# Patient Record
Sex: MALE | Race: BLACK OR AFRICAN AMERICAN | Employment: OTHER | URBAN - METROPOLITAN AREA
[De-identification: names, ages, dates, MRNs, and addresses within clinical notes are randomized per-mention and may not be internally consistent; named-entity substitution may affect disease eponyms.]

---

## 2017-03-01 ENCOUNTER — HOSPITAL ENCOUNTER (OUTPATIENT)
Dept: PAIN MANAGEMENT | Age: 62
Discharge: HOME | End: 2017-03-01
Payer: MEDICARE

## 2017-03-01 VITALS
TEMPERATURE: 98.4 F | HEART RATE: 91 BPM | DIASTOLIC BLOOD PRESSURE: 105 MMHG | SYSTOLIC BLOOD PRESSURE: 190 MMHG | RESPIRATION RATE: 16 BRPM

## 2017-03-01 DIAGNOSIS — M43.16 SPONDYLOLISTHESIS, LUMBAR REGION: Primary | ICD-10-CM

## 2017-03-01 DIAGNOSIS — M54.50 CHRONIC MIDLINE LOW BACK PAIN WITHOUT SCIATICA: ICD-10-CM

## 2017-03-01 DIAGNOSIS — G89.29 CHRONIC MIDLINE LOW BACK PAIN WITHOUT SCIATICA: ICD-10-CM

## 2017-03-01 PROCEDURE — 99214 OFFICE O/P EST MOD 30 MIN: CPT

## 2017-03-01 RX ORDER — MELOXICAM 15 MG/1
15 TABLET ORAL DAILY
Qty: 30 TABLET | Refills: 0 | Status: ON HOLD | OUTPATIENT
Start: 2017-03-06 | End: 2017-04-23 | Stop reason: HOSPADM

## 2017-03-01 RX ORDER — OXYCODONE HYDROCHLORIDE 5 MG/1
5 TABLET ORAL EVERY 6 HOURS PRN
Qty: 56 TABLET | Refills: 0 | Status: SHIPPED | OUTPATIENT
Start: 2017-03-06 | End: 2017-03-20 | Stop reason: SDUPTHER

## 2017-03-01 ASSESSMENT — PAIN DESCRIPTION - PROGRESSION: CLINICAL_PROGRESSION: NOT CHANGED

## 2017-03-01 ASSESSMENT — PAIN SCALES - GENERAL: PAINLEVEL_OUTOF10: 10

## 2017-03-01 ASSESSMENT — PAIN DESCRIPTION - FREQUENCY: FREQUENCY: CONTINUOUS

## 2017-03-01 ASSESSMENT — PAIN DESCRIPTION - PAIN TYPE: TYPE: CHRONIC PAIN

## 2017-03-01 ASSESSMENT — PAIN DESCRIPTION - LOCATION: LOCATION: BACK

## 2017-03-01 ASSESSMENT — PAIN DESCRIPTION - ONSET: ONSET: ON-GOING

## 2017-03-01 ASSESSMENT — PAIN DESCRIPTION - ORIENTATION: ORIENTATION: LOWER

## 2017-03-02 ENCOUNTER — OFFICE VISIT (OUTPATIENT)
Dept: INTERNAL MEDICINE | Facility: CLINIC | Age: 62
End: 2017-03-02

## 2017-03-02 VITALS
SYSTOLIC BLOOD PRESSURE: 134 MMHG | HEIGHT: 70 IN | HEART RATE: 104 BPM | BODY MASS INDEX: 29.92 KG/M2 | DIASTOLIC BLOOD PRESSURE: 82 MMHG | WEIGHT: 209 LBS | OXYGEN SATURATION: 100 %

## 2017-03-02 DIAGNOSIS — Z00.00 HEALTHCARE MAINTENANCE: ICD-10-CM

## 2017-03-02 DIAGNOSIS — G47.33 OSA (OBSTRUCTIVE SLEEP APNEA): Primary | ICD-10-CM

## 2017-03-02 DIAGNOSIS — J44.9 CHRONIC OBSTRUCTIVE PULMONARY DISEASE, UNSPECIFIED COPD TYPE (HCC): ICD-10-CM

## 2017-03-02 LAB — HBA1C MFR BLD: 5.6 %

## 2017-03-02 PROCEDURE — 3017F COLORECTAL CA SCREEN DOC REV: CPT | Performed by: INTERNAL MEDICINE

## 2017-03-02 PROCEDURE — G8484 FLU IMMUNIZE NO ADMIN: HCPCS | Performed by: INTERNAL MEDICINE

## 2017-03-02 PROCEDURE — 3023F SPIROM DOC REV: CPT | Performed by: INTERNAL MEDICINE

## 2017-03-02 PROCEDURE — G8419 CALC BMI OUT NRM PARAM NOF/U: HCPCS | Performed by: INTERNAL MEDICINE

## 2017-03-02 PROCEDURE — 99214 OFFICE O/P EST MOD 30 MIN: CPT | Performed by: INTERNAL MEDICINE

## 2017-03-02 PROCEDURE — 83036 HEMOGLOBIN GLYCOSYLATED A1C: CPT | Performed by: INTERNAL MEDICINE

## 2017-03-02 PROCEDURE — G8926 SPIRO NO PERF OR DOC: HCPCS | Performed by: INTERNAL MEDICINE

## 2017-03-02 PROCEDURE — 4004F PT TOBACCO SCREEN RCVD TLK: CPT | Performed by: INTERNAL MEDICINE

## 2017-03-02 PROCEDURE — G8427 DOCREV CUR MEDS BY ELIG CLIN: HCPCS | Performed by: INTERNAL MEDICINE

## 2017-03-02 RX ORDER — AMLODIPINE BESYLATE 5 MG/1
5 TABLET ORAL DAILY
Qty: 90 TABLET | Refills: 1 | Status: SHIPPED | OUTPATIENT
Start: 2017-03-02 | End: 2017-06-13 | Stop reason: SDUPTHER

## 2017-03-02 ASSESSMENT — COPD QUESTIONNAIRES: COPD: 1

## 2017-03-02 ASSESSMENT — PATIENT HEALTH QUESTIONNAIRE - PHQ9
SUM OF ALL RESPONSES TO PHQ9 QUESTIONS 1 & 2: 0
2. FEELING DOWN, DEPRESSED OR HOPELESS: 0
1. LITTLE INTEREST OR PLEASURE IN DOING THINGS: 0
SUM OF ALL RESPONSES TO PHQ9 QUESTIONS 1 & 2: 2
1. LITTLE INTEREST OR PLEASURE IN DOING THINGS: 1
SUM OF ALL RESPONSES TO PHQ QUESTIONS 1-9: 2
SUM OF ALL RESPONSES TO PHQ QUESTIONS 1-9: 0
2. FEELING DOWN, DEPRESSED OR HOPELESS: 1

## 2017-03-02 ASSESSMENT — ENCOUNTER SYMPTOMS: SHORTNESS OF BREATH: 1

## 2017-03-20 RX ORDER — OXYCODONE HYDROCHLORIDE 5 MG/1
5 TABLET ORAL EVERY 6 HOURS PRN
Qty: 56 TABLET | Refills: 0 | Status: SHIPPED | OUTPATIENT
Start: 2017-03-20 | End: 2017-03-30 | Stop reason: SDUPTHER

## 2017-03-30 ENCOUNTER — HOSPITAL ENCOUNTER (OUTPATIENT)
Dept: PAIN MANAGEMENT | Age: 62
Discharge: HOME | End: 2017-03-30
Payer: MEDICARE

## 2017-03-30 VITALS
TEMPERATURE: 98.2 F | DIASTOLIC BLOOD PRESSURE: 91 MMHG | SYSTOLIC BLOOD PRESSURE: 159 MMHG | WEIGHT: 214 LBS | RESPIRATION RATE: 18 BRPM | HEART RATE: 80 BPM | HEIGHT: 70 IN | BODY MASS INDEX: 30.64 KG/M2

## 2017-03-30 DIAGNOSIS — M25.552 HIP PAIN, LEFT: ICD-10-CM

## 2017-03-30 PROCEDURE — 99213 OFFICE O/P EST LOW 20 MIN: CPT

## 2017-03-30 PROCEDURE — 77002 NEEDLE LOCALIZATION BY XRAY: CPT

## 2017-03-30 PROCEDURE — 2500000003 HC RX 250 WO HCPCS

## 2017-03-30 PROCEDURE — 6360000002 HC RX W HCPCS

## 2017-03-30 PROCEDURE — 20610 DRAIN/INJ JOINT/BURSA W/O US: CPT

## 2017-03-30 RX ORDER — OXYCODONE HYDROCHLORIDE 5 MG/1
5 TABLET ORAL EVERY 6 HOURS PRN
Qty: 56 TABLET | Refills: 0 | Status: SHIPPED | OUTPATIENT
Start: 2017-04-03 | End: 2017-04-18 | Stop reason: SDUPTHER

## 2017-03-30 ASSESSMENT — ENCOUNTER SYMPTOMS
UNUSUAL HAIR DISTRIBUTION: 0
EYE DISCHARGE: 0
SUSPICIOUS LESIONS: 0
BLURRED VISION: 0

## 2017-03-30 ASSESSMENT — PAIN DESCRIPTION - ORIENTATION: ORIENTATION: RIGHT;LEFT

## 2017-03-30 ASSESSMENT — PAIN DESCRIPTION - PROGRESSION: CLINICAL_PROGRESSION: GRADUALLY WORSENING

## 2017-03-30 ASSESSMENT — PAIN DESCRIPTION - PAIN TYPE: TYPE: CHRONIC PAIN

## 2017-03-30 ASSESSMENT — PAIN DESCRIPTION - LOCATION: LOCATION: BACK;HIP;BUTTOCKS;LEG

## 2017-03-30 ASSESSMENT — PAIN SCALES - GENERAL: PAINLEVEL_OUTOF10: 10

## 2017-04-07 ENCOUNTER — HOSPITAL ENCOUNTER (OUTPATIENT)
Dept: PAIN MANAGEMENT | Age: 62
Discharge: HOME | End: 2017-04-07
Payer: MEDICARE

## 2017-04-07 VITALS
OXYGEN SATURATION: 95 % | HEIGHT: 70 IN | BODY MASS INDEX: 30.64 KG/M2 | RESPIRATION RATE: 16 BRPM | DIASTOLIC BLOOD PRESSURE: 90 MMHG | TEMPERATURE: 98 F | WEIGHT: 214 LBS | HEART RATE: 81 BPM | SYSTOLIC BLOOD PRESSURE: 141 MMHG

## 2017-04-07 DIAGNOSIS — M54.9 BACKACHE, UNSPECIFIED: ICD-10-CM

## 2017-04-07 PROCEDURE — G0260 INJ FOR SACROILIAC JT ANESTH: HCPCS

## 2017-04-07 PROCEDURE — 6360000002 HC RX W HCPCS: Performed by: PAIN MEDICINE

## 2017-04-07 PROCEDURE — 2580000003 HC RX 258: Performed by: PAIN MEDICINE

## 2017-04-07 RX ORDER — MIDAZOLAM HYDROCHLORIDE 1 MG/ML
2 INJECTION INTRAMUSCULAR; INTRAVENOUS ONCE
Status: COMPLETED | OUTPATIENT
Start: 2017-04-07 | End: 2017-04-07

## 2017-04-07 RX ORDER — SODIUM CHLORIDE, SODIUM LACTATE, POTASSIUM CHLORIDE, CALCIUM CHLORIDE 600; 310; 30; 20 MG/100ML; MG/100ML; MG/100ML; MG/100ML
INJECTION, SOLUTION INTRAVENOUS CONTINUOUS
Status: DISCONTINUED | OUTPATIENT
Start: 2017-04-07 | End: 2017-04-08 | Stop reason: HOSPADM

## 2017-04-07 RX ORDER — FENTANYL CITRATE 50 UG/ML
100 INJECTION, SOLUTION INTRAMUSCULAR; INTRAVENOUS ONCE
Status: DISCONTINUED | OUTPATIENT
Start: 2017-04-07 | End: 2017-04-08 | Stop reason: HOSPADM

## 2017-04-07 RX ADMIN — MIDAZOLAM HYDROCHLORIDE 2 MG: 1 INJECTION, SOLUTION INTRAMUSCULAR; INTRAVENOUS at 10:11

## 2017-04-07 RX ADMIN — SODIUM CHLORIDE, POTASSIUM CHLORIDE, SODIUM LACTATE AND CALCIUM CHLORIDE: 600; 310; 30; 20 INJECTION, SOLUTION INTRAVENOUS at 09:58

## 2017-04-07 ASSESSMENT — PAIN - FUNCTIONAL ASSESSMENT
PAIN_FUNCTIONAL_ASSESSMENT: 0-10

## 2017-04-07 ASSESSMENT — PAIN DESCRIPTION - DESCRIPTORS: DESCRIPTORS: CONSTANT;NUMBNESS;STABBING

## 2017-04-13 ENCOUNTER — HOSPITAL ENCOUNTER (OUTPATIENT)
Dept: MRI IMAGING | Age: 62
Discharge: HOME | End: 2017-04-13
Payer: MEDICARE

## 2017-04-13 DIAGNOSIS — M51.36 LUMBAR DEGENERATIVE DISC DISEASE: ICD-10-CM

## 2017-04-13 DIAGNOSIS — M54.16 LUMBAR RADICULOPATHY: ICD-10-CM

## 2017-04-13 PROCEDURE — A9579 GAD-BASE MR CONTRAST NOS,1ML: HCPCS | Performed by: PHYSICIAN ASSISTANT

## 2017-04-13 PROCEDURE — 72158 MRI LUMBAR SPINE W/O & W/DYE: CPT

## 2017-04-13 PROCEDURE — 6360000004 HC RX CONTRAST MEDICATION: Performed by: PHYSICIAN ASSISTANT

## 2017-04-13 RX ADMIN — GADOPENTETATE DIMEGLUMINE 20 ML: 469.01 INJECTION INTRAVENOUS at 09:34

## 2017-04-21 ENCOUNTER — APPOINTMENT (OUTPATIENT)
Dept: CT IMAGING | Age: 62
DRG: 066 | End: 2017-04-21
Payer: MEDICARE

## 2017-04-21 ENCOUNTER — TELEPHONE (OUTPATIENT)
Dept: PULMONOLOGY | Age: 62
End: 2017-04-21

## 2017-04-21 ENCOUNTER — APPOINTMENT (OUTPATIENT)
Dept: MRI IMAGING | Age: 62
DRG: 066 | End: 2017-04-21
Payer: MEDICARE

## 2017-04-21 ENCOUNTER — HOSPITAL ENCOUNTER (INPATIENT)
Age: 62
LOS: 2 days | Discharge: HOME | DRG: 066 | End: 2017-04-23
Attending: EMERGENCY MEDICINE | Admitting: INTERNAL MEDICINE
Payer: MEDICARE

## 2017-04-21 DIAGNOSIS — I63.9 CEREBROVASCULAR ACCIDENT (CVA), UNSPECIFIED MECHANISM (HCC): ICD-10-CM

## 2017-04-21 DIAGNOSIS — H53.9 VISION DISTURBANCE: ICD-10-CM

## 2017-04-21 DIAGNOSIS — R29.90 STROKE-LIKE SYMPTOMS: Primary | ICD-10-CM

## 2017-04-21 LAB
% CKMB: 1.6 % (ref 0–3.5)
ABSOLUTE EOS #: 0.2 K/UL (ref 0–0.4)
ABSOLUTE LYMPH #: 1.7 K/UL (ref 1–4.8)
ABSOLUTE MONO #: 0.4 K/UL (ref 0.1–1.2)
ANION GAP SERPL CALCULATED.3IONS-SCNC: 12 MMOL/L (ref 9–17)
BASOPHILS # BLD: 2 % (ref 0–2)
BASOPHILS ABSOLUTE: 0.1 K/UL (ref 0–0.2)
BUN BLDV-MCNC: 13 MG/DL (ref 6–20)
BUN/CREAT BLD: ABNORMAL (ref 9–20)
CALCIUM SERPL-MCNC: 8.4 MG/DL (ref 8.6–10.4)
CHLORIDE BLD-SCNC: 102 MMOL/L (ref 98–107)
CK MB: 2.3 NG/ML
CKMB INTERPRETATION: ABNORMAL
CO2: 24 MMOL/L (ref 20–31)
CREAT SERPL-MCNC: 0.86 MG/DL (ref 0.7–1.2)
DIFFERENTIAL TYPE: ABNORMAL
EOSINOPHILS RELATIVE PERCENT: 3 % (ref 1–4)
GFR AFRICAN AMERICAN: >60 ML/MIN
GFR NON-AFRICAN AMERICAN: >60 ML/MIN
GFR SERPL CREATININE-BSD FRML MDRD: ABNORMAL ML/MIN/{1.73_M2}
GFR SERPL CREATININE-BSD FRML MDRD: ABNORMAL ML/MIN/{1.73_M2}
GLUCOSE BLD-MCNC: 109 MG/DL (ref 70–99)
GLUCOSE BLD-MCNC: 98 MG/DL (ref 75–110)
HCT VFR BLD CALC: 41.7 % (ref 41–53)
HEMOGLOBIN: 14.5 G/DL (ref 13.5–17.5)
INR BLD: 1
LYMPHOCYTES # BLD: 28 % (ref 24–44)
MCH RBC QN AUTO: 28.5 PG (ref 26–34)
MCHC RBC AUTO-ENTMCNC: 34.7 G/DL (ref 31–37)
MCV RBC AUTO: 82.1 FL (ref 80–100)
MONOCYTES # BLD: 6 % (ref 2–11)
MYOGLOBIN: 32 NG/ML (ref 28–72)
PARTIAL THROMBOPLASTIN TIME: 26.8 SEC (ref 21.3–31.3)
PDW BLD-RTO: 14.8 % (ref 12.5–15.4)
PLATELET # BLD: 178 K/UL (ref 140–450)
PLATELET ESTIMATE: ABNORMAL
PMV BLD AUTO: 8.4 FL (ref 6–12)
POC TROPONIN I: 0 NG/ML (ref 0–0.1)
POC TROPONIN INTERP: NORMAL
POTASSIUM SERPL-SCNC: 3.8 MMOL/L (ref 3.7–5.3)
PROTHROMBIN TIME: 10.6 SEC (ref 9.4–12.6)
RBC # BLD: 5.08 M/UL (ref 4.5–5.9)
RBC # BLD: ABNORMAL 10*6/UL
SEG NEUTROPHILS: 61 % (ref 36–66)
SEGMENTED NEUTROPHILS ABSOLUTE COUNT: 3.7 K/UL (ref 1.8–7.7)
SODIUM BLD-SCNC: 138 MMOL/L (ref 135–144)
TOTAL CK: 141 U/L (ref 39–308)
TROPONIN INTERP: ABNORMAL
TROPONIN T: <0.03 NG/ML
WBC # BLD: 6.2 K/UL (ref 3.5–11)
WBC # BLD: ABNORMAL 10*3/UL

## 2017-04-21 PROCEDURE — 2060000000 HC ICU INTERMEDIATE R&B

## 2017-04-21 PROCEDURE — 93005 ELECTROCARDIOGRAM TRACING: CPT

## 2017-04-21 PROCEDURE — 82947 ASSAY GLUCOSE BLOOD QUANT: CPT

## 2017-04-21 PROCEDURE — 85025 COMPLETE CBC W/AUTO DIFF WBC: CPT

## 2017-04-21 PROCEDURE — 84484 ASSAY OF TROPONIN QUANT: CPT

## 2017-04-21 PROCEDURE — 6370000000 HC RX 637 (ALT 250 FOR IP): Performed by: INTERNAL MEDICINE

## 2017-04-21 PROCEDURE — 99285 EMERGENCY DEPT VISIT HI MDM: CPT

## 2017-04-21 PROCEDURE — 70498 CT ANGIOGRAPHY NECK: CPT

## 2017-04-21 PROCEDURE — 83874 ASSAY OF MYOGLOBIN: CPT

## 2017-04-21 PROCEDURE — 82550 ASSAY OF CK (CPK): CPT

## 2017-04-21 PROCEDURE — 70496 CT ANGIOGRAPHY HEAD: CPT

## 2017-04-21 PROCEDURE — 70551 MRI BRAIN STEM W/O DYE: CPT

## 2017-04-21 PROCEDURE — 82553 CREATINE MB FRACTION: CPT

## 2017-04-21 PROCEDURE — 6360000002 HC RX W HCPCS: Performed by: INTERNAL MEDICINE

## 2017-04-21 PROCEDURE — 70450 CT HEAD/BRAIN W/O DYE: CPT

## 2017-04-21 PROCEDURE — 80048 BASIC METABOLIC PNL TOTAL CA: CPT

## 2017-04-21 PROCEDURE — 6370000000 HC RX 637 (ALT 250 FOR IP): Performed by: PREVENTIVE MEDICINE

## 2017-04-21 PROCEDURE — 99222 1ST HOSP IP/OBS MODERATE 55: CPT | Performed by: PSYCHIATRY & NEUROLOGY

## 2017-04-21 PROCEDURE — 2580000003 HC RX 258: Performed by: INTERNAL MEDICINE

## 2017-04-21 PROCEDURE — 85610 PROTHROMBIN TIME: CPT

## 2017-04-21 PROCEDURE — 85730 THROMBOPLASTIN TIME PARTIAL: CPT

## 2017-04-21 PROCEDURE — 6360000004 HC RX CONTRAST MEDICATION: Performed by: EMERGENCY MEDICINE

## 2017-04-21 RX ORDER — SODIUM CHLORIDE 0.9 % (FLUSH) 0.9 %
10 SYRINGE (ML) INJECTION EVERY 12 HOURS SCHEDULED
Status: DISCONTINUED | OUTPATIENT
Start: 2017-04-21 | End: 2017-04-23 | Stop reason: HOSPADM

## 2017-04-21 RX ORDER — CLOPIDOGREL BISULFATE 75 MG/1
75 TABLET ORAL ONCE
Status: COMPLETED | OUTPATIENT
Start: 2017-04-21 | End: 2017-04-21

## 2017-04-21 RX ORDER — ACETAMINOPHEN 325 MG/1
650 TABLET ORAL EVERY 4 HOURS PRN
Status: DISCONTINUED | OUTPATIENT
Start: 2017-04-21 | End: 2017-04-23 | Stop reason: HOSPADM

## 2017-04-21 RX ORDER — QUETIAPINE FUMARATE 50 MG/1
50 TABLET, EXTENDED RELEASE ORAL NIGHTLY
Status: DISCONTINUED | OUTPATIENT
Start: 2017-04-21 | End: 2017-04-23 | Stop reason: HOSPADM

## 2017-04-21 RX ORDER — ASPIRIN 325 MG
325 TABLET ORAL DAILY
Status: DISCONTINUED | OUTPATIENT
Start: 2017-04-21 | End: 2017-04-22

## 2017-04-21 RX ORDER — ONDANSETRON 2 MG/ML
4 INJECTION INTRAMUSCULAR; INTRAVENOUS EVERY 6 HOURS PRN
Status: DISCONTINUED | OUTPATIENT
Start: 2017-04-21 | End: 2017-04-23 | Stop reason: HOSPADM

## 2017-04-21 RX ORDER — SODIUM CHLORIDE 0.9 % (FLUSH) 0.9 %
10 SYRINGE (ML) INJECTION PRN
Status: DISCONTINUED | OUTPATIENT
Start: 2017-04-21 | End: 2017-04-23 | Stop reason: HOSPADM

## 2017-04-21 RX ORDER — AMLODIPINE BESYLATE 5 MG/1
5 TABLET ORAL DAILY
Status: DISCONTINUED | OUTPATIENT
Start: 2017-04-21 | End: 2017-04-23 | Stop reason: HOSPADM

## 2017-04-21 RX ADMIN — QUETIAPINE FUMARATE 50 MG: 50 TABLET, EXTENDED RELEASE ORAL at 20:05

## 2017-04-21 RX ADMIN — IOVERSOL 90 ML: 741 INJECTION INTRA-ARTERIAL; INTRAVENOUS at 11:03

## 2017-04-21 RX ADMIN — Medication 10 ML: at 20:07

## 2017-04-21 RX ADMIN — ENOXAPARIN SODIUM 40 MG: 40 INJECTION SUBCUTANEOUS at 18:58

## 2017-04-21 RX ADMIN — ASPIRIN 325 MG: 325 TABLET ORAL at 12:20

## 2017-04-21 RX ADMIN — CLOPIDOGREL 75 MG: 75 TABLET, FILM COATED ORAL at 12:20

## 2017-04-21 RX ADMIN — AMLODIPINE BESYLATE 5 MG: 5 TABLET ORAL at 18:58

## 2017-04-21 ASSESSMENT — PAIN SCALES - GENERAL
PAINLEVEL_OUTOF10: 0

## 2017-04-21 ASSESSMENT — ENCOUNTER SYMPTOMS
BLOOD IN STOOL: 0
EYE REDNESS: 0
WHEEZING: 0
COUGH: 0
VOMITING: 0
EYE DISCHARGE: 0
CHEST TIGHTNESS: 0
TROUBLE SWALLOWING: 0
NAUSEA: 0
DIARRHEA: 0
SHORTNESS OF BREATH: 0
VOICE CHANGE: 0
CONSTIPATION: 0
ABDOMINAL PAIN: 0

## 2017-04-22 PROBLEM — H53.9 VISION CHANGES: Status: ACTIVE | Noted: 2017-04-22

## 2017-04-22 PROBLEM — I63.9 CVA (CEREBRAL VASCULAR ACCIDENT) (HCC): Status: ACTIVE | Noted: 2017-04-22

## 2017-04-22 LAB
ANION GAP SERPL CALCULATED.3IONS-SCNC: 12 MMOL/L (ref 9–17)
BUN BLDV-MCNC: 13 MG/DL (ref 6–20)
BUN/CREAT BLD: ABNORMAL (ref 9–20)
CALCIUM SERPL-MCNC: 8.2 MG/DL (ref 8.6–10.4)
CHLORIDE BLD-SCNC: 103 MMOL/L (ref 98–107)
CO2: 27 MMOL/L (ref 20–31)
CREAT SERPL-MCNC: 1.01 MG/DL (ref 0.7–1.2)
EKG ATRIAL RATE: 76 BPM
EKG P AXIS: 55 DEGREES
EKG P-R INTERVAL: 194 MS
EKG Q-T INTERVAL: 390 MS
EKG QRS DURATION: 78 MS
EKG QTC CALCULATION (BAZETT): 438 MS
EKG R AXIS: 43 DEGREES
EKG T AXIS: 11 DEGREES
EKG VENTRICULAR RATE: 76 BPM
GFR AFRICAN AMERICAN: >60 ML/MIN
GFR NON-AFRICAN AMERICAN: >60 ML/MIN
GFR SERPL CREATININE-BSD FRML MDRD: ABNORMAL ML/MIN/{1.73_M2}
GFR SERPL CREATININE-BSD FRML MDRD: ABNORMAL ML/MIN/{1.73_M2}
GLUCOSE BLD-MCNC: 101 MG/DL (ref 70–99)
HCT VFR BLD CALC: 39.8 % (ref 41–53)
HEMOGLOBIN: 13.6 G/DL (ref 13.5–17.5)
MCH RBC QN AUTO: 28.4 PG (ref 26–34)
MCHC RBC AUTO-ENTMCNC: 34.3 G/DL (ref 31–37)
MCV RBC AUTO: 82.8 FL (ref 80–100)
PDW BLD-RTO: 14.5 % (ref 12.5–15.4)
PLATELET # BLD: 179 K/UL (ref 140–450)
PMV BLD AUTO: 8.3 FL (ref 6–12)
POTASSIUM SERPL-SCNC: 3.8 MMOL/L (ref 3.7–5.3)
RBC # BLD: 4.8 M/UL (ref 4.5–5.9)
SODIUM BLD-SCNC: 142 MMOL/L (ref 135–144)
WBC # BLD: 6.1 K/UL (ref 3.5–11)

## 2017-04-22 PROCEDURE — G8978 MOBILITY CURRENT STATUS: HCPCS

## 2017-04-22 PROCEDURE — 99222 1ST HOSP IP/OBS MODERATE 55: CPT | Performed by: INTERNAL MEDICINE

## 2017-04-22 PROCEDURE — 97535 SELF CARE MNGMENT TRAINING: CPT

## 2017-04-22 PROCEDURE — 85027 COMPLETE CBC AUTOMATED: CPT

## 2017-04-22 PROCEDURE — 94640 AIRWAY INHALATION TREATMENT: CPT

## 2017-04-22 PROCEDURE — 97162 PT EVAL MOD COMPLEX 30 MIN: CPT

## 2017-04-22 PROCEDURE — 6370000000 HC RX 637 (ALT 250 FOR IP): Performed by: PSYCHIATRY & NEUROLOGY

## 2017-04-22 PROCEDURE — 36415 COLL VENOUS BLD VENIPUNCTURE: CPT

## 2017-04-22 PROCEDURE — 80048 BASIC METABOLIC PNL TOTAL CA: CPT

## 2017-04-22 PROCEDURE — 97165 OT EVAL LOW COMPLEX 30 MIN: CPT

## 2017-04-22 PROCEDURE — 6370000000 HC RX 637 (ALT 250 FOR IP): Performed by: INTERNAL MEDICINE

## 2017-04-22 PROCEDURE — 6370000000 HC RX 637 (ALT 250 FOR IP): Performed by: PREVENTIVE MEDICINE

## 2017-04-22 PROCEDURE — 97530 THERAPEUTIC ACTIVITIES: CPT

## 2017-04-22 PROCEDURE — G8980 MOBILITY D/C STATUS: HCPCS

## 2017-04-22 PROCEDURE — 6360000002 HC RX W HCPCS: Performed by: INTERNAL MEDICINE

## 2017-04-22 PROCEDURE — 2580000003 HC RX 258: Performed by: INTERNAL MEDICINE

## 2017-04-22 PROCEDURE — 83036 HEMOGLOBIN GLYCOSYLATED A1C: CPT

## 2017-04-22 PROCEDURE — G8979 MOBILITY GOAL STATUS: HCPCS

## 2017-04-22 PROCEDURE — 2060000000 HC ICU INTERMEDIATE R&B

## 2017-04-22 PROCEDURE — 99222 1ST HOSP IP/OBS MODERATE 55: CPT | Performed by: PSYCHIATRY & NEUROLOGY

## 2017-04-22 PROCEDURE — 97116 GAIT TRAINING THERAPY: CPT

## 2017-04-22 RX ORDER — ASPIRIN 81 MG/1
81 TABLET, CHEWABLE ORAL DAILY
Status: DISCONTINUED | OUTPATIENT
Start: 2017-04-23 | End: 2017-04-23 | Stop reason: HOSPADM

## 2017-04-22 RX ORDER — SIMVASTATIN 40 MG
80 TABLET ORAL NIGHTLY
Status: DISCONTINUED | OUTPATIENT
Start: 2017-04-22 | End: 2017-04-23 | Stop reason: HOSPADM

## 2017-04-22 RX ORDER — ALBUTEROL SULFATE 2.5 MG/3ML
2.5 SOLUTION RESPIRATORY (INHALATION) 4 TIMES DAILY
Status: DISCONTINUED | OUTPATIENT
Start: 2017-04-22 | End: 2017-04-23 | Stop reason: HOSPADM

## 2017-04-22 RX ORDER — CLOPIDOGREL BISULFATE 75 MG/1
75 TABLET ORAL DAILY
Status: DISCONTINUED | OUTPATIENT
Start: 2017-04-22 | End: 2017-04-23 | Stop reason: HOSPADM

## 2017-04-22 RX ORDER — NICOTINE 21 MG/24HR
1 PATCH, TRANSDERMAL 24 HOURS TRANSDERMAL DAILY
Status: DISCONTINUED | OUTPATIENT
Start: 2017-04-22 | End: 2017-04-23 | Stop reason: HOSPADM

## 2017-04-22 RX ADMIN — SIMVASTATIN 80 MG: 40 TABLET, FILM COATED ORAL at 20:18

## 2017-04-22 RX ADMIN — ALBUTEROL SULFATE 2.5 MG: 2.5 SOLUTION RESPIRATORY (INHALATION) at 16:46

## 2017-04-22 RX ADMIN — ASPIRIN 325 MG: 325 TABLET ORAL at 08:41

## 2017-04-22 RX ADMIN — AMLODIPINE BESYLATE 5 MG: 5 TABLET ORAL at 08:41

## 2017-04-22 RX ADMIN — Medication 10 ML: at 20:15

## 2017-04-22 RX ADMIN — ENOXAPARIN SODIUM 40 MG: 40 INJECTION SUBCUTANEOUS at 08:41

## 2017-04-22 RX ADMIN — QUETIAPINE FUMARATE 50 MG: 50 TABLET, EXTENDED RELEASE ORAL at 20:13

## 2017-04-22 RX ADMIN — ALBUTEROL SULFATE 2.5 MG: 2.5 SOLUTION RESPIRATORY (INHALATION) at 20:59

## 2017-04-22 RX ADMIN — Medication 10 ML: at 08:42

## 2017-04-22 RX ADMIN — CLOPIDOGREL 75 MG: 75 TABLET, FILM COATED ORAL at 18:52

## 2017-04-22 ASSESSMENT — PAIN SCALES - GENERAL
PAINLEVEL_OUTOF10: 0
PAINLEVEL_OUTOF10: 0

## 2017-04-23 VITALS
WEIGHT: 207 LBS | DIASTOLIC BLOOD PRESSURE: 58 MMHG | TEMPERATURE: 98 F | HEIGHT: 72 IN | RESPIRATION RATE: 16 BRPM | BODY MASS INDEX: 28.04 KG/M2 | SYSTOLIC BLOOD PRESSURE: 116 MMHG | OXYGEN SATURATION: 98 % | HEART RATE: 97 BPM

## 2017-04-23 PROBLEM — E55.9 VITAMIN D DEFICIENCY: Status: ACTIVE | Noted: 2017-04-23

## 2017-04-23 PROBLEM — E78.00 ELEVATED LDL CHOLESTEROL LEVEL: Status: ACTIVE | Noted: 2017-04-23

## 2017-04-23 LAB
CHOLESTEROL/HDL RATIO: 4.3
CHOLESTEROL: 194 MG/DL
HDLC SERPL-MCNC: 45 MG/DL
LDL CHOLESTEROL: 123 MG/DL (ref 0–130)
TRIGL SERPL-MCNC: 131 MG/DL
VITAMIN D 25-HYDROXY: 10.8 NG/ML (ref 30–100)
VLDLC SERPL CALC-MCNC: NORMAL MG/DL (ref 1–30)

## 2017-04-23 PROCEDURE — 6370000000 HC RX 637 (ALT 250 FOR IP): Performed by: PSYCHIATRY & NEUROLOGY

## 2017-04-23 PROCEDURE — 6360000002 HC RX W HCPCS: Performed by: INTERNAL MEDICINE

## 2017-04-23 PROCEDURE — 6370000000 HC RX 637 (ALT 250 FOR IP): Performed by: INTERNAL MEDICINE

## 2017-04-23 PROCEDURE — 82306 VITAMIN D 25 HYDROXY: CPT

## 2017-04-23 PROCEDURE — 94640 AIRWAY INHALATION TREATMENT: CPT

## 2017-04-23 PROCEDURE — 99232 SBSQ HOSP IP/OBS MODERATE 35: CPT | Performed by: INTERNAL MEDICINE

## 2017-04-23 PROCEDURE — 2580000003 HC RX 258: Performed by: INTERNAL MEDICINE

## 2017-04-23 PROCEDURE — 80061 LIPID PANEL: CPT

## 2017-04-23 PROCEDURE — 36415 COLL VENOUS BLD VENIPUNCTURE: CPT

## 2017-04-23 RX ORDER — SIMVASTATIN 80 MG
80 TABLET ORAL NIGHTLY
Qty: 30 TABLET | Refills: 3 | Status: SHIPPED | OUTPATIENT
Start: 2017-04-23 | End: 2017-06-13 | Stop reason: SDUPTHER

## 2017-04-23 RX ORDER — ALBUTEROL SULFATE 2.5 MG/3ML
2.5 SOLUTION RESPIRATORY (INHALATION) 4 TIMES DAILY
Qty: 120 EACH | Refills: 3 | Status: SHIPPED | OUTPATIENT
Start: 2017-04-23 | End: 2018-03-16

## 2017-04-23 RX ORDER — ERGOCALCIFEROL 1.25 MG/1
50000 CAPSULE ORAL WEEKLY
Status: DISCONTINUED | OUTPATIENT
Start: 2017-04-23 | End: 2017-04-23 | Stop reason: HOSPADM

## 2017-04-23 RX ORDER — ASPIRIN 81 MG/1
81 TABLET, CHEWABLE ORAL DAILY
Qty: 30 TABLET | Refills: 3 | Status: ON HOLD | OUTPATIENT
Start: 2017-04-23 | End: 2017-05-03 | Stop reason: HOSPADM

## 2017-04-23 RX ORDER — NICOTINE 21 MG/24HR
1 PATCH, TRANSDERMAL 24 HOURS TRANSDERMAL DAILY
Qty: 14 PATCH | Refills: 1 | Status: SHIPPED | OUTPATIENT
Start: 2017-04-23 | End: 2017-05-31 | Stop reason: ALTCHOICE

## 2017-04-23 RX ORDER — ERGOCALCIFEROL (VITAMIN D2) 1250 MCG
50000 CAPSULE ORAL WEEKLY
Qty: 12 CAPSULE | Refills: 0 | Status: SHIPPED | OUTPATIENT
Start: 2017-04-23 | End: 2017-05-03

## 2017-04-23 RX ORDER — ALBUTEROL SULFATE 90 UG/1
2 AEROSOL, METERED RESPIRATORY (INHALATION) EVERY 6 HOURS PRN
Qty: 1 INHALER | Refills: 3 | Status: SHIPPED | OUTPATIENT
Start: 2017-04-23 | End: 2018-03-16

## 2017-04-23 RX ORDER — CLOPIDOGREL BISULFATE 75 MG/1
75 TABLET ORAL DAILY
Qty: 21 TABLET | Refills: 0 | Status: SHIPPED | OUTPATIENT
Start: 2017-04-23 | End: 2017-06-13 | Stop reason: SDUPTHER

## 2017-04-23 RX ADMIN — ALBUTEROL SULFATE 2.5 MG: 2.5 SOLUTION RESPIRATORY (INHALATION) at 11:41

## 2017-04-23 RX ADMIN — AMLODIPINE BESYLATE 5 MG: 5 TABLET ORAL at 08:42

## 2017-04-23 RX ADMIN — ALBUTEROL SULFATE 2.5 MG: 2.5 SOLUTION RESPIRATORY (INHALATION) at 09:11

## 2017-04-23 RX ADMIN — ERGOCALCIFEROL 50000 UNITS: 1.25 CAPSULE ORAL at 11:58

## 2017-04-23 RX ADMIN — ASPIRIN 81 MG: 81 TABLET, CHEWABLE ORAL at 08:42

## 2017-04-23 RX ADMIN — CLOPIDOGREL 75 MG: 75 TABLET, FILM COATED ORAL at 08:42

## 2017-04-23 RX ADMIN — ENOXAPARIN SODIUM 40 MG: 40 INJECTION SUBCUTANEOUS at 08:42

## 2017-04-23 RX ADMIN — Medication 10 ML: at 08:43

## 2017-04-23 ASSESSMENT — PAIN SCALES - GENERAL
PAINLEVEL_OUTOF10: 0
PAINLEVEL_OUTOF10: 0

## 2017-04-24 ENCOUNTER — TELEPHONE (OUTPATIENT)
Dept: INTERNAL MEDICINE | Age: 62
End: 2017-04-24

## 2017-04-24 LAB
ESTIMATED AVERAGE GLUCOSE: 120 MG/DL
HBA1C MFR BLD: 5.8 % (ref 4–6)

## 2017-05-02 ENCOUNTER — APPOINTMENT (OUTPATIENT)
Dept: CT IMAGING | Age: 62
DRG: 066 | End: 2017-05-02
Payer: MEDICARE

## 2017-05-02 ENCOUNTER — HOSPITAL ENCOUNTER (INPATIENT)
Age: 62
LOS: 1 days | Discharge: HOME | DRG: 066 | End: 2017-05-03
Attending: EMERGENCY MEDICINE | Admitting: INTERNAL MEDICINE
Payer: MEDICARE

## 2017-05-02 ENCOUNTER — APPOINTMENT (OUTPATIENT)
Dept: GENERAL RADIOLOGY | Age: 62
DRG: 066 | End: 2017-05-02
Payer: MEDICARE

## 2017-05-02 ENCOUNTER — APPOINTMENT (OUTPATIENT)
Dept: MRI IMAGING | Age: 62
DRG: 066 | End: 2017-05-02
Payer: MEDICARE

## 2017-05-02 DIAGNOSIS — R29.90 STROKE-LIKE SYMPTOMS: Primary | ICD-10-CM

## 2017-05-02 LAB
% CKMB: 1.3 % (ref 0–3.5)
ABSOLUTE EOS #: 0.3 K/UL (ref 0–0.4)
ABSOLUTE LYMPH #: 2.3 K/UL (ref 1–4.8)
ABSOLUTE MONO #: 0.5 K/UL (ref 0.1–1.2)
ANION GAP SERPL CALCULATED.3IONS-SCNC: 14 MMOL/L (ref 9–17)
ANION GAP: 14 MMOL/L (ref 8–16)
BASOPHILS # BLD: 2 %
BASOPHILS ABSOLUTE: 0.1 K/UL (ref 0–0.2)
BUN BLDV-MCNC: 18 MG/DL (ref 6–20)
BUN/CREAT BLD: ABNORMAL (ref 9–20)
CALCIUM SERPL-MCNC: 8.5 MG/DL (ref 8.6–10.4)
CHLORIDE BLD-SCNC: 108 MMOL/L (ref 98–107)
CK MB: 2.2 NG/ML
CKMB INTERPRETATION: ABNORMAL
CO2: 22 MMOL/L (ref 20–31)
CREAT SERPL-MCNC: 1.09 MG/DL (ref 0.7–1.2)
DIFFERENTIAL TYPE: ABNORMAL
EOSINOPHILS RELATIVE PERCENT: 5 %
GFR AFRICAN AMERICAN: >60 ML/MIN
GFR NON-AFRICAN AMERICAN: >60 ML/MIN
GFR SERPL CREATININE-BSD FRML MDRD: ABNORMAL ML/MIN/{1.73_M2}
GFR SERPL CREATININE-BSD FRML MDRD: ABNORMAL ML/MIN/{1.73_M2}
GLUCOSE BLD-MCNC: 103 MG/DL (ref 74–106)
GLUCOSE BLD-MCNC: 111 MG/DL (ref 70–99)
HCO3 VENOUS: 25.7 MMOL/L (ref 24–30)
HCT VFR BLD CALC: 40 % (ref 41–53)
HEMOGLOBIN: 13.5 G/DL (ref 13.5–17.5)
INR BLD: 0.9
LYMPHOCYTES # BLD: 34 %
MCH RBC QN AUTO: 28 PG (ref 26–34)
MCHC RBC AUTO-ENTMCNC: 33.8 G/DL (ref 31–37)
MCV RBC AUTO: 82.9 FL (ref 80–100)
MONOCYTES # BLD: 8 %
MYOGLOBIN: 32 NG/ML (ref 28–72)
NEGATIVE BASE EXCESS, VEN: ABNORMAL (ref 0–2)
PARTIAL THROMBOPLASTIN TIME: 26.3 SEC (ref 21.3–31.3)
PCO2, VEN: 45 MM HG (ref 39–55)
PDW BLD-RTO: 14.8 % (ref 12.5–15.4)
PH VENOUS: 7.37 (ref 7.32–7.42)
PLATELET # BLD: 203 K/UL (ref 140–450)
PLATELET ESTIMATE: ABNORMAL
PMV BLD AUTO: 8.5 FL (ref 6–12)
POC BUN: 19 MG/DL (ref 6–20)
POC CHLORIDE: 110 MMOL/L (ref 98–110)
POC CREATININE WHOLE BLOOD: 1.2
POC CREATININE: 1.2 MG/DL (ref 0.6–1.4)
POC HEMATOCRIT: 39 % (ref 41–53)
POC HEMOGLOBIN: 13.3 GM/DL (ref 13.5–17.5)
POC POTASSIUM: 3.9 MMOL/L (ref 3.5–5.1)
POC SODIUM: 146 MMOL/L (ref 136–145)
POC TROPONIN I: 0 NG/ML (ref 0–0.1)
POC TROPONIN INTERP: NORMAL
POSITIVE BASE EXCESS, VEN: 0 (ref 0–2)
POTASSIUM SERPL-SCNC: 4 MMOL/L (ref 3.7–5.3)
PROTHROMBIN TIME: 10 SEC (ref 9.4–12.6)
RBC # BLD: 4.83 M/UL (ref 4.5–5.9)
RBC # BLD: ABNORMAL 10*6/UL
SEG NEUTROPHILS: 51 %
SEGMENTED NEUTROPHILS ABSOLUTE COUNT: 3.5 K/UL (ref 1.8–7.7)
SODIUM BLD-SCNC: 144 MMOL/L (ref 135–144)
TOTAL CK: 176 U/L (ref 39–308)
TOTAL CO2, VENOUS: 27 MM HG (ref 25–31)
TROPONIN INTERP: ABNORMAL
TROPONIN T: <0.03 NG/ML
WBC # BLD: 6.7 K/UL (ref 3.5–11)
WBC # BLD: ABNORMAL 10*3/UL

## 2017-05-02 PROCEDURE — 84295 ASSAY OF SERUM SODIUM: CPT

## 2017-05-02 PROCEDURE — 80048 BASIC METABOLIC PNL TOTAL CA: CPT

## 2017-05-02 PROCEDURE — 82553 CREATINE MB FRACTION: CPT

## 2017-05-02 PROCEDURE — 84484 ASSAY OF TROPONIN QUANT: CPT

## 2017-05-02 PROCEDURE — 84132 ASSAY OF SERUM POTASSIUM: CPT

## 2017-05-02 PROCEDURE — 2580000003 HC RX 258: Performed by: EMERGENCY MEDICINE

## 2017-05-02 PROCEDURE — 6360000004 HC RX CONTRAST MEDICATION: Performed by: EMERGENCY MEDICINE

## 2017-05-02 PROCEDURE — 93005 ELECTROCARDIOGRAM TRACING: CPT

## 2017-05-02 PROCEDURE — 82803 BLOOD GASES ANY COMBINATION: CPT

## 2017-05-02 PROCEDURE — 82565 ASSAY OF CREATININE: CPT

## 2017-05-02 PROCEDURE — 82947 ASSAY GLUCOSE BLOOD QUANT: CPT

## 2017-05-02 PROCEDURE — 85025 COMPLETE CBC W/AUTO DIFF WBC: CPT

## 2017-05-02 PROCEDURE — 70498 CT ANGIOGRAPHY NECK: CPT

## 2017-05-02 PROCEDURE — 85730 THROMBOPLASTIN TIME PARTIAL: CPT

## 2017-05-02 PROCEDURE — 83874 ASSAY OF MYOGLOBIN: CPT

## 2017-05-02 PROCEDURE — 70551 MRI BRAIN STEM W/O DYE: CPT

## 2017-05-02 PROCEDURE — 84520 ASSAY OF UREA NITROGEN: CPT

## 2017-05-02 PROCEDURE — 70496 CT ANGIOGRAPHY HEAD: CPT

## 2017-05-02 PROCEDURE — 2060000000 HC ICU INTERMEDIATE R&B

## 2017-05-02 PROCEDURE — 85014 HEMATOCRIT: CPT

## 2017-05-02 PROCEDURE — 99223 1ST HOSP IP/OBS HIGH 75: CPT | Performed by: PSYCHIATRY & NEUROLOGY

## 2017-05-02 PROCEDURE — 82550 ASSAY OF CK (CPK): CPT

## 2017-05-02 PROCEDURE — 99285 EMERGENCY DEPT VISIT HI MDM: CPT

## 2017-05-02 PROCEDURE — 71020 XR CHEST STANDARD TWO VW: CPT

## 2017-05-02 PROCEDURE — 82435 ASSAY OF BLOOD CHLORIDE: CPT

## 2017-05-02 PROCEDURE — 70450 CT HEAD/BRAIN W/O DYE: CPT

## 2017-05-02 PROCEDURE — 85610 PROTHROMBIN TIME: CPT

## 2017-05-02 RX ORDER — SODIUM CHLORIDE 9 MG/ML
INJECTION, SOLUTION INTRAVENOUS CONTINUOUS
Status: CANCELLED | OUTPATIENT
Start: 2017-05-02

## 2017-05-02 RX ORDER — 0.9 % SODIUM CHLORIDE 0.9 %
1000 INTRAVENOUS SOLUTION INTRAVENOUS ONCE
Status: COMPLETED | OUTPATIENT
Start: 2017-05-02 | End: 2017-05-02

## 2017-05-02 RX ORDER — CLOPIDOGREL BISULFATE 75 MG/1
75 TABLET ORAL DAILY
Status: DISCONTINUED | OUTPATIENT
Start: 2017-05-03 | End: 2017-05-03 | Stop reason: SDUPTHER

## 2017-05-02 RX ADMIN — SODIUM CHLORIDE 1000 ML: 9 INJECTION, SOLUTION INTRAVENOUS at 21:15

## 2017-05-02 RX ADMIN — IOVERSOL 90 ML: 741 INJECTION INTRA-ARTERIAL; INTRAVENOUS at 21:10

## 2017-05-02 ASSESSMENT — ENCOUNTER SYMPTOMS
VOMITING: 0
ABDOMINAL PAIN: 0
EYE DISCHARGE: 0
RHINORRHEA: 0
SORE THROAT: 0
NAUSEA: 0
SHORTNESS OF BREATH: 0

## 2017-05-03 ENCOUNTER — HOSPITAL ENCOUNTER (OUTPATIENT)
Dept: PAIN MANAGEMENT | Age: 62
Discharge: HOME | End: 2017-05-03
Payer: MEDICARE

## 2017-05-03 ENCOUNTER — HOSPITAL ENCOUNTER (INPATIENT)
Dept: PAIN MANAGEMENT | Age: 62
Discharge: HOME | DRG: 066 | End: 2017-05-03
Payer: MEDICARE

## 2017-05-03 ENCOUNTER — HOSPITAL ENCOUNTER (INPATIENT)
Dept: PAIN MANAGEMENT | Age: 62
DRG: 066 | End: 2017-05-03
Payer: MEDICARE

## 2017-05-03 VITALS
HEIGHT: 70 IN | BODY MASS INDEX: 30.84 KG/M2 | SYSTOLIC BLOOD PRESSURE: 136 MMHG | OXYGEN SATURATION: 98 % | WEIGHT: 215.39 LBS | RESPIRATION RATE: 18 BRPM | DIASTOLIC BLOOD PRESSURE: 82 MMHG | HEART RATE: 74 BPM | TEMPERATURE: 98.1 F

## 2017-05-03 VITALS
HEART RATE: 80 BPM | SYSTOLIC BLOOD PRESSURE: 138 MMHG | TEMPERATURE: 98.2 F | RESPIRATION RATE: 16 BRPM | DIASTOLIC BLOOD PRESSURE: 83 MMHG

## 2017-05-03 DIAGNOSIS — M43.16 SPONDYLOLISTHESIS, LUMBAR REGION: Primary | ICD-10-CM

## 2017-05-03 PROBLEM — I63.81 THALAMIC INFARCT, ACUTE (HCC): Status: ACTIVE | Noted: 2017-05-03

## 2017-05-03 LAB
ALBUMIN SERPL-MCNC: 3.2 G/DL (ref 3.5–5.2)
ALBUMIN/GLOBULIN RATIO: 1.1 (ref 1–2.5)
ALP BLD-CCNC: 78 U/L (ref 40–129)
ALT SERPL-CCNC: 11 U/L (ref 5–41)
ANION GAP SERPL CALCULATED.3IONS-SCNC: 13 MMOL/L (ref 9–17)
AST SERPL-CCNC: 11 U/L
BILIRUB SERPL-MCNC: 0.27 MG/DL (ref 0.3–1.2)
BUN BLDV-MCNC: 13 MG/DL (ref 6–20)
BUN/CREAT BLD: ABNORMAL (ref 9–20)
CALCIUM SERPL-MCNC: 8 MG/DL (ref 8.6–10.4)
CHLORIDE BLD-SCNC: 107 MMOL/L (ref 98–107)
CHOLESTEROL/HDL RATIO: 3.1
CHOLESTEROL: 138 MG/DL
CO2: 23 MMOL/L (ref 20–31)
CREAT SERPL-MCNC: 0.99 MG/DL (ref 0.7–1.2)
EKG ATRIAL RATE: 85 BPM
EKG P AXIS: 48 DEGREES
EKG P-R INTERVAL: 208 MS
EKG Q-T INTERVAL: 376 MS
EKG QRS DURATION: 78 MS
EKG QTC CALCULATION (BAZETT): 447 MS
EKG R AXIS: 32 DEGREES
EKG T AXIS: -9 DEGREES
EKG VENTRICULAR RATE: 85 BPM
ESTIMATED AVERAGE GLUCOSE: 117 MG/DL
GFR AFRICAN AMERICAN: >60 ML/MIN
GFR NON-AFRICAN AMERICAN: >60 ML/MIN
GFR SERPL CREATININE-BSD FRML MDRD: ABNORMAL ML/MIN/{1.73_M2}
GFR SERPL CREATININE-BSD FRML MDRD: ABNORMAL ML/MIN/{1.73_M2}
GLUCOSE BLD-MCNC: 112 MG/DL (ref 70–99)
HBA1C MFR BLD: 5.7 % (ref 4–6)
HCT VFR BLD CALC: 36 % (ref 41–53)
HDLC SERPL-MCNC: 45 MG/DL
HEMOGLOBIN: 12.3 G/DL (ref 13.5–17.5)
LDL CHOLESTEROL: 79 MG/DL (ref 0–130)
LV EF: 58 %
LVEF MODALITY: NORMAL
MCH RBC QN AUTO: 28.2 PG (ref 26–34)
MCHC RBC AUTO-ENTMCNC: 34.2 G/DL (ref 31–37)
MCV RBC AUTO: 82.4 FL (ref 80–100)
PDW BLD-RTO: 13.7 % (ref 12.5–15.4)
PLATELET # BLD: 170 K/UL (ref 140–450)
PMV BLD AUTO: 8.7 FL (ref 6–12)
POTASSIUM SERPL-SCNC: 3.8 MMOL/L (ref 3.7–5.3)
RBC # BLD: 4.37 M/UL (ref 4.5–5.9)
SODIUM BLD-SCNC: 143 MMOL/L (ref 135–144)
TOTAL PROTEIN: 6.2 G/DL (ref 6.4–8.3)
TRIGL SERPL-MCNC: 72 MG/DL
VLDLC SERPL CALC-MCNC: NORMAL MG/DL (ref 1–30)
WBC # BLD: 6.2 K/UL (ref 3.5–11)

## 2017-05-03 PROCEDURE — 80053 COMPREHEN METABOLIC PANEL: CPT

## 2017-05-03 PROCEDURE — G8980 MOBILITY D/C STATUS: HCPCS

## 2017-05-03 PROCEDURE — 85027 COMPLETE CBC AUTOMATED: CPT

## 2017-05-03 PROCEDURE — 99213 OFFICE O/P EST LOW 20 MIN: CPT

## 2017-05-03 PROCEDURE — 99214 OFFICE O/P EST MOD 30 MIN: CPT

## 2017-05-03 PROCEDURE — 97535 SELF CARE MNGMENT TRAINING: CPT

## 2017-05-03 PROCEDURE — 93306 TTE W/DOPPLER COMPLETE: CPT

## 2017-05-03 PROCEDURE — 36415 COLL VENOUS BLD VENIPUNCTURE: CPT

## 2017-05-03 PROCEDURE — 97161 PT EVAL LOW COMPLEX 20 MIN: CPT

## 2017-05-03 PROCEDURE — 6370000000 HC RX 637 (ALT 250 FOR IP): Performed by: NURSE PRACTITIONER

## 2017-05-03 PROCEDURE — G8987 SELF CARE CURRENT STATUS: HCPCS

## 2017-05-03 PROCEDURE — 97530 THERAPEUTIC ACTIVITIES: CPT

## 2017-05-03 PROCEDURE — 6370000000 HC RX 637 (ALT 250 FOR IP): Performed by: EMERGENCY MEDICINE

## 2017-05-03 PROCEDURE — 2580000003 HC RX 258: Performed by: EMERGENCY MEDICINE

## 2017-05-03 PROCEDURE — 6360000002 HC RX W HCPCS: Performed by: NURSE PRACTITIONER

## 2017-05-03 PROCEDURE — 80061 LIPID PANEL: CPT

## 2017-05-03 PROCEDURE — 99222 1ST HOSP IP/OBS MODERATE 55: CPT | Performed by: FAMILY MEDICINE

## 2017-05-03 PROCEDURE — 97165 OT EVAL LOW COMPLEX 30 MIN: CPT

## 2017-05-03 PROCEDURE — 83036 HEMOGLOBIN GLYCOSYLATED A1C: CPT

## 2017-05-03 PROCEDURE — G8988 SELF CARE GOAL STATUS: HCPCS

## 2017-05-03 PROCEDURE — G8978 MOBILITY CURRENT STATUS: HCPCS

## 2017-05-03 PROCEDURE — 99222 1ST HOSP IP/OBS MODERATE 55: CPT | Performed by: PSYCHIATRY & NEUROLOGY

## 2017-05-03 PROCEDURE — G8979 MOBILITY GOAL STATUS: HCPCS

## 2017-05-03 RX ORDER — CLOPIDOGREL BISULFATE 75 MG/1
75 TABLET ORAL DAILY
Status: DISCONTINUED | OUTPATIENT
Start: 2017-05-03 | End: 2017-05-03 | Stop reason: SDUPTHER

## 2017-05-03 RX ORDER — AMLODIPINE BESYLATE 5 MG/1
5 TABLET ORAL DAILY
Status: DISCONTINUED | OUTPATIENT
Start: 2017-05-03 | End: 2017-05-03 | Stop reason: HOSPADM

## 2017-05-03 RX ORDER — ASPIRIN 81 MG/1
81 TABLET, CHEWABLE ORAL DAILY
COMMUNITY
End: 2018-03-16

## 2017-05-03 RX ORDER — MORPHINE SULFATE 4 MG/ML
4 INJECTION, SOLUTION INTRAMUSCULAR; INTRAVENOUS
Status: DISCONTINUED | OUTPATIENT
Start: 2017-05-03 | End: 2017-05-03 | Stop reason: HOSPADM

## 2017-05-03 RX ORDER — OXYCODONE HYDROCHLORIDE 5 MG/1
5 TABLET ORAL EVERY 6 HOURS PRN
Qty: 56 TABLET | Refills: 0 | Status: SHIPPED | OUTPATIENT
Start: 2017-05-03 | End: 2017-05-17 | Stop reason: SDUPTHER

## 2017-05-03 RX ORDER — CLOPIDOGREL BISULFATE 75 MG/1
75 TABLET ORAL DAILY
Status: DISCONTINUED | OUTPATIENT
Start: 2017-05-03 | End: 2017-05-03 | Stop reason: HOSPADM

## 2017-05-03 RX ORDER — ATORVASTATIN CALCIUM 40 MG/1
40 TABLET, FILM COATED ORAL NIGHTLY
Status: DISCONTINUED | OUTPATIENT
Start: 2017-05-03 | End: 2017-05-03 | Stop reason: HOSPADM

## 2017-05-03 RX ORDER — ACETAMINOPHEN 325 MG/1
650 TABLET ORAL EVERY 4 HOURS PRN
Status: DISCONTINUED | OUTPATIENT
Start: 2017-05-03 | End: 2017-05-03 | Stop reason: HOSPADM

## 2017-05-03 RX ORDER — SODIUM CHLORIDE 0.9 % (FLUSH) 0.9 %
10 SYRINGE (ML) INJECTION EVERY 12 HOURS SCHEDULED
Status: DISCONTINUED | OUTPATIENT
Start: 2017-05-03 | End: 2017-05-03 | Stop reason: HOSPADM

## 2017-05-03 RX ORDER — ONDANSETRON 2 MG/ML
4 INJECTION INTRAMUSCULAR; INTRAVENOUS EVERY 6 HOURS PRN
Status: DISCONTINUED | OUTPATIENT
Start: 2017-05-03 | End: 2017-05-03 | Stop reason: HOSPADM

## 2017-05-03 RX ORDER — ASPIRIN 300 MG/1
300 SUPPOSITORY RECTAL DAILY
Status: DISCONTINUED | OUTPATIENT
Start: 2017-05-03 | End: 2017-05-03 | Stop reason: HOSPADM

## 2017-05-03 RX ORDER — QUETIAPINE FUMARATE 50 MG/1
50 TABLET, EXTENDED RELEASE ORAL NIGHTLY
Status: DISCONTINUED | OUTPATIENT
Start: 2017-05-03 | End: 2017-05-03 | Stop reason: HOSPADM

## 2017-05-03 RX ORDER — SODIUM CHLORIDE 0.9 % (FLUSH) 0.9 %
10 SYRINGE (ML) INJECTION PRN
Status: DISCONTINUED | OUTPATIENT
Start: 2017-05-03 | End: 2017-05-03 | Stop reason: HOSPADM

## 2017-05-03 RX ORDER — MORPHINE SULFATE 2 MG/ML
2 INJECTION, SOLUTION INTRAMUSCULAR; INTRAVENOUS
Status: DISCONTINUED | OUTPATIENT
Start: 2017-05-03 | End: 2017-05-03 | Stop reason: HOSPADM

## 2017-05-03 RX ORDER — ERGOCALCIFEROL 1.25 MG/1
50000 CAPSULE ORAL WEEKLY
Status: DISCONTINUED | OUTPATIENT
Start: 2017-05-03 | End: 2017-05-03 | Stop reason: HOSPADM

## 2017-05-03 RX ORDER — ASPIRIN 81 MG/1
81 TABLET ORAL DAILY
Status: DISCONTINUED | OUTPATIENT
Start: 2017-05-03 | End: 2017-05-03

## 2017-05-03 RX ORDER — HYDROCODONE BITARTRATE AND ACETAMINOPHEN 5; 325 MG/1; MG/1
1 TABLET ORAL EVERY 4 HOURS PRN
Status: DISCONTINUED | OUTPATIENT
Start: 2017-05-03 | End: 2017-05-03 | Stop reason: HOSPADM

## 2017-05-03 RX ORDER — NICOTINE 21 MG/24HR
1 PATCH, TRANSDERMAL 24 HOURS TRANSDERMAL DAILY
Status: DISCONTINUED | OUTPATIENT
Start: 2017-05-03 | End: 2017-05-03 | Stop reason: HOSPADM

## 2017-05-03 RX ORDER — SIMVASTATIN 40 MG
80 TABLET ORAL NIGHTLY
Status: DISCONTINUED | OUTPATIENT
Start: 2017-05-03 | End: 2017-05-03

## 2017-05-03 RX ORDER — BISACODYL 10 MG
10 SUPPOSITORY, RECTAL RECTAL DAILY PRN
Status: DISCONTINUED | OUTPATIENT
Start: 2017-05-03 | End: 2017-05-03 | Stop reason: HOSPADM

## 2017-05-03 RX ORDER — SODIUM CHLORIDE 9 MG/ML
INJECTION, SOLUTION INTRAVENOUS CONTINUOUS
Status: DISCONTINUED | OUTPATIENT
Start: 2017-05-03 | End: 2017-05-03 | Stop reason: HOSPADM

## 2017-05-03 RX ADMIN — AMLODIPINE BESYLATE 5 MG: 5 TABLET ORAL at 11:46

## 2017-05-03 RX ADMIN — CLOPIDOGREL 75 MG: 75 TABLET, FILM COATED ORAL at 08:40

## 2017-05-03 RX ADMIN — ERGOCALCIFEROL 50000 UNITS: 1.25 CAPSULE ORAL at 08:40

## 2017-05-03 RX ADMIN — ASPIRIN 325 MG: 325 TABLET, COATED ORAL at 08:41

## 2017-05-03 RX ADMIN — SODIUM CHLORIDE: 9 INJECTION, SOLUTION INTRAVENOUS at 03:11

## 2017-05-03 RX ADMIN — ENOXAPARIN SODIUM 40 MG: 40 INJECTION SUBCUTANEOUS at 08:41

## 2017-05-03 ASSESSMENT — ENCOUNTER SYMPTOMS
SINUS PRESSURE: 0
SHORTNESS OF BREATH: 0
VOMITING: 0
BACK PAIN: 1
WHEEZING: 0
CONSTIPATION: 0
SHORTNESS OF BREATH: 0
COUGH: 0
ABDOMINAL PAIN: 0
NAUSEA: 0
VOICE CHANGE: 0
BLOOD IN STOOL: 0
COUGH: 0
CONSTIPATION: 0
SORE THROAT: 0
DIARRHEA: 0

## 2017-05-03 ASSESSMENT — PAIN DESCRIPTION - LOCATION
LOCATION: BACK

## 2017-05-03 ASSESSMENT — PAIN SCALES - GENERAL
PAINLEVEL_OUTOF10: 10
PAINLEVEL_OUTOF10: 0
PAINLEVEL_OUTOF10: 0
PAINLEVEL_OUTOF10: 9

## 2017-05-03 ASSESSMENT — PAIN DESCRIPTION - DESCRIPTORS
DESCRIPTORS: CONSTANT;STABBING;ACHING
DESCRIPTORS: ACHING
DESCRIPTORS: ACHING

## 2017-05-03 ASSESSMENT — PAIN DESCRIPTION - ONSET
ONSET: ON-GOING
ONSET: ON-GOING

## 2017-05-03 ASSESSMENT — PAIN DESCRIPTION - PAIN TYPE
TYPE: CHRONIC PAIN

## 2017-05-03 ASSESSMENT — PAIN DESCRIPTION - PROGRESSION
CLINICAL_PROGRESSION: GRADUALLY WORSENING
CLINICAL_PROGRESSION: NOT CHANGED

## 2017-05-03 ASSESSMENT — PAIN DESCRIPTION - FREQUENCY
FREQUENCY: CONTINUOUS
FREQUENCY: CONTINUOUS

## 2017-05-03 ASSESSMENT — PAIN DESCRIPTION - ORIENTATION: ORIENTATION: LOWER;RIGHT;LEFT

## 2017-05-04 ENCOUNTER — TELEPHONE (OUTPATIENT)
Dept: INTERNAL MEDICINE | Age: 62
End: 2017-05-04

## 2017-05-04 DIAGNOSIS — I63.9 CEREBROVASCULAR ACCIDENT (CVA), UNSPECIFIED MECHANISM (HCC): Primary | ICD-10-CM

## 2017-05-04 DIAGNOSIS — I63.89 OTHER CEREBRAL INFARCTION (HCC): ICD-10-CM

## 2017-05-04 DIAGNOSIS — I10 ESSENTIAL HYPERTENSION: ICD-10-CM

## 2017-05-05 ENCOUNTER — TELEPHONE (OUTPATIENT)
Dept: NEUROLOGY | Age: 62
End: 2017-05-05

## 2017-05-25 ENCOUNTER — HOSPITAL ENCOUNTER (OUTPATIENT)
Dept: CARDIAC CATH/INVASIVE PROCEDURES | Age: 62
Discharge: HOME | End: 2017-05-25
Payer: MEDICARE

## 2017-05-31 ENCOUNTER — HOSPITAL ENCOUNTER (OUTPATIENT)
Dept: CARDIAC CATH/INVASIVE PROCEDURES | Age: 62
Discharge: HOME | End: 2017-05-31
Payer: MEDICARE

## 2017-05-31 VITALS
HEIGHT: 70 IN | TEMPERATURE: 98.2 F | WEIGHT: 217 LBS | HEART RATE: 74 BPM | DIASTOLIC BLOOD PRESSURE: 87 MMHG | OXYGEN SATURATION: 98 % | SYSTOLIC BLOOD PRESSURE: 138 MMHG | RESPIRATION RATE: 18 BRPM | BODY MASS INDEX: 31.07 KG/M2

## 2017-05-31 PROCEDURE — 7100000011 HC PHASE II RECOVERY - ADDTL 15 MIN

## 2017-05-31 PROCEDURE — 93325 DOPPLER ECHO COLOR FLOW MAPG: CPT

## 2017-05-31 PROCEDURE — 93312 ECHO TRANSESOPHAGEAL: CPT

## 2017-05-31 PROCEDURE — 6360000002 HC RX W HCPCS

## 2017-05-31 PROCEDURE — 7100000010 HC PHASE II RECOVERY - FIRST 15 MIN

## 2017-05-31 RX ORDER — SODIUM CHLORIDE 9 MG/ML
INJECTION, SOLUTION INTRAVENOUS CONTINUOUS
Status: DISCONTINUED | OUTPATIENT
Start: 2017-05-31 | End: 2017-06-01 | Stop reason: HOSPADM

## 2017-05-31 RX ADMIN — SODIUM CHLORIDE: 9 INJECTION, SOLUTION INTRAVENOUS at 13:19

## 2017-05-31 ASSESSMENT — PAIN SCALES - GENERAL: PAINLEVEL_OUTOF10: 0

## 2017-06-02 ENCOUNTER — HOSPITAL ENCOUNTER (OUTPATIENT)
Dept: PAIN MANAGEMENT | Age: 62
Discharge: HOME | End: 2017-06-02
Payer: MEDICARE

## 2017-06-02 VITALS
SYSTOLIC BLOOD PRESSURE: 161 MMHG | RESPIRATION RATE: 18 BRPM | HEART RATE: 74 BPM | DIASTOLIC BLOOD PRESSURE: 86 MMHG | TEMPERATURE: 98 F

## 2017-06-02 DIAGNOSIS — M43.16 SPONDYLOLISTHESIS, LUMBAR REGION: Primary | ICD-10-CM

## 2017-06-02 PROCEDURE — 99214 OFFICE O/P EST MOD 30 MIN: CPT

## 2017-06-02 RX ORDER — OXYCODONE HYDROCHLORIDE 5 MG/1
5 TABLET ORAL EVERY 6 HOURS PRN
Qty: 56 TABLET | Refills: 0 | Status: SHIPPED | OUTPATIENT
Start: 2017-06-14 | End: 2017-06-27 | Stop reason: SDUPTHER

## 2017-06-02 ASSESSMENT — ENCOUNTER SYMPTOMS
CONSTIPATION: 0
BACK PAIN: 1
COUGH: 0
SHORTNESS OF BREATH: 0

## 2017-06-02 ASSESSMENT — PAIN DESCRIPTION - FREQUENCY: FREQUENCY: CONTINUOUS

## 2017-06-02 ASSESSMENT — PAIN DESCRIPTION - ORIENTATION: ORIENTATION: LOWER;LEFT;RIGHT

## 2017-06-02 ASSESSMENT — PAIN DESCRIPTION - ONSET: ONSET: ON-GOING

## 2017-06-02 ASSESSMENT — PAIN DESCRIPTION - LOCATION: LOCATION: BACK;BUTTOCKS;HIP

## 2017-06-02 ASSESSMENT — PAIN SCALES - GENERAL: PAINLEVEL_OUTOF10: 10

## 2017-06-02 ASSESSMENT — PAIN DESCRIPTION - PROGRESSION: CLINICAL_PROGRESSION: GRADUALLY WORSENING

## 2017-06-02 ASSESSMENT — PAIN DESCRIPTION - PAIN TYPE: TYPE: CHRONIC PAIN

## 2017-06-13 ENCOUNTER — OFFICE VISIT (OUTPATIENT)
Dept: INTERNAL MEDICINE | Age: 62
End: 2017-06-13
Payer: MEDICARE

## 2017-06-13 VITALS
SYSTOLIC BLOOD PRESSURE: 141 MMHG | HEART RATE: 92 BPM | WEIGHT: 210 LBS | OXYGEN SATURATION: 98 % | BODY MASS INDEX: 30.06 KG/M2 | DIASTOLIC BLOOD PRESSURE: 93 MMHG | HEIGHT: 70 IN

## 2017-06-13 DIAGNOSIS — I10 ESSENTIAL HYPERTENSION: Primary | Chronic | ICD-10-CM

## 2017-06-13 DIAGNOSIS — M54.41 CHRONIC BILATERAL LOW BACK PAIN WITH RIGHT-SIDED SCIATICA: ICD-10-CM

## 2017-06-13 DIAGNOSIS — G89.29 CHRONIC BILATERAL LOW BACK PAIN WITH RIGHT-SIDED SCIATICA: ICD-10-CM

## 2017-06-13 DIAGNOSIS — E78.00 HYPERCHOLESTEREMIA: Chronic | ICD-10-CM

## 2017-06-13 PROCEDURE — G8598 ASA/ANTIPLAT THER USED: HCPCS | Performed by: INTERNAL MEDICINE

## 2017-06-13 PROCEDURE — 3017F COLORECTAL CA SCREEN DOC REV: CPT | Performed by: INTERNAL MEDICINE

## 2017-06-13 PROCEDURE — G8417 CALC BMI ABV UP PARAM F/U: HCPCS | Performed by: INTERNAL MEDICINE

## 2017-06-13 PROCEDURE — 99214 OFFICE O/P EST MOD 30 MIN: CPT | Performed by: INTERNAL MEDICINE

## 2017-06-13 PROCEDURE — 4004F PT TOBACCO SCREEN RCVD TLK: CPT | Performed by: INTERNAL MEDICINE

## 2017-06-13 PROCEDURE — G8427 DOCREV CUR MEDS BY ELIG CLIN: HCPCS | Performed by: INTERNAL MEDICINE

## 2017-06-13 RX ORDER — SIMVASTATIN 80 MG
80 TABLET ORAL NIGHTLY
Qty: 30 TABLET | Refills: 5 | Status: SHIPPED | OUTPATIENT
Start: 2017-06-13 | End: 2018-01-05 | Stop reason: SDUPTHER

## 2017-06-13 RX ORDER — AMLODIPINE BESYLATE 5 MG/1
5 TABLET ORAL DAILY
Qty: 30 TABLET | Refills: 5 | Status: SHIPPED | OUTPATIENT
Start: 2017-06-13 | End: 2018-01-05 | Stop reason: SDUPTHER

## 2017-06-13 RX ORDER — CLOPIDOGREL BISULFATE 75 MG/1
75 TABLET ORAL DAILY
Qty: 30 TABLET | Refills: 5 | Status: SHIPPED | OUTPATIENT
Start: 2017-06-13 | End: 2018-01-05 | Stop reason: SDUPTHER

## 2017-06-13 ASSESSMENT — ENCOUNTER SYMPTOMS: BACK PAIN: 1

## 2017-06-20 DIAGNOSIS — M25.511 RIGHT SHOULDER PAIN, UNSPECIFIED CHRONICITY: Primary | ICD-10-CM

## 2017-06-21 ENCOUNTER — OFFICE VISIT (OUTPATIENT)
Dept: ORTHOPEDIC SURGERY | Age: 62
End: 2017-06-21
Payer: MEDICARE

## 2017-06-21 VITALS
DIASTOLIC BLOOD PRESSURE: 88 MMHG | HEART RATE: 93 BPM | WEIGHT: 212 LBS | HEIGHT: 70 IN | SYSTOLIC BLOOD PRESSURE: 130 MMHG | BODY MASS INDEX: 30.35 KG/M2

## 2017-06-21 DIAGNOSIS — M19.019 SHOULDER ARTHRITIS: Primary | ICD-10-CM

## 2017-06-21 PROCEDURE — G8427 DOCREV CUR MEDS BY ELIG CLIN: HCPCS | Performed by: ORTHOPAEDIC SURGERY

## 2017-06-21 PROCEDURE — 99213 OFFICE O/P EST LOW 20 MIN: CPT | Performed by: ORTHOPAEDIC SURGERY

## 2017-06-21 PROCEDURE — G8598 ASA/ANTIPLAT THER USED: HCPCS | Performed by: ORTHOPAEDIC SURGERY

## 2017-06-21 PROCEDURE — 4004F PT TOBACCO SCREEN RCVD TLK: CPT | Performed by: ORTHOPAEDIC SURGERY

## 2017-06-21 PROCEDURE — 3017F COLORECTAL CA SCREEN DOC REV: CPT | Performed by: ORTHOPAEDIC SURGERY

## 2017-06-21 PROCEDURE — G8417 CALC BMI ABV UP PARAM F/U: HCPCS | Performed by: ORTHOPAEDIC SURGERY

## 2017-06-21 ASSESSMENT — ENCOUNTER SYMPTOMS
SINUS PRESSURE: 0
SHORTNESS OF BREATH: 0
VOMITING: 0
CONSTIPATION: 0
APNEA: 0
DIARRHEA: 0
NAUSEA: 0
BACK PAIN: 0

## 2017-06-29 ENCOUNTER — HOSPITAL ENCOUNTER (OUTPATIENT)
Dept: PHYSICAL THERAPY | Age: 62
Setting detail: THERAPIES SERIES
Discharge: HOME | End: 2017-06-29
Payer: MEDICARE

## 2017-07-06 ENCOUNTER — HOSPITAL ENCOUNTER (OUTPATIENT)
Dept: PHYSICAL THERAPY | Age: 62
Setting detail: THERAPIES SERIES
Discharge: HOME | End: 2017-07-06
Payer: MEDICARE

## 2017-07-06 PROCEDURE — G8984 CARRY CURRENT STATUS: HCPCS

## 2017-07-06 PROCEDURE — G0283 ELEC STIM OTHER THAN WOUND: HCPCS

## 2017-07-06 PROCEDURE — G8985 CARRY GOAL STATUS: HCPCS

## 2017-07-06 PROCEDURE — 97162 PT EVAL MOD COMPLEX 30 MIN: CPT

## 2017-07-06 PROCEDURE — 97110 THERAPEUTIC EXERCISES: CPT

## 2017-07-10 ENCOUNTER — HOSPITAL ENCOUNTER (OUTPATIENT)
Dept: PAIN MANAGEMENT | Age: 62
Discharge: HOME | End: 2017-07-10
Payer: MEDICARE

## 2017-07-10 VITALS
SYSTOLIC BLOOD PRESSURE: 151 MMHG | TEMPERATURE: 98.2 F | RESPIRATION RATE: 20 BRPM | DIASTOLIC BLOOD PRESSURE: 98 MMHG | HEART RATE: 81 BPM

## 2017-07-10 DIAGNOSIS — M54.50 CHRONIC MIDLINE LOW BACK PAIN WITHOUT SCIATICA: Primary | ICD-10-CM

## 2017-07-10 DIAGNOSIS — G89.29 CHRONIC MIDLINE LOW BACK PAIN WITHOUT SCIATICA: Primary | ICD-10-CM

## 2017-07-10 PROCEDURE — 80307 DRUG TEST PRSMV CHEM ANLYZR: CPT

## 2017-07-10 PROCEDURE — 99214 OFFICE O/P EST MOD 30 MIN: CPT

## 2017-07-10 RX ORDER — OXYCODONE HYDROCHLORIDE 5 MG/1
5 TABLET ORAL EVERY 6 HOURS PRN
Qty: 56 TABLET | Refills: 0 | Status: SHIPPED | OUTPATIENT
Start: 2017-07-12 | End: 2017-07-26 | Stop reason: SDUPTHER

## 2017-07-10 ASSESSMENT — PAIN DESCRIPTION - ORIENTATION: ORIENTATION: LOWER

## 2017-07-10 ASSESSMENT — ENCOUNTER SYMPTOMS
BACK PAIN: 1
CONSTIPATION: 0
SHORTNESS OF BREATH: 0
COUGH: 0

## 2017-07-10 ASSESSMENT — PAIN DESCRIPTION - PROGRESSION: CLINICAL_PROGRESSION: GRADUALLY WORSENING

## 2017-07-10 ASSESSMENT — PAIN SCALES - GENERAL: PAINLEVEL_OUTOF10: 9

## 2017-07-10 ASSESSMENT — PAIN DESCRIPTION - FREQUENCY: FREQUENCY: CONTINUOUS

## 2017-07-10 ASSESSMENT — PAIN DESCRIPTION - ONSET: ONSET: ON-GOING

## 2017-07-10 ASSESSMENT — PAIN DESCRIPTION - LOCATION: LOCATION: BACK

## 2017-07-10 ASSESSMENT — PAIN DESCRIPTION - PAIN TYPE: TYPE: CHRONIC PAIN

## 2017-07-10 ASSESSMENT — PAIN DESCRIPTION - DESCRIPTORS: DESCRIPTORS: ACHING;CONSTANT;NUMBNESS

## 2017-07-11 ENCOUNTER — HOSPITAL ENCOUNTER (OUTPATIENT)
Dept: PHYSICAL THERAPY | Age: 62
Setting detail: THERAPIES SERIES
Discharge: HOME | End: 2017-07-11
Payer: MEDICARE

## 2017-07-11 PROCEDURE — 97110 THERAPEUTIC EXERCISES: CPT

## 2017-07-11 PROCEDURE — G0283 ELEC STIM OTHER THAN WOUND: HCPCS

## 2017-07-13 ENCOUNTER — HOSPITAL ENCOUNTER (OUTPATIENT)
Dept: PHYSICAL THERAPY | Age: 62
Setting detail: THERAPIES SERIES
Discharge: HOME | End: 2017-07-13
Payer: MEDICARE

## 2017-07-13 LAB
6-ACETYLMORPHINE, UR: NOT DETECTED
7-AMINOCLONAZEPAM, URINE: NOT DETECTED
ALPHA-OH-ALPRAZ, URINE: NOT DETECTED
ALPRAZOLAM, URINE: NOT DETECTED
AMPHETAMINES, URINE: NOT DETECTED
BARBITURATES, URINE: NOT DETECTED
BENZOYLECGONINE, UR: NOT DETECTED
BUPRENORPHINE URINE: NOT DETECTED
CARISOPRODOL, UR: NOT DETECTED
CLONAZEPAM, URINE: NOT DETECTED
CODEINE, URINE: NOT DETECTED
CREATININE URINE: 83.5 MG/DL (ref 20–400)
DIAZEPAM, URINE: NOT DETECTED
EER PAIN MGT DRUG PANEL, HIGH RES/EMIT U: NORMAL
ETHYL GLUCURONIDE UR: NOT DETECTED
FENTANYL URINE: NOT DETECTED
HYDROCODONE, URINE: NOT DETECTED
HYDROMORPHONE, URINE: NOT DETECTED
LORAZEPAM, URINE: NOT DETECTED
MARIJUANA METAB, UR: NOT DETECTED
MDA, UR: NOT DETECTED
MDEA, EVE, UR: NOT DETECTED
MDMA URINE: NOT DETECTED
MEPERIDINE METAB, UR: NOT DETECTED
METHADONE, URINE: NOT DETECTED
METHAMPHETAMINE, URINE: NOT DETECTED
METHYLPHENIDATE: NOT DETECTED
MIDAZOLAM, URINE: NOT DETECTED
MORPHINE URINE: NOT DETECTED
NORBUPRENORPHINE, URINE: NOT DETECTED
NORDIAZEPAM, URINE: NOT DETECTED
NORFENTANYL, URINE: NOT DETECTED
NORHYDROCODONE, URINE: NOT DETECTED
NOROXYCODONE, URINE: PRESENT
NOROXYMORPHONE, URINE: NOT DETECTED
OXAZEPAM, URINE: NOT DETECTED
OXYCODONE URINE: PRESENT
OXYMORPHONE, URINE: NOT DETECTED
PAIN MGT DRUG PANEL, HI RES, UR: NORMAL
PCP,URINE: NOT DETECTED
PHENTERMINE, UR: NOT DETECTED
PROPOXYPHENE, URINE: NOT DETECTED
TAPENTADOL, URINE: NOT DETECTED
TAPENTADOL-O-SULFATE, URINE: NOT DETECTED
TEMAZEPAM, URINE: NOT DETECTED
TRAMADOL, URINE: NOT DETECTED
ZOLPIDEM, URINE: NOT DETECTED

## 2017-07-18 ENCOUNTER — HOSPITAL ENCOUNTER (OUTPATIENT)
Dept: PHYSICAL THERAPY | Age: 62
Setting detail: THERAPIES SERIES
Discharge: HOME | End: 2017-07-18
Payer: MEDICARE

## 2017-07-18 PROCEDURE — 97110 THERAPEUTIC EXERCISES: CPT

## 2017-07-18 PROCEDURE — G0283 ELEC STIM OTHER THAN WOUND: HCPCS

## 2017-07-20 ENCOUNTER — HOSPITAL ENCOUNTER (OUTPATIENT)
Dept: PHYSICAL THERAPY | Age: 62
Setting detail: THERAPIES SERIES
Discharge: HOME | End: 2017-07-20
Payer: MEDICARE

## 2017-07-20 PROCEDURE — 97110 THERAPEUTIC EXERCISES: CPT

## 2017-07-20 PROCEDURE — G0283 ELEC STIM OTHER THAN WOUND: HCPCS

## 2017-07-25 ENCOUNTER — HOSPITAL ENCOUNTER (OUTPATIENT)
Dept: PHYSICAL THERAPY | Age: 62
Setting detail: THERAPIES SERIES
Discharge: HOME | End: 2017-07-25
Payer: MEDICARE

## 2017-07-25 PROCEDURE — 97110 THERAPEUTIC EXERCISES: CPT

## 2017-07-25 PROCEDURE — G0283 ELEC STIM OTHER THAN WOUND: HCPCS

## 2017-07-27 ENCOUNTER — HOSPITAL ENCOUNTER (OUTPATIENT)
Dept: PHYSICAL THERAPY | Age: 62
Setting detail: THERAPIES SERIES
Discharge: HOME | End: 2017-07-27
Payer: MEDICARE

## 2017-07-27 ENCOUNTER — TELEPHONE (OUTPATIENT)
Dept: NEUROLOGY | Age: 62
End: 2017-07-27

## 2017-07-28 ENCOUNTER — TELEPHONE (OUTPATIENT)
Dept: INTERNAL MEDICINE | Age: 62
End: 2017-07-28

## 2017-08-01 ENCOUNTER — HOSPITAL ENCOUNTER (OUTPATIENT)
Dept: PHYSICAL THERAPY | Age: 62
Setting detail: THERAPIES SERIES
Discharge: HOME | End: 2017-08-01
Payer: MEDICARE

## 2017-08-03 ENCOUNTER — HOSPITAL ENCOUNTER (OUTPATIENT)
Dept: PHYSICAL THERAPY | Age: 62
Setting detail: THERAPIES SERIES
Discharge: HOME | End: 2017-08-03
Payer: MEDICARE

## 2017-08-08 ENCOUNTER — HOSPITAL ENCOUNTER (OUTPATIENT)
Dept: PHYSICAL THERAPY | Age: 62
Setting detail: THERAPIES SERIES
Discharge: HOME | End: 2017-08-08
Payer: MEDICARE

## 2017-08-09 ENCOUNTER — HOSPITAL ENCOUNTER (OUTPATIENT)
Dept: PAIN MANAGEMENT | Age: 62
Discharge: HOME | End: 2017-08-09
Payer: MEDICARE

## 2017-08-09 VITALS
RESPIRATION RATE: 12 BRPM | DIASTOLIC BLOOD PRESSURE: 83 MMHG | SYSTOLIC BLOOD PRESSURE: 125 MMHG | TEMPERATURE: 98.3 F | HEART RATE: 80 BPM

## 2017-08-09 DIAGNOSIS — M96.1 POSTLAMINECTOMY SYNDROME: ICD-10-CM

## 2017-08-09 DIAGNOSIS — M48.061 NEURAL FORAMINAL STENOSIS OF LUMBAR SPINE: Primary | ICD-10-CM

## 2017-08-09 PROCEDURE — 99213 OFFICE O/P EST LOW 20 MIN: CPT

## 2017-08-09 PROCEDURE — 99214 OFFICE O/P EST MOD 30 MIN: CPT

## 2017-08-09 RX ORDER — OXYCODONE HYDROCHLORIDE 5 MG/1
5 TABLET ORAL EVERY 6 HOURS PRN
Qty: 120 TABLET | Refills: 0 | Status: SHIPPED | OUTPATIENT
Start: 2017-08-09 | End: 2017-09-06 | Stop reason: SDUPTHER

## 2017-08-09 ASSESSMENT — ENCOUNTER SYMPTOMS
BACK PAIN: 1
SHORTNESS OF BREATH: 0
CONSTIPATION: 0
COUGH: 0
BOWEL INCONTINENCE: 0

## 2017-08-09 ASSESSMENT — PAIN DESCRIPTION - LOCATION: LOCATION: BACK;SHOULDER

## 2017-08-09 ASSESSMENT — PAIN DESCRIPTION - ORIENTATION: ORIENTATION: LOWER;RIGHT

## 2017-08-09 ASSESSMENT — PAIN SCALES - GENERAL: PAINLEVEL_OUTOF10: 10

## 2017-08-09 ASSESSMENT — PAIN DESCRIPTION - PAIN TYPE: TYPE: CHRONIC PAIN

## 2017-08-09 ASSESSMENT — PAIN DESCRIPTION - DESCRIPTORS: DESCRIPTORS: CONSTANT;ACHING;STABBING

## 2017-09-06 ENCOUNTER — HOSPITAL ENCOUNTER (OUTPATIENT)
Dept: PAIN MANAGEMENT | Age: 62
Discharge: HOME | End: 2017-09-06
Payer: MEDICARE

## 2017-09-06 VITALS
HEART RATE: 85 BPM | TEMPERATURE: 98 F | RESPIRATION RATE: 14 BRPM | SYSTOLIC BLOOD PRESSURE: 150 MMHG | DIASTOLIC BLOOD PRESSURE: 88 MMHG

## 2017-09-06 PROCEDURE — 99214 OFFICE O/P EST MOD 30 MIN: CPT

## 2017-09-06 RX ORDER — OXYCODONE HYDROCHLORIDE 5 MG/1
5 TABLET ORAL EVERY 6 HOURS PRN
Qty: 120 TABLET | Refills: 0 | Status: SHIPPED | OUTPATIENT
Start: 2017-09-08 | End: 2017-10-05 | Stop reason: SDUPTHER

## 2017-09-06 ASSESSMENT — PAIN DESCRIPTION - DESCRIPTORS: DESCRIPTORS: CONSTANT;STABBING;ACHING

## 2017-09-06 ASSESSMENT — PAIN DESCRIPTION - PROGRESSION: CLINICAL_PROGRESSION: GRADUALLY WORSENING

## 2017-09-06 ASSESSMENT — PAIN DESCRIPTION - PAIN TYPE: TYPE: CHRONIC PAIN

## 2017-09-06 ASSESSMENT — PAIN DESCRIPTION - LOCATION: LOCATION: BACK;LEG;ANKLE

## 2017-09-06 ASSESSMENT — PAIN SCALES - GENERAL: PAINLEVEL_OUTOF10: 8

## 2017-09-06 ASSESSMENT — PAIN DESCRIPTION - ORIENTATION: ORIENTATION: LEFT

## 2017-10-05 ENCOUNTER — HOSPITAL ENCOUNTER (OUTPATIENT)
Dept: PAIN MANAGEMENT | Age: 62
Discharge: HOME | End: 2017-10-05
Payer: MEDICARE

## 2017-10-05 VITALS
BODY MASS INDEX: 30.64 KG/M2 | HEIGHT: 70 IN | DIASTOLIC BLOOD PRESSURE: 91 MMHG | RESPIRATION RATE: 18 BRPM | TEMPERATURE: 98.1 F | HEART RATE: 81 BPM | SYSTOLIC BLOOD PRESSURE: 147 MMHG | WEIGHT: 214 LBS

## 2017-10-05 DIAGNOSIS — M96.1 POSTLAMINECTOMY SYNDROME: ICD-10-CM

## 2017-10-05 DIAGNOSIS — M43.16 SPONDYLOLISTHESIS, LUMBAR REGION: ICD-10-CM

## 2017-10-05 DIAGNOSIS — Z51.81 MEDICATION MONITORING ENCOUNTER: ICD-10-CM

## 2017-10-05 DIAGNOSIS — M48.061 NEURAL FORAMINAL STENOSIS OF LUMBAR SPINE: Primary | ICD-10-CM

## 2017-10-05 PROCEDURE — 99214 OFFICE O/P EST MOD 30 MIN: CPT

## 2017-10-05 PROCEDURE — 99213 OFFICE O/P EST LOW 20 MIN: CPT | Performed by: NURSE PRACTITIONER

## 2017-10-05 RX ORDER — OXYCODONE HYDROCHLORIDE 5 MG/1
5 TABLET ORAL EVERY 6 HOURS PRN
Qty: 120 TABLET | Refills: 0 | Status: SHIPPED | OUTPATIENT
Start: 2017-10-08 | End: 2017-11-02 | Stop reason: SDUPTHER

## 2017-10-05 ASSESSMENT — ENCOUNTER SYMPTOMS
CONSTIPATION: 0
SHORTNESS OF BREATH: 0
COUGH: 0
BACK PAIN: 1

## 2017-10-05 ASSESSMENT — PAIN SCALES - GENERAL: PAINLEVEL_OUTOF10: 8

## 2017-10-05 ASSESSMENT — PAIN DESCRIPTION - PROGRESSION: CLINICAL_PROGRESSION: GRADUALLY WORSENING

## 2017-10-05 ASSESSMENT — PAIN DESCRIPTION - DESCRIPTORS: DESCRIPTORS: CONSTANT;ACHING;STABBING

## 2017-10-05 ASSESSMENT — PAIN DESCRIPTION - ORIENTATION: ORIENTATION: LEFT;RIGHT

## 2017-10-05 ASSESSMENT — PAIN DESCRIPTION - ONSET: ONSET: ON-GOING

## 2017-10-05 ASSESSMENT — PAIN DESCRIPTION - PAIN TYPE: TYPE: CHRONIC PAIN

## 2017-10-05 ASSESSMENT — PAIN DESCRIPTION - FREQUENCY: FREQUENCY: CONTINUOUS

## 2017-10-05 NOTE — PROGRESS NOTES
contract. JUAN R Pires)  Review of OARRS does not show any aberrant prescription behavior. Medication is helping the patient stay active. Patient denies any side effects and reports adequate analgesia. No sign of misuse/abuse. Past Medical History:   Diagnosis Date    Back pain     Cerebral artery occlusion with cerebral infarction ([address]     COPD (chronic obstructive pulmonary disease) (HCC)     NEUBULIZER AS NEEDED    Depression     Depression     DJD (degenerative joint disease)     Hand pain, left     trigger finger    High cholesterol     PT NEVER FILLED SCRIPT    History of shingles     2013    Hypertension     PT NEVER FILLED  SCRIPT    Sleep apnea     does not have machine    Sleep apnea     DX YRS AGO LAST USE OF MACHINE 2008    Wears dentures     1 GOLD TOOTH ON FLIPPER UPPER    Wears glasses        Past Surgical History:   Procedure Laterality Date    BACK SURGERY  2005    LUMBAR    BACK SURGERY  6/15/16    posterior lumbar fusion L4-L5    CYST REMOVAL Bilateral     \"groin\"    FINGER TRIGGER RELEASE Left 03/04/2014    ring finger    FINGER TRIGGER RELEASE Right 1/5/15    rt hand-RING FINGER    KNEE ARTHROSCOPY Bilateral     NERVE BLOCK  01/13/2017    caudal #1    celestone 9mg  25mcq fentanyl    NERVE BLOCK Left 03/30/2017    left hip bursa injection depomedrol 40 mg    NERVE BLOCK Bilateral 04/07/2017    bilateral si joints injections, depo 40    PATELLA SURGERY Right     x2       Allergies   Allergen Reactions    Motrin [Ibuprofen] Nausea And Vomiting    Nsaids Nausea And Vomiting    Other Nausea And Vomiting     Pt states he cannot take any muscle relaxers but does not know what specific muscle relaxer caused the nausea. Current Outpatient Prescriptions:     [START ON 10/8/2017] oxyCODONE (ROXICODONE) 5 MG immediate release tablet, Take 1 tablet by mouth every 6 hours as needed for Pain .  Earliest Fill Date: 10/8/17, Disp: 120 tablet, Rfl: 0    clopidogrel (PLAVIX) 75 MG tablet, Take 1 tablet by mouth daily for 21 days, Disp: 30 tablet, Rfl: 5    simvastatin (ZOCOR) 80 MG tablet, Take 1 tablet by mouth nightly, Disp: 30 tablet, Rfl: 5    amLODIPine (NORVASC) 5 MG tablet, Take 1 tablet by mouth daily, Disp: 30 tablet, Rfl: 5    aspirin 81 MG chewable tablet, Take 81 mg by mouth daily, Disp: , Rfl:     albuterol (PROVENTIL) (2.5 MG/3ML) 0.083% nebulizer solution, Take 3 mLs by nebulization 4 times daily, Disp: 120 each, Rfl: 3    albuterol sulfate HFA (PROVENTIL HFA) 108 (90 BASE) MCG/ACT inhaler, Inhale 2 puffs into the lungs every 6 hours as needed for Wheezing, Disp: 1 Inhaler, Rfl: 3    Family History   Problem Relation Age of Onset    Diabetes Mother     Diabetes Brother        Social History     Social History    Marital status: Single     Spouse name: N/A    Number of children: N/A    Years of education: N/A     Occupational History    Not on file. Social History Main Topics    Smoking status: Current Every Day Smoker     Packs/day: 0.25     Years: 30.00     Types: Cigarettes    Smokeless tobacco: Never Used    Alcohol use No    Drug use: No    Sexual activity: Yes     Partners: Female     Other Topics Concern    Not on file     Social History Narrative       Review of Systems:  Review of Systems   Constitution: Negative for chills and fever. Cardiovascular: Negative for chest pain. Respiratory: Negative for cough and shortness of breath. Musculoskeletal: Positive for arthritis, back pain and joint pain. Gastrointestinal: Negative for constipation. Physical Exam:  BP (!) 147/91  Pulse 81  Temp 98.1 °F (36.7 °C) (Oral)   Resp 18  Ht 5' 10\" (1.778 m)  Wt 214 lb (97.1 kg)  BMI 30.71 kg/m2    Physical Exam   Constitutional: He is oriented to person, place, and time and well-developed, well-nourished, and in no distress. Cardiovascular: Normal rate.     Pulmonary/Chest: Effort normal.   Musculoskeletal: Normal range of

## 2017-11-02 ENCOUNTER — HOSPITAL ENCOUNTER (OUTPATIENT)
Dept: PAIN MANAGEMENT | Age: 62
Discharge: HOME | End: 2017-11-02
Payer: MEDICARE

## 2017-11-02 VITALS
RESPIRATION RATE: 16 BRPM | SYSTOLIC BLOOD PRESSURE: 150 MMHG | TEMPERATURE: 98.3 F | HEIGHT: 70 IN | DIASTOLIC BLOOD PRESSURE: 103 MMHG | HEART RATE: 90 BPM | BODY MASS INDEX: 30.06 KG/M2 | WEIGHT: 210 LBS

## 2017-11-02 DIAGNOSIS — M48.061 NEURAL FORAMINAL STENOSIS OF LUMBAR SPINE: ICD-10-CM

## 2017-11-02 DIAGNOSIS — M96.1 POSTLAMINECTOMY SYNDROME: ICD-10-CM

## 2017-11-02 DIAGNOSIS — M43.16 SPONDYLOLISTHESIS, LUMBAR REGION: Primary | ICD-10-CM

## 2017-11-02 DIAGNOSIS — Z51.81 MEDICATION MONITORING ENCOUNTER: ICD-10-CM

## 2017-11-02 PROCEDURE — 99213 OFFICE O/P EST LOW 20 MIN: CPT | Performed by: NURSE PRACTITIONER

## 2017-11-02 PROCEDURE — 99213 OFFICE O/P EST LOW 20 MIN: CPT

## 2017-11-02 PROCEDURE — 99214 OFFICE O/P EST MOD 30 MIN: CPT

## 2017-11-02 RX ORDER — OXYCODONE HYDROCHLORIDE 5 MG/1
5 TABLET ORAL EVERY 6 HOURS PRN
Qty: 120 TABLET | Refills: 0 | Status: SHIPPED | OUTPATIENT
Start: 2017-11-07 | End: 2017-12-05 | Stop reason: SDUPTHER

## 2017-11-02 ASSESSMENT — ENCOUNTER SYMPTOMS
BACK PAIN: 1
SHORTNESS OF BREATH: 0
CONSTIPATION: 0
COUGH: 0

## 2017-11-02 NOTE — PROGRESS NOTES
Patient is here today to review medication contract. Chief Complaint:  Low back pain    [facility]    Patient complains of constant aching low back pain and bilateral hip pain that  has been ongoing for some time with no known injury. It has been getting worse on the left hip and radiating down the left thigh. He is s/p L4 5 fusion. Had epidural steroids recently with no benefit. He had bilateral SI injections 4/7/17 with no relief. He is opioid dependent for pain control takes oxycodone four times a day as needed. He recently saw Dr Deanna Schmid for the worsening pain and surgery was suggested but pt is hesitant. He was told there is no guarantee surgery would improve pain and he is able to tolerate pain now and still complete ADLs. Did discuss red flags with pt such as loss of bowel and bladder or sensation in legs that would necessitate a reeval from NS. Pt verbalized understanding. HPI:   Back Pain   This is a chronic problem. The current episode started more than 1 year ago. The problem occurs constantly. The problem has been gradually worsening since onset. The pain is present in the lumbar spine and gluteal (across low back varies from side to side). The quality of the pain is described as stabbing and aching (numbness in thigh). The pain radiates to the left thigh (radiates to bilateral hips varies from side to side). The pain is at a severity of 7/10. The pain is moderate. The pain is the same all the time. The symptoms are aggravated by position, sitting and standing. Stiffness is present: when sitting for long periods  Associated symptoms include numbness. Pertinent negatives include no chest pain or fever. Risk factors include lack of exercise. He has tried analgesics and bed rest for the symptoms. The treatment provided mild relief. Patient denies any new neurological symptoms. No bowel or bladder incontinence, no weakness, and no falling.     Pill count: appropriate    Morphine equivalent dose as reported on OARRS:30    Controlled Substances Monitoring:     Attestation: The Prescription Monitoring Report for this patient was reviewed today. JUAN R Darling)  Documentation: Obtaining appropriate analgesic effect of treatment., No signs of potential drug abuse or diversion identified., Existing medication contract. JUAN R Darling)  Review of OARRS does not show any aberrant prescription behavior. Medication is helping the patient stay active. Patient denies any side effects and reports adequate analgesia. No sign of misuse/abuse.     Past Medical History:   Diagnosis Date    Back pain     [address]erebral artery occlusion with cerebral infarction ([facility] [address]     COPD [facility]ch[facility] obstructive pulmonary disease) ([facility])     NEUBULIZER AS NEEDED    Depression     Depression     DJD (degenerative joint disease)     Hand pain, left     trigger finger    High cholesterol     PT NEVER FILLED SCRIPT    History of shingles     2013    Hypertension     PT NEVER FILLED  SCRIPT    Sleep apnea     does not have machine    Sleep apnea     DX YRS AGO LAST USE OF MACHINE 2008    Wears dentures     1 GOLD TOOTH ON FLIPPER UPPER    Wears glasses        Past Surgical History:   Procedure Laterality Date    BACK SURGERY  2005    LUMBAR    BACK SURGERY  6/15/16    posterior lumbar fusion L4-L5    CYST REMOVAL Bilateral     \"groin\"    FINGER TRIGGER RELEASE Left 03/04/2014    ring finger    FINGER TRIGGER RELEASE Right 1/5/15    rt hand-RING FINGER    KNEE ARTHROSCOPY Bilateral     NERVE BLOCK  01/13/2017    caudal #1    celestone 9mg  25mcq fentanyl    NERVE BLOCK Left 03/30/2017    left hip bursa injection depomedrol 40 mg    NERVE BLOCK Bilateral 04/07/2017    bilateral si joints injections, depo 40    PATELLA SURGERY Right     x2       Allergies   Allergen Reactions    Motrin [Ibuprofen] Nausea And Vomiting    Nsaids Nausea And Vomiting    Other Nausea And Vomiting     Pt states he cannot take any muscle relaxers but does not know what specific muscle relaxer caused the nausea. Current Outpatient Prescriptions:     oxyCODONE (ROXICODONE) 5 MG immediate release tablet, Take 1 tablet by mouth every 6 hours as needed for Pain . Earliest Fill Date: 10/8/17, Disp: 120 tablet, Rfl: 0    clopidogrel (PLAVIX) 75 MG tablet, Take 1 tablet by mouth daily for 21 days, Disp: 30 tablet, Rfl: 5    simvastatin (ZOCOR) 80 MG tablet, Take 1 tablet by mouth nightly, Disp: 30 tablet, Rfl: 5    amLODIPine (NORVASC) 5 MG tablet, Take 1 tablet by mouth daily, Disp: 30 tablet, Rfl: 5    aspirin 81 MG chewable tablet, Take 81 mg by mouth daily, Disp: , Rfl:     albuterol (PROVENTIL) (2.5 MG/3ML) 0.083% nebulizer solution, Take 3 mLs by nebulization 4 times daily, Disp: 120 each, Rfl: 3    albuterol sulfate HFA (PROVENTIL HFA) 108 (90 BASE) MCG/ACT inhaler, Inhale 2 puffs into the lungs every 6 hours as needed for Wheezing, Disp: 1 Inhaler, Rfl: 3    Family History   Problem Relation Age of Onset    Diabetes Mother     Diabetes Brother        Social History     Social History    Marital status: Single     Spouse name: N/A    Number of children: N/A    Years of education: N/A     Occupational History    Not on file. Social History Main Topics    Smoking status: Current Every Day Smoker     Packs/day: 0.25     Years: 30.00     Types: Cigarettes    Smokeless tobacco: Never Used    Alcohol use No    Drug use: No    Sexual activity: Yes     Partners: Female     Other Topics Concern    Not on file     Social History Narrative    No narrative on file       Review of Systems:  Review of Systems   Constitution: Negative for chills and fever. Cardiovascular: Negative for chest pain. Respiratory: Negative for cough and shortness of breath. Musculoskeletal: Positive for back pain. Gastrointestinal: Negative for constipation. Neurological: Positive for numbness.         Left thigh         Physical Exam:  BP (!) 150/103 strengthening exercises, and recommend minimizing use of pain medications unless patient cannot get through daily activities due to pain.   Pt was seen by stress care today and remains medium risk   Follow up appointment made for 4 weeks

## 2017-11-02 NOTE — BH NOTE
Carolyne Cruz was seen today for a follow-up appointment as part of the protocol [facility] at [facility] for medication contract patients. The focus of today's session was an evaluation of mood disorder symptoms, potential for suicide and potential for chemical dependency or abuse. Diagnosis Medical: M99.83  Diagnosis code: Psych code: F32.9    FINDINGS:     1) The patient arrived on time for appointment and was cooperative, attentive and had variable eye contact. At times, when it appeared he did not really want to answer a question, for example about his substance use history. Overall affect was euthymic. The patient did not demonstrate pain behaviors during the session. He was A & O x3.      2) Patient reports current pain level to be 6/10 pain . Patient reports usual pain level is 8/10. Pain is exacerbated on an ascending scale of 0-10, by sitting, as well as getting out of bed. He states pain is relieved by : laying down. 3) Patient states current mood is:  \"just alright. At times I get real anxious. \" These symptoms occur \"when I get into an argument with my girl, sometimes the way people drive. \" He used to be depressed, but not now. Patient denied feelings of helplessness, hopelessness, and worthlessness. Patient denied current suicidal thoughts and does not suicidal plan or intent. He does report issues related to sleep, including sleep onset. The patient does not report issues related to appetite. Further stated that they receive social support from his mother, his brothers, girlfriend and friends. He finds enjoyment in talking to people. He enjoys shooting pool, but has trouble with this activity because his back. Pain medication is  helpful. Patient reports functioning is improved by medication. He had difficulty answering a question about activities the medication helps him do.  He went on to talk about daily activities such as walking, getting out of bed, etc.     4) Current Medications:   Prior to Admission medications    Medication Sig Start Date End Date Taking? Authorizing Provider   oxyCODONE (ROXICODONE) 5 MG immediate release tablet Take 1 tablet by mouth every 6 hours as needed for Pain . Earliest Fill Date: 10/8/17 10/8/17 11/7/17  Kyleigh Martino APRMIRNA   clopidogrel (PLAVIX) 75 MG tablet Take 1 tablet by mouth daily for 21 days 6/13/17 10/5/17  Tootie Weinberg MD   simvastatin (ZOCOR) 80 MG tablet Take 1 tablet by mouth nightly 6/13/17   Tootie Weinberg MD   amLODIPine [facility]) 5 MG tablet Take 1 tablet by mouth daily 6/13/17   Tootei Weinberg MD   aspirin 81 MG chewable tablet Take 81 mg by mouth daily    Historical Provider, MD   albuterol (PROVENTIL) (2.5 MG/3ML) 0.083% nebulizer solution Take 3 mLs by nebulization 4 times daily 4/23/17   Keyona Devine, DO   albuterol sulfate HFA (PROVENTIL HFA) 108 (90 BASE) MCG/ACT inhaler Inhale 2 puffs into the lungs every 6 hours as needed for Wheezing 4/23/17   Keyona Devine, DO       5) Other substance Use:  Use in file:  reports that he has been smoking Cigarettes. He has a 7.50 pack-year smoking history. He has never used smokeless tobacco. He reports that he does not drink alcohol or use drugs. History   Alcohol Use No     History   Smoking Status    Current Every Day Smoker    Packs/day: 0.25    Years: 30.00    Types: Cigarettes   Smokeless Tobacco    Never Used     History   Drug Use No     Reported today: Described alcohol consumption as occurring \"a little bit. When I shoot pool I might have a beer. He shoots pool once a week. Reported that last alcohol consumption was last night while playing [employer]. Patient reported use of nicotine products, current use is:5 cigarettes. Caffeine use is a few cups a week. Patient denied current illicit drug use. Patient reported history of alcohol or drug-related issues. He used to drink a lot and smoke \"weed back in my 20's. \" Patient reported history of alcohol or drug-related treatment at [facility] in [address] [place] [address]. The patient states that they do not run out of pain medication early at the end of the month, but with further questioning he stated that he did run out early in the past, but he is back to monthly prescriptions. Patient does report a desire to take medication in higher doses or more frequently than prescribed, compulsive use, loss of control, and continued use despite harm. 6) He reports that they do currently engage in counseling services. Further they reported a history of mental health treatment and reported a history of psychiatric hospitalization 4, 5, 6 years ago. \"He has been in the \"psych [facility]spina\" 5 or 6 times. He was \"stressed out. \"     7) Given the information at hand and it should be noted that this evaluation is based only on the patient's self-report. He will be assigned at a care level of medium (yellow) due to recent history of in patient hospitalization as well issues with his pill count. It should be noted that the above is provided by Pt. Self report. RECOMMENDATIONS:      1)  He should be scheduled for their next followup appointment in 3 months.                    Electronically signed by Saravanan Meier, PhD on 11/2/2017 at 8:07 AM

## 2017-12-05 ENCOUNTER — HOSPITAL ENCOUNTER (OUTPATIENT)
Dept: PAIN MANAGEMENT | Age: 62
Discharge: HOME | End: 2017-12-05
Payer: MEDICARE

## 2017-12-05 VITALS
TEMPERATURE: 98.4 F | HEART RATE: 85 BPM | DIASTOLIC BLOOD PRESSURE: 78 MMHG | RESPIRATION RATE: 16 BRPM | SYSTOLIC BLOOD PRESSURE: 132 MMHG

## 2017-12-05 DIAGNOSIS — Z51.81 MEDICATION MONITORING ENCOUNTER: Primary | ICD-10-CM

## 2017-12-05 DIAGNOSIS — M96.1 POSTLAMINECTOMY SYNDROME: ICD-10-CM

## 2017-12-05 DIAGNOSIS — M48.061 NEURAL FORAMINAL STENOSIS OF LUMBAR SPINE: ICD-10-CM

## 2017-12-05 PROCEDURE — 99214 OFFICE O/P EST MOD 30 MIN: CPT | Performed by: NURSE PRACTITIONER

## 2017-12-05 PROCEDURE — 80307 DRUG TEST PRSMV CHEM ANLYZR: CPT

## 2017-12-05 PROCEDURE — 99214 OFFICE O/P EST MOD 30 MIN: CPT

## 2017-12-05 RX ORDER — OXYCODONE HYDROCHLORIDE 5 MG/1
5 TABLET ORAL EVERY 6 HOURS PRN
Qty: 120 TABLET | Refills: 0 | Status: SHIPPED | OUTPATIENT
Start: 2017-12-07 | End: 2018-01-02 | Stop reason: SDUPTHER

## 2017-12-05 ASSESSMENT — ENCOUNTER SYMPTOMS
BACK PAIN: 1
SHORTNESS OF BREATH: 0
COUGH: 0
CONSTIPATION: 0

## 2017-12-05 NOTE — PROGRESS NOTES
medication side effects, risk of tolerance and/or dependence, and alternative treatments discussed. , Random urine drug screen sent today., No signs of potential drug abuse or diversion identified., Existing medication contract. (Joann Regan, HIGINIO)    Past Medical History:   Diagnosis Date    Back pain     [address]bral [facility] occlusion with [facility] infarction ([facility] [address]     COPD (chronic obstructive pulmonary disease) (HCC)     NEUBULIZER AS NEEDED    Depression     Depression     DJD (degenerative joint disease)     Hand pain, left     trigger finger    High cholesterol     PT NEVER FILLED SCRIPT    History of shingles     2013    Hypertension     PT NEVER FILLED  SCRIPT    Sleep apnea     does not have machine    Sleep apnea     DX YRS AGO LAST USE OF MACHINE 2008    Wears dentures     1 GOLD TOOTH ON FLIPPER UPPER    Wears glasses        Past Surgical History:   Procedure Laterality Date    BACK SURGERY  2005    LUMBAR    BACK SURGERY  6/15/16    posterior lumbar fusion L4-L5    CYST REMOVAL Bilateral     \"groin\"    FINGER TRIGGER RELEASE Left 03/04/2014    ring finger    FINGER TRIGGER RELEASE Right 1/5/15    rt hand-RING FINGER    KNEE ARTHROSCOPY Bilateral     NERVE BLOCK  01/13/2017    caudal #1    celestone 9mg  25mcq fentanyl    NERVE BLOCK Left 03/30/2017    left hip bursa injection depomedrol 40 mg    NERVE BLOCK Bilateral 04/07/2017    bilateral si joints injections, depo 40    PATELLA SURGERY Right     x2       Allergies   Allergen Reactions    Motrin [Ibuprofen] Nausea And Vomiting    Nsaids Nausea And Vomiting    Other Nausea And Vomiting     Pt states he cannot take any muscle relaxers but does not know what specific muscle relaxer caused the nausea. Current Outpatient Prescriptions:     oxyCODONE (ROXICODONE) 5 MG immediate release tablet, Take 1 tablet by mouth every 6 hours as needed for Pain .  Earliest Fill Date: 11/7/17, Disp: 120 tablet, Rfl: 0   clopidogrel (PLAVIX) 75 MG tablet, Take 1 tablet by mouth daily for 21 days, Disp: 30 tablet, Rfl: 5    simvastatin (ZOCOR) 80 MG tablet, Take 1 tablet by mouth nightly, Disp: 30 tablet, Rfl: 5    amLODIPine (NORVASC) 5 MG tablet, Take 1 tablet by mouth daily, Disp: 30 tablet, Rfl: 5    aspirin 81 MG chewable tablet, Take 81 mg by mouth daily, Disp: , Rfl:     albuterol (PROVENTIL) (2.5 MG/3ML) 0.083% nebulizer solution, Take 3 mLs by nebulization 4 times daily, Disp: 120 each, Rfl: 3    albuterol sulfate HFA (PROVENTIL HFA) 108 (90 BASE) MCG/ACT inhaler, Inhale 2 puffs into the lungs every 6 hours as needed for Wheezing, Disp: 1 Inhaler, Rfl: 3    Family History   Problem Relation Age of Onset    Diabetes Mother     Diabetes Brother        Social History     Social History    Marital status: Single     Spouse name: N/A    Number of children: N/A    Years of education: N/A     Occupational History    Not on file. Social History Main Topics    Smoking status: Current Every Day Smoker     Packs/day: 0.25     Years: 30.00     Types: Cigarettes    Smokeless tobacco: Never Used    Alcohol use No    Drug use: No    Sexual activity: Yes     Partners: Female     Other Topics Concern    Not on file     Social History Narrative    No narrative on file       Review of Systems:  Review of Systems   Constitution: Negative for chills and fever. Cardiovascular: Negative for chest pain and irregular heartbeat. Respiratory: Negative for cough and shortness of breath. Musculoskeletal: Positive for back pain. Gastrointestinal: Negative for constipation. Neurological: Positive for numbness and tingling. Negative for disturbances in coordination and loss of balance. Physical Exam:  /78   Pulse 85   Temp 98.4 °F (36.9 °C) (Oral)   Resp 16     Physical Exam   Constitutional: He is oriented to person, place, and time and well-developed, well-nourished, and in no distress.    HENT:   Head: Normocephalic. Eyes: EOM are normal.   Neck: Normal range of motion. Pulmonary/Chest: Effort normal.   Musculoskeletal:        Lumbar back: He exhibits decreased range of motion, tenderness and pain. Neurological: He is alert and oriented to person, place, and time. Skin: Skin is warm and dry. Psychiatric: Affect normal.       Record/Diagnostics Review:    MRI Lumbar 2017 -    1. Sequelae of bilateral laminectomies and posterior lumbar fusion at L4-5  with resolved spinal canal stenosis and bilateral neural foraminal narrowing  at L4-5.  2. Moderate spinal canal stenosis and moderate bilateral neural foraminal  narrowing at L3-4 secondary to a disc bulge, facet arthropathy and thickening  of the ligamentum flavum similar compared with the previous exam.  3. Moderate bilateral neural foraminal narrowing at L5-S1 secondary to a disc  bulge and facet arthropathy, similar to the previous exam.  Disc desiccation  and disc space narrowing at L5-S1 has worsened since the previous exam    Assessment:  Problem List Items Addressed This Visit     Neural foraminal stenosis of lumbar spine    Postlaminectomy syndrome    Medication monitoring encounter - Primary      Other Visit Diagnoses    None. Treatment Plan:  DISCUSSION: Treatment options discussed with patient and all questions answered to patient's satisfaction. TREATMENT OPTIONS:   ELLYN  Continue current medication  Contract compliance requirements are met. Satisfactory pain management plan. Medications help with personal goals and self-care needs. Follow up appointment scheduled.

## 2017-12-09 LAB
6-ACETYLMORPHINE, UR: NOT DETECTED
7-AMINOCLONAZEPAM, URINE: NOT DETECTED
ALPHA-OH-ALPRAZ, URINE: NOT DETECTED
ALPRAZOLAM, URINE: NOT DETECTED
AMPHETAMINES, URINE: NOT DETECTED
BARBITURATES, URINE: NOT DETECTED
BENZOYLECGONINE, UR: NOT DETECTED
BUPRENORPHINE URINE: NOT DETECTED
CARISOPRODOL, UR: NOT DETECTED
CLONAZEPAM, URINE: NOT DETECTED
CODEINE, URINE: NOT DETECTED
CREATININE URINE: 33.1 MG/DL (ref 20–400)
DIAZEPAM, URINE: NOT DETECTED
EER PAIN MGT DRUG PANEL, HIGH RES/EMIT U: NORMAL
ETHYL GLUCURONIDE UR: NOT DETECTED
FENTANYL URINE: NOT DETECTED
HYDROCODONE, URINE: NOT DETECTED
HYDROMORPHONE, URINE: NOT DETECTED
LORAZEPAM, URINE: NOT DETECTED
MARIJUANA METAB, UR: NOT DETECTED
MDA, UR: NOT DETECTED
MDEA, EVE, UR: NOT DETECTED
MDMA URINE: NOT DETECTED
MEPERIDINE METAB, UR: NOT DETECTED
METHADONE, URINE: NOT DETECTED
METHAMPHETAMINE, URINE: NOT DETECTED
METHYLPHENIDATE: NOT DETECTED
MIDAZOLAM, URINE: NOT DETECTED
MORPHINE URINE: NOT DETECTED
NORBUPRENORPHINE, URINE: NOT DETECTED
NORDIAZEPAM, URINE: NOT DETECTED
NORFENTANYL, URINE: NOT DETECTED
NORHYDROCODONE, URINE: NOT DETECTED
NOROXYCODONE, URINE: PRESENT
NOROXYMORPHONE, URINE: NOT DETECTED
OXAZEPAM, URINE: NOT DETECTED
OXYCODONE URINE: PRESENT
OXYMORPHONE, URINE: NOT DETECTED
PAIN MGT DRUG PANEL, HI RES, UR: NORMAL
PCP,URINE: NOT DETECTED
PHENTERMINE, UR: NOT DETECTED
PROPOXYPHENE, URINE: NOT DETECTED
TAPENTADOL, URINE: NOT DETECTED
TAPENTADOL-O-SULFATE, URINE: NOT DETECTED
TEMAZEPAM, URINE: NOT DETECTED
TRAMADOL, URINE: NOT DETECTED
ZOLPIDEM, URINE: NOT DETECTED

## 2018-01-02 ENCOUNTER — HOSPITAL ENCOUNTER (OUTPATIENT)
Dept: PAIN MANAGEMENT | Age: 63
Discharge: HOME | End: 2018-01-02
Payer: MEDICARE

## 2018-01-02 VITALS
HEART RATE: 85 BPM | DIASTOLIC BLOOD PRESSURE: 82 MMHG | WEIGHT: 218 LBS | BODY MASS INDEX: 31.21 KG/M2 | OXYGEN SATURATION: 98 % | SYSTOLIC BLOOD PRESSURE: 147 MMHG | HEIGHT: 70 IN | TEMPERATURE: 98 F | RESPIRATION RATE: 18 BRPM

## 2018-01-02 DIAGNOSIS — M48.061 NEURAL FORAMINAL STENOSIS OF LUMBAR SPINE: ICD-10-CM

## 2018-01-02 DIAGNOSIS — M96.1 POSTLAMINECTOMY SYNDROME: ICD-10-CM

## 2018-01-02 PROCEDURE — 99214 OFFICE O/P EST MOD 30 MIN: CPT

## 2018-01-02 PROCEDURE — 99213 OFFICE O/P EST LOW 20 MIN: CPT | Performed by: ANESTHESIOLOGY

## 2018-01-02 RX ORDER — OXYCODONE HYDROCHLORIDE 5 MG/1
5 TABLET ORAL EVERY 6 HOURS PRN
Qty: 120 TABLET | Refills: 0 | Status: SHIPPED | OUTPATIENT
Start: 2018-01-06 | End: 2018-02-01 | Stop reason: SDUPTHER

## 2018-01-02 ASSESSMENT — PAIN DESCRIPTION - PAIN TYPE: TYPE: CHRONIC PAIN

## 2018-01-02 ASSESSMENT — PAIN DESCRIPTION - PROGRESSION: CLINICAL_PROGRESSION: NOT CHANGED

## 2018-01-02 ASSESSMENT — PAIN DESCRIPTION - LOCATION: LOCATION: BACK

## 2018-01-02 ASSESSMENT — PAIN DESCRIPTION - DESCRIPTORS: DESCRIPTORS: CONSTANT;ACHING;STABBING

## 2018-01-02 ASSESSMENT — PAIN DESCRIPTION - ORIENTATION: ORIENTATION: LOWER

## 2018-01-02 ASSESSMENT — PAIN DESCRIPTION - DIRECTION: RADIATING_TOWARDS: ACROSS LOW BACK

## 2018-01-02 ASSESSMENT — PAIN DESCRIPTION - FREQUENCY: FREQUENCY: CONTINUOUS

## 2018-01-02 ASSESSMENT — PAIN SCALES - GENERAL: PAINLEVEL_OUTOF10: 8

## 2018-01-02 NOTE — PROGRESS NOTES
BP (!) 147/82   Pulse 85   Temp 98 °F (36.7 °C) (Oral)   Resp 18   Ht 5' 10\" (1.778 m)   Wt 218 lb (98.9 kg)   SpO2 98%   BMI 31.28 kg/m²      Controlled Substances Monitoring:          General Appearance: alert and oriented to person, place and time, well-developed and well-nourished, in no acute distress and     Musculoskeletal: LBP: across the back: and distal to both legs: worse on the right: prior Laminectomy L4/5; with residual central and  foraminal stenosis: similar changes at L3/4 and L5/S1 ; Awaiting further NS evaluation: ? Repeat MRI. Remains tender in the L spine central and worse on the right PV / PSIS area. Suspect facet pain as well. Caudals : 2 2017 did not help: recent SI bilateral : did not help. Neurologic: no new findings  Mood and affect: alert and oriented to person, place, time and situation    Impression  Patient Active Problem List   Diagnosis    Tobacco abuse    Back pain    Hypercholesteremia    Trigger finger of left hand    Trigger finger, right    Degenerative arthritis of knee, bilateral    Right shoulder pain    Essential hypertension    Chronic obstructive pulmonary disease ([address]    Foot pain, right    Midline low back pain with right-sided sciatica    Spondylolisthesis, lumbar region    Chronic low back pain without sciatica    Right hip pain    CVA (cerebral vascular accident) ([address]    Vision changes    Stroke-like symptoms    Vitamin D deficiency    Elevated LDL cholesterol level    Thalamic infarct, acute (HCC)    Simple chronic bronchitis (HCC)    Neural foraminal stenosis of lumbar spine    Postlaminectomy syndrome    Medication monitoring encounter       Plan of Care    Consider epidural steroids/MS   Awaiting further eval possibly myelogram or repeat  MRI. Re ealuate the right sided facet pain      I have made any additions and/or corrections, if necessary, to the above scribed information.   I have reviewed and agree with the above information. Patient is here today to review medication contract. The patient is a [facility] patient : No     Pain Assessment  Pain Assessment: 0-10  Pain Level: 8  Pain Type: Chronic pain  Pain Location: Back  Pain Orientation: Lower  Pain Radiating Towards: across low back   Pain Descriptors: Constant, Aching, Stabbing  Pain Frequency: Continuous  Clinical Progression: Not changed  Effect of Pain on Daily Activities: ADL 4/5 doing basic daily care but has to stop and rest frequently. Limited in bending and lifting. Has to reposition frequently. Patient's Stated Pain Goal: 2  Pain Intervention(s): Medication (see eMar), Repositioned, Rest, Tub bath  POSS Score (Patient Ctrl Analgesia): 1    ELLYN  Today  No    OARRS today  Yes     Result of OARRS - appropriate for medication agreement    Morphine equivalent dose as reported on OARRS: 30    Pill count today   Yes  Comments     Last dose taken : this morning       ER visits related to Pain? No  Date of ER visit   N/A    Other Hospitalization  No     Medication Side Effects:  Constipation  No      Sedation  No    Urinary Retention  No  Other Medication Side Effect     Are you on a Bowel Regime  :  Diet  Yes   Medication No    Are you having any of these Emotional Issues? anxiety/ nervousness and moodiness    Are you seeing a Psychiatrist or Psychologist  Yes stress care level yellow last visit November 2017    Have you ever had thoughts of hurting yourself  No    No outpatient prescriptions have been marked as taking for the 1/2/18 encounter [facility] Encounter) with STV PAIN FLUORO RM 1.        Are your Current Pain medication (s) managing the pain  Yes    Other Issues     I have written this patient information acting as a scribe for Dr. Carey Wallis     Electronically signed by Leonard Sepulveda RN on 1/2/2018 at 9:44 AM

## 2018-01-05 ENCOUNTER — OFFICE VISIT (OUTPATIENT)
Dept: INTERNAL MEDICINE | Age: 63
End: 2018-01-05
Payer: MEDICARE

## 2018-01-05 VITALS
HEIGHT: 70 IN | HEART RATE: 89 BPM | WEIGHT: 219.6 LBS | BODY MASS INDEX: 31.44 KG/M2 | OXYGEN SATURATION: 98 % | DIASTOLIC BLOOD PRESSURE: 88 MMHG | RESPIRATION RATE: 12 BRPM | SYSTOLIC BLOOD PRESSURE: 139 MMHG

## 2018-01-05 DIAGNOSIS — J43.1 PANLOBULAR EMPHYSEMA (HCC): ICD-10-CM

## 2018-01-05 DIAGNOSIS — B07.0 PLANTAR WART: ICD-10-CM

## 2018-01-05 DIAGNOSIS — H53.8 BLURRED VISION: Primary | ICD-10-CM

## 2018-01-05 PROCEDURE — 4004F PT TOBACCO SCREEN RCVD TLK: CPT | Performed by: INTERNAL MEDICINE

## 2018-01-05 PROCEDURE — G8417 CALC BMI ABV UP PARAM F/U: HCPCS | Performed by: INTERNAL MEDICINE

## 2018-01-05 PROCEDURE — G8598 ASA/ANTIPLAT THER USED: HCPCS | Performed by: INTERNAL MEDICINE

## 2018-01-05 PROCEDURE — G8427 DOCREV CUR MEDS BY ELIG CLIN: HCPCS | Performed by: INTERNAL MEDICINE

## 2018-01-05 PROCEDURE — G8926 SPIRO NO PERF OR DOC: HCPCS | Performed by: INTERNAL MEDICINE

## 2018-01-05 PROCEDURE — G8484 FLU IMMUNIZE NO ADMIN: HCPCS | Performed by: INTERNAL MEDICINE

## 2018-01-05 PROCEDURE — 99214 OFFICE O/P EST MOD 30 MIN: CPT | Performed by: INTERNAL MEDICINE

## 2018-01-05 PROCEDURE — 3023F SPIROM DOC REV: CPT | Performed by: INTERNAL MEDICINE

## 2018-01-05 PROCEDURE — 3017F COLORECTAL CA SCREEN DOC REV: CPT | Performed by: INTERNAL MEDICINE

## 2018-01-05 RX ORDER — AMLODIPINE BESYLATE 5 MG/1
5 TABLET ORAL DAILY
Qty: 30 TABLET | Refills: 5 | Status: SHIPPED | OUTPATIENT
Start: 2018-01-05 | End: 2018-06-27 | Stop reason: SDUPTHER

## 2018-01-05 RX ORDER — CLOPIDOGREL BISULFATE 75 MG/1
75 TABLET ORAL DAILY
Qty: 30 TABLET | Refills: 5 | Status: SHIPPED | OUTPATIENT
Start: 2018-01-05 | End: 2018-03-16

## 2018-01-05 RX ORDER — SIMVASTATIN 80 MG
80 TABLET ORAL NIGHTLY
Qty: 30 TABLET | Refills: 5 | Status: SHIPPED | OUTPATIENT
Start: 2018-01-05 | End: 2018-03-16

## 2018-01-05 ASSESSMENT — ENCOUNTER SYMPTOMS
ALLERGIC/IMMUNOLOGIC NEGATIVE: 1
GASTROINTESTINAL NEGATIVE: 1
BLURRED VISION: 1
RESPIRATORY NEGATIVE: 1
DIFFICULTY BREATHING: 1

## 2018-01-05 ASSESSMENT — COPD QUESTIONNAIRES: COPD: 1

## 2018-01-05 NOTE — PROGRESS NOTES
03/04/2014    ring finger    FINGER TRIGGER RELEASE Right 1/5/15    rt hand-RING FINGER    KNEE ARTHROSCOPY Bilateral     NERVE BLOCK  01/13/2017    caudal #1    celestone 9mg  25mcq fentanyl    NERVE BLOCK Left 03/30/2017    left hip bursa injection depomedrol 40 mg    NERVE BLOCK Bilateral 04/07/2017    bilateral si joints injections, depo 40    PATELLA SURGERY Right     x2       Family History   Problem Relation Age of Onset    Diabetes Mother     Diabetes Brother        Social History   Substance Use Topics    Smoking status: Current Every Day Smoker     Packs/day: 0.25     Years: 30.00     Types: Cigarettes    Smokeless tobacco: Never Used    Alcohol use No      Current Outpatient Prescriptions   Medication Sig Dispense Refill    [START ON 1/6/2018] oxyCODONE (ROXICODONE) 5 MG immediate release tablet Take 1 tablet by mouth every 6 hours as needed for Pain for up to 30 days. Earliest Fill Date: 1/6/18 120 tablet 0    clopidogrel (PLAVIX) 75 MG tablet Take 1 tablet by mouth daily for 21 days 30 tablet 5    simvastatin (ZOCOR) 80 MG tablet Take 1 tablet by mouth nightly 30 tablet 5    amLODIPine (NORVASC) 5 MG tablet Take 1 tablet by mouth daily 30 tablet 5    aspirin 81 MG chewable tablet Take 81 mg by mouth daily      albuterol (PROVENTIL) (2.5 MG/3ML) 0.083% nebulizer solution Take 3 mLs by nebulization 4 times daily 120 each 3    albuterol sulfate HFA (PROVENTIL HFA) 108 (90 BASE) MCG/ACT inhaler Inhale 2 puffs into the lungs every 6 hours as needed for Wheezing 1 Inhaler 3     No current facility-administered medications for this visit. Allergies   Allergen Reactions    Motrin [Ibuprofen] Nausea And Vomiting    Nsaids Nausea And Vomiting    Other Nausea And Vomiting     Pt states he cannot take any muscle relaxers but does not know what specific muscle relaxer caused the nausea.        Health Maintenance   Topic Date Due    Zostavax vaccine  08/30/2017    Flu vaccine (1) 09/01/2017

## 2018-01-16 ENCOUNTER — OFFICE VISIT (OUTPATIENT)
Dept: PODIATRY | Age: 63
End: 2018-01-16
Payer: MEDICARE

## 2018-01-16 VITALS
HEIGHT: 69 IN | WEIGHT: 221 LBS | RESPIRATION RATE: 16 BRPM | DIASTOLIC BLOOD PRESSURE: 78 MMHG | HEART RATE: 68 BPM | TEMPERATURE: 98.2 F | SYSTOLIC BLOOD PRESSURE: 126 MMHG | BODY MASS INDEX: 32.73 KG/M2

## 2018-01-16 DIAGNOSIS — R26.2 DIFFICULTY WALKING: ICD-10-CM

## 2018-01-16 DIAGNOSIS — D23.72 BENIGN NEOPLASM OF SKIN OF LEFT FOOT: ICD-10-CM

## 2018-01-16 DIAGNOSIS — D23.71 BENIGN NEOPLASM OF SKIN OF RIGHT FOOT: ICD-10-CM

## 2018-01-16 DIAGNOSIS — M79.604 PAIN IN BOTH LOWER EXTREMITIES: ICD-10-CM

## 2018-01-16 DIAGNOSIS — M79.605 PAIN IN BOTH LOWER EXTREMITIES: ICD-10-CM

## 2018-01-16 DIAGNOSIS — B35.1 DERMATOPHYTOSIS OF NAIL: Primary | ICD-10-CM

## 2018-01-16 PROCEDURE — G8484 FLU IMMUNIZE NO ADMIN: HCPCS | Performed by: PODIATRIST

## 2018-01-16 PROCEDURE — 4004F PT TOBACCO SCREEN RCVD TLK: CPT | Performed by: PODIATRIST

## 2018-01-16 PROCEDURE — G8427 DOCREV CUR MEDS BY ELIG CLIN: HCPCS | Performed by: PODIATRIST

## 2018-01-16 PROCEDURE — G8598 ASA/ANTIPLAT THER USED: HCPCS | Performed by: PODIATRIST

## 2018-01-16 PROCEDURE — 99203 OFFICE O/P NEW LOW 30 MIN: CPT | Performed by: PODIATRIST

## 2018-01-16 PROCEDURE — 17110 DESTRUCTION B9 LES UP TO 14: CPT | Performed by: PODIATRIST

## 2018-01-16 PROCEDURE — 3017F COLORECTAL CA SCREEN DOC REV: CPT | Performed by: PODIATRIST

## 2018-01-16 PROCEDURE — 11721 DEBRIDE NAIL 6 OR MORE: CPT | Performed by: PODIATRIST

## 2018-01-16 PROCEDURE — G8417 CALC BMI ABV UP PARAM F/U: HCPCS | Performed by: PODIATRIST

## 2018-01-22 NOTE — PROGRESS NOTES
[facility] Podiatry  New Patient History and Physical    Chief Complaint   Patient presents with    Foot Pain     bilateral         HPI: Nola Nice is a 63 y.o. male who presents to the office today complaining of painful lesions to moi feet. Symptoms began 2 month(s) ago. Patient has noticed bump getting bigger and having trouble finding shoes that fit Patient relates pain is Present. Pain is rated 8 out of 10 and is described as constant. Treatments prior to today's visit include: trimming by himself. Denies any trauma to the feet. Currently denies F/C/N/V. Allergies   Allergen Reactions    Motrin [Ibuprofen] Nausea And Vomiting    Nsaids Nausea And Vomiting    Other Nausea And Vomiting     Pt states he cannot take any muscle relaxers but does not know what specific muscle relaxer caused the nausea. Past Medical History:   Diagnosis Date    Back pain     Cerebral artery occlusion with cerebral infarction ([address]     COPD (chronic obstructive pulmonary disease) ([facility]     NEUBULIZER AS NEEDED    Depression     Depression     DJD (degenerative joint disease)     Hand pain, left     trigger finger    High cholesterol     PT NEVER FILLED SCRIPT    History of shingles     2013    Hypertension     PT NEVER FILLED  SCRIPT    Postlaminectomy syndrome 8/9/2017    Sleep apnea     does not have machine    Sleep apnea     DX YRS AGO LAST USE OF MACHINE 2008    Wears dentures     1 GOLD TOOTH ON FLIPPER UPPER    Wears glasses        Prior to Admission medications    Medication Sig Start Date End Date Taking?  Authorizing Provider   simvastatin (ZOCOR) 80 MG tablet Take 1 tablet by mouth nightly 1/5/18   Mercedes Brittle, MD   clopidogrel (PLAVIX) 75 MG tablet Take 1 tablet by mouth daily 1/5/18   Mercedes Brittle, MD   amLODIPine [facility]) 5 MG tablet Take 1 tablet by mouth daily 1/5/18   Mercedes Brittle, MD   oxyCODONE (ROXICODONE) 5 MG immediate release tablet Take 1 tablet by mouth

## 2018-01-25 ENCOUNTER — OFFICE VISIT (OUTPATIENT)
Dept: PODIATRY | Age: 63
End: 2018-01-25
Payer: MEDICARE

## 2018-01-25 VITALS — HEIGHT: 69 IN | WEIGHT: 221 LBS | BODY MASS INDEX: 32.73 KG/M2

## 2018-01-25 DIAGNOSIS — D23.72 BENIGN NEOPLASM OF SKIN OF LEFT LOWER EXTREMITY, INCLUDING HIP: ICD-10-CM

## 2018-01-25 DIAGNOSIS — L85.3 XEROSIS OF SKIN: ICD-10-CM

## 2018-01-25 DIAGNOSIS — M79.605 BILATERAL LOWER EXTREMITY PAIN: ICD-10-CM

## 2018-01-25 DIAGNOSIS — D23.71 BENIGN NEOPLASM OF SKIN OF RIGHT LOWER EXTREMITY, INCLUDING HIP: Primary | ICD-10-CM

## 2018-01-25 DIAGNOSIS — R60.0 EDEMA OF LOWER EXTREMITY: ICD-10-CM

## 2018-01-25 DIAGNOSIS — M79.604 BILATERAL LOWER EXTREMITY PAIN: ICD-10-CM

## 2018-01-25 PROCEDURE — 17110 DESTRUCTION B9 LES UP TO 14: CPT | Performed by: PODIATRIST

## 2018-01-25 PROCEDURE — G8427 DOCREV CUR MEDS BY ELIG CLIN: HCPCS | Performed by: PODIATRIST

## 2018-01-25 PROCEDURE — G8484 FLU IMMUNIZE NO ADMIN: HCPCS | Performed by: PODIATRIST

## 2018-01-25 PROCEDURE — G8598 ASA/ANTIPLAT THER USED: HCPCS | Performed by: PODIATRIST

## 2018-01-25 PROCEDURE — 3017F COLORECTAL CA SCREEN DOC REV: CPT | Performed by: PODIATRIST

## 2018-01-25 PROCEDURE — G8417 CALC BMI ABV UP PARAM F/U: HCPCS | Performed by: PODIATRIST

## 2018-01-25 PROCEDURE — 4004F PT TOBACCO SCREEN RCVD TLK: CPT | Performed by: PODIATRIST

## 2018-01-25 RX ORDER — AMMONIUM LACTATE 12 G/100G
CREAM TOPICAL
Qty: 1 BOTTLE | Refills: 4 | Status: SHIPPED | OUTPATIENT
Start: 2018-01-25 | End: 2018-03-16

## 2018-01-25 RX ORDER — QUETIAPINE FUMARATE 50 MG/1
50 TABLET, EXTENDED RELEASE ORAL DAILY
Refills: 0 | COMMUNITY
Start: 2018-01-08 | End: 2018-03-16

## 2018-02-01 ENCOUNTER — HOSPITAL ENCOUNTER (OUTPATIENT)
Dept: PAIN MANAGEMENT | Age: 63
Discharge: HOME | End: 2018-02-01
Payer: MEDICARE

## 2018-02-01 VITALS
RESPIRATION RATE: 16 BRPM | DIASTOLIC BLOOD PRESSURE: 71 MMHG | SYSTOLIC BLOOD PRESSURE: 139 MMHG | HEIGHT: 70 IN | BODY MASS INDEX: 31.07 KG/M2 | WEIGHT: 217 LBS | TEMPERATURE: 98.4 F | HEART RATE: 89 BPM

## 2018-02-01 DIAGNOSIS — Z51.81 MEDICATION MONITORING ENCOUNTER: Primary | ICD-10-CM

## 2018-02-01 DIAGNOSIS — M48.061 NEURAL FORAMINAL STENOSIS OF LUMBAR SPINE: ICD-10-CM

## 2018-02-01 DIAGNOSIS — M96.1 POSTLAMINECTOMY SYNDROME: ICD-10-CM

## 2018-02-01 PROCEDURE — 99214 OFFICE O/P EST MOD 30 MIN: CPT

## 2018-02-01 PROCEDURE — 99213 OFFICE O/P EST LOW 20 MIN: CPT | Performed by: NURSE PRACTITIONER

## 2018-02-01 RX ORDER — OXYCODONE HYDROCHLORIDE 5 MG/1
5 TABLET ORAL EVERY 6 HOURS PRN
Qty: 120 TABLET | Refills: 0 | Status: SHIPPED | OUTPATIENT
Start: 2018-02-05 | End: 2018-03-07

## 2018-02-01 ASSESSMENT — ENCOUNTER SYMPTOMS
COUGH: 0
CONSTIPATION: 0
SHORTNESS OF BREATH: 0
BACK PAIN: 1

## 2018-02-01 NOTE — PROGRESS NOTES
identified. , Potential drug abuse or diversion identified, see note documentation. , Possible medication side effects, risk of tolerance and/or dependence, and alternative treatments discussed. JUAN R Meza)  Medication Contracts: Existing medication contract. JUAN R Meza)  Review of OARRS does not show any aberrant prescription behavior. Medication is helping the patient stay active. Patient denies any side effects and reports adequate analgesia. No sign of misuse/abuse.     Past Medical History:   Diagnosis Date    Back pain     [facility] occlusion with cerebral infarction [facility]     COPD (chronic obstructive pulmonary disease) (HCC)     NEUBULIZER AS NEEDED    Depression     Depression     DJD (degenerative joint disease)     Hand pain, left     trigger finger    High cholesterol     PT NEVER FILLED SCRIPT    History of shingles     2013    Hypertension     PT NEVER FILLED  SCRIPT    Postlaminectomy syndrome 8/9/2017    Sleep apnea     does not have machine    Sleep apnea     DX YRS AGO LAST USE OF MACHINE 2008    Wears dentures     1 GOLD TOOTH ON FLIPPER UPPER    Wears glasses        Past Surgical History:   Procedure Laterality Date    BACK SURGERY  2005    LUMBAR    BACK SURGERY  6/15/16    posterior lumbar fusion L4-L5    CYST REMOVAL Bilateral     \"groin\"    FINGER TRIGGER RELEASE Left 03/04/2014    ring finger    FINGER TRIGGER RELEASE Right 1/5/15    rt hand-RING FINGER    KNEE ARTHROSCOPY Bilateral     NERVE BLOCK  01/13/2017    caudal #1    celestone 9mg  25mcq fentanyl    NERVE BLOCK Left 03/30/2017    left hip bursa injection depomedrol 40 mg    NERVE BLOCK Bilateral 04/07/2017    bilateral si joints injections, depo 40    PATELLA SURGERY Right     x2       Allergies   Allergen Reactions    Motrin [Ibuprofen] Nausea And Vomiting    Nsaids Nausea And Vomiting    Other Nausea And Vomiting     Pt states he cannot take any muscle relaxers but does not know what satisfaction. TREATMENT OPTIONS:     Continue opioid therapy. Script written for oxycodone   Will send pt for random pill count this month due to not having pills at today's visit with history of pill shortages. If short will need to terminate medicaiton aggreement  Pt has appt today with NS to discuss surgical options  Contract requirements met. Satisfactory pain management plan. Medication helps with personal goals and self care needs. Patient is stable on current regimen of meds and medication is effectively managing pain, we will continue current medications without changes. As always, we encourage daily stretching and strengthening exercises, and recommend minimizing use of pain medications unless patient cannot get through daily activities due to pain.   Follow up appointment made for 4 weeks

## 2018-02-08 ENCOUNTER — OFFICE VISIT (OUTPATIENT)
Dept: PODIATRY | Age: 63
End: 2018-02-08
Payer: MEDICARE

## 2018-02-08 VITALS
DIASTOLIC BLOOD PRESSURE: 68 MMHG | SYSTOLIC BLOOD PRESSURE: 132 MMHG | BODY MASS INDEX: 32.73 KG/M2 | HEART RATE: 68 BPM | WEIGHT: 221 LBS | RESPIRATION RATE: 16 BRPM | HEIGHT: 69 IN | TEMPERATURE: 98.2 F

## 2018-02-08 DIAGNOSIS — I73.9 PVD (PERIPHERAL VASCULAR DISEASE) (HCC): ICD-10-CM

## 2018-02-08 DIAGNOSIS — D23.72 BENIGN NEOPLASM OF SKIN OF LEFT FOOT: ICD-10-CM

## 2018-02-08 DIAGNOSIS — M79.672 PAIN IN BOTH FEET: ICD-10-CM

## 2018-02-08 DIAGNOSIS — R26.2 DIFFICULTY WALKING: ICD-10-CM

## 2018-02-08 DIAGNOSIS — M79.671 PAIN IN BOTH FEET: ICD-10-CM

## 2018-02-08 DIAGNOSIS — D23.71 BENIGN NEOPLASM OF SKIN OF RIGHT FOOT: Primary | ICD-10-CM

## 2018-02-08 PROCEDURE — G8484 FLU IMMUNIZE NO ADMIN: HCPCS | Performed by: PODIATRIST

## 2018-02-08 PROCEDURE — 17110 DESTRUCTION B9 LES UP TO 14: CPT | Performed by: PODIATRIST

## 2018-02-08 PROCEDURE — 3017F COLORECTAL CA SCREEN DOC REV: CPT | Performed by: PODIATRIST

## 2018-02-08 PROCEDURE — G8417 CALC BMI ABV UP PARAM F/U: HCPCS | Performed by: PODIATRIST

## 2018-02-08 PROCEDURE — G8598 ASA/ANTIPLAT THER USED: HCPCS | Performed by: PODIATRIST

## 2018-02-08 PROCEDURE — G8427 DOCREV CUR MEDS BY ELIG CLIN: HCPCS | Performed by: PODIATRIST

## 2018-02-08 PROCEDURE — 4004F PT TOBACCO SCREEN RCVD TLK: CPT | Performed by: PODIATRIST

## 2018-02-23 ENCOUNTER — HOSPITAL ENCOUNTER (OUTPATIENT)
Dept: PREADMISSION TESTING | Age: 63
Discharge: HOME | End: 2018-02-27
Payer: MEDICARE

## 2018-02-23 ENCOUNTER — HOSPITAL ENCOUNTER (OUTPATIENT)
Dept: GENERAL RADIOLOGY | Age: 63
Discharge: HOME | End: 2018-02-25
Payer: MEDICARE

## 2018-02-23 VITALS
TEMPERATURE: 98.2 F | DIASTOLIC BLOOD PRESSURE: 76 MMHG | HEIGHT: 70 IN | RESPIRATION RATE: 17 BRPM | OXYGEN SATURATION: 98 % | WEIGHT: 221.78 LBS | HEART RATE: 81 BPM | SYSTOLIC BLOOD PRESSURE: 145 MMHG | BODY MASS INDEX: 31.75 KG/M2

## 2018-02-23 LAB
ABSOLUTE EOS #: 0.3 K/UL (ref 0–0.4)
ABSOLUTE IMMATURE GRANULOCYTE: ABNORMAL K/UL (ref 0–0.3)
ABSOLUTE LYMPH #: 1.8 K/UL (ref 1–4.8)
ABSOLUTE MONO #: 0.5 K/UL (ref 0.2–0.8)
ANION GAP SERPL CALCULATED.3IONS-SCNC: 13 MMOL/L (ref 9–17)
BASOPHILS # BLD: 1 % (ref 0–2)
BASOPHILS ABSOLUTE: 0 K/UL (ref 0–0.2)
BUN BLDV-MCNC: 23 MG/DL (ref 8–23)
CHLORIDE BLD-SCNC: 102 MMOL/L (ref 98–107)
CO2: 23 MMOL/L (ref 20–31)
CREAT SERPL-MCNC: 1.18 MG/DL (ref 0.7–1.2)
DIFFERENTIAL TYPE: ABNORMAL
EKG ATRIAL RATE: 81 BPM
EKG P AXIS: 48 DEGREES
EKG P-R INTERVAL: 202 MS
EKG Q-T INTERVAL: 392 MS
EKG QRS DURATION: 90 MS
EKG QTC CALCULATION (BAZETT): 455 MS
EKG R AXIS: 60 DEGREES
EKG T AXIS: -21 DEGREES
EKG VENTRICULAR RATE: 81 BPM
EOSINOPHILS RELATIVE PERCENT: 5 % (ref 1–4)
GFR AFRICAN AMERICAN: >60 ML/MIN
GFR NON-AFRICAN AMERICAN: >60 ML/MIN
GFR SERPL CREATININE-BSD FRML MDRD: NORMAL ML/MIN/{1.73_M2}
GFR SERPL CREATININE-BSD FRML MDRD: NORMAL ML/MIN/{1.73_M2}
HCT VFR BLD CALC: 40.1 % (ref 41–53)
HEMOGLOBIN: 13.7 G/DL (ref 13.5–17.5)
IMMATURE GRANULOCYTES: ABNORMAL %
LYMPHOCYTES # BLD: 29 % (ref 24–44)
MCH RBC QN AUTO: 29.4 PG (ref 26–34)
MCHC RBC AUTO-ENTMCNC: 34.2 G/DL (ref 31–37)
MCV RBC AUTO: 86 FL (ref 80–100)
MONOCYTES # BLD: 8 % (ref 1–7)
NRBC AUTOMATED: ABNORMAL PER 100 WBC
PDW BLD-RTO: 13.6 % (ref 11.5–14.5)
PLATELET # BLD: 196 K/UL (ref 130–400)
PLATELET ESTIMATE: ABNORMAL
PMV BLD AUTO: ABNORMAL FL (ref 6–12)
POTASSIUM SERPL-SCNC: 3.8 MMOL/L (ref 3.7–5.3)
RBC # BLD: 4.66 M/UL (ref 4.5–5.9)
RBC # BLD: ABNORMAL 10*6/UL
SEG NEUTROPHILS: 57 % (ref 36–66)
SEGMENTED NEUTROPHILS ABSOLUTE COUNT: 3.5 K/UL (ref 1.8–7.7)
SODIUM BLD-SCNC: 138 MMOL/L (ref 135–144)
WBC # BLD: 6.1 K/UL (ref 3.5–11)
WBC # BLD: ABNORMAL 10*3/UL

## 2018-02-23 PROCEDURE — 71046 X-RAY EXAM CHEST 2 VIEWS: CPT

## 2018-02-23 PROCEDURE — 85025 COMPLETE CBC W/AUTO DIFF WBC: CPT

## 2018-02-23 PROCEDURE — 82565 ASSAY OF CREATININE: CPT

## 2018-02-23 PROCEDURE — 93017 CV STRESS TEST TRACING ONLY: CPT

## 2018-02-23 PROCEDURE — 84520 ASSAY OF UREA NITROGEN: CPT

## 2018-02-23 PROCEDURE — 93005 ELECTROCARDIOGRAM TRACING: CPT

## 2018-02-23 PROCEDURE — 80051 ELECTROLYTE PANEL: CPT

## 2018-02-23 PROCEDURE — 36415 COLL VENOUS BLD VENIPUNCTURE: CPT

## 2018-02-23 NOTE — PRE-PROCEDURE INSTRUCTIONS
[facility] Thursday, 3/8/18 at 6:00 AM    Once you enter the hospital lobby, take the elevators to the second floor. Check-In is at the surgery registration desk. If you have been given a blood band, you must bring it with you the day of surgery. Continue to take your home medications as you normally do up to and including the night before surgery with the exception of any blood thinning medications. Please stop any blood thinning medications as directed by your surgeon or prescribing physician. Failure to stop certain medications may interfere with your scheduled surgery. These may include:  Aspirin, Warfarin (Coumadin), Clopidogrel (Plavix), Ibuprofen (Motrin, Advil), Naproxen (Aleve), Meloxicam (Mobic), Celecoxib (Celebrex), Eliquis, Pradaxa, Xarelto, Effient, Fish Oil, Herbal supplements. If you are diabetic, do not take any of your diabetic medications by mouth the morning of surgery. If you are taking insulin contact the doctor that manages your diabetes for instructions about any changes to your insulin dosages the day before surgery. Do not inject insulin or other injectable diabetic medications the morning of surgery unless otherwise instructed by the doctor who manages your diabetes. Please take the following medication(s) the day of surgery with a small sip of water:  Amlodipine. Tylenol is okay. Please use your inhalers at home the day of surgery. PREPARING FOR YOUR SURGERY:     Before surgery, you can play an important role in your own health. Because skin is not sterile, we need to be sure that your skin is as free of germs as possible before surgery by carefully washing before surgery. Preparing or prepping skin before surgery can reduce the risk of a surgical site infection.   Do not shave the area of your body where your surgery will be performed unless you received specific permission from your physician.     You will need to shower at home the night before surgery and the morning of surgery with a special soap called chlorhexidine gluconate (CHG*). *Not to be used by people allergic to Chlorhexidine Gluconate (CHG). Following these instructions will help you be sure that your skin is clean before surgery. Instructions on cleaning your skin before surgery: The night before your surgery:      You will need to shower with warm water (not hot) and the CHG soap.  Use a clean wash cloth and a clean towel. Have clean clothes available to put on after the shower.    First wash your hair with regular shampoo. Rinse your hair and body thoroughly to remove the shampoo. [facility] Wash your face with your regular soap or water only. Thoroughly rinse your body with warm water from the neck down.  Turn water off to prevent rinsing the soap off too soon.  With a clean wet washcloth and half of the CHG soap in the bottle, lather your entire body from the neck down. Do not use CHG soap near your eyes or ears to avoid injury to those areas.  Wash thoroughly, paying special attention to the area where your surgery will be performed.  Wash your body gently for five (5) minutes. Avoid scrubbing your skin too hard.  Turn the water back on and rinse your body thoroughly.  Pat yourself dry with a clean, soft towel. Do not apply lotion, cream or powder.  Dress with clean freshly washed clothes. The morning of surgery:     Repeat shower following steps above - using remaining half of CHG soap in bottle. Patient Instructions:    [facility] If you are having any type of anesthesia you are to have nothing to eat or drink after midnight the night before your surgery. This includes gum, mints, water or smoking or chewing tobacco.  The only exception to this is a small sip of water to take with any morning dose of heart, blood pressure, or seizure medications. No alcoholic beverages for 24 hours prior to surgery.      Bring a list of

## 2018-02-27 ENCOUNTER — OFFICE VISIT (OUTPATIENT)
Dept: INTERNAL MEDICINE | Age: 63
End: 2018-02-27
Payer: MEDICARE

## 2018-02-27 VITALS
BODY MASS INDEX: 31.47 KG/M2 | HEIGHT: 70 IN | SYSTOLIC BLOOD PRESSURE: 130 MMHG | DIASTOLIC BLOOD PRESSURE: 86 MMHG | WEIGHT: 219.8 LBS | OXYGEN SATURATION: 98 % | HEART RATE: 90 BPM

## 2018-02-27 DIAGNOSIS — Z01.818 PREOPERATIVE CLEARANCE: Primary | ICD-10-CM

## 2018-02-27 PROCEDURE — G8484 FLU IMMUNIZE NO ADMIN: HCPCS | Performed by: FAMILY MEDICINE

## 2018-02-27 PROCEDURE — 3017F COLORECTAL CA SCREEN DOC REV: CPT | Performed by: FAMILY MEDICINE

## 2018-02-27 PROCEDURE — 99213 OFFICE O/P EST LOW 20 MIN: CPT | Performed by: FAMILY MEDICINE

## 2018-02-27 PROCEDURE — G8427 DOCREV CUR MEDS BY ELIG CLIN: HCPCS | Performed by: FAMILY MEDICINE

## 2018-02-27 PROCEDURE — 4004F PT TOBACCO SCREEN RCVD TLK: CPT | Performed by: FAMILY MEDICINE

## 2018-02-27 PROCEDURE — G8417 CALC BMI ABV UP PARAM F/U: HCPCS | Performed by: FAMILY MEDICINE

## 2018-02-27 PROCEDURE — G8598 ASA/ANTIPLAT THER USED: HCPCS | Performed by: FAMILY MEDICINE

## 2018-02-27 ASSESSMENT — ENCOUNTER SYMPTOMS
WHEEZING: 0
SHORTNESS OF BREATH: 0
NAUSEA: 0
COUGH: 0
VOMITING: 0

## 2018-02-27 NOTE — PROGRESS NOTES
Subjective:      Patient ID: Shlomo Hampton is a 63 y.o. male. HPI     Patient is having right foot bunionectomy on march 8 by podiatry. He cannot remembver name of podiatrist but will call to let us know. Denies any other concerns    Review of Systems   Constitutional: Negative for chills and fever. HENT: Negative for congestion. Respiratory: Negative for cough, shortness of breath and wheezing. Cardiovascular: Negative for chest pain, palpitations and leg swelling. Gastrointestinal: Negative for nausea and vomiting. Musculoskeletal: Negative for arthralgias. Neurological: Negative for dizziness and headaches. /86   Pulse 90   Ht 5' 10\" (1.778 m)   Wt 219 lb 12.8 oz (99.7 kg)   SpO2 98%   BMI 31.54 kg/m²       Objective:   Physical Exam   Constitutional: He appears well-developed and well-nourished. No distress. Neck: Neck supple. Cardiovascular: Normal rate, regular rhythm, normal heart sounds and intact distal pulses. No murmur heard. Pulmonary/Chest: Effort normal and breath sounds normal. No respiratory distress. He has no wheezes. Musculoskeletal: He exhibits no edema. Lymphadenopathy:     He has no cervical adenopathy. Neurological: He has normal reflexes. Assessment:       1. Preoperative clearance              Plan:       Labs, EKG abd CXR are reviewed. Patient is cleared fr surgery. Hold blood thinners 5 days prior to surgery. Ryann Gaitan received counseling on the following healthy behaviors: medication adherence  Reviewed prior labs and health maintenance  Continue current medications, diet and exercise. Discussed use, benefit, and side effects of prescribed medications. Barriers to medication compliance addressed. Patient given educational materials - see patient instructions  Was a self-tracking handout given in paper form or via [employer]?  No:     Requested Prescriptions      No prescriptions requested or ordered in this encounter       All

## 2018-03-16 ENCOUNTER — APPOINTMENT (OUTPATIENT)
Dept: CT IMAGING | Age: 63
End: 2018-03-16
Payer: MEDICARE

## 2018-03-16 ENCOUNTER — APPOINTMENT (OUTPATIENT)
Dept: ULTRASOUND IMAGING | Age: 63
End: 2018-03-16
Payer: MEDICARE

## 2018-03-16 ENCOUNTER — HOSPITAL ENCOUNTER (OUTPATIENT)
Age: 63
Setting detail: OBSERVATION
Discharge: HOME | End: 2018-03-17
Attending: EMERGENCY MEDICINE | Admitting: EMERGENCY MEDICINE
Payer: MEDICARE

## 2018-03-16 ENCOUNTER — APPOINTMENT (OUTPATIENT)
Dept: GENERAL RADIOLOGY | Age: 63
End: 2018-03-16
Payer: MEDICARE

## 2018-03-16 DIAGNOSIS — K85.90 ACUTE PANCREATITIS WITHOUT INFECTION OR NECROSIS, UNSPECIFIED PANCREATITIS TYPE: Primary | ICD-10-CM

## 2018-03-16 PROBLEM — K85.00 IDIOPATHIC ACUTE PANCREATITIS: Status: ACTIVE | Noted: 2018-03-16

## 2018-03-16 LAB
ABSOLUTE EOS #: 0.22 K/UL (ref 0–0.44)
ABSOLUTE IMMATURE GRANULOCYTE: <0.03 K/UL (ref 0–0.3)
ABSOLUTE LYMPH #: 2.66 K/UL (ref 1.1–3.7)
ABSOLUTE MONO #: 0.54 K/UL (ref 0.1–1.2)
ALBUMIN SERPL-MCNC: 3.7 G/DL (ref 3.5–5.2)
ALBUMIN/GLOBULIN RATIO: 1.1 (ref 1–2.5)
ALP BLD-CCNC: 81 U/L (ref 40–129)
ALT SERPL-CCNC: 12 U/L (ref 5–41)
ANION GAP SERPL CALCULATED.3IONS-SCNC: 13 MMOL/L (ref 9–17)
AST SERPL-CCNC: 17 U/L
BASOPHILS # BLD: 1 % (ref 0–2)
BASOPHILS ABSOLUTE: 0.05 K/UL (ref 0–0.2)
BILIRUB SERPL-MCNC: 0.3 MG/DL (ref 0.3–1.2)
BILIRUBIN DIRECT: <0.08 MG/DL
BILIRUBIN, INDIRECT: NORMAL MG/DL (ref 0–1)
BUN BLDV-MCNC: 16 MG/DL (ref 8–23)
BUN/CREAT BLD: ABNORMAL (ref 9–20)
CALCIUM SERPL-MCNC: 8.6 MG/DL (ref 8.6–10.4)
CHLORIDE BLD-SCNC: 108 MMOL/L (ref 98–107)
CO2: 22 MMOL/L (ref 20–31)
CREAT SERPL-MCNC: 1.22 MG/DL (ref 0.7–1.2)
DIFFERENTIAL TYPE: NORMAL
EKG ATRIAL RATE: 73 BPM
EKG P AXIS: 58 DEGREES
EKG P-R INTERVAL: 182 MS
EKG Q-T INTERVAL: 406 MS
EKG QRS DURATION: 94 MS
EKG QTC CALCULATION (BAZETT): 447 MS
EKG R AXIS: 57 DEGREES
EKG T AXIS: 18 DEGREES
EKG VENTRICULAR RATE: 73 BPM
EOSINOPHILS RELATIVE PERCENT: 3 % (ref 1–4)
GFR AFRICAN AMERICAN: >60 ML/MIN
GFR NON-AFRICAN AMERICAN: >60 ML/MIN
GFR SERPL CREATININE-BSD FRML MDRD: ABNORMAL ML/MIN/{1.73_M2}
GFR SERPL CREATININE-BSD FRML MDRD: ABNORMAL ML/MIN/{1.73_M2}
GLOBULIN: NORMAL G/DL (ref 1.5–3.8)
GLUCOSE BLD-MCNC: 105 MG/DL (ref 70–99)
HCT VFR BLD CALC: 42.7 % (ref 40.7–50.3)
HEMOGLOBIN: 13.8 G/DL (ref 13–17)
IMMATURE GRANULOCYTES: 0 %
LACTIC ACID, WHOLE BLOOD: 1.3 MMOL/L (ref 0.7–2.1)
LIPASE: 131 U/L (ref 13–60)
LYMPHOCYTES # BLD: 41 % (ref 24–43)
MCH RBC QN AUTO: 28.3 PG (ref 25.2–33.5)
MCHC RBC AUTO-ENTMCNC: 32.3 G/DL (ref 28.4–34.8)
MCV RBC AUTO: 87.5 FL (ref 82.6–102.9)
MONOCYTES # BLD: 8 % (ref 3–12)
NRBC AUTOMATED: 0 PER 100 WBC
PDW BLD-RTO: 14.4 % (ref 11.8–14.4)
PLATELET # BLD: 198 K/UL (ref 138–453)
PLATELET ESTIMATE: NORMAL
PMV BLD AUTO: 10.8 FL (ref 8.1–13.5)
POC TROPONIN I: 0 NG/ML (ref 0–0.1)
POC TROPONIN I: 0 NG/ML (ref 0–0.1)
POC TROPONIN INTERP: NORMAL
POC TROPONIN INTERP: NORMAL
POTASSIUM SERPL-SCNC: 3.9 MMOL/L (ref 3.7–5.3)
RBC # BLD: 4.88 M/UL (ref 4.21–5.77)
RBC # BLD: NORMAL 10*6/UL
SEG NEUTROPHILS: 47 % (ref 36–65)
SEGMENTED NEUTROPHILS ABSOLUTE COUNT: 3.05 K/UL (ref 1.5–8.1)
SODIUM BLD-SCNC: 143 MMOL/L (ref 135–144)
TOTAL PROTEIN: 7.1 G/DL (ref 6.4–8.3)
WBC # BLD: 6.5 K/UL (ref 3.5–11.3)
WBC # BLD: NORMAL 10*3/UL

## 2018-03-16 PROCEDURE — 93005 ELECTROCARDIOGRAM TRACING: CPT

## 2018-03-16 PROCEDURE — 74177 CT ABD & PELVIS W/CONTRAST: CPT

## 2018-03-16 PROCEDURE — 84484 ASSAY OF TROPONIN QUANT: CPT

## 2018-03-16 PROCEDURE — 83605 ASSAY OF LACTIC ACID: CPT

## 2018-03-16 PROCEDURE — G0378 HOSPITAL OBSERVATION PER HR: HCPCS

## 2018-03-16 PROCEDURE — 96374 THER/PROPH/DIAG INJ IV PUSH: CPT

## 2018-03-16 PROCEDURE — 96376 TX/PRO/DX INJ SAME DRUG ADON: CPT

## 2018-03-16 PROCEDURE — 80076 HEPATIC FUNCTION PANEL: CPT

## 2018-03-16 PROCEDURE — 96375 TX/PRO/DX INJ NEW DRUG ADDON: CPT

## 2018-03-16 PROCEDURE — 2580000003 HC RX 258: Performed by: EMERGENCY MEDICINE

## 2018-03-16 PROCEDURE — 6360000002 HC RX W HCPCS: Performed by: EMERGENCY MEDICINE

## 2018-03-16 PROCEDURE — 6360000004 HC RX CONTRAST MEDICATION: Performed by: EMERGENCY MEDICINE

## 2018-03-16 PROCEDURE — 76705 ECHO EXAM OF ABDOMEN: CPT

## 2018-03-16 PROCEDURE — 85025 COMPLETE CBC W/AUTO DIFF WBC: CPT

## 2018-03-16 PROCEDURE — 6370000000 HC RX 637 (ALT 250 FOR IP): Performed by: EMERGENCY MEDICINE

## 2018-03-16 PROCEDURE — 80048 BASIC METABOLIC PNL TOTAL CA: CPT

## 2018-03-16 PROCEDURE — 99285 EMERGENCY DEPT VISIT HI MDM: CPT

## 2018-03-16 PROCEDURE — 83690 ASSAY OF LIPASE: CPT

## 2018-03-16 RX ORDER — SODIUM CHLORIDE 9 MG/ML
INJECTION, SOLUTION INTRAVENOUS CONTINUOUS
Status: DISCONTINUED | OUTPATIENT
Start: 2018-03-16 | End: 2018-03-17 | Stop reason: HOSPADM

## 2018-03-16 RX ORDER — AMLODIPINE BESYLATE 10 MG/1
5 TABLET ORAL DAILY
Status: DISCONTINUED | OUTPATIENT
Start: 2018-03-16 | End: 2018-03-17 | Stop reason: HOSPADM

## 2018-03-16 RX ORDER — SODIUM CHLORIDE 0.9 % (FLUSH) 0.9 %
10 SYRINGE (ML) INJECTION PRN
Status: DISCONTINUED | OUTPATIENT
Start: 2018-03-16 | End: 2018-03-17 | Stop reason: HOSPADM

## 2018-03-16 RX ORDER — AMLODIPINE BESYLATE 10 MG/1
5 TABLET ORAL DAILY
Status: DISCONTINUED | OUTPATIENT
Start: 2018-03-16 | End: 2018-03-16

## 2018-03-16 RX ORDER — OXYCODONE HYDROCHLORIDE AND ACETAMINOPHEN 5; 325 MG/1; MG/1
1 TABLET ORAL EVERY 6 HOURS PRN
Status: DISCONTINUED | OUTPATIENT
Start: 2018-03-16 | End: 2018-03-17 | Stop reason: HOSPADM

## 2018-03-16 RX ORDER — MORPHINE SULFATE 4 MG/ML
4 INJECTION, SOLUTION INTRAMUSCULAR; INTRAVENOUS
Status: DISCONTINUED | OUTPATIENT
Start: 2018-03-16 | End: 2018-03-17 | Stop reason: HOSPADM

## 2018-03-16 RX ORDER — OXYCODONE HYDROCHLORIDE AND ACETAMINOPHEN 5; 325 MG/1; MG/1
2 TABLET ORAL EVERY 6 HOURS PRN
Status: DISCONTINUED | OUTPATIENT
Start: 2018-03-16 | End: 2018-03-17 | Stop reason: HOSPADM

## 2018-03-16 RX ORDER — ACETAMINOPHEN 325 MG/1
650 TABLET ORAL EVERY 4 HOURS PRN
Status: DISCONTINUED | OUTPATIENT
Start: 2018-03-16 | End: 2018-03-17 | Stop reason: HOSPADM

## 2018-03-16 RX ORDER — ONDANSETRON 2 MG/ML
4 INJECTION INTRAMUSCULAR; INTRAVENOUS EVERY 6 HOURS PRN
Status: DISCONTINUED | OUTPATIENT
Start: 2018-03-16 | End: 2018-03-17 | Stop reason: HOSPADM

## 2018-03-16 RX ORDER — ONDANSETRON 2 MG/ML
4 INJECTION INTRAMUSCULAR; INTRAVENOUS ONCE
Status: COMPLETED | OUTPATIENT
Start: 2018-03-16 | End: 2018-03-16

## 2018-03-16 RX ORDER — MORPHINE SULFATE 2 MG/ML
2 INJECTION, SOLUTION INTRAMUSCULAR; INTRAVENOUS
Status: DISCONTINUED | OUTPATIENT
Start: 2018-03-16 | End: 2018-03-17 | Stop reason: HOSPADM

## 2018-03-16 RX ORDER — SODIUM CHLORIDE 0.9 % (FLUSH) 0.9 %
10 SYRINGE (ML) INJECTION EVERY 12 HOURS SCHEDULED
Status: DISCONTINUED | OUTPATIENT
Start: 2018-03-16 | End: 2018-03-17 | Stop reason: HOSPADM

## 2018-03-16 RX ORDER — MORPHINE SULFATE 4 MG/ML
4 INJECTION, SOLUTION INTRAMUSCULAR; INTRAVENOUS ONCE
Status: COMPLETED | OUTPATIENT
Start: 2018-03-16 | End: 2018-03-16

## 2018-03-16 RX ORDER — 0.9 % SODIUM CHLORIDE 0.9 %
1000 INTRAVENOUS SOLUTION INTRAVENOUS ONCE
Status: COMPLETED | OUTPATIENT
Start: 2018-03-16 | End: 2018-03-16

## 2018-03-16 RX ADMIN — MORPHINE SULFATE 4 MG: 4 INJECTION INTRAVENOUS at 10:50

## 2018-03-16 RX ADMIN — OXYCODONE HYDROCHLORIDE AND ACETAMINOPHEN 2 TABLET: 5; 325 TABLET ORAL at 18:50

## 2018-03-16 RX ADMIN — ONDANSETRON 4 MG: 2 INJECTION INTRAMUSCULAR; INTRAVENOUS at 14:35

## 2018-03-16 RX ADMIN — SODIUM CHLORIDE: 9 INJECTION, SOLUTION INTRAVENOUS at 15:13

## 2018-03-16 RX ADMIN — ONDANSETRON 4 MG: 2 INJECTION INTRAMUSCULAR; INTRAVENOUS at 11:27

## 2018-03-16 RX ADMIN — SODIUM CHLORIDE 1000 ML: 9 INJECTION, SOLUTION INTRAVENOUS at 13:39

## 2018-03-16 RX ADMIN — AMLODIPINE BESYLATE 5 MG: 10 TABLET ORAL at 13:39

## 2018-03-16 RX ADMIN — IOPAMIDOL 75 ML: 755 INJECTION, SOLUTION INTRAVENOUS at 12:05

## 2018-03-16 RX ADMIN — HYDROMORPHONE HYDROCHLORIDE 1 MG: 1 INJECTION, SOLUTION INTRAMUSCULAR; INTRAVENOUS; SUBCUTANEOUS at 11:51

## 2018-03-16 ASSESSMENT — ENCOUNTER SYMPTOMS
SORE THROAT: 0
ABDOMINAL DISTENTION: 0
EYE DISCHARGE: 0
DIARRHEA: 0
NAUSEA: 1
CHEST TIGHTNESS: 0
ABDOMINAL PAIN: 1
BACK PAIN: 0
BLOOD IN STOOL: 0
WHEEZING: 0
STRIDOR: 0
SHORTNESS OF BREATH: 0
COLOR CHANGE: 0
EYE PAIN: 0
COUGH: 0

## 2018-03-16 ASSESSMENT — PAIN DESCRIPTION - LOCATION: LOCATION: ABDOMEN

## 2018-03-16 ASSESSMENT — PAIN SCALES - GENERAL
PAINLEVEL_OUTOF10: 9
PAINLEVEL_OUTOF10: 8
PAINLEVEL_OUTOF10: 9
PAINLEVEL_OUTOF10: 9

## 2018-03-16 ASSESSMENT — PAIN DESCRIPTION - FREQUENCY: FREQUENCY: CONTINUOUS

## 2018-03-16 ASSESSMENT — PAIN DESCRIPTION - PAIN TYPE: TYPE: ACUTE PAIN

## 2018-03-16 ASSESSMENT — PAIN DESCRIPTION - PROGRESSION: CLINICAL_PROGRESSION: GRADUALLY WORSENING

## 2018-03-16 ASSESSMENT — PAIN DESCRIPTION - DESCRIPTORS: DESCRIPTORS: CONSTANT

## 2018-03-16 ASSESSMENT — PAIN DESCRIPTION - ORIENTATION: ORIENTATION: LEFT;MID

## 2018-03-16 ASSESSMENT — PAIN DESCRIPTION - ONSET: ONSET: PROGRESSIVE

## 2018-03-16 NOTE — ED TRIAGE NOTES
Patient states that he started having LUQ and Mid upper abdominal pain two days ago. Patient denies and vomiting, urinary complaints or diarrhea. Patient states that he ran out of his BP meds 3 days ago. Patient denies any injury or trauma.

## 2018-03-16 NOTE — ED NOTES
Resident informed of pts request, resident states he is not giving any more pain medication, writer restates pt also with c/o nausea can we treat that?      Braxton Simental RN  03/16/18 [id number]

## 2018-03-16 NOTE — ED NOTES
Pt ambulatory to room 17 from triage with steady gait. Pt holding lower abdomen.       Mariel Weber RN  03/16/18 [id number]

## 2018-03-16 NOTE — ED PROVIDER NOTES
[facility]     Emergency Department     Faculty Attestation    I performed a history and physical examination of the patient and discussed management with the resident. I reviewed the residents note and agree with the documented findings and plan of care. Any areas of disagreement are noted on the chart. I was personally present for the key portions of any procedures. I have documented in the chart those procedures where I was not present during the key portions. I have reviewed the emergency nurses triage note. I agree with the chief complaint, past medical history, past surgical history, allergies, medications, social and family history as documented unless otherwise noted below. For Physician Assistant/ Nurse Practitioner cases/documentation I have personally evaluated this patient and have completed at least one if not all key elements of the E/M (history, physical exam, and MDM). Additional findings are as noted. Primary Care Physician: Eneida Choe MD    CHIEF COMPLAINT       Chief Complaint   Patient presents with    Abdominal Pain       RECENT VITALS:   Temp: 97.9 °F (36.6 °C),  Pulse: 76, Resp: 18, BP: (!) 143/84    LABS:  Labs Reviewed   BASIC METABOLIC PANEL   CBC WITH AUTO DIFFERENTIAL   HEPATIC FUNCTION PANEL   LIPASE   LACTIC ACID, WHOLE BLOOD   POCT TROPONIN     EKG  Normal sinus rhythm at 73 bpm MT intervals 182 ms, QRS Duration is 94 ms, QT corrected 447 ms, axis for the R wave is 57° is no acute ST elevation or depression.   A little flattening of the T waves laterally    PERTINENT ATTENDING PHYSICIAN COMMENTS:    Patient with abdominal pain mostly left upper quadrant and left lower quadrant but very tender on exam a little nausea no vomiting no diarrhea we'll do workup     Critical Care          Bethany Faulkner MD, [facility]  Attending Emergency  Physician              Bethany Faulkner MD  03/16/18 0033

## 2018-03-16 NOTE — ED PROVIDER NOTES
appears well-developed and well-nourished. No distress. HENT:   Head: Normocephalic and atraumatic. Eyes: EOM are normal. Pupils are equal, round, and reactive to light. Right eye exhibits no discharge. Left eye exhibits no discharge. Neck: Neck supple. Cardiovascular: Normal rate, regular rhythm, S1 normal, S2 normal, normal heart sounds and normal pulses. Pulses:       Radial pulses are 2+ on the right side, and 2+ on the left side. Pulmonary/Chest: Breath sounds normal. No accessory muscle usage. No respiratory distress. He has no decreased breath sounds. He has no wheezes. He has no rales. He exhibits no tenderness. Abdominal: Bowel sounds are normal. He exhibits no distension, no abdominal bruit and no mass. There is tenderness in the epigastric area and left upper quadrant. There is no rigidity, no rebound, no guarding, no CVA tenderness, no tenderness at McBurney's point and negative Rondon's sign. Musculoskeletal: Normal range of motion. Lymphadenopathy:     He has no cervical adenopathy. Neurological: He is alert and oriented to person, place, and time. GCS eye subscore is 4. GCS verbal subscore is 5. GCS motor subscore is 6. Skin: Skin is dry. He is not diaphoretic.            DIFFERENTIAL  DIAGNOSIS     PLAN (LABS / IMAGING / EKG):  Orders Placed This Encounter   Procedures    CT ABDOMEN PELVIS W IV CONTRAST    Basic Metabolic Panel    CBC Auto Differential    Hepatic Function Panel    Lipase    Lactic Acid, Whole Blood    POCT troponin    POCT troponin    EKG 12 Lead    PATIENT STATUS (FROM ED OR OR/PROCEDURAL) Observation       MEDICATIONS ORDERED:  Orders Placed This Encounter   Medications    morphine (PF) injection 4 mg    iopamidol (ISOVUE-370) 76 % injection 75 mL    ondansetron (ZOFRAN) injection 4 mg    HYDROmorphone (DILAUDID) injection 1 mg    0.9 % sodium chloride bolus    amLODIPine (NORVASC) tablet 5 mg       DDX: DDX: GERD, PUD, pancreatitis, cholecystitis, GB colic, c ACS/ MI, pneumonia, SBO, DKA, AAA, constipation,    DIAGNOSTIC RESULTS / EMERGENCY DEPARTMENT COURSE / MDM     LABS:  Results for orders placed or performed during the hospital encounter of 47/21/97   Basic Metabolic Panel   Result Value Ref Range    Glucose 105 (H) 70 - 99 mg/dL    BUN 16 8 - 23 mg/dL    CREATININE 1.22 (H) 0.70 - 1.20 mg/dL    Bun/Cre Ratio NOT REPORTED 9 - 20    Calcium 8.6 8.6 - 10.4 mg/dL    Sodium 143 135 - 144 mmol/L    Potassium 3.9 3.7 - 5.3 mmol/L    Chloride 108 (H) 98 - 107 mmol/L    CO2 22 20 - 31 mmol/L    Anion Gap 13 9 - 17 mmol/L    GFR Non-African American >60 >60 mL/min    GFR African American >60 >60 mL/min    GFR Comment          GFR Staging NOT REPORTED    CBC Auto Differential   Result Value Ref Range    WBC 6.5 3.5 - 11.3 k/uL    RBC 4.88 4.21 - 5.77 m/uL    Hemoglobin 13.8 13.0 - 17.0 g/dL    Hematocrit 42.7 40.7 - 50.3 %    MCV 87.5 82.6 - 102.9 fL    MCH 28.3 25.2 - 33.5 pg    MCHC 32.3 28.4 - 34.8 g/dL    RDW 14.4 11.8 - 14.4 %    Platelets 696 641 - 265 k/uL    MPV 10.8 8.1 - 13.5 fL    NRBC Automated 0.0 0.0 per 100 WBC    Differential Type NOT REPORTED     Seg Neutrophils 47 36 - 65 %    Lymphocytes 41 24 - 43 %    Monocytes 8 3 - 12 %    Eosinophils % 3 1 - 4 %    Basophils 1 0 - 2 %    Immature Granulocytes 0 0 %    Segs Absolute 3.05 1.50 - 8.10 k/uL    Absolute Lymph # 2.66 1.10 - 3.70 k/uL    Absolute Mono # 0.54 0.10 - 1.20 k/uL    Absolute Eos # 0.22 0.00 - 0.44 k/uL    Basophils # 0.05 0.00 - 0.20 k/uL    Absolute Immature Granulocyte <0.03 0.00 - 0.30 k/uL    WBC Morphology NOT REPORTED     RBC Morphology NOT REPORTED     Platelet Estimate NOT REPORTED    Hepatic Function Panel   Result Value Ref Range    Alb 3.7 3.5 - 5.2 g/dL    Alkaline Phosphatase 81 40 - 129 U/L    ALT 12 5 - 41 U/L    AST 17 <40 U/L    Total Bilirubin 0.30 0.3 - 1.2 mg/dL    Bilirubin, Direct <0.08 <0.31 mg/dL    Bilirubin, Indirect CANNOT BE CALCULATED 0.00 - 1.00 mg/dL    Total Protein 7.1 6.4 - 8.3 g/dL    Globulin NOT REPORTED 1.5 - 3.8 g/dL    Albumin/Globulin Ratio 1.1 1.0 - 2.5   Lipase   Result Value Ref Range    Lipase 131 (H) 13 - 60 U/L   Lactic Acid, Whole Blood   Result Value Ref Range    Lactic Acid, Whole Blood 1.3 0.7 - 2.1 mmol/L   POCT troponin   Result Value Ref Range    POC Troponin I 0.00 0.00 - 0.10 ng/mL    POC Troponin Interp       The Troponin-I (POC) results cannot be compared to the Troponin-T results. POCT troponin   Result Value Ref Range    POC Troponin I 0.00 0.00 - 0.10 ng/mL    POC Troponin Interp       The Troponin-I (POC) results cannot be compared to the Troponin-T results. IMPRESSION: Patient is a 72-year-old gentleman who presents with a chief complaint of abdominal pain. On my initial encounter is well-appearing and nontoxic looking does not appear to be in any acute distress. Vital signs reviewed unremarkable for hypertension. On physical exam auscultation of the lungs without any adventitious sounds, there is clear equal bilateral breath sounds. Auscultation of the heart without any murmurs rubs or gallops. Abdomen is soft, nondistended with normal bowel sounds, has mild tenderness to palpation in the epigastrium and left upper quadrant without any evidence of rebound guarding or rigidity. There is no midline or palpable abdominal masses, no CVA tenderness. The history of present illness of physical exam is concerning for acute dermatitis, cholecystitis, biliary disease, diverticulitis. The workup would include CBC, BMP, LFT, lipase, lactic acid. Would obtain CT scan of the abdomen and pelvis for further evaluation. We'll give patient pain medication and IV fluids.     RADIOLOGY:  Ct Abdomen Pelvis W Iv Contrast    Result Date: 3/16/2018  EXAMINATION: CT OF THE ABDOMEN AND PELVIS WITH CONTRAST 3/16/2018 12:11 pm TECHNIQUE: CT of the abdomen and pelvis was performed with the administration of intravenous contrast. Multiplanar reformatted images are provided for review. Dose modulation, iterative reconstruction, and/or weight based adjustment of the mA/kV was utilized to reduce the radiation dose to as low as reasonably achievable. COMPARISON: January 16, 2015 HISTORY: ORDERING SYSTEM PROVIDED HISTORY: abdominal pain mainly in the epigastric guarding. TECHNOLOGIST PROVIDED HISTORY: IV Only Contrast FINDINGS: Lower Chest: Findings of subsegmental atelectasis are noted. Organs: The liver, gallbladder, pancreas, spleen, kidneys, and adrenals are within normal limits. GI/Bowel: There is no bowel dilatation or wall thickening identified. A normal appendix is visualized. Pelvis: No acute findings. There is mild fatty infiltration in the anterior bladder wall, a nonspecific finding. The prostate mildly impresses on the bladder base. Peritoneum/Retroperitoneum: No free air or free fluid. The aorta is normal in caliber. The visceral branches are patent. Calcified atheromatous plaque is present. No lymphadenopathy. Bones/Soft Tissues: Degenerative spurring is noted in the lower thoracic spine. The patient is status post discectomy and transpedicular fusion at L4-5. Disc disease at L5-S1 is noted. More prominent sclerosis and endplate changes at the L5-S1 level are noted in the interval.     1.  No acute abnormality identified in the abdomen or pelvis. 2.  Degenerative disc disease and findings of discectomy with transpedicular fusion at L4-5. Disc disease and associated endplate changes have become more prominent at L5-S1 since 2015.        EKG  None  EKG Interpretation    Interpreted by emergency department physician    Rhythm: normal sinus  Rate: 73, normal  Axis: normal  Ectopy: none  Conduction: normal  ST Segments: normal, no acute infarction evident  T Waves: normal  Q Waves: none    EKG Interpretation:  Normal EKG  All EKG's are interpreted by the Emergency Department Physician who either signs or Co-signs this chart in

## 2018-03-17 VITALS
TEMPERATURE: 98.1 F | HEIGHT: 70 IN | WEIGHT: 217 LBS | HEART RATE: 66 BPM | DIASTOLIC BLOOD PRESSURE: 79 MMHG | SYSTOLIC BLOOD PRESSURE: 125 MMHG | BODY MASS INDEX: 31.07 KG/M2 | OXYGEN SATURATION: 97 % | RESPIRATION RATE: 16 BRPM

## 2018-03-17 LAB — LIPASE: 37 U/L (ref 13–60)

## 2018-03-17 PROCEDURE — 6370000000 HC RX 637 (ALT 250 FOR IP): Performed by: EMERGENCY MEDICINE

## 2018-03-17 PROCEDURE — 83690 ASSAY OF LIPASE: CPT

## 2018-03-17 PROCEDURE — 96376 TX/PRO/DX INJ SAME DRUG ADON: CPT

## 2018-03-17 PROCEDURE — 96372 THER/PROPH/DIAG INJ SC/IM: CPT

## 2018-03-17 PROCEDURE — 2580000003 HC RX 258: Performed by: EMERGENCY MEDICINE

## 2018-03-17 PROCEDURE — 36415 COLL VENOUS BLD VENIPUNCTURE: CPT

## 2018-03-17 PROCEDURE — 6360000002 HC RX W HCPCS: Performed by: EMERGENCY MEDICINE

## 2018-03-17 PROCEDURE — G0378 HOSPITAL OBSERVATION PER HR: HCPCS

## 2018-03-17 RX ORDER — PANTOPRAZOLE SODIUM 40 MG/1
40 TABLET, DELAYED RELEASE ORAL DAILY
Qty: 30 TABLET | Refills: 3 | Status: SHIPPED | OUTPATIENT
Start: 2018-03-17 | End: 2018-06-27 | Stop reason: ALTCHOICE

## 2018-03-17 RX ORDER — PANTOPRAZOLE SODIUM 40 MG/1
40 TABLET, DELAYED RELEASE ORAL DAILY
Qty: 30 TABLET | Refills: 3 | Status: SHIPPED | OUTPATIENT
Start: 2018-03-17 | End: 2018-03-17

## 2018-03-17 RX ADMIN — AMLODIPINE BESYLATE 5 MG: 10 TABLET ORAL at 10:15

## 2018-03-17 RX ADMIN — OXYCODONE HYDROCHLORIDE AND ACETAMINOPHEN 2 TABLET: 5; 325 TABLET ORAL at 06:31

## 2018-03-17 RX ADMIN — SODIUM CHLORIDE, PRESERVATIVE FREE 10 ML: 5 INJECTION INTRAVENOUS at 10:10

## 2018-03-17 RX ADMIN — OXYCODONE HYDROCHLORIDE AND ACETAMINOPHEN 2 TABLET: 5; 325 TABLET ORAL at 13:23

## 2018-03-17 RX ADMIN — SODIUM CHLORIDE: 9 INJECTION, SOLUTION INTRAVENOUS at 03:01

## 2018-03-17 RX ADMIN — ENOXAPARIN SODIUM 40 MG: 100 INJECTION SUBCUTANEOUS at 10:11

## 2018-03-17 RX ADMIN — OXYCODONE HYDROCHLORIDE AND ACETAMINOPHEN 2 TABLET: 5; 325 TABLET ORAL at 00:57

## 2018-03-17 RX ADMIN — MORPHINE SULFATE 2 MG: 2 INJECTION, SOLUTION INTRAMUSCULAR; INTRAVENOUS at 10:23

## 2018-03-17 ASSESSMENT — PAIN DESCRIPTION - ORIENTATION
ORIENTATION: LEFT;UPPER
ORIENTATION: LEFT;UPPER;MID

## 2018-03-17 ASSESSMENT — PAIN SCALES - GENERAL
PAINLEVEL_OUTOF10: 5
PAINLEVEL_OUTOF10: 3
PAINLEVEL_OUTOF10: 9
PAINLEVEL_OUTOF10: 9
PAINLEVEL_OUTOF10: 8
PAINLEVEL_OUTOF10: 4
PAINLEVEL_OUTOF10: 8
PAINLEVEL_OUTOF10: 8
PAINLEVEL_OUTOF10: 6

## 2018-03-17 ASSESSMENT — PAIN DESCRIPTION - PAIN TYPE
TYPE: ACUTE PAIN

## 2018-03-17 ASSESSMENT — PAIN DESCRIPTION - LOCATION
LOCATION: ABDOMEN
LOCATION: ABDOMEN

## 2018-03-17 ASSESSMENT — PAIN DESCRIPTION - FREQUENCY: FREQUENCY: CONTINUOUS

## 2018-03-17 ASSESSMENT — PAIN DESCRIPTION - DESCRIPTORS: DESCRIPTORS: SHARP

## 2018-03-17 ASSESSMENT — PAIN DESCRIPTION - ONSET: ONSET: ON-GOING

## 2018-03-17 ASSESSMENT — PAIN DESCRIPTION - PROGRESSION: CLINICAL_PROGRESSION: NOT CHANGED

## 2018-03-17 NOTE — PLAN OF CARE
Problem: Pain:  Goal: Pain level will decrease  Pain level will decrease   Outcome: Met This Shift    Goal: Control of acute pain  Control of acute pain   Outcome: Met This Shift  Patient will remain free of pain or will keep pain to a manageable level. Patient will be satisfied with pain level. Patient will be given pain medications as scheduled. Pain will be managed by non-pharmacological means whenever possible.     Goal: Control of chronic pain  Control of chronic pain   Outcome: Ongoing

## 2018-03-18 ENCOUNTER — CARE COORDINATION (OUTPATIENT)
Dept: CASE MANAGEMENT | Age: 63
End: 2018-03-18

## 2018-03-18 DIAGNOSIS — K85.00 IDIOPATHIC ACUTE PANCREATITIS, UNSPECIFIED COMPLICATION STATUS: Primary | ICD-10-CM

## 2018-03-18 PROCEDURE — 1111F DSCHRG MED/CURRENT MED MERGE: CPT | Performed by: INTERNAL MEDICINE

## 2018-03-18 NOTE — H&P
. family history includes Diabetes in his brother and mother. The patient denies any pertinent family history. I have reviewed and agree with the family history entered. I have reviewed the Family History and it is not significant to the case    SOCIAL HISTORY      reports that he has been smoking Cigarettes. He has a 7.50 pack-year smoking history. He has never used smokeless tobacco. He reports that he drinks about 1.2 oz of alcohol per week . He reports that he does not use drugs. I have reviewed and agree with all Social.  There are concerns for tobacco abuse. PHYSICAL EXAM     INITIAL VITALS:  height is 5' 10\" (1.778 m) and weight is 217 lb (98.4 kg). His oral temperature is 98.1 °F (36.7 °C). His blood pressure is 125/79 and his pulse is 66. His respiration is 16 and oxygen saturation is 97%. CONSTITUTIONAL: AOx4, no apparent distress, appears stated age   HEAD: normocephalic, atraumatic   EYES: PERRLA, EOMI    ENT: moist mucous membranes, uvula midline   NECK: supple, symmetric   BACK: symmetric   LUNGS: clear to auscultation bilaterally   CARDIOVASCULAR: regular rate and rhythm, no murmurs, rubs or gallops   ABDOMEN: soft, tenderness to LUQ, RLQ with deep palpation, no rebound tenderness, no peritoneal signs, non-distended with normal active bowel sounds   NEUROLOGIC:  MAEx4, no focal sensory or motor deficits   MUSCULOSKELETAL: no clubbing, cyanosis or edema   SKIN: no rash or wounds       DIFFERENTIAL DIAGNOSIS/MDM:     DDx: Acute pancreatitis, PUD, acute gastritis, GERD, doubt perforated bowel, doubt mesenteric ischemia, doubt small bowel obstruction, doubt appendicitis.      DIAGNOSTIC RESULTS     EKG: All EKG's are interpreted by the Observation Physician who either signs or Co-signs this chart in the absence of a cardiologist.    EKG Interpretation    Interpreted by observation physician    Rhythm: normal sinus   Rate: normal  Axis: normal  Ectopy: none  Conduction: normal  ST

## 2018-03-19 ENCOUNTER — HOSPITAL ENCOUNTER (EMERGENCY)
Age: 63
Discharge: HOME | End: 2018-03-19
Attending: EMERGENCY MEDICINE
Payer: MEDICARE

## 2018-03-19 VITALS
RESPIRATION RATE: 16 BRPM | SYSTOLIC BLOOD PRESSURE: 151 MMHG | DIASTOLIC BLOOD PRESSURE: 91 MMHG | OXYGEN SATURATION: 98 % | WEIGHT: 220 LBS | BODY MASS INDEX: 31.57 KG/M2 | HEART RATE: 77 BPM | TEMPERATURE: 98.1 F

## 2018-03-19 DIAGNOSIS — R10.12 LEFT UPPER QUADRANT PAIN: Primary | ICD-10-CM

## 2018-03-19 DIAGNOSIS — R11.0 NAUSEA: ICD-10-CM

## 2018-03-19 LAB
ABSOLUTE EOS #: 0.24 K/UL (ref 0–0.44)
ABSOLUTE IMMATURE GRANULOCYTE: <0.03 K/UL (ref 0–0.3)
ABSOLUTE LYMPH #: 2.45 K/UL (ref 1.1–3.7)
ABSOLUTE MONO #: 0.43 K/UL (ref 0.1–1.2)
ALBUMIN SERPL-MCNC: 3.8 G/DL (ref 3.5–5.2)
ALBUMIN/GLOBULIN RATIO: 1.2 (ref 1–2.5)
ALP BLD-CCNC: 80 U/L (ref 40–129)
ALT SERPL-CCNC: 10 U/L (ref 5–41)
ANION GAP SERPL CALCULATED.3IONS-SCNC: 12 MMOL/L (ref 9–17)
AST SERPL-CCNC: 20 U/L
BASOPHILS # BLD: 1 % (ref 0–2)
BASOPHILS ABSOLUTE: 0.04 K/UL (ref 0–0.2)
BILIRUB SERPL-MCNC: 0.34 MG/DL (ref 0.3–1.2)
BILIRUBIN DIRECT: <0.08 MG/DL
BILIRUBIN, INDIRECT: NORMAL MG/DL (ref 0–1)
BUN BLDV-MCNC: 17 MG/DL (ref 8–23)
BUN/CREAT BLD: ABNORMAL (ref 9–20)
CALCIUM SERPL-MCNC: 8.8 MG/DL (ref 8.6–10.4)
CHLORIDE BLD-SCNC: 105 MMOL/L (ref 98–107)
CO2: 25 MMOL/L (ref 20–31)
CREAT SERPL-MCNC: 1.2 MG/DL (ref 0.7–1.2)
DIFFERENTIAL TYPE: NORMAL
EOSINOPHILS RELATIVE PERCENT: 4 % (ref 1–4)
GFR AFRICAN AMERICAN: >60 ML/MIN
GFR NON-AFRICAN AMERICAN: >60 ML/MIN
GFR SERPL CREATININE-BSD FRML MDRD: ABNORMAL ML/MIN/{1.73_M2}
GFR SERPL CREATININE-BSD FRML MDRD: ABNORMAL ML/MIN/{1.73_M2}
GLOBULIN: NORMAL G/DL (ref 1.5–3.8)
GLUCOSE BLD-MCNC: 127 MG/DL (ref 70–99)
HCT VFR BLD CALC: 42.6 % (ref 40.7–50.3)
HEMOGLOBIN: 13.9 G/DL (ref 13–17)
IMMATURE GRANULOCYTES: 0 %
LIPASE: 39 U/L (ref 13–60)
LYMPHOCYTES # BLD: 40 % (ref 24–43)
MCH RBC QN AUTO: 28.7 PG (ref 25.2–33.5)
MCHC RBC AUTO-ENTMCNC: 32.6 G/DL (ref 28.4–34.8)
MCV RBC AUTO: 88 FL (ref 82.6–102.9)
MONOCYTES # BLD: 7 % (ref 3–12)
NRBC AUTOMATED: 0 PER 100 WBC
PDW BLD-RTO: 13.5 % (ref 11.8–14.4)
PLATELET # BLD: 177 K/UL (ref 138–453)
PLATELET ESTIMATE: NORMAL
PMV BLD AUTO: 10.5 FL (ref 8.1–13.5)
POTASSIUM SERPL-SCNC: 3.9 MMOL/L (ref 3.7–5.3)
RBC # BLD: 4.84 M/UL (ref 4.21–5.77)
RBC # BLD: NORMAL 10*6/UL
SEG NEUTROPHILS: 48 % (ref 36–65)
SEGMENTED NEUTROPHILS ABSOLUTE COUNT: 3.02 K/UL (ref 1.5–8.1)
SODIUM BLD-SCNC: 142 MMOL/L (ref 135–144)
TOTAL PROTEIN: 7.1 G/DL (ref 6.4–8.3)
WBC # BLD: 6.2 K/UL (ref 3.5–11.3)
WBC # BLD: NORMAL 10*3/UL

## 2018-03-19 PROCEDURE — 83690 ASSAY OF LIPASE: CPT

## 2018-03-19 PROCEDURE — 85025 COMPLETE CBC W/AUTO DIFF WBC: CPT

## 2018-03-19 PROCEDURE — 80076 HEPATIC FUNCTION PANEL: CPT

## 2018-03-19 PROCEDURE — 80048 BASIC METABOLIC PNL TOTAL CA: CPT

## 2018-03-19 PROCEDURE — 6370000000 HC RX 637 (ALT 250 FOR IP): Performed by: PHYSICIAN ASSISTANT

## 2018-03-19 PROCEDURE — 99283 EMERGENCY DEPT VISIT LOW MDM: CPT

## 2018-03-19 RX ORDER — ACETAMINOPHEN 325 MG/1
650 TABLET ORAL EVERY 6 HOURS PRN
Qty: 30 TABLET | Refills: 0 | Status: SHIPPED | OUTPATIENT
Start: 2018-03-19 | End: 2019-03-12 | Stop reason: ALTCHOICE

## 2018-03-19 RX ORDER — OXYCODONE HYDROCHLORIDE AND ACETAMINOPHEN 5; 325 MG/1; MG/1
2 TABLET ORAL ONCE
Status: COMPLETED | OUTPATIENT
Start: 2018-03-19 | End: 2018-03-19

## 2018-03-19 RX ADMIN — OXYCODONE HYDROCHLORIDE AND ACETAMINOPHEN 2 TABLET: 5; 325 TABLET ORAL at 15:54

## 2018-03-19 ASSESSMENT — ENCOUNTER SYMPTOMS
NAUSEA: 1
EYE DISCHARGE: 0
EYE PAIN: 0
BACK PAIN: 0
RHINORRHEA: 0
COUGH: 0
EYE ITCHING: 0
VOMITING: 0
WHEEZING: 0
SORE THROAT: 0
SHORTNESS OF BREATH: 0
ABDOMINAL PAIN: 1

## 2018-03-19 ASSESSMENT — PAIN DESCRIPTION - LOCATION: LOCATION: ABDOMEN

## 2018-03-19 ASSESSMENT — PAIN SCALES - GENERAL
PAINLEVEL_OUTOF10: 9
PAINLEVEL_OUTOF10: 9

## 2018-03-19 NOTE — ED PROVIDER NOTES
[facility]     Emergency Department     Faculty Attestation    I performed a history and physical examination of the patient and discussed management with the resident. I have reviewed and agree with the residents findings including all diagnostic interpretations, and treatment plans as written. Any areas of disagreement are noted on the chart. I was personally present for the key portions of any procedures. I have documented in the chart those procedures where I was not present during the key portions. I have reviewed the emergency nurses triage note. I agree with the chief complaint, past medical history, past surgical history, allergies, medications, social and family history as documented unless otherwise noted below. Documentation of the HPI, Physical Exam and Medical Decision Making performed by scribparul is based on my personal performance of the HPI, PE and MDM. For Physician Assistant/ Nurse Practitioner cases/documentation I have personally evaluated this patient and have completed at least one if not all key elements of the E/M (history, physical exam, and MDM). Additional findings are as noted. Primary Care Physician: Sanaz Barragan MD    History: This is a 63 y.o. male who presents to the Emergency Department with complaint of Abdominal pain. The patient presented to emergency complaining of abdominal pain that is been ongoing since his discharge from hospital for similar complaint. Physical:   weight is 220 lb (99.8 kg). His temperature is 98.1 °F (36.7 °C). His blood pressure is 151/91 (abnormal) and his pulse is 77. His respiration is 16 and oxygen saturation is 98%. Abdomen is soft he has some mild tenderness in the left upper quadrant portions abdomen no guarding no rebound    Impression: Abdominal pain    Plan: The patient is refusing an IV and IV medication but will allow a straight stick.  We will obtain labs on the patient and treat accordingly. (Please note that portions of this note were completed with a voice recognition program.  Efforts were made to edit the dictations but occasionally words are mis-transcribed.)    Lisa Harkins.  Surendra Albert MD, [facility]  Attending Emergency Medicine Physician        Betsey Ruiz MD  03/19/18 2469

## 2018-03-19 NOTE — ED PROVIDER NOTES
Knee arthroscopy (Bilateral); Finger trigger release (Left, 03/04/2014); Finger trigger release (Right, 1/5/15); back surgery (6/15/16); Nerve Block (01/13/2017); Nerve Block (Left, 03/30/2017); Nerve Block (Bilateral, 04/07/2017); and joint replacement (Right, 01/2016). Social History     Social History    Marital status: Single     Spouse name: N/A    Number of children: N/A    Years of education: N/A     Occupational History    Not on file. Social History Main Topics    Smoking status: Current Every Day Smoker     Packs/day: 0.25     Years: 30.00     Types: Cigarettes    Smokeless tobacco: Never Used    Alcohol use 1.2 oz/week     2 Cans of beer per week    Drug use: No    Sexual activity: Yes     Partners: Female     Other Topics Concern    Not on file     Social History Narrative    No narrative on file       Family History   Problem Relation Age of Onset    Diabetes Mother     Diabetes Brother        Allergies:  Motrin [ibuprofen]; Nsaids; and Other    Home Medications:  Prior to Admission medications    Medication Sig Start Date End Date Taking? Authorizing Provider   acetaminophen (TYLENOL) 325 MG tablet Take 2 tablets by mouth every 6 hours as needed for Pain 3/19/18  Yes Joey Huber PA-C   pantoprazole (PROTONIX) 40 MG tablet Take 1 tablet by mouth daily 3/17/18   Jimenez Cowart MD   amLODIPine (NORVASC) 5 MG tablet Take 1 tablet by mouth daily 1/5/18   Fidelia Harkins MD       patient's medication list has been reviewed as entered by the nursing staff. REVIEW OF SYSTEMS    (2-9 systems for level 4, 10 or more for level 5)      Review of Systems   Constitutional: Negative for chills and fever. HENT: Negative for ear pain, rhinorrhea and sore throat. Eyes: Negative for pain, discharge and itching. Respiratory: Negative for cough, shortness of breath and wheezing. Cardiovascular: Negative for chest pain and palpitations.    Gastrointestinal: Positive for abdominal pain and nausea. Negative for vomiting. Genitourinary: Negative for difficulty urinating, dysuria and hematuria. Musculoskeletal: Negative for back pain and myalgias. Skin: Negative for rash and wound. Neurological: Negative for dizziness and headaches. Psychiatric/Behavioral: Negative for dysphoric mood. PHYSICAL EXAM   (up to 7 for level 4, 8 or more for level 5)      INITIAL VITALS:  weight is 220 lb (99.8 kg). His temperature is 98.1 °F (36.7 °C). His blood pressure is 151/91 (abnormal) and his pulse is 77. His respiration is 16 and oxygen saturation is 98%. Physical Exam   Constitutional: He is oriented to person, place, and time. He appears well-developed and well-nourished. HENT:   Head: Normocephalic and atraumatic. Right Ear: External ear normal.   Left Ear: External ear normal.   Eyes: Left eye exhibits no discharge. No scleral icterus. Neck: Normal range of motion. No tracheal deviation present. Cardiovascular: Normal rate, regular rhythm and normal heart sounds. Exam reveals no gallop. No murmur heard. Pulmonary/Chest: Effort normal and breath sounds normal. No stridor. No respiratory distress. Abdominal: There is tenderness in the left upper quadrant. There is no rebound, no guarding and no CVA tenderness. Musculoskeletal: Normal range of motion. He exhibits no edema. Neurological: He is alert and oriented to person, place, and time. Coordination normal.   Skin: Skin is warm and dry. No rash (on exposed surfaces) noted. He is not diaphoretic. No pallor. Psychiatric: He has a normal mood and affect.  His behavior is normal.       DIFFERENTIAL  DIAGNOSIS       Pancreatitis, colitis, diverticulitis, abdominal pain, unspecified nausea    PLAN (LABS / IMAGING / EKG):  Orders Placed This Encounter   Procedures    CBC Auto Differential    Basic Metabolic Panel    LIPASE    Hepatic Function Panel       MEDICATIONS ORDERED:  Orders Placed This Encounter   Medications    mouth every 6 hours as needed for Pain       Chapis Espinoza PA-C   Emergency Medicine Physician Assistant    (Please note that portions of this note were completed with a voice recognition program.  Efforts were made to edit the dictations but occasionally words are mis-transcribed.)       Chapis Espinoza PA-C  03/19/18 1402

## 2018-03-21 ENCOUNTER — CARE COORDINATION (OUTPATIENT)
Dept: CARE COORDINATION | Age: 63
End: 2018-03-21

## 2018-03-21 ENCOUNTER — OFFICE VISIT (OUTPATIENT)
Dept: PODIATRY | Age: 63
End: 2018-03-21
Payer: MEDICARE

## 2018-03-21 VITALS — RESPIRATION RATE: 16 BRPM | HEART RATE: 68 BPM | BODY MASS INDEX: 32.73 KG/M2 | WEIGHT: 221 LBS | HEIGHT: 69 IN

## 2018-03-21 DIAGNOSIS — L85.3 XEROSIS OF SKIN: ICD-10-CM

## 2018-03-21 DIAGNOSIS — M79.605 BILATERAL LOWER EXTREMITY PAIN: Primary | ICD-10-CM

## 2018-03-21 DIAGNOSIS — R60.0 EDEMA OF LOWER EXTREMITY: ICD-10-CM

## 2018-03-21 DIAGNOSIS — D23.72 BENIGN NEOPLASM OF SKIN OF LOWER LIMB, INCLUDING HIP, LEFT: ICD-10-CM

## 2018-03-21 DIAGNOSIS — D23.71 BENIGN NEOPLASM OF SKIN OF LOWER LIMB, INCLUDING HIP, RIGHT: ICD-10-CM

## 2018-03-21 DIAGNOSIS — B35.1 DERMATOPHYTOSIS OF NAIL: ICD-10-CM

## 2018-03-21 DIAGNOSIS — M79.604 BILATERAL LOWER EXTREMITY PAIN: Primary | ICD-10-CM

## 2018-03-21 PROCEDURE — 17110 DESTRUCTION B9 LES UP TO 14: CPT | Performed by: PODIATRIST

## 2018-03-21 PROCEDURE — G8428 CUR MEDS NOT DOCUMENT: HCPCS | Performed by: PODIATRIST

## 2018-03-21 PROCEDURE — 11721 DEBRIDE NAIL 6 OR MORE: CPT | Performed by: PODIATRIST

## 2018-03-21 PROCEDURE — 4004F PT TOBACCO SCREEN RCVD TLK: CPT | Performed by: PODIATRIST

## 2018-03-21 PROCEDURE — G8484 FLU IMMUNIZE NO ADMIN: HCPCS | Performed by: PODIATRIST

## 2018-03-21 PROCEDURE — 3017F COLORECTAL CA SCREEN DOC REV: CPT | Performed by: PODIATRIST

## 2018-03-21 PROCEDURE — G8417 CALC BMI ABV UP PARAM F/U: HCPCS | Performed by: PODIATRIST

## 2018-03-21 PROCEDURE — 99213 OFFICE O/P EST LOW 20 MIN: CPT | Performed by: PODIATRIST

## 2018-03-21 PROCEDURE — G8598 ASA/ANTIPLAT THER USED: HCPCS | Performed by: PODIATRIST

## 2018-03-21 NOTE — PROGRESS NOTES
Vitals:   Vitals:    03/21/18 1311   Pulse: 68   Resp: 16     General: AAO x 3 in NAD. Dermatologic Exam:  Skin lesion/ulceration Absent . Skin No rashes or nodules noted. .       Musculoskeletal:     1st MPJ ROM decreased, Bilateral.  Muscle strength 5/5, Bilateral.  Pain present upon palpation of right foot skin lesion to medial 1st Metatarsal.  POP to nail beds 1-5, moi. Medial longitudinal arch, Bilateral WNL. Ankle ROM WNL,Bilateral.    Dorsally contracted digits absent digits 1-5 Bilateral.     Vascular: DP and PT pulses palpable 2/4, Bilateral.  CFT <3 seconds, Bilateral.  Hair growth present to the level of the digits, Bilateral.  Edema absent, Bilateral.  Varicosities absent, Bilateral. Erythema absent, Bilateral    Neurological: Sensation intact to light touch to level of digits, Bilateral.  Protective sensation intact 10/10 sites via 5.07/10g [place]ahzad Monofilament, Bilateral.  negative Tinel's, Bilateral.  negative Valleix sign, Bilateral.      Integument: Warm, dry, scaley , Bilateral.  Benign skin neoplasm with petechiae right medial 1st MPJ and plantar left foot. Open lesion absent, Bilateral.  Interdigital maceration absent to web spaces 1-4, Bilateral.  Nails are elongated thickened with subungual debris 1-5 bilateral.  Fissures absent, Bilateral.     Asessment: Patient is a 63 y.o. male with:   1. Bilateral lower extremity pain    2. Benign neoplasm of skin of lower limb, including hip, right    3. Benign neoplasm of skin of lower limb, including hip, left    4. Edema of lower extremity    5. Xerosis of skin        Plan: Patient examined and evaluated. Current condition and treatment options discussed in detail. Sharp debridement of skin neoplasm with chemocautery using salinocaine. Sharp debridement of mycotic nails WOI. Patient has cancelled surgery 2 times for surgical excision of skin neoplasm.   At this time we advice patient that surgery is not recommended due to concern with patient compliance. Advised pt to avoid narrow shoe gear. Verbal and written instructions given to patient. Contact office with any questions/problems/concerns. No orders of the defined types were placed in this encounter. No orders of the defined types were placed in this encounter.        RTC in 2month(s).    3/21/2018      Electronically signed by Shanda Jose DPM on 3/21/2018 at 1:32 PM  3/21/2018

## 2018-03-23 ENCOUNTER — CARE COORDINATION (OUTPATIENT)
Dept: CASE MANAGEMENT | Age: 63
End: 2018-03-23

## 2018-03-30 ENCOUNTER — CARE COORDINATION (OUTPATIENT)
Dept: CASE MANAGEMENT | Age: 63
End: 2018-03-30

## 2018-04-07 ENCOUNTER — HOSPITAL ENCOUNTER (EMERGENCY)
Age: 63
Discharge: HOME | End: 2018-04-07
Attending: EMERGENCY MEDICINE
Payer: MEDICARE

## 2018-04-07 VITALS
WEIGHT: 225 LBS | RESPIRATION RATE: 18 BRPM | DIASTOLIC BLOOD PRESSURE: 94 MMHG | TEMPERATURE: 97.5 F | SYSTOLIC BLOOD PRESSURE: 151 MMHG | HEIGHT: 70 IN | OXYGEN SATURATION: 98 % | HEART RATE: 94 BPM | BODY MASS INDEX: 32.21 KG/M2

## 2018-04-07 DIAGNOSIS — M54.50 ACUTE EXACERBATION OF CHRONIC LOW BACK PAIN: Primary | ICD-10-CM

## 2018-04-07 DIAGNOSIS — G89.29 ACUTE EXACERBATION OF CHRONIC LOW BACK PAIN: Primary | ICD-10-CM

## 2018-04-07 PROCEDURE — 99283 EMERGENCY DEPT VISIT LOW MDM: CPT

## 2018-04-07 PROCEDURE — 6370000000 HC RX 637 (ALT 250 FOR IP): Performed by: EMERGENCY MEDICINE

## 2018-04-07 RX ORDER — OXYCODONE HYDROCHLORIDE AND ACETAMINOPHEN 5; 325 MG/1; MG/1
2 TABLET ORAL ONCE
Status: COMPLETED | OUTPATIENT
Start: 2018-04-07 | End: 2018-04-07

## 2018-04-07 RX ADMIN — OXYCODONE HYDROCHLORIDE AND ACETAMINOPHEN 2 TABLET: 5; 325 TABLET ORAL at 14:37

## 2018-04-07 ASSESSMENT — PAIN DESCRIPTION - DESCRIPTORS: DESCRIPTORS: STABBING

## 2018-04-07 ASSESSMENT — PAIN DESCRIPTION - FREQUENCY: FREQUENCY: CONTINUOUS

## 2018-04-07 ASSESSMENT — PAIN DESCRIPTION - LOCATION: LOCATION: BACK

## 2018-04-07 ASSESSMENT — PAIN SCALES - GENERAL
PAINLEVEL_OUTOF10: 10
PAINLEVEL_OUTOF10: 10

## 2018-04-07 ASSESSMENT — PAIN DESCRIPTION - ORIENTATION: ORIENTATION: LOWER

## 2018-04-07 ASSESSMENT — PAIN DESCRIPTION - PAIN TYPE: TYPE: CHRONIC PAIN

## 2018-04-08 ASSESSMENT — ENCOUNTER SYMPTOMS
SHORTNESS OF BREATH: 0
SORE THROAT: 0
NAUSEA: 0
ABDOMINAL PAIN: 0
VOMITING: 0
DIARRHEA: 0
COUGH: 0
EYE DISCHARGE: 0
BACK PAIN: 1

## 2018-04-09 ENCOUNTER — CARE COORDINATION (OUTPATIENT)
Dept: CARE COORDINATION | Age: 63
End: 2018-04-09

## 2018-05-29 ENCOUNTER — HOSPITAL ENCOUNTER (EMERGENCY)
Age: 63
Discharge: HOME | End: 2018-05-29
Attending: EMERGENCY MEDICINE
Payer: MEDICARE

## 2018-05-29 ENCOUNTER — APPOINTMENT (OUTPATIENT)
Dept: CT IMAGING | Age: 63
End: 2018-05-29
Payer: MEDICARE

## 2018-05-29 VITALS
TEMPERATURE: 98.9 F | HEIGHT: 70 IN | OXYGEN SATURATION: 97 % | HEART RATE: 84 BPM | BODY MASS INDEX: 32.21 KG/M2 | DIASTOLIC BLOOD PRESSURE: 85 MMHG | WEIGHT: 225 LBS | SYSTOLIC BLOOD PRESSURE: 133 MMHG

## 2018-05-29 DIAGNOSIS — R11.0 NAUSEA: ICD-10-CM

## 2018-05-29 DIAGNOSIS — R10.32 LEFT LOWER QUADRANT PAIN: Primary | ICD-10-CM

## 2018-05-29 LAB
-: NORMAL
ABSOLUTE EOS #: 0.24 K/UL (ref 0–0.44)
ABSOLUTE IMMATURE GRANULOCYTE: <0.03 K/UL (ref 0–0.3)
ABSOLUTE LYMPH #: 1.81 K/UL (ref 1.1–3.7)
ABSOLUTE MONO #: 0.31 K/UL (ref 0.1–1.2)
ALBUMIN SERPL-MCNC: 3.5 G/DL (ref 3.5–5.2)
ALBUMIN/GLOBULIN RATIO: 1.1 (ref 1–2.5)
ALP BLD-CCNC: 70 U/L (ref 40–129)
ALT SERPL-CCNC: 13 U/L (ref 5–41)
AMORPHOUS: NORMAL
ANION GAP SERPL CALCULATED.3IONS-SCNC: 13 MMOL/L (ref 9–17)
AST SERPL-CCNC: 15 U/L
BACTERIA: NORMAL
BASOPHILS # BLD: 1 % (ref 0–2)
BASOPHILS ABSOLUTE: 0.04 K/UL (ref 0–0.2)
BILIRUB SERPL-MCNC: 0.43 MG/DL (ref 0.3–1.2)
BILIRUBIN URINE: NEGATIVE
BUN BLDV-MCNC: 22 MG/DL (ref 8–23)
BUN/CREAT BLD: ABNORMAL (ref 9–20)
CALCIUM SERPL-MCNC: 8.4 MG/DL (ref 8.6–10.4)
CASTS UA: NORMAL /LPF (ref 0–8)
CHLORIDE BLD-SCNC: 108 MMOL/L (ref 98–107)
CO2: 21 MMOL/L (ref 20–31)
COLOR: YELLOW
COMMENT UA: ABNORMAL
CREAT SERPL-MCNC: 1.36 MG/DL (ref 0.7–1.2)
CRYSTALS, UA: NORMAL /HPF
DIFFERENTIAL TYPE: ABNORMAL
EOSINOPHILS RELATIVE PERCENT: 5 % (ref 1–4)
EPITHELIAL CELLS UA: NORMAL /HPF (ref 0–5)
GFR AFRICAN AMERICAN: >60 ML/MIN
GFR NON-AFRICAN AMERICAN: 53 ML/MIN
GFR SERPL CREATININE-BSD FRML MDRD: ABNORMAL ML/MIN/{1.73_M2}
GFR SERPL CREATININE-BSD FRML MDRD: ABNORMAL ML/MIN/{1.73_M2}
GLUCOSE BLD-MCNC: 130 MG/DL (ref 70–99)
GLUCOSE URINE: NEGATIVE
HCT VFR BLD CALC: 42.9 % (ref 40.7–50.3)
HEMOGLOBIN: 13.9 G/DL (ref 13–17)
IMMATURE GRANULOCYTES: 0 %
KETONES, URINE: NEGATIVE
LEUKOCYTE ESTERASE, URINE: NEGATIVE
LIPASE: 46 U/L (ref 13–60)
LYMPHOCYTES # BLD: 35 % (ref 24–43)
MCH RBC QN AUTO: 28 PG (ref 25.2–33.5)
MCHC RBC AUTO-ENTMCNC: 32.4 G/DL (ref 28.4–34.8)
MCV RBC AUTO: 86.5 FL (ref 82.6–102.9)
MONOCYTES # BLD: 6 % (ref 3–12)
MUCUS: NORMAL
NITRITE, URINE: NEGATIVE
NRBC AUTOMATED: 0 PER 100 WBC
OTHER OBSERVATIONS UA: NORMAL
PDW BLD-RTO: 13.5 % (ref 11.8–14.4)
PH UA: 6 (ref 5–8)
PLATELET # BLD: 189 K/UL (ref 138–453)
PLATELET ESTIMATE: ABNORMAL
PMV BLD AUTO: 11.2 FL (ref 8.1–13.5)
POTASSIUM SERPL-SCNC: 3.6 MMOL/L (ref 3.7–5.3)
PROTEIN UA: ABNORMAL
RBC # BLD: 4.96 M/UL (ref 4.21–5.77)
RBC # BLD: ABNORMAL 10*6/UL
RBC UA: NORMAL /HPF (ref 0–4)
RENAL EPITHELIAL, UA: NORMAL /HPF
SEG NEUTROPHILS: 53 % (ref 36–65)
SEGMENTED NEUTROPHILS ABSOLUTE COUNT: 2.82 K/UL (ref 1.5–8.1)
SODIUM BLD-SCNC: 142 MMOL/L (ref 135–144)
SPECIFIC GRAVITY UA: 1.02 (ref 1–1.03)
TOTAL PROTEIN: 6.8 G/DL (ref 6.4–8.3)
TRICHOMONAS: NORMAL
TURBIDITY: CLEAR
URINE HGB: NEGATIVE
UROBILINOGEN, URINE: NORMAL
WBC # BLD: 5.2 K/UL (ref 3.5–11.3)
WBC # BLD: ABNORMAL 10*3/UL
WBC UA: NORMAL /HPF (ref 0–5)
YEAST: NORMAL

## 2018-05-29 PROCEDURE — 6370000000 HC RX 637 (ALT 250 FOR IP): Performed by: RADIOLOGY

## 2018-05-29 PROCEDURE — 6360000004 HC RX CONTRAST MEDICATION: Performed by: RADIOLOGY

## 2018-05-29 PROCEDURE — 2580000003 HC RX 258: Performed by: RADIOLOGY

## 2018-05-29 PROCEDURE — 81001 URINALYSIS AUTO W/SCOPE: CPT

## 2018-05-29 PROCEDURE — 6360000002 HC RX W HCPCS: Performed by: RADIOLOGY

## 2018-05-29 PROCEDURE — 96375 TX/PRO/DX INJ NEW DRUG ADDON: CPT

## 2018-05-29 PROCEDURE — 80053 COMPREHEN METABOLIC PANEL: CPT

## 2018-05-29 PROCEDURE — G0383 LEV 4 HOSP TYPE B ED VISIT: HCPCS

## 2018-05-29 PROCEDURE — 85025 COMPLETE CBC W/AUTO DIFF WBC: CPT

## 2018-05-29 PROCEDURE — 74177 CT ABD & PELVIS W/CONTRAST: CPT

## 2018-05-29 PROCEDURE — 83690 ASSAY OF LIPASE: CPT

## 2018-05-29 PROCEDURE — 96374 THER/PROPH/DIAG INJ IV PUSH: CPT

## 2018-05-29 RX ORDER — ONDANSETRON 4 MG/1
4 TABLET, FILM COATED ORAL EVERY 8 HOURS PRN
Qty: 10 TABLET | Refills: 0 | Status: SHIPPED | OUTPATIENT
Start: 2018-05-29 | End: 2018-05-29

## 2018-05-29 RX ORDER — DOCUSATE SODIUM 100 MG/1
100 CAPSULE, LIQUID FILLED ORAL 2 TIMES DAILY PRN
Qty: 10 CAPSULE | Refills: 0 | Status: SHIPPED | OUTPATIENT
Start: 2018-05-29 | End: 2018-05-29

## 2018-05-29 RX ORDER — 0.9 % SODIUM CHLORIDE 0.9 %
1000 INTRAVENOUS SOLUTION INTRAVENOUS ONCE
Status: COMPLETED | OUTPATIENT
Start: 2018-05-29 | End: 2018-05-29

## 2018-05-29 RX ORDER — FENTANYL CITRATE 50 UG/ML
100 INJECTION, SOLUTION INTRAMUSCULAR; INTRAVENOUS ONCE
Status: COMPLETED | OUTPATIENT
Start: 2018-05-29 | End: 2018-05-29

## 2018-05-29 RX ORDER — DOCUSATE SODIUM 100 MG/1
100 CAPSULE, LIQUID FILLED ORAL 2 TIMES DAILY PRN
Qty: 10 CAPSULE | Refills: 0 | Status: SHIPPED | OUTPATIENT
Start: 2018-05-29 | End: 2018-06-27 | Stop reason: ALTCHOICE

## 2018-05-29 RX ORDER — OXYCODONE HYDROCHLORIDE AND ACETAMINOPHEN 5; 325 MG/1; MG/1
1 TABLET ORAL EVERY 6 HOURS PRN
Qty: 5 TABLET | Refills: 0 | Status: SHIPPED | OUTPATIENT
Start: 2018-05-29 | End: 2018-05-29

## 2018-05-29 RX ORDER — OXYCODONE HYDROCHLORIDE AND ACETAMINOPHEN 5; 325 MG/1; MG/1
1 TABLET ORAL EVERY 6 HOURS PRN
Qty: 5 TABLET | Refills: 0 | Status: SHIPPED | OUTPATIENT
Start: 2018-05-29 | End: 2018-06-01

## 2018-05-29 RX ORDER — ONDANSETRON 2 MG/ML
4 INJECTION INTRAMUSCULAR; INTRAVENOUS ONCE
Status: COMPLETED | OUTPATIENT
Start: 2018-05-29 | End: 2018-05-29

## 2018-05-29 RX ORDER — OXYCODONE HYDROCHLORIDE AND ACETAMINOPHEN 5; 325 MG/1; MG/1
1 TABLET ORAL ONCE
Status: COMPLETED | OUTPATIENT
Start: 2018-05-29 | End: 2018-05-29

## 2018-05-29 RX ORDER — OXYCODONE HYDROCHLORIDE AND ACETAMINOPHEN 5; 325 MG/1; MG/1
1 TABLET ORAL ONCE
Status: DISCONTINUED | OUTPATIENT
Start: 2018-05-29 | End: 2018-05-29

## 2018-05-29 RX ORDER — ONDANSETRON 4 MG/1
4 TABLET, FILM COATED ORAL EVERY 8 HOURS PRN
Qty: 10 TABLET | Refills: 0 | Status: SHIPPED | OUTPATIENT
Start: 2018-05-29 | End: 2018-06-27 | Stop reason: ALTCHOICE

## 2018-05-29 RX ADMIN — IOPAMIDOL 75 ML: 755 INJECTION, SOLUTION INTRAVENOUS at 14:11

## 2018-05-29 RX ADMIN — OXYCODONE HYDROCHLORIDE AND ACETAMINOPHEN 1 TABLET: 5; 325 TABLET ORAL at 14:48

## 2018-05-29 RX ADMIN — ONDANSETRON 4 MG: 2 INJECTION INTRAMUSCULAR; INTRAVENOUS at 12:00

## 2018-05-29 RX ADMIN — SODIUM CHLORIDE 1000 ML: 9 INJECTION, SOLUTION INTRAVENOUS at 12:00

## 2018-05-29 RX ADMIN — FENTANYL CITRATE 100 MCG: 50 INJECTION, SOLUTION INTRAMUSCULAR; INTRAVENOUS at 12:00

## 2018-05-29 ASSESSMENT — ENCOUNTER SYMPTOMS
COLOR CHANGE: 0
ABDOMINAL DISTENTION: 0
NAUSEA: 1
ABDOMINAL PAIN: 1
COUGH: 0
SHORTNESS OF BREATH: 0
DIARRHEA: 0
CONSTIPATION: 0
BLOOD IN STOOL: 0
VOMITING: 0

## 2018-05-29 ASSESSMENT — PAIN SCALES - GENERAL
PAINLEVEL_OUTOF10: 8
PAINLEVEL_OUTOF10: 10

## 2018-05-30 ENCOUNTER — CARE COORDINATION (OUTPATIENT)
Dept: CARE COORDINATION | Age: 63
End: 2018-05-30

## 2018-06-27 ENCOUNTER — OFFICE VISIT (OUTPATIENT)
Dept: INTERNAL MEDICINE | Age: 63
End: 2018-06-27
Payer: MEDICARE

## 2018-06-27 VITALS
OXYGEN SATURATION: 97 % | SYSTOLIC BLOOD PRESSURE: 148 MMHG | BODY MASS INDEX: 32.88 KG/M2 | HEIGHT: 69 IN | DIASTOLIC BLOOD PRESSURE: 90 MMHG | HEART RATE: 78 BPM | WEIGHT: 222 LBS

## 2018-06-27 DIAGNOSIS — M96.1 POSTLAMINECTOMY SYNDROME: ICD-10-CM

## 2018-06-27 DIAGNOSIS — R26.81 UNSTABLE GAIT: ICD-10-CM

## 2018-06-27 DIAGNOSIS — M54.41 CHRONIC MIDLINE LOW BACK PAIN WITH RIGHT-SIDED SCIATICA: ICD-10-CM

## 2018-06-27 DIAGNOSIS — F33.1 MODERATE EPISODE OF RECURRENT MAJOR DEPRESSIVE DISORDER (HCC): ICD-10-CM

## 2018-06-27 DIAGNOSIS — I10 ESSENTIAL HYPERTENSION: ICD-10-CM

## 2018-06-27 DIAGNOSIS — M48.061 NEURAL FORAMINAL STENOSIS OF LUMBAR SPINE: ICD-10-CM

## 2018-06-27 DIAGNOSIS — R73.9 HYPERGLYCEMIA: Primary | ICD-10-CM

## 2018-06-27 DIAGNOSIS — G89.29 CHRONIC MIDLINE LOW BACK PAIN WITH RIGHT-SIDED SCIATICA: ICD-10-CM

## 2018-06-27 LAB — HBA1C MFR BLD: 5.9 %

## 2018-06-27 PROCEDURE — G8427 DOCREV CUR MEDS BY ELIG CLIN: HCPCS | Performed by: FAMILY MEDICINE

## 2018-06-27 PROCEDURE — 4004F PT TOBACCO SCREEN RCVD TLK: CPT | Performed by: FAMILY MEDICINE

## 2018-06-27 PROCEDURE — 3017F COLORECTAL CA SCREEN DOC REV: CPT | Performed by: FAMILY MEDICINE

## 2018-06-27 PROCEDURE — 83036 HEMOGLOBIN GLYCOSYLATED A1C: CPT | Performed by: FAMILY MEDICINE

## 2018-06-27 PROCEDURE — G8417 CALC BMI ABV UP PARAM F/U: HCPCS | Performed by: FAMILY MEDICINE

## 2018-06-27 PROCEDURE — G8598 ASA/ANTIPLAT THER USED: HCPCS | Performed by: FAMILY MEDICINE

## 2018-06-27 PROCEDURE — 99213 OFFICE O/P EST LOW 20 MIN: CPT | Performed by: FAMILY MEDICINE

## 2018-06-27 RX ORDER — AMLODIPINE BESYLATE 5 MG/1
5 TABLET ORAL DAILY
Qty: 30 TABLET | Refills: 5 | Status: SHIPPED | OUTPATIENT
Start: 2018-06-27 | End: 2019-01-17 | Stop reason: SDUPTHER

## 2018-06-27 RX ORDER — CLONAZEPAM 1 MG/1
1 TABLET ORAL NIGHTLY PRN
COMMUNITY
End: 2019-03-12 | Stop reason: ALTCHOICE

## 2018-06-27 ASSESSMENT — ENCOUNTER SYMPTOMS
DIARRHEA: 0
ABDOMINAL PAIN: 0
RHINORRHEA: 0
TROUBLE SWALLOWING: 0
NAUSEA: 0
COUGH: 0
CHEST TIGHTNESS: 0
SORE THROAT: 0
BACK PAIN: 1
CONSTIPATION: 0
SHORTNESS OF BREATH: 0

## 2018-06-27 ASSESSMENT — PATIENT HEALTH QUESTIONNAIRE - PHQ9
SUM OF ALL RESPONSES TO PHQ QUESTIONS 1-9: 2
1. LITTLE INTEREST OR PLEASURE IN DOING THINGS: 1
2. FEELING DOWN, DEPRESSED OR HOPELESS: 1
SUM OF ALL RESPONSES TO PHQ9 QUESTIONS 1 & 2: 2

## 2018-07-10 ENCOUNTER — HOSPITAL ENCOUNTER (EMERGENCY)
Age: 63
Discharge: HOME | End: 2018-07-10
Attending: EMERGENCY MEDICINE
Payer: MEDICARE

## 2018-07-10 ENCOUNTER — HOSPITAL ENCOUNTER (OUTPATIENT)
Dept: MRI IMAGING | Age: 63
Discharge: HOME | End: 2018-07-12
Payer: MEDICARE

## 2018-07-10 VITALS
TEMPERATURE: 97 F | DIASTOLIC BLOOD PRESSURE: 93 MMHG | SYSTOLIC BLOOD PRESSURE: 157 MMHG | RESPIRATION RATE: 15 BRPM | OXYGEN SATURATION: 99 % | HEART RATE: 91 BPM

## 2018-07-10 DIAGNOSIS — M54.41 CHRONIC MIDLINE LOW BACK PAIN WITH RIGHT-SIDED SCIATICA: ICD-10-CM

## 2018-07-10 DIAGNOSIS — G89.29 CHRONIC MIDLINE LOW BACK PAIN WITH RIGHT-SIDED SCIATICA: ICD-10-CM

## 2018-07-10 DIAGNOSIS — R26.81 UNSTABLE GAIT: ICD-10-CM

## 2018-07-10 DIAGNOSIS — M54.50 ACUTE EXACERBATION OF CHRONIC LOW BACK PAIN: Primary | ICD-10-CM

## 2018-07-10 DIAGNOSIS — M96.1 POSTLAMINECTOMY SYNDROME: ICD-10-CM

## 2018-07-10 DIAGNOSIS — M48.061 NEURAL FORAMINAL STENOSIS OF LUMBAR SPINE: ICD-10-CM

## 2018-07-10 DIAGNOSIS — G89.29 ACUTE EXACERBATION OF CHRONIC LOW BACK PAIN: Primary | ICD-10-CM

## 2018-07-10 PROCEDURE — 6360000004 HC RX CONTRAST MEDICATION: Performed by: FAMILY MEDICINE

## 2018-07-10 PROCEDURE — A9579 GAD-BASE MR CONTRAST NOS,1ML: HCPCS | Performed by: FAMILY MEDICINE

## 2018-07-10 PROCEDURE — 72158 MRI LUMBAR SPINE W/O & W/DYE: CPT

## 2018-07-10 PROCEDURE — 6370000000 HC RX 637 (ALT 250 FOR IP): Performed by: FAMILY MEDICINE

## 2018-07-10 PROCEDURE — 99283 EMERGENCY DEPT VISIT LOW MDM: CPT

## 2018-07-10 RX ORDER — OXYCODONE HYDROCHLORIDE AND ACETAMINOPHEN 5; 325 MG/1; MG/1
1 TABLET ORAL ONCE
Status: COMPLETED | OUTPATIENT
Start: 2018-07-10 | End: 2018-07-10

## 2018-07-10 RX ADMIN — GADOTERIDOL 20 ML: 279.3 INJECTION, SOLUTION INTRAVENOUS at 11:00

## 2018-07-10 RX ADMIN — OXYCODONE HYDROCHLORIDE AND ACETAMINOPHEN 1 TABLET: 5; 325 TABLET ORAL at 11:52

## 2018-07-10 ASSESSMENT — PAIN SCALES - GENERAL
PAINLEVEL_OUTOF10: 10
PAINLEVEL_OUTOF10: 10

## 2018-07-10 ASSESSMENT — PAIN DESCRIPTION - ONSET: ONSET: ON-GOING

## 2018-07-10 ASSESSMENT — PAIN DESCRIPTION - ORIENTATION: ORIENTATION: LOWER

## 2018-07-10 ASSESSMENT — PAIN DESCRIPTION - PROGRESSION: CLINICAL_PROGRESSION: GRADUALLY WORSENING

## 2018-07-10 ASSESSMENT — PAIN DESCRIPTION - DESCRIPTORS: DESCRIPTORS: ACHING;RADIATING

## 2018-07-10 ASSESSMENT — PAIN DESCRIPTION - PAIN TYPE: TYPE: ACUTE PAIN;CHRONIC PAIN

## 2018-07-10 ASSESSMENT — PAIN DESCRIPTION - LOCATION: LOCATION: BACK

## 2018-07-10 NOTE — ED PROVIDER NOTES
evaluated and examined the patient in conjunction with the APC and agree with the treatment plan and disposition of the patient as recorded by the APC.     Marcellus Kimball MD  Attending Emergency  Physician        Dayday Marroquin MD  07/21/18 8629
bulge with bilateral facet arthrosis contributes to severe right and mild left neural foraminal narrowing. This appears slightly progressed. 1. Postsurgical change from posterior fusion at L4-L5 with associated laminectomies. 2. Moderate spinal canal stenosis at L3-L4. No significant spinal canal stenosis at the remaining levels. 3. There is prominence of the epidural fat at L5-S1, which contributes to moderate narrowing of the thecal sac. 4. Degenerative changes contribute to neural foraminal narrowing as above. EKG      All EKG's are interpreted by the Emergency Department Physician who either signs or Co-signs this chart in the absence of a cardiologist.    EMERGENCY DEPARTMENT COURSE:  Patient DD for back pain after lying flat in MRI for 1 hour. History of lumbar laminectomy with L4 5 fusion in June 2016. Has discontinued pain contract with pain management due to pill shortages in February 2018. Reports worsening back pain after lying down in MRI, MRI results show no acute changes. Patient to have one Percocet at this time with no refills, we'll discharged thereafter with patient to follow-up with neurosurgery. PROCEDURES:  None    CONSULTS:  None    CRITICAL CARE:  None    FINAL IMPRESSION      1.  Acute exacerbation of chronic low back pain          DISPOSITION / PLAN     DISPOSITION Decision To Discharge 07/10/2018 12:11:28 PM      PATIENT REFERRED TO:  Fermin Haji MD  [address] [place] [address]  [phone]    Schedule an appointment as soon as possible for a visit in 1 week        DISCHARGE MEDICATIONS:  Discharge Medication List as of 7/10/2018 12:14 PM          Sydnee Powell MD  Emergency Medicine Resident    (Please note that portions of this note were completed with a voice recognition program.  Efforts were made to edit the dictations but occasionally words are mis-transcribed.)     Sydnee Powell MD  Resident  07/10/18 3919

## 2018-07-12 ENCOUNTER — HOSPITAL ENCOUNTER (EMERGENCY)
Age: 63
Discharge: HOME | End: 2018-07-12
Attending: EMERGENCY MEDICINE
Payer: MEDICARE

## 2018-07-12 VITALS
DIASTOLIC BLOOD PRESSURE: 78 MMHG | OXYGEN SATURATION: 96 % | WEIGHT: 220 LBS | RESPIRATION RATE: 18 BRPM | BODY MASS INDEX: 32.58 KG/M2 | HEIGHT: 69 IN | TEMPERATURE: 97.3 F | HEART RATE: 91 BPM | SYSTOLIC BLOOD PRESSURE: 128 MMHG

## 2018-07-12 DIAGNOSIS — R10.32 LEFT LOWER QUADRANT PAIN: Primary | ICD-10-CM

## 2018-07-12 LAB
ABSOLUTE EOS #: 0.31 K/UL (ref 0–0.44)
ABSOLUTE IMMATURE GRANULOCYTE: <0.03 K/UL (ref 0–0.3)
ABSOLUTE LYMPH #: 1.81 K/UL (ref 1.1–3.7)
ABSOLUTE MONO #: 0.44 K/UL (ref 0.1–1.2)
ALBUMIN SERPL-MCNC: 4 G/DL (ref 3.5–5.2)
ALBUMIN/GLOBULIN RATIO: 1.3 (ref 1–2.5)
ALP BLD-CCNC: 79 U/L (ref 40–129)
ALT SERPL-CCNC: 16 U/L (ref 5–41)
ANION GAP SERPL CALCULATED.3IONS-SCNC: 9 MMOL/L (ref 9–17)
AST SERPL-CCNC: 18 U/L
BASOPHILS # BLD: 1 % (ref 0–2)
BASOPHILS ABSOLUTE: 0.04 K/UL (ref 0–0.2)
BILIRUB SERPL-MCNC: 0.25 MG/DL (ref 0.3–1.2)
BILIRUBIN DIRECT: 0.09 MG/DL
BILIRUBIN, INDIRECT: 0.16 MG/DL (ref 0–1)
BUN BLDV-MCNC: 31 MG/DL (ref 8–23)
BUN/CREAT BLD: ABNORMAL (ref 9–20)
CALCIUM SERPL-MCNC: 8.6 MG/DL (ref 8.6–10.4)
CHLORIDE BLD-SCNC: 108 MMOL/L (ref 98–107)
CO2: 24 MMOL/L (ref 20–31)
CREAT SERPL-MCNC: 1.28 MG/DL (ref 0.7–1.2)
DIFFERENTIAL TYPE: ABNORMAL
EOSINOPHILS RELATIVE PERCENT: 5 % (ref 1–4)
GFR AFRICAN AMERICAN: >60 ML/MIN
GFR NON-AFRICAN AMERICAN: 57 ML/MIN
GFR SERPL CREATININE-BSD FRML MDRD: ABNORMAL ML/MIN/{1.73_M2}
GFR SERPL CREATININE-BSD FRML MDRD: ABNORMAL ML/MIN/{1.73_M2}
GLOBULIN: ABNORMAL G/DL (ref 1.5–3.8)
GLUCOSE BLD-MCNC: 107 MG/DL (ref 70–99)
HCT VFR BLD CALC: 45.3 % (ref 40.7–50.3)
HEMOGLOBIN: 14.5 G/DL (ref 13–17)
IMMATURE GRANULOCYTES: 0 %
LACTIC ACID, WHOLE BLOOD: 1.2 MMOL/L (ref 0.7–2.1)
LACTIC ACID: NORMAL MMOL/L
LIPASE: 45 U/L (ref 13–60)
LYMPHOCYTES # BLD: 30 % (ref 24–43)
MCH RBC QN AUTO: 27.5 PG (ref 25.2–33.5)
MCHC RBC AUTO-ENTMCNC: 32 G/DL (ref 28.4–34.8)
MCV RBC AUTO: 85.8 FL (ref 82.6–102.9)
MONOCYTES # BLD: 7 % (ref 3–12)
NRBC AUTOMATED: 0 PER 100 WBC
PDW BLD-RTO: 13.7 % (ref 11.8–14.4)
PLATELET # BLD: ABNORMAL K/UL (ref 138–453)
PLATELET ESTIMATE: ABNORMAL
PLATELET, FLUORESCENCE: 178 K/UL (ref 138–453)
PLATELET, IMMATURE FRACTION: 6.4 % (ref 1.1–10.3)
PMV BLD AUTO: ABNORMAL FL (ref 8.1–13.5)
POTASSIUM SERPL-SCNC: 4.5 MMOL/L (ref 3.7–5.3)
RBC # BLD: 5.28 M/UL (ref 4.21–5.77)
RBC # BLD: ABNORMAL 10*6/UL
SEG NEUTROPHILS: 56 % (ref 36–65)
SEGMENTED NEUTROPHILS ABSOLUTE COUNT: 3.38 K/UL (ref 1.5–8.1)
SODIUM BLD-SCNC: 141 MMOL/L (ref 135–144)
TOTAL PROTEIN: 7.1 G/DL (ref 6.4–8.3)
WBC # BLD: 6 K/UL (ref 3.5–11.3)
WBC # BLD: ABNORMAL 10*3/UL

## 2018-07-12 PROCEDURE — 96375 TX/PRO/DX INJ NEW DRUG ADDON: CPT

## 2018-07-12 PROCEDURE — 83690 ASSAY OF LIPASE: CPT

## 2018-07-12 PROCEDURE — 83605 ASSAY OF LACTIC ACID: CPT

## 2018-07-12 PROCEDURE — 96374 THER/PROPH/DIAG INJ IV PUSH: CPT

## 2018-07-12 PROCEDURE — 85055 RETICULATED PLATELET ASSAY: CPT

## 2018-07-12 PROCEDURE — 80076 HEPATIC FUNCTION PANEL: CPT

## 2018-07-12 PROCEDURE — 99284 EMERGENCY DEPT VISIT MOD MDM: CPT

## 2018-07-12 PROCEDURE — 6360000002 HC RX W HCPCS: Performed by: EMERGENCY MEDICINE

## 2018-07-12 PROCEDURE — 2580000003 HC RX 258: Performed by: EMERGENCY MEDICINE

## 2018-07-12 PROCEDURE — 85025 COMPLETE CBC W/AUTO DIFF WBC: CPT

## 2018-07-12 PROCEDURE — 80048 BASIC METABOLIC PNL TOTAL CA: CPT

## 2018-07-12 RX ORDER — OXYCODONE HYDROCHLORIDE AND ACETAMINOPHEN 5; 325 MG/1; MG/1
1-2 TABLET ORAL EVERY 6 HOURS PRN
Qty: 5 TABLET | Refills: 0 | Status: SHIPPED | OUTPATIENT
Start: 2018-07-12 | End: 2018-07-19

## 2018-07-12 RX ORDER — FENTANYL CITRATE 50 UG/ML
50 INJECTION, SOLUTION INTRAMUSCULAR; INTRAVENOUS ONCE
Status: COMPLETED | OUTPATIENT
Start: 2018-07-12 | End: 2018-07-12

## 2018-07-12 RX ORDER — ONDANSETRON 4 MG/1
4 TABLET, ORALLY DISINTEGRATING ORAL EVERY 8 HOURS PRN
Qty: 20 TABLET | Refills: 0 | Status: SHIPPED | OUTPATIENT
Start: 2018-07-12 | End: 2018-08-10 | Stop reason: ALTCHOICE

## 2018-07-12 RX ORDER — ONDANSETRON 2 MG/ML
4 INJECTION INTRAMUSCULAR; INTRAVENOUS ONCE
Status: COMPLETED | OUTPATIENT
Start: 2018-07-12 | End: 2018-07-12

## 2018-07-12 RX ORDER — 0.9 % SODIUM CHLORIDE 0.9 %
1000 INTRAVENOUS SOLUTION INTRAVENOUS ONCE
Status: COMPLETED | OUTPATIENT
Start: 2018-07-12 | End: 2018-07-12

## 2018-07-12 RX ADMIN — ONDANSETRON 4 MG: 2 INJECTION, SOLUTION INTRAMUSCULAR; INTRAVENOUS at 12:09

## 2018-07-12 RX ADMIN — SODIUM CHLORIDE 1000 ML: 9 INJECTION, SOLUTION INTRAVENOUS at 12:08

## 2018-07-12 RX ADMIN — FENTANYL CITRATE 50 MCG: 50 INJECTION, SOLUTION INTRAMUSCULAR; INTRAVENOUS at 12:09

## 2018-07-12 ASSESSMENT — PAIN DESCRIPTION - PAIN TYPE: TYPE: ACUTE PAIN

## 2018-07-12 ASSESSMENT — ENCOUNTER SYMPTOMS
CONSTIPATION: 0
ABDOMINAL PAIN: 1
SHORTNESS OF BREATH: 0
CHEST TIGHTNESS: 0
BACK PAIN: 1
COUGH: 0
SORE THROAT: 0
VOMITING: 1
ANAL BLEEDING: 0
BLOOD IN STOOL: 0
DIARRHEA: 0
NAUSEA: 1

## 2018-07-12 ASSESSMENT — PAIN SCALES - GENERAL
PAINLEVEL_OUTOF10: 10
PAINLEVEL_OUTOF10: 10

## 2018-07-12 ASSESSMENT — PAIN DESCRIPTION - ONSET: ONSET: ON-GOING

## 2018-07-12 ASSESSMENT — PAIN DESCRIPTION - LOCATION: LOCATION: ABDOMEN

## 2018-07-12 ASSESSMENT — PAIN DESCRIPTION - ORIENTATION: ORIENTATION: LEFT;UPPER

## 2018-07-12 ASSESSMENT — PAIN DESCRIPTION - DESCRIPTORS: DESCRIPTORS: SHARP;SHOOTING

## 2018-07-12 ASSESSMENT — PAIN DESCRIPTION - PROGRESSION: CLINICAL_PROGRESSION: GRADUALLY WORSENING

## 2018-07-12 ASSESSMENT — PAIN DESCRIPTION - FREQUENCY: FREQUENCY: CONTINUOUS

## 2018-07-12 NOTE — ED NOTES
Labeled blood specimen collected and sent to lab via tube system.      Andrea Pederson RN  07/12/18 5080

## 2018-07-12 NOTE — ED NOTES
Pt resting on cart with call light within reach. NAD noted, RR even and NL.  Will continue to monitor     Lobito Dela Cruz RN  07/12/18 0865

## 2018-07-12 NOTE — ED NOTES
Pt resting on cart with call light within reach. NAD noted, RR even and NL. Pt instructed that a urine sample is needed at earliest convenience, will continue to monitor.      Denis Lopez RN  07/12/18 1825

## 2018-07-12 NOTE — ED NOTES
Pt ambulatory to restroom with steady gait noted. Pt resting on cart with call light within reach. NAD noted, RR even and NL.  Will continue to monitor     Tianna Gregg RN  07/12/18 4857

## 2018-07-12 NOTE — ED NOTES
Pt to ED c/o LUQ pain. Pt stating the pain has been intermittent x4 months. Pt stating he his pain is I his pancrease. Pt denies changed bowel pattern, problem using the bathroom, or blood in stools. Pt reporting his bowel movements are baseline, denies any urinary complaints. Pt reporting nausea but no emesis. Pain in LUQ is worse with palpation. Pt resting on cart with call light within reach. NAD noted, RR even and NL.  Will continue to monitor     Tianna Gregg RN  07/12/18 7421

## 2018-07-12 NOTE — ED NOTES
01/2016). Social History     Social History    Marital status: Single     Spouse name: N/A    Number of children: N/A    Years of education: N/A     Occupational History    Not on file. Social History Main Topics    Smoking status: Current Every Day Smoker     Packs/day: 0.25     Years: 30.00     Types: Cigarettes    Smokeless tobacco: Never Used    Alcohol use 1.2 oz/week     2 Cans of beer per week    Drug use: No    Sexual activity: Yes     Partners: Female     Other Topics Concern    Not on file     Social History Narrative    No narrative on file       Family History   Problem Relation Age of Onset    Diabetes Mother     Diabetes Brother        Allergies:  Motrin [ibuprofen]; Nsaids; and Other    Home Medications:  Prior to Admission medications    Medication Sig Start Date End Date Taking? Authorizing Provider   oxyCODONE-acetaminophen (PERCOCET) 5-325 MG per tablet Take 1-2 tablets by mouth every 6 hours as needed for Pain for up to 5 doses. WARNING:  May cause drowsiness. May impair ability to operate vehicles or machinery. Do not use in combination with alcohol. . 7/12/18 7/19/18 Yes Sherrill De La Cruz MD   ondansetron (ZOFRAN ODT) 4 MG disintegrating tablet Take 1 tablet by mouth every 8 hours as needed for Nausea 7/12/18  Yes Sherrill De La Cruz MD   clonazePAM (KLONOPIN) 1 MG tablet Take 1 mg by mouth nightly as needed. Gilberto Mckeon Historical Provider, MD   amLODIPine (NORVASC) 5 MG tablet Take 1 tablet by mouth daily 6/27/18   Jose Shukla MD   acetaminophen (TYLENOL) 325 MG tablet Take 2 tablets by mouth every 6 hours as needed for Pain 3/19/18   Indio Cross PA-C       REVIEW OF SYSTEMS    (2-9 systems for level 4, 10 or more for level 5)      Review of Systems   Constitutional: Negative for appetite change, chills and diaphoresis. HENT: Negative for sore throat. Eyes: Negative for visual disturbance. Respiratory: Negative for cough, chest tightness and shortness of breath. Encounter   Procedures    CBC Auto Differential    Basic Metabolic Panel    Lipase    Hepatic Function Panel    Lactic Acid, Plasma    Immature Platelet Fraction    Telemetry monitoring    Insert peripheral IV       MEDICATIONS ORDERED:  Orders Placed This Encounter   Medications    0.9 % sodium chloride bolus    ondansetron (ZOFRAN) injection 4 mg    fentaNYL (SUBLIMAZE) injection 50 mcg    oxyCODONE-acetaminophen (PERCOCET) 5-325 MG per tablet     Sig: Take 1-2 tablets by mouth every 6 hours as needed for Pain for up to 5 doses. WARNING:  May cause drowsiness. May impair ability to operate vehicles or machinery. Do not use in combination with alcohol. .     Dispense:  5 tablet     Refill:  0    ondansetron (ZOFRAN ODT) 4 MG disintegrating tablet     Sig: Take 1 tablet by mouth every 8 hours as needed for Nausea     Dispense:  20 tablet     Refill:  0       DDX: pancreatitis, diverticulitis, diverticulosis, abdominal aortic aneurysm, hiatal hernia, gastric ulcer, gastritis, IBS    DIAGNOSTIC RESULTS / EMERGENCY DEPARTMENT COURSE / MDM     LABS:  Results for orders placed or performed during the hospital encounter of 07/12/18   CBC Auto Differential   Result Value Ref Range    WBC 6.0 3.5 - 11.3 k/uL    RBC 5.28 4.21 - 5.77 m/uL    Hemoglobin 14.5 13.0 - 17.0 g/dL    Hematocrit 45.3 40.7 - 50.3 %    MCV 85.8 82.6 - 102.9 fL    MCH 27.5 25.2 - 33.5 pg    MCHC 32.0 28.4 - 34.8 g/dL    RDW 13.7 11.8 - 14.4 %    Platelets See Reflexed IPF Result 138 - 453 k/uL    MPV NOT REPORTED 8.1 - 13.5 fL    NRBC Automated 0.0 0.0 per 100 WBC    Differential Type NOT REPORTED     WBC Morphology NOT REPORTED     RBC Morphology NOT REPORTED     Platelet Estimate NOT REPORTED     Seg Neutrophils 56 36 - 65 %    Lymphocytes 30 24 - 43 %    Monocytes 7 3 - 12 %    Eosinophils % 5 (H) 1 - 4 %    Basophils 1 0 - 2 %    Immature Granulocytes 0 0 %    Segs Absolute 3.38 1.50 - 8.10 k/uL    Absolute Lymph # 1.81 1.10 - 3.70 k/uL    Absolute Mono # 0.44 0.10 - 1.20 k/uL    Absolute Eos # 0.31 0.00 - 0.44 k/uL    Basophils # 0.04 0.00 - 0.20 k/uL    Absolute Immature Granulocyte <0.03 0.00 - 0.30 k/uL   Basic Metabolic Panel   Result Value Ref Range    Glucose 107 (H) 70 - 99 mg/dL    BUN 31 (H) 8 - 23 mg/dL    CREATININE 1.28 (H) 0.70 - 1.20 mg/dL    Bun/Cre Ratio NOT REPORTED 9 - 20    Calcium 8.6 8.6 - 10.4 mg/dL    Sodium 141 135 - 144 mmol/L    Potassium 4.5 3.7 - 5.3 mmol/L    Chloride 108 (H) 98 - 107 mmol/L    CO2 24 20 - 31 mmol/L    Anion Gap 9 9 - 17 mmol/L    GFR Non-African American 57 (L) >60 mL/min    GFR African American >60 >60 mL/min    GFR Comment          GFR Staging NOT REPORTED    Lipase   Result Value Ref Range    Lipase 45 13 - 60 U/L   Hepatic Function Panel   Result Value Ref Range    Alb 4.0 3.5 - 5.2 g/dL    Alkaline Phosphatase 79 40 - 129 U/L    ALT 16 5 - 41 U/L    AST 18 <40 U/L    Total Bilirubin 0.25 (L) 0.3 - 1.2 mg/dL    Bilirubin, Direct 0.09 <0.31 mg/dL    Bilirubin, Indirect 0.16 0.00 - 1.00 mg/dL    Total Protein 7.1 6.4 - 8.3 g/dL    Globulin NOT REPORTED 1.5 - 3.8 g/dL    Albumin/Globulin Ratio 1.3 1.0 - 2.5   Lactic Acid, Plasma   Result Value Ref Range    Lactic Acid NOT REPORTED mmol/L    Lactic Acid, Whole Blood 1.2 0.7 - 2.1 mmol/L   Immature Platelet Fraction   Result Value Ref Range    Platelet, Immature Fraction 6.4 1.1 - 10.3 %    Platelet, Fluorescence 178 138 - 453 k/uL       RADIOLOGY:  None    EKG  None    All EKG's are interpreted by the Emergency Department Physician who either signs or Co-signs this chart in the absence of a cardiologist.    MDM/EMERGENCY DEPARTMENT COURSE:  Patient is a 63year old male with HTN, hyperlipidemia and chronic back pain presenting with LLQ abdominal pain. He states that the pain is in the same location as his previous episodes of pancreatitis.  There is tenderness over palpation of the LLQ, however no rebound tenderness, guarding,  peritoneal or appendiceal signs. Plan to give IVF and test lactate, CBC, BMP, LFTs, Lipase. Fentanyl for pain control. Will determine need for further imaging based on the lab results. 4:35 PM  Labwork unremarkable, lipase within normal limits, patient with relief after fentanyl and fluids and Zofran. Low suspicion for intra-abdominal acute pathology given normal lactate and normal white count. I do not believe patient needs any imaging at this time as he had negative CT scan on 5/29/18. Patient stable for discharge at this time with follow-up with Dr. Marvel Rehman within the next week. Patient will be given minimal prescription for Percocet for pain control and Zofran as well. Patient instructed to return if symptoms worsen. Patient verbalized understanding and in agreement with this plan. PROCEDURES:  None    CONSULTS:  None    CRITICAL CARE:  None    FINAL IMPRESSION      1. Left lower quadrant pain          DISPOSITION / PLAN     DISPOSITION Decision To Discharge 07/12/2018 01:22:16 PM      PATIENT REFERRED TO:  Shama Fermin MD  [address] [place], [place] [address]ning[facility] [phone]    Go in 1 week      [facility]  [address] [place] [address]  [phone]    If symptoms worsen      DISCHARGE MEDICATIONS:  Discharge Medication List as of 7/12/2018  1:24 PM      START taking these medications    Details   oxyCODONE-acetaminophen (PERCOCET) 5-325 MG per tablet Take 1-2 tablets by mouth every 6 hours as needed for Pain for up to 5 doses. WARNING:  May cause drowsiness. May impair ability to operate vehicles or machinery. Do not use in combination with alcohol. ., Disp-5 tablet, R-0Print      ondansetron (ZOFRAN ODT) 4 MG disintegrating tablet Take 1 tablet by mouth every 8 hours as needed for Nausea, Disp-20 tablet, R-0Print             Electronically signed by Acosta Peguero MD on 7/12/2018 at 4:34 PM    Bel Macias 4th Year Medical Student    (Please note that portions of this note were completed with a voice recognition program.  Efforts were made to edit the dictations but occasionally words are mis-transcribed.)       Susu Blanco  07/12/18 1231       Brown Gunn MD  Resident  07/12/18 0808

## 2018-07-13 ENCOUNTER — CARE COORDINATION (OUTPATIENT)
Dept: CARE COORDINATION | Age: 63
End: 2018-07-13

## 2018-07-23 NOTE — ED PROVIDER NOTES
replacement (Right, 01/2016).    Social History   Social History            Social History    Marital status: Single       Spouse name: N/A    Number of children: N/A    Years of education: N/A          Occupational History    Not on file.            Social History Main Topics    Smoking status: Current Every Day Smoker       Packs/day: 0.25       Years: 30.00       Types: Cigarettes    Smokeless tobacco: Never Used    Alcohol use 1.2 oz/week       2 Cans of beer per week    Drug use: No    Sexual activity: Yes       Partners: Female           Other Topics Concern    Not on file          Social History Narrative    No narrative on file            Family History         Family History   Problem Relation Age of Onset    Diabetes Mother      Diabetes Brother              Allergies:  Motrin [ibuprofen]; Nsaids; and Other     Home Medications:  Home Medications           Prior to Admission medications    Medication Sig Start Date End Date Taking? Authorizing Provider   oxyCODONE-acetaminophen (PERCOCET) 5-325 MG per tablet Take 1-2 tablets by mouth every 6 hours as needed for Pain for up to 5 doses. WARNING:  May cause drowsiness. May impair ability to operate vehicles or machinery. Do not use in combination with alcohol. . 7/12/18 7/19/18 Yes Sarahi Kincaid MD   ondansetron (ZOFRAN ODT) 4 MG disintegrating tablet Take 1 tablet by mouth every 8 hours as needed for Nausea 7/12/18   Yes Sarahi Kincaid MD   clonazePAM (KLONOPIN) 1 MG tablet Take 1 mg by mouth nightly as needed. .       Historical Provider, MD   amLODIPine (NORVASC) 5 MG tablet Take 1 tablet by mouth daily 6/27/18     Sharlee Lesch, MD   acetaminophen (TYLENOL) 325 MG tablet Take 2 tablets by mouth every 6 hours as needed for Pain 3/19/18     Lance Thurman PA-C            REVIEW OF SYSTEMS    (2-9 systems for level 4, 10 or more for level 5)       Review of Systems   Constitutional: Negative for appetite change, chills and diaphoresis. HENT: Negative for sore throat. Eyes: Negative for visual disturbance. Respiratory: Negative for cough, chest tightness and shortness of breath. Cardiovascular: Negative for chest pain, palpitations and leg swelling. Gastrointestinal: Positive for abdominal pain, nausea and vomiting. Negative for anal bleeding, blood in stool, constipation and diarrhea. Genitourinary: Negative for difficulty urinating, dysuria, hematuria, penile pain and testicular pain. Musculoskeletal: Positive for back pain. Skin: Negative for rash. Neurological: Negative for dizziness, weakness, light-headedness, numbness and headaches. Psychiatric/Behavioral: Negative for confusion.         PHYSICAL EXAM   (up to 7 for level 4, 8 or more for level 5)       INITIAL VITALS:   /78   Pulse 91   Temp 97.3 °F (36.3 °C) (Oral)   Resp 18   Ht 5' 9\" (1.753 m)   Wt 220 lb (99.8 kg)   SpO2 96%   BMI 32.49 kg/m²      Physical Exam   Constitutional: He is oriented to person, place, and time. He appears well-developed and well-nourished. No distress. HENT:   Head: Normocephalic and atraumatic. Eyes: Conjunctivae are normal. Pupils are equal, round, and reactive to light. Neck: Normal range of motion. Neck supple. No JVD present. Cardiovascular: Normal rate, regular rhythm and normal heart sounds. Exam reveals no gallop. No murmur heard. Pulmonary/Chest: Effort normal and breath sounds normal. No respiratory distress. He has no wheezes. Abdominal: Soft. Bowel sounds are normal. He exhibits no pulsatile midline mass and no mass. There is tenderness (left sided). There is no rebound, no guarding, no CVA tenderness, no tenderness at McBurney's point and negative Rondon's sign. No hernia. LLQ tenderness   Musculoskeletal: Normal range of motion. He exhibits no tenderness. Neurological: He is alert and oriented to person, place, and time. Skin: Skin is warm and dry. No rash noted.  He is not REPORTED       Seg Neutrophils 56 36 - 65 %     Lymphocytes 30 24 - 43 %     Monocytes 7 3 - 12 %     Eosinophils % 5 (H) 1 - 4 %     Basophils 1 0 - 2 %     Immature Granulocytes 0 0 %     Segs Absolute 3.38 1.50 - 8.10 k/uL     Absolute Lymph # 1.81 1.10 - 3.70 k/uL     Absolute Mono # 0.44 0.10 - 1.20 k/uL     Absolute Eos # 0.31 0.00 - 0.44 k/uL     Basophils # 0.04 0.00 - 0.20 k/uL     Absolute Immature Granulocyte <0.03 0.00 - 0.30 k/uL   Basic Metabolic Panel   Result Value Ref Range     Glucose 107 (H) 70 - 99 mg/dL     BUN 31 (H) 8 - 23 mg/dL     CREATININE 1.28 (H) 0.70 - 1.20 mg/dL     Bun/Cre Ratio NOT REPORTED 9 - 20     Calcium 8.6 8.6 - 10.4 mg/dL     Sodium 141 135 - 144 mmol/L     Potassium 4.5 3.7 - 5.3 mmol/L     Chloride 108 (H) 98 - 107 mmol/L     CO2 24 20 - 31 mmol/L     Anion Gap 9 9 - 17 mmol/L     GFR Non-African American 57 (L) >60 mL/min     GFR African American >60 >60 mL/min     GFR Comment            GFR Staging NOT REPORTED     Lipase   Result Value Ref Range     Lipase 45 13 - 60 U/L   Hepatic Function Panel   Result Value Ref Range     Alb 4.0 3.5 - 5.2 g/dL     Alkaline Phosphatase 79 40 - 129 U/L     ALT 16 5 - 41 U/L     AST 18 <40 U/L     Total Bilirubin 0.25 (L) 0.3 - 1.2 mg/dL     Bilirubin, Direct 0.09 <0.31 mg/dL     Bilirubin, Indirect 0.16 0.00 - 1.00 mg/dL     Total Protein 7.1 6.4 - 8.3 g/dL     Globulin NOT REPORTED 1.5 - 3.8 g/dL     Albumin/Globulin Ratio 1.3 1.0 - 2.5   Lactic Acid, Plasma   Result Value Ref Range     Lactic Acid NOT REPORTED mmol/L     Lactic Acid, Whole Blood 1.2 0.7 - 2.1 mmol/L   Immature Platelet Fraction   Result Value Ref Range     Platelet, Immature Fraction 6.4 1.1 - 10.3 %     Platelet, Fluorescence 178 138 - 453 k/uL         RADIOLOGY:  None     EKG  None     All EKG's are interpreted by the Emergency Department Physician who either signs or Co-signs this chart in the absence of a cardiologist.     MDM/EMERGENCY DEPARTMENT COURSE:  Patient

## 2018-08-10 ENCOUNTER — HOSPITAL ENCOUNTER (OUTPATIENT)
Dept: PREADMISSION TESTING | Age: 63
Discharge: HOME | End: 2018-08-14
Payer: MEDICARE

## 2018-08-10 VITALS
SYSTOLIC BLOOD PRESSURE: 133 MMHG | BODY MASS INDEX: 32.52 KG/M2 | DIASTOLIC BLOOD PRESSURE: 81 MMHG | HEIGHT: 69 IN | OXYGEN SATURATION: 98 % | TEMPERATURE: 98 F | RESPIRATION RATE: 20 BRPM | HEART RATE: 85 BPM | WEIGHT: 219.58 LBS

## 2018-08-10 LAB
ANION GAP SERPL CALCULATED.3IONS-SCNC: 13 MMOL/L (ref 9–17)
BUN BLDV-MCNC: 23 MG/DL (ref 8–23)
CHLORIDE BLD-SCNC: 111 MMOL/L (ref 98–107)
CO2: 22 MMOL/L (ref 20–31)
CREAT SERPL-MCNC: 1.31 MG/DL (ref 0.7–1.2)
GFR AFRICAN AMERICAN: >60 ML/MIN
GFR NON-AFRICAN AMERICAN: 56 ML/MIN
GFR SERPL CREATININE-BSD FRML MDRD: ABNORMAL ML/MIN/{1.73_M2}
GFR SERPL CREATININE-BSD FRML MDRD: ABNORMAL ML/MIN/{1.73_M2}
GLUCOSE BLD-MCNC: 100 MG/DL (ref 70–99)
HCT VFR BLD CALC: 42.8 % (ref 40.7–50.3)
HEMOGLOBIN: 14.1 G/DL (ref 13–17)
POTASSIUM SERPL-SCNC: 4.3 MMOL/L (ref 3.7–5.3)
SODIUM BLD-SCNC: 146 MMOL/L (ref 135–144)

## 2018-08-10 PROCEDURE — 80051 ELECTROLYTE PANEL: CPT

## 2018-08-10 PROCEDURE — 85014 HEMATOCRIT: CPT

## 2018-08-10 PROCEDURE — 36415 COLL VENOUS BLD VENIPUNCTURE: CPT

## 2018-08-10 PROCEDURE — 85018 HEMOGLOBIN: CPT

## 2018-08-10 PROCEDURE — 82947 ASSAY GLUCOSE BLOOD QUANT: CPT

## 2018-08-10 PROCEDURE — 84520 ASSAY OF UREA NITROGEN: CPT

## 2018-08-10 PROCEDURE — 82565 ASSAY OF CREATININE: CPT

## 2018-08-10 RX ORDER — SODIUM CHLORIDE, SODIUM LACTATE, POTASSIUM CHLORIDE, CALCIUM CHLORIDE 600; 310; 30; 20 MG/100ML; MG/100ML; MG/100ML; MG/100ML
1000 INJECTION, SOLUTION INTRAVENOUS CONTINUOUS
Status: CANCELLED | OUTPATIENT
Start: 2018-08-10

## 2018-08-10 ASSESSMENT — PAIN SCALES - GENERAL: PAINLEVEL_OUTOF10: 10

## 2018-08-10 ASSESSMENT — PAIN DESCRIPTION - LOCATION: LOCATION: BACK

## 2018-08-10 ASSESSMENT — PAIN DESCRIPTION - PAIN TYPE: TYPE: CHRONIC PAIN

## 2018-08-10 ASSESSMENT — PAIN DESCRIPTION - ORIENTATION: ORIENTATION: LOWER

## 2018-08-10 NOTE — PROGRESS NOTES
Anesthesia Focused Assessment    STOP-BANG Sleep Apnea Questionnaire    SNORE loudly (heard through closed doors)? No  TIRED, fatigued, sleepy during daytime? No  OBSERVED stopping breathing during sleep? No  High blood PRESSURE being treated? Yes    BMI over 35? No  AGE over 48? Yes  NECK circumference over 16\"? No  GENDER (male)? Yes             Total 3  High risk 5-8  Intermediate risk 3-4  Low risk 0-2    Obstructive Sleep Apnea: yes but no cpap used  If YES, machine used: no     Type 1 DM:   no  T2DM:  no    Coronary Artery Disease:  no  Hypertension:  yes    Active smoker:  1/2 ppd for 30 years  Drinks Alcohol:  socially    Dentition: upper permanent bridge    Defib / AICD / Pacemaker: no      Renal Failure/dialysis:  no    Patient was evaluated in PAT & anesthesia guidelines were applied. NPO guidelines, medication instructions and scheduled arrival time were reviewed with patient. Hx of anesthesia complications:  no  Family hx of anesthesia complications:  no                                                                                                                     Anesthesia contacted:   no  Medical or cardiac clearance ordered: patient was cleared for surgery 2/2018 for a podiatry case. Updated clearance for the back procedure will be requested.     SCOUT TRACY PA-C  8/10/18  11:19 AM

## 2018-11-08 ENCOUNTER — TELEPHONE (OUTPATIENT)
Dept: INTERNAL MEDICINE | Age: 63
End: 2018-11-08

## 2018-12-23 ENCOUNTER — HOSPITAL ENCOUNTER (EMERGENCY)
Age: 63
Discharge: HOME | End: 2018-12-23
Attending: EMERGENCY MEDICINE
Payer: MEDICARE

## 2018-12-23 VITALS
RESPIRATION RATE: 18 BRPM | SYSTOLIC BLOOD PRESSURE: 142 MMHG | OXYGEN SATURATION: 99 % | HEART RATE: 84 BPM | BODY MASS INDEX: 32.58 KG/M2 | TEMPERATURE: 97.7 F | DIASTOLIC BLOOD PRESSURE: 92 MMHG | HEIGHT: 69 IN | WEIGHT: 220 LBS

## 2018-12-23 DIAGNOSIS — R10.9 LEFT SIDED ABDOMINAL PAIN: Primary | ICD-10-CM

## 2018-12-23 LAB
ABSOLUTE EOS #: 0.35 K/UL (ref 0–0.44)
ABSOLUTE IMMATURE GRANULOCYTE: <0.03 K/UL (ref 0–0.3)
ABSOLUTE LYMPH #: 2.39 K/UL (ref 1.1–3.7)
ABSOLUTE MONO #: 0.46 K/UL (ref 0.1–1.2)
ALBUMIN SERPL-MCNC: 3.8 G/DL (ref 3.5–5.2)
ALBUMIN/GLOBULIN RATIO: 1.3 (ref 1–2.5)
ALP BLD-CCNC: 70 U/L (ref 40–129)
ALT SERPL-CCNC: 13 U/L (ref 5–41)
ANION GAP SERPL CALCULATED.3IONS-SCNC: 9 MMOL/L (ref 9–17)
AST SERPL-CCNC: 13 U/L
BASOPHILS # BLD: 1 % (ref 0–2)
BASOPHILS ABSOLUTE: 0.04 K/UL (ref 0–0.2)
BILIRUB SERPL-MCNC: 0.38 MG/DL (ref 0.3–1.2)
BILIRUBIN DIRECT: 0.09 MG/DL
BILIRUBIN, INDIRECT: 0.29 MG/DL (ref 0–1)
BUN BLDV-MCNC: 16 MG/DL (ref 8–23)
BUN/CREAT BLD: ABNORMAL (ref 9–20)
CALCIUM SERPL-MCNC: 9 MG/DL (ref 8.6–10.4)
CHLORIDE BLD-SCNC: 110 MMOL/L (ref 98–107)
CO2: 23 MMOL/L (ref 20–31)
CREAT SERPL-MCNC: 1.04 MG/DL (ref 0.7–1.2)
DIFFERENTIAL TYPE: ABNORMAL
EOSINOPHILS RELATIVE PERCENT: 5 % (ref 1–4)
GFR AFRICAN AMERICAN: >60 ML/MIN
GFR NON-AFRICAN AMERICAN: >60 ML/MIN
GFR SERPL CREATININE-BSD FRML MDRD: ABNORMAL ML/MIN/{1.73_M2}
GFR SERPL CREATININE-BSD FRML MDRD: ABNORMAL ML/MIN/{1.73_M2}
GLOBULIN: NORMAL G/DL (ref 1.5–3.8)
GLUCOSE BLD-MCNC: 114 MG/DL (ref 70–99)
HCT VFR BLD CALC: 44.1 % (ref 40.7–50.3)
HEMOGLOBIN: 14.2 G/DL (ref 13–17)
IMMATURE GRANULOCYTES: 0 %
LIPASE: 55 U/L (ref 13–60)
LYMPHOCYTES # BLD: 36 % (ref 24–43)
MCH RBC QN AUTO: 28.2 PG (ref 25.2–33.5)
MCHC RBC AUTO-ENTMCNC: 32.2 G/DL (ref 28.4–34.8)
MCV RBC AUTO: 87.7 FL (ref 82.6–102.9)
MONOCYTES # BLD: 7 % (ref 3–12)
NRBC AUTOMATED: 0 PER 100 WBC
PDW BLD-RTO: 14.3 % (ref 11.8–14.4)
PLATELET # BLD: 188 K/UL (ref 138–453)
PLATELET ESTIMATE: ABNORMAL
PMV BLD AUTO: 10.8 FL (ref 8.1–13.5)
POTASSIUM SERPL-SCNC: 3.9 MMOL/L (ref 3.7–5.3)
RBC # BLD: 5.03 M/UL (ref 4.21–5.77)
RBC # BLD: ABNORMAL 10*6/UL
SEG NEUTROPHILS: 51 % (ref 36–65)
SEGMENTED NEUTROPHILS ABSOLUTE COUNT: 3.46 K/UL (ref 1.5–8.1)
SODIUM BLD-SCNC: 142 MMOL/L (ref 135–144)
TOTAL PROTEIN: 6.8 G/DL (ref 6.4–8.3)
WBC # BLD: 6.7 K/UL (ref 3.5–11.3)
WBC # BLD: ABNORMAL 10*3/UL

## 2018-12-23 PROCEDURE — 80048 BASIC METABOLIC PNL TOTAL CA: CPT

## 2018-12-23 PROCEDURE — 85025 COMPLETE CBC W/AUTO DIFF WBC: CPT

## 2018-12-23 PROCEDURE — 83690 ASSAY OF LIPASE: CPT

## 2018-12-23 PROCEDURE — 6360000002 HC RX W HCPCS: Performed by: PHYSICIAN ASSISTANT

## 2018-12-23 PROCEDURE — G0382 LEV 3 HOSP TYPE B ED VISIT: HCPCS

## 2018-12-23 PROCEDURE — 80076 HEPATIC FUNCTION PANEL: CPT

## 2018-12-23 PROCEDURE — 96374 THER/PROPH/DIAG INJ IV PUSH: CPT

## 2018-12-23 RX ORDER — FENTANYL CITRATE 50 UG/ML
50 INJECTION, SOLUTION INTRAMUSCULAR; INTRAVENOUS ONCE
Status: COMPLETED | OUTPATIENT
Start: 2018-12-23 | End: 2018-12-23

## 2018-12-23 RX ORDER — DICYCLOMINE HYDROCHLORIDE 10 MG/1
20 CAPSULE ORAL EVERY 6 HOURS PRN
Qty: 20 CAPSULE | Refills: 0 | Status: SHIPPED | OUTPATIENT
Start: 2018-12-23 | End: 2019-03-12 | Stop reason: ALTCHOICE

## 2018-12-23 RX ADMIN — FENTANYL CITRATE 50 MCG: 50 INJECTION, SOLUTION INTRAMUSCULAR; INTRAVENOUS at 14:25

## 2018-12-23 ASSESSMENT — PAIN SCALES - GENERAL
PAINLEVEL_OUTOF10: 10
PAINLEVEL_OUTOF10: 10

## 2018-12-23 ASSESSMENT — ENCOUNTER SYMPTOMS
SHORTNESS OF BREATH: 0
RHINORRHEA: 0
EYE PAIN: 0
EYE ITCHING: 0
NAUSEA: 0
SORE THROAT: 0
EYE DISCHARGE: 0
VOMITING: 0
COUGH: 0
ABDOMINAL PAIN: 1
WHEEZING: 0
BACK PAIN: 0

## 2018-12-23 ASSESSMENT — PAIN DESCRIPTION - ORIENTATION: ORIENTATION: LEFT;UPPER

## 2018-12-23 ASSESSMENT — PAIN DESCRIPTION - FREQUENCY: FREQUENCY: CONTINUOUS

## 2018-12-23 ASSESSMENT — PAIN DESCRIPTION - LOCATION: LOCATION: ABDOMEN

## 2018-12-23 ASSESSMENT — PAIN DESCRIPTION - DESCRIPTORS: DESCRIPTORS: THROBBING

## 2018-12-23 ASSESSMENT — PAIN DESCRIPTION - ONSET: ONSET: ON-GOING

## 2018-12-23 ASSESSMENT — PAIN DESCRIPTION - PAIN TYPE: TYPE: ACUTE PAIN

## 2018-12-23 ASSESSMENT — PAIN DESCRIPTION - PROGRESSION: CLINICAL_PROGRESSION: NOT CHANGED

## 2019-01-06 ENCOUNTER — HOSPITAL ENCOUNTER (EMERGENCY)
Age: 64
Discharge: HOME | End: 2019-01-06
Attending: EMERGENCY MEDICINE
Payer: MEDICARE

## 2019-01-06 VITALS
HEART RATE: 89 BPM | HEIGHT: 73 IN | TEMPERATURE: 97 F | BODY MASS INDEX: 29.16 KG/M2 | RESPIRATION RATE: 16 BRPM | DIASTOLIC BLOOD PRESSURE: 87 MMHG | SYSTOLIC BLOOD PRESSURE: 153 MMHG | WEIGHT: 220 LBS | OXYGEN SATURATION: 97 %

## 2019-01-06 DIAGNOSIS — L84 CORN OF TOE: Primary | ICD-10-CM

## 2019-01-06 PROCEDURE — 99283 EMERGENCY DEPT VISIT LOW MDM: CPT

## 2019-01-06 ASSESSMENT — PAIN DESCRIPTION - ORIENTATION: ORIENTATION: LEFT

## 2019-01-06 ASSESSMENT — PAIN DESCRIPTION - PAIN TYPE: TYPE: ACUTE PAIN

## 2019-01-06 ASSESSMENT — ENCOUNTER SYMPTOMS
ABDOMINAL PAIN: 0
SHORTNESS OF BREATH: 0
BACK PAIN: 0
VOMITING: 0
COUGH: 0
DIARRHEA: 0
COLOR CHANGE: 0
NAUSEA: 0
SORE THROAT: 0

## 2019-01-06 ASSESSMENT — PAIN DESCRIPTION - DESCRIPTORS: DESCRIPTORS: ACHING

## 2019-01-06 ASSESSMENT — PAIN DESCRIPTION - LOCATION: LOCATION: FOOT;TOE (COMMENT WHICH ONE)

## 2019-01-06 ASSESSMENT — PAIN DESCRIPTION - ONSET: ONSET: ON-GOING

## 2019-01-06 ASSESSMENT — PAIN DESCRIPTION - FREQUENCY: FREQUENCY: CONTINUOUS

## 2019-01-06 ASSESSMENT — PAIN SCALES - GENERAL: PAINLEVEL_OUTOF10: 8

## 2019-01-16 ENCOUNTER — OFFICE VISIT (OUTPATIENT)
Dept: PODIATRY | Age: 64
End: 2019-01-16
Payer: MEDICARE

## 2019-01-16 VITALS
DIASTOLIC BLOOD PRESSURE: 85 MMHG | WEIGHT: 226 LBS | HEIGHT: 70 IN | SYSTOLIC BLOOD PRESSURE: 135 MMHG | HEART RATE: 74 BPM | BODY MASS INDEX: 32.35 KG/M2

## 2019-01-16 DIAGNOSIS — L84 CORN OF TOE: ICD-10-CM

## 2019-01-16 DIAGNOSIS — I73.9 PAD (PERIPHERAL ARTERY DISEASE) (HCC): ICD-10-CM

## 2019-01-16 DIAGNOSIS — F17.200 SMOKER: ICD-10-CM

## 2019-01-16 DIAGNOSIS — B35.1 DERMATOPHYTOSIS OF NAIL: ICD-10-CM

## 2019-01-16 DIAGNOSIS — M20.5X2 ADDUCTOVARUS ROTATION OF TOE, ACQUIRED, LEFT: Primary | ICD-10-CM

## 2019-01-16 DIAGNOSIS — M79.675 TOE PAIN, BILATERAL: ICD-10-CM

## 2019-01-16 DIAGNOSIS — M79.674 TOE PAIN, BILATERAL: ICD-10-CM

## 2019-01-16 DIAGNOSIS — M79.672 LEFT FOOT PAIN: ICD-10-CM

## 2019-01-16 PROCEDURE — 11721 DEBRIDE NAIL 6 OR MORE: CPT | Performed by: STUDENT IN AN ORGANIZED HEALTH CARE EDUCATION/TRAINING PROGRAM

## 2019-01-16 PROCEDURE — 11055 PARING/CUTG B9 HYPRKER LES 1: CPT | Performed by: STUDENT IN AN ORGANIZED HEALTH CARE EDUCATION/TRAINING PROGRAM

## 2019-01-16 PROCEDURE — 99213 OFFICE O/P EST LOW 20 MIN: CPT | Performed by: STUDENT IN AN ORGANIZED HEALTH CARE EDUCATION/TRAINING PROGRAM

## 2019-01-16 PROCEDURE — 99202 OFFICE O/P NEW SF 15 MIN: CPT | Performed by: STUDENT IN AN ORGANIZED HEALTH CARE EDUCATION/TRAINING PROGRAM

## 2019-01-17 ENCOUNTER — OFFICE VISIT (OUTPATIENT)
Dept: PRIMARY CARE CLINIC | Age: 64
End: 2019-01-17
Payer: MEDICARE

## 2019-01-17 VITALS
SYSTOLIC BLOOD PRESSURE: 132 MMHG | OXYGEN SATURATION: 97 % | TEMPERATURE: 98.1 F | DIASTOLIC BLOOD PRESSURE: 80 MMHG | HEART RATE: 91 BPM | WEIGHT: 227 LBS | BODY MASS INDEX: 32.57 KG/M2

## 2019-01-17 DIAGNOSIS — I10 ESSENTIAL HYPERTENSION: ICD-10-CM

## 2019-01-17 DIAGNOSIS — M54.50 CHRONIC MIDLINE LOW BACK PAIN WITHOUT SCIATICA: Primary | ICD-10-CM

## 2019-01-17 DIAGNOSIS — R10.12 LUQ ABDOMINAL PAIN: ICD-10-CM

## 2019-01-17 DIAGNOSIS — G89.29 CHRONIC MIDLINE LOW BACK PAIN WITHOUT SCIATICA: Primary | ICD-10-CM

## 2019-01-17 PROCEDURE — G8417 CALC BMI ABV UP PARAM F/U: HCPCS | Performed by: INTERNAL MEDICINE

## 2019-01-17 PROCEDURE — 4004F PT TOBACCO SCREEN RCVD TLK: CPT | Performed by: INTERNAL MEDICINE

## 2019-01-17 PROCEDURE — 99214 OFFICE O/P EST MOD 30 MIN: CPT | Performed by: INTERNAL MEDICINE

## 2019-01-17 PROCEDURE — G8484 FLU IMMUNIZE NO ADMIN: HCPCS | Performed by: INTERNAL MEDICINE

## 2019-01-17 PROCEDURE — 3017F COLORECTAL CA SCREEN DOC REV: CPT | Performed by: INTERNAL MEDICINE

## 2019-01-17 PROCEDURE — G8599 NO ASA/ANTIPLAT THER USE RNG: HCPCS | Performed by: INTERNAL MEDICINE

## 2019-01-17 PROCEDURE — G8427 DOCREV CUR MEDS BY ELIG CLIN: HCPCS | Performed by: INTERNAL MEDICINE

## 2019-01-17 RX ORDER — AMLODIPINE BESYLATE 5 MG/1
5 TABLET ORAL DAILY
Qty: 30 TABLET | Refills: 5 | Status: SHIPPED | OUTPATIENT
Start: 2019-01-17 | End: 2019-06-19 | Stop reason: SDUPTHER

## 2019-01-17 RX ORDER — AMLODIPINE BESYLATE 5 MG/1
TABLET ORAL
COMMUNITY
Start: 2016-10-21 | End: 2019-01-17 | Stop reason: SDUPTHER

## 2019-01-17 RX ORDER — HYDROCODONE BITARTRATE AND ACETAMINOPHEN 5; 325 MG/1; MG/1
TABLET ORAL
Refills: 0 | COMMUNITY
Start: 2018-10-18 | End: 2019-03-12 | Stop reason: ALTCHOICE

## 2019-01-17 RX ORDER — SIMVASTATIN 80 MG
80 TABLET ORAL NIGHTLY
Qty: 30 TABLET | Refills: 5 | Status: SHIPPED | OUTPATIENT
Start: 2019-01-17 | End: 2019-06-19

## 2019-01-17 ASSESSMENT — ENCOUNTER SYMPTOMS
BACK PAIN: 1
ABDOMINAL PAIN: 1

## 2019-02-08 ENCOUNTER — HOSPITAL ENCOUNTER (OUTPATIENT)
Dept: CT IMAGING | Age: 64
Discharge: HOME | End: 2019-02-10
Payer: MEDICARE

## 2019-02-08 ENCOUNTER — HOSPITAL ENCOUNTER (OUTPATIENT)
Age: 64
Discharge: HOME | End: 2019-02-08
Payer: MEDICARE

## 2019-02-08 DIAGNOSIS — R10.12 LUQ ABDOMINAL PAIN: ICD-10-CM

## 2019-02-08 DIAGNOSIS — R10.12 LUQ ABDOMINAL PAIN: Primary | ICD-10-CM

## 2019-02-08 LAB
CREAT SERPL-MCNC: 1.14 MG/DL (ref 0.7–1.2)
GFR AFRICAN AMERICAN: >60 ML/MIN
GFR NON-AFRICAN AMERICAN: >60 ML/MIN
GFR SERPL CREATININE-BSD FRML MDRD: NORMAL ML/MIN/{1.73_M2}
GFR SERPL CREATININE-BSD FRML MDRD: NORMAL ML/MIN/{1.73_M2}

## 2019-02-08 PROCEDURE — 74160 CT ABDOMEN W/CONTRAST: CPT

## 2019-02-08 PROCEDURE — 82565 ASSAY OF CREATININE: CPT

## 2019-02-08 PROCEDURE — 6360000004 HC RX CONTRAST MEDICATION: Performed by: INTERNAL MEDICINE

## 2019-02-08 RX ADMIN — IOVERSOL 75 ML: 741 INJECTION INTRA-ARTERIAL; INTRAVENOUS at 14:37

## 2019-02-20 ENCOUNTER — OFFICE VISIT (OUTPATIENT)
Dept: PODIATRY | Age: 64
End: 2019-02-20
Payer: MEDICARE

## 2019-02-20 ENCOUNTER — TELEPHONE (OUTPATIENT)
Dept: PODIATRY | Age: 64
End: 2019-02-20

## 2019-02-20 VITALS
WEIGHT: 227 LBS | HEIGHT: 69 IN | DIASTOLIC BLOOD PRESSURE: 96 MMHG | BODY MASS INDEX: 33.62 KG/M2 | SYSTOLIC BLOOD PRESSURE: 150 MMHG | HEART RATE: 90 BPM | RESPIRATION RATE: 18 BRPM

## 2019-02-20 DIAGNOSIS — M20.5X2 ADDUCTOVARUS ROTATION OF TOE, ACQUIRED, LEFT: Primary | ICD-10-CM

## 2019-02-20 DIAGNOSIS — L84 PRE-ULCERATIVE CALLUSES: ICD-10-CM

## 2019-02-20 DIAGNOSIS — F17.200 SMOKER: ICD-10-CM

## 2019-02-20 DIAGNOSIS — M79.672 LEFT FOOT PAIN: ICD-10-CM

## 2019-02-20 PROCEDURE — 99212 OFFICE O/P EST SF 10 MIN: CPT | Performed by: PODIATRIST

## 2019-02-20 PROCEDURE — 11055 PARING/CUTG B9 HYPRKER LES 1: CPT | Performed by: PODIATRIST

## 2019-02-20 PROCEDURE — 99213 OFFICE O/P EST LOW 20 MIN: CPT | Performed by: PODIATRIST

## 2019-02-26 ENCOUNTER — HOSPITAL ENCOUNTER (OUTPATIENT)
Dept: GENERAL RADIOLOGY | Age: 64
Discharge: HOME | End: 2019-02-28
Payer: MEDICARE

## 2019-02-26 ENCOUNTER — HOSPITAL ENCOUNTER (OUTPATIENT)
Age: 64
Discharge: HOME | End: 2019-02-28
Payer: MEDICARE

## 2019-02-26 DIAGNOSIS — L84 CORN OF TOE: ICD-10-CM

## 2019-02-26 DIAGNOSIS — M79.672 LEFT FOOT PAIN: ICD-10-CM

## 2019-02-26 DIAGNOSIS — M20.5X2 ADDUCTOVARUS ROTATION OF TOE, ACQUIRED, LEFT: ICD-10-CM

## 2019-02-26 PROCEDURE — 73630 X-RAY EXAM OF FOOT: CPT

## 2019-02-27 ENCOUNTER — HOSPITAL ENCOUNTER (OUTPATIENT)
Dept: GENERAL RADIOLOGY | Age: 64
Discharge: HOME | End: 2019-03-01
Payer: MEDICARE

## 2019-02-27 ENCOUNTER — HOSPITAL ENCOUNTER (OUTPATIENT)
Dept: VASCULAR LAB | Age: 64
Discharge: HOME | End: 2019-02-27
Payer: MEDICARE

## 2019-02-27 ENCOUNTER — HOSPITAL ENCOUNTER (OUTPATIENT)
Age: 64
Discharge: HOME | End: 2019-03-01
Payer: MEDICARE

## 2019-02-27 ENCOUNTER — OFFICE VISIT (OUTPATIENT)
Dept: PODIATRY | Age: 64
End: 2019-02-27
Payer: MEDICARE

## 2019-02-27 VITALS
SYSTOLIC BLOOD PRESSURE: 120 MMHG | HEART RATE: 93 BPM | DIASTOLIC BLOOD PRESSURE: 80 MMHG | WEIGHT: 228.4 LBS | HEIGHT: 69 IN | BODY MASS INDEX: 33.83 KG/M2

## 2019-02-27 DIAGNOSIS — I73.9 PAD (PERIPHERAL ARTERY DISEASE) (HCC): ICD-10-CM

## 2019-02-27 DIAGNOSIS — M20.5X2 ADDUCTOVARUS ROTATION OF TOE, ACQUIRED, LEFT: ICD-10-CM

## 2019-02-27 DIAGNOSIS — D23.72 BENIGN NEOPLASM OF SKIN OF LEFT LOWER EXTREMITY, INCLUDING HIP: ICD-10-CM

## 2019-02-27 DIAGNOSIS — F17.200 SMOKER: ICD-10-CM

## 2019-02-27 DIAGNOSIS — D23.71 BENIGN NEOPLASM OF SKIN OF RIGHT FOOT: ICD-10-CM

## 2019-02-27 DIAGNOSIS — M20.5X2 ADDUCTOVARUS ROTATION OF TOE, ACQUIRED, LEFT: Primary | ICD-10-CM

## 2019-02-27 PROCEDURE — 73630 X-RAY EXAM OF FOOT: CPT

## 2019-02-27 PROCEDURE — 93923 UPR/LXTR ART STDY 3+ LVLS: CPT

## 2019-02-27 PROCEDURE — 99212 OFFICE O/P EST SF 10 MIN: CPT | Performed by: STUDENT IN AN ORGANIZED HEALTH CARE EDUCATION/TRAINING PROGRAM

## 2019-02-27 PROCEDURE — 99213 OFFICE O/P EST LOW 20 MIN: CPT | Performed by: STUDENT IN AN ORGANIZED HEALTH CARE EDUCATION/TRAINING PROGRAM

## 2019-03-06 ENCOUNTER — OFFICE VISIT (OUTPATIENT)
Dept: PODIATRY | Age: 64
End: 2019-03-06
Payer: MEDICARE

## 2019-03-06 VITALS
HEIGHT: 68 IN | BODY MASS INDEX: 34.1 KG/M2 | SYSTOLIC BLOOD PRESSURE: 120 MMHG | DIASTOLIC BLOOD PRESSURE: 76 MMHG | WEIGHT: 225 LBS | HEART RATE: 91 BPM

## 2019-03-06 DIAGNOSIS — F17.200 SMOKER: ICD-10-CM

## 2019-03-06 DIAGNOSIS — M20.5X2 ADDUCTOVARUS ROTATION OF TOE, ACQUIRED, LEFT: Primary | ICD-10-CM

## 2019-03-06 DIAGNOSIS — I73.9 PAD (PERIPHERAL ARTERY DISEASE) (HCC): ICD-10-CM

## 2019-03-06 DIAGNOSIS — D23.71 BENIGN NEOPLASM OF SKIN OF RIGHT FOOT: ICD-10-CM

## 2019-03-06 PROCEDURE — 99212 OFFICE O/P EST SF 10 MIN: CPT

## 2019-03-06 PROCEDURE — 99213 OFFICE O/P EST LOW 20 MIN: CPT | Performed by: STUDENT IN AN ORGANIZED HEALTH CARE EDUCATION/TRAINING PROGRAM

## 2019-03-13 ENCOUNTER — ANESTHESIA (OUTPATIENT)
Dept: OPERATING ROOM | Age: 64
End: 2019-03-13
Payer: MEDICARE

## 2019-03-13 ENCOUNTER — ANESTHESIA EVENT (OUTPATIENT)
Dept: OPERATING ROOM | Age: 64
End: 2019-03-13
Payer: MEDICARE

## 2019-03-13 ENCOUNTER — HOSPITAL ENCOUNTER (OUTPATIENT)
Age: 64
Setting detail: OUTPATIENT SURGERY
Discharge: HOME | End: 2019-03-13
Attending: PODIATRIST | Admitting: PODIATRIST
Payer: MEDICARE

## 2019-03-13 VITALS
WEIGHT: 225 LBS | HEIGHT: 69 IN | RESPIRATION RATE: 20 BRPM | SYSTOLIC BLOOD PRESSURE: 142 MMHG | DIASTOLIC BLOOD PRESSURE: 83 MMHG | HEART RATE: 106 BPM | TEMPERATURE: 97.3 F | BODY MASS INDEX: 33.33 KG/M2 | OXYGEN SATURATION: 98 %

## 2019-03-13 VITALS — SYSTOLIC BLOOD PRESSURE: 137 MMHG | DIASTOLIC BLOOD PRESSURE: 86 MMHG | TEMPERATURE: 96.9 F | OXYGEN SATURATION: 100 %

## 2019-03-13 DIAGNOSIS — G89.18 ACUTE POSTOPERATIVE PAIN: Primary | ICD-10-CM

## 2019-03-13 LAB
GFR NON-AFRICAN AMERICAN: >60 ML/MIN
GFR SERPL CREATININE-BSD FRML MDRD: >60 ML/MIN
GFR SERPL CREATININE-BSD FRML MDRD: NORMAL ML/MIN/{1.73_M2}
GLUCOSE BLD-MCNC: 105 MG/DL (ref 74–100)
POC CREATININE: 1.17 MG/DL (ref 0.51–1.19)

## 2019-03-13 PROCEDURE — 2720000010 HC SURG SUPPLY STERILE: Performed by: PODIATRIST

## 2019-03-13 PROCEDURE — 2580000003 HC RX 258: Performed by: PODIATRIST

## 2019-03-13 PROCEDURE — 3600000004 HC SURGERY LEVEL 4 BASE: Performed by: PODIATRIST

## 2019-03-13 PROCEDURE — 6370000000 HC RX 637 (ALT 250 FOR IP): Performed by: ANESTHESIOLOGY

## 2019-03-13 PROCEDURE — 2580000003 HC RX 258: Performed by: ANESTHESIOLOGY

## 2019-03-13 PROCEDURE — 2709999900 HC NON-CHARGEABLE SUPPLY: Performed by: PODIATRIST

## 2019-03-13 PROCEDURE — 2500000003 HC RX 250 WO HCPCS: Performed by: NURSE ANESTHETIST, CERTIFIED REGISTERED

## 2019-03-13 PROCEDURE — 3700000001 HC ADD 15 MINUTES (ANESTHESIA): Performed by: PODIATRIST

## 2019-03-13 PROCEDURE — 3600000014 HC SURGERY LEVEL 4 ADDTL 15MIN: Performed by: PODIATRIST

## 2019-03-13 PROCEDURE — 93005 ELECTROCARDIOGRAM TRACING: CPT

## 2019-03-13 PROCEDURE — 2500000003 HC RX 250 WO HCPCS: Performed by: PODIATRIST

## 2019-03-13 PROCEDURE — 7100000040 HC SPAR PHASE II RECOVERY - FIRST 15 MIN: Performed by: PODIATRIST

## 2019-03-13 PROCEDURE — 3700000000 HC ANESTHESIA ATTENDED CARE: Performed by: PODIATRIST

## 2019-03-13 PROCEDURE — 6360000002 HC RX W HCPCS: Performed by: NURSE ANESTHETIST, CERTIFIED REGISTERED

## 2019-03-13 PROCEDURE — 7100000041 HC SPAR PHASE II RECOVERY - ADDTL 15 MIN: Performed by: PODIATRIST

## 2019-03-13 PROCEDURE — 82565 ASSAY OF CREATININE: CPT

## 2019-03-13 PROCEDURE — 82947 ASSAY GLUCOSE BLOOD QUANT: CPT

## 2019-03-13 RX ORDER — LIDOCAINE HYDROCHLORIDE 10 MG/ML
INJECTION, SOLUTION EPIDURAL; INFILTRATION; INTRACAUDAL; PERINEURAL PRN
Status: DISCONTINUED | OUTPATIENT
Start: 2019-03-13 | End: 2019-03-13 | Stop reason: ALTCHOICE

## 2019-03-13 RX ORDER — CEFAZOLIN SODIUM 1 G/3ML
INJECTION, POWDER, FOR SOLUTION INTRAMUSCULAR; INTRAVENOUS PRN
Status: DISCONTINUED | OUTPATIENT
Start: 2019-03-13 | End: 2019-03-13 | Stop reason: SDUPTHER

## 2019-03-13 RX ORDER — GLYCOPYRROLATE 1 MG/5 ML
SYRINGE (ML) INTRAVENOUS PRN
Status: DISCONTINUED | OUTPATIENT
Start: 2019-03-13 | End: 2019-03-13 | Stop reason: SDUPTHER

## 2019-03-13 RX ORDER — MAGNESIUM HYDROXIDE 1200 MG/15ML
LIQUID ORAL CONTINUOUS PRN
Status: COMPLETED | OUTPATIENT
Start: 2019-03-13 | End: 2019-03-13

## 2019-03-13 RX ORDER — IPRATROPIUM BROMIDE AND ALBUTEROL SULFATE 2.5; .5 MG/3ML; MG/3ML
1 SOLUTION RESPIRATORY (INHALATION) ONCE
Status: COMPLETED | OUTPATIENT
Start: 2019-03-13 | End: 2019-03-13

## 2019-03-13 RX ORDER — BUPIVACAINE HYDROCHLORIDE 5 MG/ML
INJECTION, SOLUTION EPIDURAL; INTRACAUDAL PRN
Status: DISCONTINUED | OUTPATIENT
Start: 2019-03-13 | End: 2019-03-13 | Stop reason: ALTCHOICE

## 2019-03-13 RX ORDER — SODIUM CHLORIDE, SODIUM LACTATE, POTASSIUM CHLORIDE, CALCIUM CHLORIDE 600; 310; 30; 20 MG/100ML; MG/100ML; MG/100ML; MG/100ML
INJECTION, SOLUTION INTRAVENOUS CONTINUOUS
Status: DISCONTINUED | OUTPATIENT
Start: 2019-03-13 | End: 2019-03-13 | Stop reason: HOSPADM

## 2019-03-13 RX ORDER — OXYCODONE HYDROCHLORIDE AND ACETAMINOPHEN 5; 325 MG/1; MG/1
1 TABLET ORAL EVERY 6 HOURS PRN
Qty: 28 TABLET | Refills: 0 | Status: SHIPPED | OUTPATIENT
Start: 2019-03-13 | End: 2019-03-20

## 2019-03-13 RX ORDER — PROPOFOL 10 MG/ML
INJECTION, EMULSION INTRAVENOUS CONTINUOUS PRN
Status: DISCONTINUED | OUTPATIENT
Start: 2019-03-13 | End: 2019-03-13 | Stop reason: SDUPTHER

## 2019-03-13 RX ADMIN — CEFAZOLIN 2000 MG: 1 INJECTION, POWDER, FOR SOLUTION INTRAMUSCULAR; INTRAVENOUS at 12:11

## 2019-03-13 RX ADMIN — PROPOFOL 300 MCG/KG/MIN: 10 INJECTION, EMULSION INTRAVENOUS at 12:03

## 2019-03-13 RX ADMIN — SODIUM CHLORIDE, POTASSIUM CHLORIDE, SODIUM LACTATE AND CALCIUM CHLORIDE: 600; 310; 30; 20 INJECTION, SOLUTION INTRAVENOUS at 10:41

## 2019-03-13 RX ADMIN — IPRATROPIUM BROMIDE AND ALBUTEROL SULFATE 1 AMPULE: .5; 3 SOLUTION RESPIRATORY (INHALATION) at 11:56

## 2019-03-13 RX ADMIN — SODIUM CHLORIDE, POTASSIUM CHLORIDE, SODIUM LACTATE AND CALCIUM CHLORIDE: 600; 310; 30; 20 INJECTION, SOLUTION INTRAVENOUS at 12:09

## 2019-03-13 RX ADMIN — Medication 0.4 MG: at 12:16

## 2019-03-13 ASSESSMENT — PULMONARY FUNCTION TESTS
PIF_VALUE: 0
PIF_VALUE: 0
PIF_VALUE: 1
PIF_VALUE: 0
PIF_VALUE: 1
PIF_VALUE: 1
PIF_VALUE: 0

## 2019-03-13 ASSESSMENT — PAIN SCALES - GENERAL
PAINLEVEL_OUTOF10: 0

## 2019-03-13 ASSESSMENT — PAIN - FUNCTIONAL ASSESSMENT: PAIN_FUNCTIONAL_ASSESSMENT: 0-10

## 2019-03-14 LAB
EKG ATRIAL RATE: 65 BPM
EKG P AXIS: 61 DEGREES
EKG P-R INTERVAL: 206 MS
EKG Q-T INTERVAL: 420 MS
EKG QRS DURATION: 74 MS
EKG QTC CALCULATION (BAZETT): 436 MS
EKG R AXIS: 53 DEGREES
EKG T AXIS: 9 DEGREES
EKG VENTRICULAR RATE: 65 BPM

## 2019-03-22 ENCOUNTER — OFFICE VISIT (OUTPATIENT)
Dept: PODIATRY | Age: 64
End: 2019-03-22
Payer: MEDICARE

## 2019-03-22 VITALS
BODY MASS INDEX: 35.31 KG/M2 | WEIGHT: 225 LBS | SYSTOLIC BLOOD PRESSURE: 125 MMHG | HEART RATE: 93 BPM | HEIGHT: 67 IN | DIASTOLIC BLOOD PRESSURE: 80 MMHG

## 2019-03-22 DIAGNOSIS — Z98.890 S/P FOOT SURGERY, LEFT: Primary | ICD-10-CM

## 2019-03-22 PROCEDURE — 99024 POSTOP FOLLOW-UP VISIT: CPT | Performed by: STUDENT IN AN ORGANIZED HEALTH CARE EDUCATION/TRAINING PROGRAM

## 2019-03-22 RX ORDER — OXYCODONE HYDROCHLORIDE AND ACETAMINOPHEN 5; 325 MG/1; MG/1
1 TABLET ORAL EVERY 6 HOURS PRN
Qty: 28 TABLET | Refills: 0 | Status: SHIPPED | OUTPATIENT
Start: 2019-03-22 | End: 2019-03-29

## 2019-03-22 RX ORDER — OXYCODONE HYDROCHLORIDE AND ACETAMINOPHEN 5; 325 MG/1; MG/1
1 TABLET ORAL EVERY 6 HOURS PRN
Qty: 28 TABLET | Refills: 0 | Status: SHIPPED | OUTPATIENT
Start: 2019-03-22 | End: 2019-03-22 | Stop reason: SDUPTHER

## 2019-03-26 ENCOUNTER — APPOINTMENT (OUTPATIENT)
Dept: GENERAL RADIOLOGY | Age: 64
End: 2019-03-26
Payer: MEDICARE

## 2019-03-26 ENCOUNTER — HOSPITAL ENCOUNTER (EMERGENCY)
Age: 64
Discharge: HOME | End: 2019-03-26
Attending: EMERGENCY MEDICINE
Payer: MEDICARE

## 2019-03-26 VITALS
OXYGEN SATURATION: 98 % | WEIGHT: 220 LBS | TEMPERATURE: 98.1 F | BODY MASS INDEX: 32.58 KG/M2 | RESPIRATION RATE: 16 BRPM | HEART RATE: 76 BPM | DIASTOLIC BLOOD PRESSURE: 88 MMHG | HEIGHT: 69 IN | SYSTOLIC BLOOD PRESSURE: 146 MMHG

## 2019-03-26 DIAGNOSIS — R05.9 COUGH: Primary | ICD-10-CM

## 2019-03-26 DIAGNOSIS — J06.9 ACUTE UPPER RESPIRATORY INFECTION: ICD-10-CM

## 2019-03-26 LAB
-: NORMAL
ABSOLUTE EOS #: 0.53 K/UL (ref 0–0.44)
ABSOLUTE IMMATURE GRANULOCYTE: <0.03 K/UL (ref 0–0.3)
ABSOLUTE LYMPH #: 1.48 K/UL (ref 1.1–3.7)
ABSOLUTE MONO #: 0.52 K/UL (ref 0.1–1.2)
ANION GAP SERPL CALCULATED.3IONS-SCNC: 10 MMOL/L (ref 9–17)
BASOPHILS # BLD: 1 % (ref 0–2)
BASOPHILS ABSOLUTE: 0.04 K/UL (ref 0–0.2)
BUN BLDV-MCNC: 22 MG/DL (ref 8–23)
BUN/CREAT BLD: ABNORMAL (ref 9–20)
CALCIUM SERPL-MCNC: 8.4 MG/DL (ref 8.6–10.4)
CHLORIDE BLD-SCNC: 108 MMOL/L (ref 98–107)
CO2: 21 MMOL/L (ref 20–31)
CREAT SERPL-MCNC: 1.27 MG/DL (ref 0.7–1.2)
DIFFERENTIAL TYPE: ABNORMAL
EOSINOPHILS RELATIVE PERCENT: 9 % (ref 1–4)
GFR AFRICAN AMERICAN: >60 ML/MIN
GFR NON-AFRICAN AMERICAN: 58 ML/MIN
GFR SERPL CREATININE-BSD FRML MDRD: ABNORMAL ML/MIN/{1.73_M2}
GFR SERPL CREATININE-BSD FRML MDRD: ABNORMAL ML/MIN/{1.73_M2}
GLUCOSE BLD-MCNC: 114 MG/DL (ref 70–99)
HCT VFR BLD CALC: 39.9 % (ref 40.7–50.3)
HEMOGLOBIN: 13 G/DL (ref 13–17)
IMMATURE GRANULOCYTES: 0 %
LYMPHOCYTES # BLD: 26 % (ref 24–43)
MCH RBC QN AUTO: 28.6 PG (ref 25.2–33.5)
MCHC RBC AUTO-ENTMCNC: 32.6 G/DL (ref 28.4–34.8)
MCV RBC AUTO: 87.7 FL (ref 82.6–102.9)
MONOCYTES # BLD: 9 % (ref 3–12)
NRBC AUTOMATED: 0 PER 100 WBC
PDW BLD-RTO: 13.9 % (ref 11.8–14.4)
PLATELET # BLD: 198 K/UL (ref 138–453)
PLATELET ESTIMATE: ABNORMAL
PMV BLD AUTO: 10.3 FL (ref 8.1–13.5)
POTASSIUM SERPL-SCNC: 4 MMOL/L (ref 3.7–5.3)
RBC # BLD: 4.55 M/UL (ref 4.21–5.77)
RBC # BLD: ABNORMAL 10*6/UL
REASON FOR REJECTION: NORMAL
SEG NEUTROPHILS: 55 % (ref 36–65)
SEGMENTED NEUTROPHILS ABSOLUTE COUNT: 3.17 K/UL (ref 1.5–8.1)
SODIUM BLD-SCNC: 139 MMOL/L (ref 135–144)
TROPONIN INTERP: NORMAL
TROPONIN INTERP: NORMAL
TROPONIN T: NORMAL NG/ML
TROPONIN T: NORMAL NG/ML
TROPONIN, HIGH SENSITIVITY: 19 NG/L (ref 0–22)
TROPONIN, HIGH SENSITIVITY: 20 NG/L (ref 0–22)
WBC # BLD: 5.8 K/UL (ref 3.5–11.3)
WBC # BLD: ABNORMAL 10*3/UL
ZZ NTE CLEAN UP: ORDERED TEST: NORMAL
ZZ NTE WITH NAME CLEAN UP: SPECIMEN SOURCE: NORMAL

## 2019-03-26 PROCEDURE — 71046 X-RAY EXAM CHEST 2 VIEWS: CPT

## 2019-03-26 PROCEDURE — 93005 ELECTROCARDIOGRAM TRACING: CPT

## 2019-03-26 PROCEDURE — 84484 ASSAY OF TROPONIN QUANT: CPT

## 2019-03-26 PROCEDURE — G0383 LEV 4 HOSP TYPE B ED VISIT: HCPCS

## 2019-03-26 PROCEDURE — 6370000000 HC RX 637 (ALT 250 FOR IP): Performed by: EMERGENCY MEDICINE

## 2019-03-26 PROCEDURE — 85025 COMPLETE CBC W/AUTO DIFF WBC: CPT

## 2019-03-26 PROCEDURE — 80048 BASIC METABOLIC PNL TOTAL CA: CPT

## 2019-03-26 RX ORDER — BENZONATATE 100 MG/1
100 CAPSULE ORAL 3 TIMES DAILY PRN
Qty: 30 CAPSULE | Refills: 0 | Status: SHIPPED | OUTPATIENT
Start: 2019-03-26 | End: 2019-04-02

## 2019-03-26 RX ORDER — PREDNISONE 20 MG/1
60 TABLET ORAL ONCE
Status: COMPLETED | OUTPATIENT
Start: 2019-03-26 | End: 2019-03-26

## 2019-03-26 RX ORDER — AZITHROMYCIN 250 MG/1
TABLET, FILM COATED ORAL
Qty: 1 PACKET | Refills: 0 | Status: SHIPPED | OUTPATIENT
Start: 2019-03-26 | End: 2019-03-30

## 2019-03-26 RX ORDER — PREDNISONE 50 MG/1
50 TABLET ORAL DAILY
Qty: 4 TABLET | Refills: 0 | Status: ON HOLD | OUTPATIENT
Start: 2019-03-26 | End: 2019-09-19 | Stop reason: HOSPADM

## 2019-03-26 RX ADMIN — PREDNISONE 60 MG: 20 TABLET ORAL at 09:20

## 2019-03-26 ASSESSMENT — ENCOUNTER SYMPTOMS
RHINORRHEA: 0
SHORTNESS OF BREATH: 0
ABDOMINAL PAIN: 0

## 2019-03-26 ASSESSMENT — PAIN SCALES - GENERAL: PAINLEVEL_OUTOF10: 8

## 2019-03-26 ASSESSMENT — PAIN DESCRIPTION - DESCRIPTORS: DESCRIPTORS: ACHING

## 2019-03-26 ASSESSMENT — PAIN DESCRIPTION - PAIN TYPE: TYPE: ACUTE PAIN

## 2019-03-26 ASSESSMENT — PAIN DESCRIPTION - ONSET: ONSET: ON-GOING

## 2019-03-26 ASSESSMENT — PAIN DESCRIPTION - LOCATION: LOCATION: CHEST;GENERALIZED;HEAD

## 2019-03-26 ASSESSMENT — PAIN DESCRIPTION - FREQUENCY: FREQUENCY: INTERMITTENT

## 2019-03-27 LAB
EKG ATRIAL RATE: 80 BPM
EKG P AXIS: 69 DEGREES
EKG P-R INTERVAL: 198 MS
EKG Q-T INTERVAL: 388 MS
EKG QRS DURATION: 74 MS
EKG QTC CALCULATION (BAZETT): 447 MS
EKG R AXIS: 49 DEGREES
EKG T AXIS: -47 DEGREES
EKG VENTRICULAR RATE: 80 BPM

## 2019-03-29 ENCOUNTER — OFFICE VISIT (OUTPATIENT)
Dept: PODIATRY | Age: 64
End: 2019-03-29
Payer: MEDICARE

## 2019-03-29 VITALS
HEART RATE: 96 BPM | RESPIRATION RATE: 18 BRPM | SYSTOLIC BLOOD PRESSURE: 150 MMHG | BODY MASS INDEX: 35.31 KG/M2 | HEIGHT: 68 IN | DIASTOLIC BLOOD PRESSURE: 88 MMHG | WEIGHT: 233 LBS

## 2019-03-29 DIAGNOSIS — M20.5X2 ADDUCTOVARUS ROTATION OF TOE, ACQUIRED, LEFT: ICD-10-CM

## 2019-03-29 DIAGNOSIS — Z98.890 S/P FOOT SURGERY, LEFT: Primary | ICD-10-CM

## 2019-03-29 PROCEDURE — 99024 POSTOP FOLLOW-UP VISIT: CPT | Performed by: STUDENT IN AN ORGANIZED HEALTH CARE EDUCATION/TRAINING PROGRAM

## 2019-04-05 ENCOUNTER — TELEPHONE (OUTPATIENT)
Dept: PODIATRY | Age: 64
End: 2019-04-05

## 2019-04-09 ENCOUNTER — HOSPITAL ENCOUNTER (EMERGENCY)
Age: 64
Discharge: HOME | End: 2019-04-09
Attending: EMERGENCY MEDICINE
Payer: MEDICARE

## 2019-04-09 ENCOUNTER — APPOINTMENT (OUTPATIENT)
Dept: GENERAL RADIOLOGY | Age: 64
End: 2019-04-09
Payer: MEDICARE

## 2019-04-09 ENCOUNTER — APPOINTMENT (OUTPATIENT)
Dept: CT IMAGING | Age: 64
End: 2019-04-09
Payer: MEDICARE

## 2019-04-09 VITALS
HEART RATE: 94 BPM | RESPIRATION RATE: 17 BRPM | BODY MASS INDEX: 34.4 KG/M2 | WEIGHT: 227 LBS | DIASTOLIC BLOOD PRESSURE: 87 MMHG | OXYGEN SATURATION: 97 % | TEMPERATURE: 98.9 F | HEIGHT: 68 IN | SYSTOLIC BLOOD PRESSURE: 141 MMHG

## 2019-04-09 DIAGNOSIS — S43.401A SPRAIN OF RIGHT SHOULDER, UNSPECIFIED SHOULDER SPRAIN TYPE, INITIAL ENCOUNTER: Primary | ICD-10-CM

## 2019-04-09 DIAGNOSIS — G89.29 CHRONIC MIDLINE LOW BACK PAIN WITHOUT SCIATICA: ICD-10-CM

## 2019-04-09 DIAGNOSIS — M54.50 CHRONIC MIDLINE LOW BACK PAIN WITHOUT SCIATICA: ICD-10-CM

## 2019-04-09 DIAGNOSIS — M17.12 OSTEOARTHRITIS OF LEFT KNEE, UNSPECIFIED OSTEOARTHRITIS TYPE: ICD-10-CM

## 2019-04-09 PROCEDURE — 72131 CT LUMBAR SPINE W/O DYE: CPT

## 2019-04-09 PROCEDURE — 99284 EMERGENCY DEPT VISIT MOD MDM: CPT

## 2019-04-09 PROCEDURE — 96374 THER/PROPH/DIAG INJ IV PUSH: CPT

## 2019-04-09 PROCEDURE — 73030 X-RAY EXAM OF SHOULDER: CPT

## 2019-04-09 PROCEDURE — 6370000000 HC RX 637 (ALT 250 FOR IP): Performed by: STUDENT IN AN ORGANIZED HEALTH CARE EDUCATION/TRAINING PROGRAM

## 2019-04-09 PROCEDURE — 6360000002 HC RX W HCPCS: Performed by: STUDENT IN AN ORGANIZED HEALTH CARE EDUCATION/TRAINING PROGRAM

## 2019-04-09 RX ORDER — IBUPROFEN 800 MG/1
800 TABLET ORAL EVERY 8 HOURS PRN
Qty: 20 TABLET | Refills: 0 | Status: ON HOLD | OUTPATIENT
Start: 2019-04-09 | End: 2019-09-19 | Stop reason: HOSPADM

## 2019-04-09 RX ORDER — KETOROLAC TROMETHAMINE 15 MG/ML
15 INJECTION, SOLUTION INTRAMUSCULAR; INTRAVENOUS ONCE
Status: COMPLETED | OUTPATIENT
Start: 2019-04-09 | End: 2019-04-09

## 2019-04-09 RX ORDER — TRAMADOL HYDROCHLORIDE 50 MG/1
50 TABLET ORAL EVERY 12 HOURS PRN
Qty: 4 TABLET | Refills: 0 | Status: SHIPPED | OUTPATIENT
Start: 2019-04-09 | End: 2019-04-11

## 2019-04-09 RX ORDER — TRAMADOL HYDROCHLORIDE 50 MG/1
50 TABLET ORAL ONCE
Status: COMPLETED | OUTPATIENT
Start: 2019-04-09 | End: 2019-04-09

## 2019-04-09 RX ADMIN — KETOROLAC TROMETHAMINE 15 MG: 15 INJECTION, SOLUTION INTRAMUSCULAR; INTRAVENOUS at 08:54

## 2019-04-09 RX ADMIN — TRAMADOL HYDROCHLORIDE 50 MG: 50 TABLET, FILM COATED ORAL at 09:59

## 2019-04-09 ASSESSMENT — ENCOUNTER SYMPTOMS
SORE THROAT: 0
DIARRHEA: 0
FACIAL SWELLING: 0
ABDOMINAL DISTENTION: 0
SHORTNESS OF BREATH: 0
COUGH: 0
VOMITING: 0
NAUSEA: 0
BACK PAIN: 1
RHINORRHEA: 0
CHEST TIGHTNESS: 0
CONSTIPATION: 0
WHEEZING: 0

## 2019-04-09 ASSESSMENT — PAIN SCALES - GENERAL
PAINLEVEL_OUTOF10: 10
PAINLEVEL_OUTOF10: 10

## 2019-04-09 NOTE — ED PROVIDER NOTES
SPINE WO CONTRAST    (Results Pending)         LABS:  Labs Reviewed - No data to display      EMERGENCY DEPARTMENT COURSE:     -------------------------  BP: (!) 141/87, Temp: 98.9 °F (37.2 °C), Pulse: 94, Resp: 17      Comments            Bateman MD, F.A.C.E.P.   Attending Emergency Physician         Akua Larkin MD  04/09/19 [id number]

## 2019-04-09 NOTE — ED PROVIDER NOTES
[facility] ED  eMERGENCY dEPARTMENT eNCOUnter  Resident    Pt Name: Zach Prince  MRN: [id number]  [place] 1955  Date of evaluation: 4/9/2019  PCP:  No primary care provider on file. CHIEF COMPLAINT       Chief Complaint   Patient presents with    Back Pain     lower back pain    Shoulder Pain     right shoulder pain    Knee Pain     left knee pain, knee went out while getting out of bed     Fall         HISTORY OF PRESENT ILLNESS    Zach Prince is a 66 y.o. male with past medical history of chronic lower back pain status post lumpectomy, bilateral knee osteoarthritis came to the emergency room after his left knee gave up and he fell down on the floor. Patient fell on the floor on the right side and he hit his right  shoulder on the ground. Patient denies any head hit or loss of consciousness. Patient was having severe lower back pain so he came to the emergency room. Patient was denying any recent fever, chills, urinary or bowel retention. REVIEW OF SYSTEMS       Review of Systems   Constitutional: Negative for chills, diaphoresis, fatigue and fever. HENT: Negative for congestion, facial swelling, rhinorrhea and sore throat. Respiratory: Negative for cough, chest tightness, shortness of breath and wheezing. Cardiovascular: Negative for chest pain, palpitations and leg swelling. Gastrointestinal: Negative for abdominal distention, constipation, diarrhea, nausea and vomiting. Genitourinary: Negative for dysuria, frequency, hematuria and urgency. Musculoskeletal: Positive for arthralgias, back pain, gait problem, joint swelling and myalgias. Negative for neck pain and neck stiffness. Right shoulder pain. Lower back pain. Left knee pain. Neurological: Negative for dizziness, weakness, numbness and headaches. Hematological: Negative for adenopathy.        PAST MEDICAL HISTORY    has a past medical history of Aortic valve sclerosis, Back pain, COPD (chronic obstructive pulmonary disease) ([facility] [address] Dental bridge present, Depression, DJD (degenerative joint disease), High cholesterol, History of shingles, Hypertension, Pain at rest, Postlaminectomy syndrome, Sleep apnea, TIA (transient ischemic attack), Toe contracture, left, Toe pain, left, Wears dentures, and Wears glasses. SURGICAL HISTORY      has a past surgical history that includes cyst removal (Bilateral); Patella surgery (Right); Knee arthroscopy (Bilateral); Finger trigger release (Left, 2014); Finger trigger release (Right, 1/5/15); Nerve Block (2017); Nerve Block (Left, 2017); Nerve Block (Bilateral, 2017); Total knee arthroplasty (Right, 2016); Lumbar spine surgery (); lumbar fusion (6/15/16); arthroplasty (Left, 2019); and toe arthroplasty (Left, 3/13/2019). CURRENT MEDICATIONS       Previous Medications    AMLODIPINE (NORVASC) 5 MG TABLET    Take 1 tablet by mouth daily    PREDNISONE (DELTASONE) 50 MG TABLET    Take 1 tablet by mouth daily    SIMVASTATIN (ZOCOR) 80 MG TABLET    Take 1 tablet by mouth nightly       ALLERGIES     is allergic to motrin [ibuprofen]; nsaids; and other. FAMILY HISTORY     indicated that his mother is . He indicated that his father is . He indicated that his brother is alive. He indicated that his maternal grandmother is . He indicated that his maternal grandfather is . He indicated that his paternal grandmother is . He indicated that his paternal grandfather is . family history includes Diabetes in his brother and mother. SOCIAL HISTORY      reports that he has been smoking cigarettes. He has a 15.00 pack-year smoking history. He has never used smokeless tobacco. He reports that he drinks about 1.2 oz of alcohol per week. He reports that he does not use drugs. PHYSICAL EXAM     INITIAL VITALS:  height is 5' 8\" (1.727 m) and weight is 227 lb (103 kg).  His oral temperature is 98.9 °F (37.2 °C). His blood pressure is 141/87 (abnormal) and his pulse is 94. His respiration is 17 and oxygen saturation is 97%. Physical Examination: General appearance - alert, well appearing, and in distress  Mental status - alert, oriented to person, place, and time  Chest - clear to auscultation, no wheezes, rales or rhonchi, symmetric air entry  Heart - normal rate, regular rhythm, normal S1, S2, no murmurs, rubs, clicks or gallops  Abdomen - soft, nontender, nondistended, no masses or organomegaly  Back exam - lumbar spine tenderness and pain on motion  Neurological - alert, oriented, normal speech, no focal findings or movement disorder noted  Musculoskeletal - right shoulder tenderness and pain on motion. Left knee popliteal bursa palpable   Extremities - peripheral pulses normal, no pedal edema, no clubbing or cyanosis  Skin - normal coloration and turgor, no rashes, no suspicious skin lesions noted      DIFFERENTIAL DIAGNOSIS/MDM:     Acute vertebra compression fracture. Chronic lumbar back pain. Right shoulder dislocation    DIAGNOSTIC RESULTS     EKG: All EKG's are interpreted by the Emergency Department Physician who either signs or Co-signs this chart in the absence of a cardiologist.        RADIOLOGY:   I directly visualized the following  images and reviewed the radiologist interpretations:  CT LUMBAR SPINE WO CONTRAST   Preliminary Result   No acute abnormality on noncontrast CT of the lumbar spine. The patient has   degenerative changes as well as postsurgical changes of laminectomy, fusion   and PLIF screw placement L4 and L5. Multilevel degenerative disc disease   most severe at L5-S1. The L5-S1 exiting nerve roots are elevated but not   impinged. XR SHOULDER RIGHT (MIN 2 VIEWS)   Final Result   No acute osseous or soft tissue abnormality. Degenerative change of the right shoulder joint spaces.                  EMERGENCY DEPARTMENT COURSE:   Vitals: Vitals:    04/09/19 0814   BP: (!) 141/87   Pulse: 94   Resp: 17   Temp: 98.9 °F (37.2 °C)   TempSrc: Oral   SpO2: 97%   Weight: 227 lb (103 kg)   Height: 5' 8\" (1.727 m)     Patient with past medical history of chronic degenerative lumbar back pain status post laminectomy, bilateral knee osteoarthritis came to the emergency room after mechanical fall. Patient was complaining of severe lower back pain and right shoulder pain. Right shoulder x-ray  showed no acute process, CT lumbar spine without contrast showed no acute abnormality but patient had multilevel degenerative disc disease. Patient received IV Toradol but did not help. Tramadol given. Patient is being discharged on 4 tablets of tramadol and ibuprofen. Patient does not have family doctor. Patient preferred to [facility] clinic. FINAL IMPRESSION      1. Sprain of right shoulder, unspecified shoulder sprain type, initial encounter    2. Chronic midline low back pain without sciatica    3. Osteoarthritis of left knee, unspecified osteoarthritis type          DISPOSITION/PLAN   DISPOSITION        Home     PATIENT REFERRED TO:  Dora Mujica MD  [facility].  [id number]  [address] [phone]    Schedule an appointment as soon as possible for a visit         DISCHARGE MEDICATIONS:  New Prescriptions    IBUPROFEN (ADVIL;MOTRIN) 800 MG TABLET    Take 1 tablet by mouth every 8 hours as needed for Pain    TRAMADOL (ULTRAM) 50 MG TABLET    Take 1 tablet by mouth every 12 hours as needed for Pain for up to 2 days. Intended supply: 3 days.  Take lowest dose possible to manage pain       (Please note that portions of this note were completed with a voice recognition program.  Efforts were made to edit the dictations but occasionally words are mis-transcribed.)    Otila Apley  Emergency Medicine Resident            Otila Apley, MD  04/09/19 7578

## 2019-04-26 ENCOUNTER — APPOINTMENT (OUTPATIENT)
Dept: GENERAL RADIOLOGY | Age: 64
End: 2019-04-26
Payer: MEDICARE

## 2019-04-26 ENCOUNTER — TELEPHONE (OUTPATIENT)
Dept: PODIATRY | Age: 64
End: 2019-04-26

## 2019-04-26 ENCOUNTER — HOSPITAL ENCOUNTER (EMERGENCY)
Age: 64
Discharge: LEFT BEFORE DISCHARGE | End: 2019-04-26
Attending: EMERGENCY MEDICINE
Payer: MEDICARE

## 2019-04-26 VITALS
RESPIRATION RATE: 18 BRPM | TEMPERATURE: 98.5 F | HEART RATE: 109 BPM | OXYGEN SATURATION: 97 % | HEIGHT: 69 IN | BODY MASS INDEX: 33.62 KG/M2 | WEIGHT: 227 LBS

## 2019-04-26 DIAGNOSIS — M25.562 LEFT KNEE PAIN, UNSPECIFIED CHRONICITY: Primary | ICD-10-CM

## 2019-04-26 PROCEDURE — 73562 X-RAY EXAM OF KNEE 3: CPT

## 2019-04-26 PROCEDURE — 99283 EMERGENCY DEPT VISIT LOW MDM: CPT

## 2019-04-26 RX ORDER — IBUPROFEN 800 MG/1
800 TABLET ORAL ONCE
Status: DISCONTINUED | OUTPATIENT
Start: 2019-04-26 | End: 2019-04-26 | Stop reason: HOSPADM

## 2019-04-26 RX ORDER — ACETAMINOPHEN 325 MG/1
325 TABLET ORAL EVERY 6 HOURS PRN
Qty: 15 TABLET | Refills: 0 | Status: SHIPPED | OUTPATIENT
Start: 2019-04-26 | End: 2019-12-05

## 2019-04-26 ASSESSMENT — ENCOUNTER SYMPTOMS
DIARRHEA: 0
NAUSEA: 0
WHEEZING: 0
CONSTIPATION: 0
COUGH: 0
ABDOMINAL PAIN: 0
SORE THROAT: 0
VOMITING: 0
BACK PAIN: 0
SHORTNESS OF BREATH: 0
BLOOD IN STOOL: 0

## 2019-04-26 ASSESSMENT — PAIN SCALES - GENERAL: PAINLEVEL_OUTOF10: 10

## 2019-04-26 ASSESSMENT — PAIN DESCRIPTION - LOCATION: LOCATION: KNEE

## 2019-04-26 ASSESSMENT — PAIN DESCRIPTION - ORIENTATION: ORIENTATION: LEFT

## 2019-04-26 NOTE — ED TRIAGE NOTES
Patient arrived to unit via wheelchair and cane. Reports that he injured his left knee this morning. Has been unable to bear weight since injury. Patient reports pain 10./10. History of knee surgery in 89.

## 2019-04-26 NOTE — ED PROVIDER NOTES
[facility] ED  Emergency Department Encounter  EmergencyMedicine Resident     Pt Jose Muñoz  MRN: [id number]  [place] 1955  Date of evaluation: 4/26/19  PCP:  No primary care provider on file. CHIEF COMPLAINT       Chief Complaint   Patient presents with    Knee Injury     left knee pain, reports pain and injury started today       HISTORY OF PRESENT ILLNESS  (Location/Symptom, Timing/Onset, Context/Setting, Quality, Duration, Modifying Factors, Severity.)      Chema Freeman is a 66 y.o. male who presents with primary complaint that he has left knee pain [place] the anterior aspect of his left knee. It started after he took a step and twisted his knee. Just prior to arrival.  He admits he had a knee scope done in 1985 but otherwise has had no surgeries on the knee. He has no hardware in his knee. He has no numbness or tingling below the knee he has no decreased sensation below the leg. He does have difficulty ambulating secondary to pain in the knee. PAST MEDICAL / SURGICAL / SOCIAL / FAMILY HISTORY      has a past medical history of Aortic valve sclerosis, Back pain, COPD (chronic obstructive pulmonary disease) ([address] Dental bridge present, Depression, DJD (degenerative joint disease), High cholesterol, History of shingles, Hypertension, Pain at rest, Postlaminectomy syndrome, Sleep apnea, TIA (transient ischemic attack), Toe contracture, left, Toe pain, left, Wears dentures, and Wears glasses. has a past surgical history that includes cyst removal (Bilateral); Patella surgery (Right); Knee arthroscopy (Bilateral); Finger trigger release (Left, 03/04/2014); Finger trigger release (Right, 1/5/15); Nerve Block (01/13/2017); Nerve Block (Left, 03/30/2017); Nerve Block (Bilateral, 04/07/2017); Total knee arthroplasty (Right, 01/2016);  Lumbar spine surgery (2005); lumbar fusion (6/15/16); arthroplasty (Left, 03/13/2019); and toe arthroplasty (Left, 3/13/2019). Social History     Socioeconomic History    Marital status: Single     Spouse name: Not on file    Number of children: Not on file    Years of education: Not on file    Highest education level: Not on file   Occupational History    Not on file   Social Needs    Financial resource strain: Not on file    Food insecurity:     Worry: Not on file     Inability: Not on file    Transportation needs:     Medical: Not on file     Non-medical: Not on file   Tobacco Use    Smoking status: Current Every Day Smoker     Packs/day: 0.50     Years: 30.00     Pack years: 15.00     Types: Cigarettes    Smokeless tobacco: Never Used    Tobacco comment: trying to quit   Substance and Sexual Activity    Alcohol use: Yes     Alcohol/week: 1.2 oz     Types: 2 Cans of beer per week     Comment: SOCIALLY    Drug use: No    Sexual activity: Yes     Partners: Female   Lifestyle    Physical activity:     Days per week: Not on file     Minutes per session: Not on file    Stress: Not on file   Relationships    Social connections:     Talks on phone: Not on file     Gets together: Not on file     Attends [employer] service: Not on file     Active member of club or organization: Not on file     Attends meetings of clubs or organizations: Not on file     Relationship status: Not on file    Intimate partner violence:     Fear of current or ex partner: Not on file     Emotionally abused: Not on file     Physically abused: Not on file     Forced sexual activity: Not on file   Other Topics Concern    Not on file   Social History Narrative    Not on file       Family History   Problem Relation Age of Onset    Diabetes Mother     Diabetes Brother        Allergies:  Motrin [ibuprofen]; Nsaids; and Other    Home Medications:  Prior to Admission medications    Medication Sig Start Date End Date Taking?  Authorizing Provider   acetaminophen (TYLENOL) 325 MG tablet Take 1 tablet by mouth every 6 hours as needed for Pain 4/26/19  Yes Carmena Prior, DO   ibuprofen (ADVIL;MOTRIN) 800 MG tablet Take 1 tablet by mouth every 8 hours as needed for Pain 4/9/19  Yes Krissy Hendricks MD   predniSONE (DELTASONE) 50 MG tablet Take 1 tablet by mouth daily 3/26/19  Yes Siena Garcia MD   amLODIPine (NORVASC) 5 MG tablet Take 1 tablet by mouth daily 1/17/19  Yes Rodriguez Valera MD   simvastatin (ZOCOR) 80 MG tablet Take 1 tablet by mouth nightly 1/17/19  Yes Rodriguez Valera MD       REVIEW OF SYSTEMS    (2-9 systems for level 4, 10 or more for level 5)      Review of Systems   Constitutional: Negative for diaphoresis and fever. HENT: Negative for sore throat. Respiratory: Negative for cough, shortness of breath and wheezing. Cardiovascular: Negative for chest pain, palpitations and leg swelling. Gastrointestinal: Negative for abdominal pain, blood in stool, constipation, diarrhea, nausea and vomiting. Genitourinary: Negative for dysuria, frequency and hematuria. Musculoskeletal: Positive for arthralgias and joint swelling. Negative for back pain and neck pain. Skin: Negative for rash. Neurological: Negative for dizziness and headaches. Hematological: Does not bruise/bleed easily. PHYSICAL EXAM   (up to 7 for level 4, 8 or more for level 5)      INITIAL VITALS:   Pulse 109   Temp 98.5 °F (36.9 °C)   Resp 18   Ht 5' 9\" (1.753 m)   Wt 227 lb (103 kg)   SpO2 97%   BMI 33.52 kg/m²     Physical Exam   Constitutional: He is oriented to person, place, and time. He appears well-developed and well-nourished. No distress. HENT:   Head: Normocephalic and atraumatic. Cardiovascular: Normal rate, regular rhythm and normal heart sounds. Exam reveals no gallop and no friction rub. No murmur heard. Pulmonary/Chest: Effort normal and breath sounds normal. No respiratory distress. He has no wheezes. He has no rales. Abdominal: Soft. Bowel sounds are normal. He exhibits no distension. There is no tenderness.  There is no guarding. Musculoskeletal: He exhibits tenderness. He exhibits no edema or deformity. Patient has tenderness primarily in the anterior aspect of knee. He has pain with motion of the knee both passive and active, DP pulses and sensation intact below the knee. Neurological: He is alert and oriented to person, place, and time. Skin: Skin is warm and dry. He is not diaphoretic. No erythema. Psychiatric: He has a normal mood and affect. His behavior is normal.       DIFFERENTIAL  DIAGNOSIS     PLAN (LABS / IMAGING / EKG):  Orders Placed This Encounter   Procedures    XR KNEE LEFT (3 VIEWS)    [facility]       MEDICATIONS ORDERED:  Orders Placed This Encounter   Medications    ibuprofen (ADVIL;MOTRIN) tablet 800 mg    acetaminophen (TYLENOL) 325 MG tablet     Sig: Take 1 tablet by mouth every 6 hours as needed for Pain     Dispense:  15 tablet     Refill:  0       DDX: Fracture, dislocation, sprain, strain, contusion, muscle spasm, ligamentous injury, meniscus injury, osteomyelitis, cellulitis, abscess, bursitis, arthritis, DJD, DVT      DIAGNOSTIC RESULTS / EMERGENCY DEPARTMENT COURSE / MDM     LABS:  No results found for this visit on 04/26/19. RADIOLOGY:     Xr Shoulder Right (min 2 Views)    Result Date: 4/9/2019  EXAMINATION: 3 XRAY VIEWS OF THE RIGHT SHOULDER 4/9/2019 8:43 am COMPARISON: Right shoulder radiographs performed 06/21/2017. HISTORY: ORDERING SYSTEM PROVIDED HISTORY: Mechanical fall. Patient is having pain TECHNOLOGIST PROVIDED HISTORY: Mechanical fall. Patient is having pain FINDINGS: There is no acute osseous abnormality. There is degenerative change of the glenohumeral and acromioclavicular joint spaces. The surrounding soft tissues are unremarkable. The visualized right lung is without acute process. No acute osseous or soft tissue abnormality. Degenerative change of the right shoulder joint spaces.      Xr Knee Left (3 Views)    Result Date: 4/26/2019  EXAMINATION: 3 XRAY VIEWS OF THE LEFT KNEE 4/26/2019 12:13 pm COMPARISON: 05/17/2016. HISTORY: ORDERING SYSTEM PROVIDED HISTORY: stepped and felt pain in knee TECHNOLOGIST PROVIDED HISTORY: stepped and felt pain in knee FINDINGS: The bone mineralization is somewhat osteopenic. There is joint space narrowing involving the patellofemoral and femoral tibial compartments. There are moderate size osteophytes noted. No acute fractures or dislocations are seen. No bony erosions are noted. 1. No acute osseous abnormality involving the left knee. 2. Tricompartmental osteoarthritis. Ct Lumbar Spine Wo Contrast    Result Date: 4/9/2019  EXAMINATION: CT OF THE LUMBAR SPINE WITHOUT CONTRAST  4/9/2019 TECHNIQUE: CT of the lumbar spine was performed without the administration of intravenous contrast. Multiplanar reformatted images are provided for review. Dose modulation, iterative reconstruction, and/or weight based adjustment of the mA/kV was utilized to reduce the radiation dose to as low as reasonably achievable. COMPARISON: None HISTORY: ORDERING SYSTEM PROVIDED HISTORY: other TECHNOLOGIST PROVIDED HISTORY: other Ordering Physician Provided Reason for Exam: lower back pain s/p fall today Acuity: Unknown Type of Exam: Unknown Additional signs and symptoms: h/o surgeries FINDINGS: BONES/ALIGNMENT: There is normal alignment of the spine. The vertebral body heights are maintained. No osseous destructive lesion is seen. The patient is status post lumbar fusion with placement of PLIF screws at L4 and L5. Hardware is intact. DEGENERATIVE CHANGES: The L5-S1 disc space is narrowed with vacuum phenomenon. Moderate degenerative changes are present L3 through L5 with mild degenerative changes at L1 and L2. A disc osteophytic complex is noted L5-S1. The L5-S1 neural foramina is moderately narrowed. Exiting nerve root is elevated but not impinged. Mild disc protrusions are present at the other levels.   No gross bony canal stenosis is evident. SOFT TISSUES/RETROPERITONEUM: No paraspinal mass is seen. No acute abnormality on noncontrast CT of the lumbar spine. The patient has degenerative changes as well as postsurgical changes of laminectomy, fusion and PLIF screw placement L4 and L5. Multilevel degenerative disc disease most severe at L5-S1. The L5-S1 exiting nerve roots are elevated but not impinged. [facility]/EMERGENCY DEPARTMENT COURSE:      ED Course as of Apr 26 1335 Fri Apr 26, 2019   1208 Patient has left knee swelling and pain with passive and active motion, he did not fall and hit the knee. No loss of consciousness. He just stepped and twisted it wrong just prior to arrival.  Analgesia performed with 800 mg tablet of Motrin, we will obtain x-rays to ensure no acute fracture clinically appears he likely sprained the knee potentially commences injury will have patient follow up outpatient with orthopedic surgery. [KW]   6457 The patient had x-ray that shows no acute findings, follow-up has been provided for orthopedic clinic for osteoarthritis versus ligamentous injury. Patient has refused Tylenol Motrin and Toradol. Patient placed in knee immobilizer at discharge. [KW]   8907 Prior to discharge well explaining the patient that it could be osteoarthritis versus possible ligamentous injury the patient became very irate that we did not know specifically what was causing his pain, he cursed at myself and other staff members. Patient eloped without his paperwork. [KW]      ED Course User Index  [KW] Yaniv Jj,            PROCEDURES:  None    CONSULTS:  None    CRITICAL CARE:  None    FINAL IMPRESSION      1.  Left knee pain, unspecified chronicity          DISPOSITION / PLAN     DISPOSITION     PATIENT REFERRED TO:  [facility]  [address] [address]  Schedule an appointment as soon as possible for a visit   For Follow Up    [facility]  [address]  [phone]    If symptoms worsen      DISCHARGE MEDICATIONS:  Discharge Medication List as of 4/26/2019 12:23 PM      START taking these medications    Details   acetaminophen (TYLENOL) 325 MG tablet Take 1 tablet by mouth every 6 hours as needed for Pain, Disp-15 tablet, R-0Print             Suzette Miller DO  Emergency Medicine Resident    (Please note that portions of this note were completed with a voicerecognition program.  Efforts were made to edit the dictations but occasionally words are mis-transcribed.)       Suzette Miller,   04/26/19 [address], DO  04/26/19 4917

## 2019-04-26 NOTE — ED PROVIDER NOTES
Emergency Medicine Attending Note    I have seen and examined the patient in conjunction with the Resident/MLP. Please see my key portion documented:      I agree with the assessment and plan as discussed with Dr. Wagner Bhatt. Electronically signed:  MAVIS Keys MD  04/26/19 5560

## 2019-04-26 NOTE — TELEPHONE ENCOUNTER
Writer called pt to reschedule missed appointment. Pt said he did not need a new appointment at this time but will call back if he has any problems down the line.

## 2019-06-08 ENCOUNTER — HOSPITAL ENCOUNTER (EMERGENCY)
Age: 64
Discharge: HOME | End: 2019-06-08
Attending: EMERGENCY MEDICINE
Payer: COMMERCIAL

## 2019-06-08 VITALS
HEIGHT: 69 IN | BODY MASS INDEX: 34.07 KG/M2 | HEART RATE: 87 BPM | OXYGEN SATURATION: 98 % | RESPIRATION RATE: 16 BRPM | DIASTOLIC BLOOD PRESSURE: 77 MMHG | SYSTOLIC BLOOD PRESSURE: 138 MMHG | TEMPERATURE: 98.1 F | WEIGHT: 230 LBS

## 2019-06-08 DIAGNOSIS — R45.851 DEPRESSION WITH SUICIDAL IDEATION: Primary | ICD-10-CM

## 2019-06-08 DIAGNOSIS — F32.A DEPRESSION WITH SUICIDAL IDEATION: Primary | ICD-10-CM

## 2019-06-08 DIAGNOSIS — F19.10 DRUG ABUSE (HCC): ICD-10-CM

## 2019-06-08 LAB
ABSOLUTE EOS #: 0.1 K/UL (ref 0–0.4)
ABSOLUTE IMMATURE GRANULOCYTE: NORMAL K/UL (ref 0–0.3)
ABSOLUTE LYMPH #: 1.8 K/UL (ref 1–4.8)
ABSOLUTE MONO #: 0.4 K/UL (ref 0.1–1.3)
AMPHETAMINE SCREEN URINE: NEGATIVE
ANION GAP SERPL CALCULATED.3IONS-SCNC: 17 MMOL/L (ref 9–17)
BARBITURATE SCREEN URINE: NEGATIVE
BASOPHILS # BLD: 1 % (ref 0–2)
BASOPHILS ABSOLUTE: 0 K/UL (ref 0–0.2)
BENZODIAZEPINE SCREEN, URINE: NEGATIVE
BILIRUBIN URINE: NEGATIVE
BNP INTERPRETATION: NORMAL
BUN BLDV-MCNC: 13 MG/DL (ref 8–23)
BUN/CREAT BLD: ABNORMAL (ref 9–20)
BUPRENORPHINE URINE: ABNORMAL
CALCIUM SERPL-MCNC: 8.3 MG/DL (ref 8.6–10.4)
CANNABINOID SCREEN URINE: NEGATIVE
CHLORIDE BLD-SCNC: 102 MMOL/L (ref 98–107)
CO2: 19 MMOL/L (ref 20–31)
COCAINE METABOLITE, URINE: POSITIVE
COLOR: YELLOW
COMMENT UA: NORMAL
CREAT SERPL-MCNC: 1.04 MG/DL (ref 0.7–1.2)
DIFFERENTIAL TYPE: NORMAL
EOSINOPHILS RELATIVE PERCENT: 2 % (ref 0–4)
GFR AFRICAN AMERICAN: >60 ML/MIN
GFR NON-AFRICAN AMERICAN: >60 ML/MIN
GFR SERPL CREATININE-BSD FRML MDRD: ABNORMAL ML/MIN/{1.73_M2}
GFR SERPL CREATININE-BSD FRML MDRD: ABNORMAL ML/MIN/{1.73_M2}
GLUCOSE BLD-MCNC: 92 MG/DL (ref 70–99)
GLUCOSE URINE: NEGATIVE
HCT VFR BLD CALC: 43.7 % (ref 41–53)
HEMOGLOBIN: 14.5 G/DL (ref 13.5–17.5)
IMMATURE GRANULOCYTES: NORMAL %
KETONES, URINE: NEGATIVE
LEUKOCYTE ESTERASE, URINE: NEGATIVE
LYMPHOCYTES # BLD: 30 % (ref 24–44)
MAGNESIUM: 2 MG/DL (ref 1.6–2.6)
MCH RBC QN AUTO: 28.9 PG (ref 26–34)
MCHC RBC AUTO-ENTMCNC: 33.2 G/DL (ref 31–37)
MCV RBC AUTO: 87.2 FL (ref 80–100)
MDMA URINE: ABNORMAL
METHADONE SCREEN, URINE: NEGATIVE
METHAMPHETAMINE, URINE: ABNORMAL
MONOCYTES # BLD: 6 % (ref 1–7)
NITRITE, URINE: NEGATIVE
NRBC AUTOMATED: NORMAL PER 100 WBC
OPIATES, URINE: NEGATIVE
OXYCODONE SCREEN URINE: NEGATIVE
PDW BLD-RTO: 14.7 % (ref 11.5–14.9)
PH UA: 5.5 (ref 5–8)
PHENCYCLIDINE, URINE: NEGATIVE
PLATELET # BLD: 204 K/UL (ref 150–450)
PLATELET ESTIMATE: NORMAL
PMV BLD AUTO: 8.1 FL (ref 6–12)
POTASSIUM SERPL-SCNC: 3.5 MMOL/L (ref 3.7–5.3)
PRO-BNP: 52 PG/ML
PROPOXYPHENE, URINE: ABNORMAL
PROTEIN UA: NEGATIVE
RBC # BLD: 5.01 M/UL (ref 4.5–5.9)
RBC # BLD: NORMAL 10*6/UL
SEG NEUTROPHILS: 61 % (ref 36–66)
SEGMENTED NEUTROPHILS ABSOLUTE COUNT: 3.7 K/UL (ref 1.3–9.1)
SODIUM BLD-SCNC: 138 MMOL/L (ref 135–144)
SPECIFIC GRAVITY UA: 1 (ref 1–1.03)
TEST INFORMATION: ABNORMAL
TRICYCLIC ANTIDEPRESSANTS, UR: ABNORMAL
TROPONIN INTERP: ABNORMAL
TROPONIN INTERP: ABNORMAL
TROPONIN T: ABNORMAL NG/ML
TROPONIN T: ABNORMAL NG/ML
TROPONIN, HIGH SENSITIVITY: 27 NG/L (ref 0–22)
TROPONIN, HIGH SENSITIVITY: 28 NG/L (ref 0–22)
TURBIDITY: CLEAR
URINE HGB: NEGATIVE
UROBILINOGEN, URINE: NORMAL
WBC # BLD: 6 K/UL (ref 3.5–11)
WBC # BLD: NORMAL 10*3/UL

## 2019-06-08 PROCEDURE — 85025 COMPLETE CBC W/AUTO DIFF WBC: CPT

## 2019-06-08 PROCEDURE — 84484 ASSAY OF TROPONIN QUANT: CPT

## 2019-06-08 PROCEDURE — 99284 EMERGENCY DEPT VISIT MOD MDM: CPT

## 2019-06-08 PROCEDURE — 36415 COLL VENOUS BLD VENIPUNCTURE: CPT

## 2019-06-08 PROCEDURE — 81003 URINALYSIS AUTO W/O SCOPE: CPT

## 2019-06-08 PROCEDURE — 83735 ASSAY OF MAGNESIUM: CPT

## 2019-06-08 PROCEDURE — 80307 DRUG TEST PRSMV CHEM ANLYZR: CPT

## 2019-06-08 PROCEDURE — 80048 BASIC METABOLIC PNL TOTAL CA: CPT

## 2019-06-08 PROCEDURE — 83880 ASSAY OF NATRIURETIC PEPTIDE: CPT

## 2019-06-08 PROCEDURE — 93005 ELECTROCARDIOGRAM TRACING: CPT | Performed by: EMERGENCY MEDICINE

## 2019-06-08 ASSESSMENT — ENCOUNTER SYMPTOMS
ABDOMINAL PAIN: 0
SHORTNESS OF BREATH: 0
COUGH: 0

## 2019-06-08 NOTE — ED PROVIDER NOTES
[address] ED  eMERGENCY dEPARTMENT eNCOUnter    Pt Name: Jose Elias Garcia  MRN: [id number]  [place] 1955  Date of evaluation: 6/8/19  CHIEF COMPLAINT       Chief Complaint   Patient presents with    Stress     HISTORY OF PRESENT ILLNESS   HPI  66 y.o. male presenting for psychiatric evaluation. Patient has a history of depression and substance abuse and today is experiencing depression and states he has attempted to harm himself by overdosing on cocaine. he endorses some intermittent chest pain, none currently. REVIEW OF SYSTEMS     Review of Systems   Constitutional: Negative for chills and fever. Respiratory: Negative for cough and shortness of breath. Cardiovascular: Positive for chest pain. Gastrointestinal: Negative for abdominal pain. Genitourinary: Negative for difficulty urinating. Skin: Negative for wound. Neurological: Negative for dizziness and syncope. All other systems reviewed and are negative.     PASTMEDICAL HISTORY     Past Medical History:   Diagnosis Date    Aortic valve sclerosis     Back pain     COPD (chronic obstructive pulmonary disease) (HCC)     NEUBULIZER AS NEEDED    Dental bridge present     UPPER    Depression     DJD (degenerative joint disease)     Drug abuse ([facility] [address]     drug abuse includes smoking cocaine    High cholesterol     History of shingles     2013    Hypertension     PT NEVER FILLED  SCRIPT    Pain at rest     Knee-Left, back     Postlaminectomy syndrome 8/9/2017    Sleep apnea     does not have machine    Suicidal ideations     S/I's without attempt    TIA (transient ischemic attack) 10/2017    x 2    Toe contracture, left     5th    Toe pain, left     5th digit    Wears dentures     1 GOLD TOOTH ON FLIPPER UPPER    Wears glasses      SURGICAL HISTORY       Past Surgical History:   Procedure Laterality Date    ARTHROPLASTY Left 03/13/2019    toe    CYST REMOVAL Bilateral     \"groin\"    FINGER TRIGGER RELEASE Left 03/04/2014 ring finger    FINGER TRIGGER RELEASE Right 1/5/15    rt hand-RING FINGER    KNEE ARTHROSCOPY Bilateral     LUMBAR FUSION  6/15/16    posterior lumbar fusion L4-L5    LUMBAR SPINE SURGERY  2005    LUMBAR    NERVE BLOCK  2017    caudal #1    celestone 9mg  25mcq fentanyl    NERVE BLOCK Left 2017    left hip bursa injection depomedrol 40 mg    NERVE BLOCK Bilateral 2017    bilateral si joints injections, depo 40    PATELLA SURGERY Right     x2    TOE ARTHROPLASTY Left 3/13/2019    5TH TOE TOTAL ARTHROPLASTY, TNC AT 5TH MTJ performed by Tray Purvis DPM at [address] Right 2016    KNEE     CURRENT MEDICATIONS       Previous Medications    ACETAMINOPHEN (TYLENOL) 325 MG TABLET    Take 1 tablet by mouth every 6 hours as needed for Pain    AMLODIPINE (NORVASC) 5 MG TABLET    Take 1 tablet by mouth daily    IBUPROFEN (ADVIL;MOTRIN) 800 MG TABLET    Take 1 tablet by mouth every 8 hours as needed for Pain    PREDNISONE (DELTASONE) 50 MG TABLET    Take 1 tablet by mouth daily    SIMVASTATIN (ZOCOR) 80 MG TABLET    Take 1 tablet by mouth nightly     ALLERGIES     is allergic to motrin [ibuprofen]; nsaids; and other. FAMILY HISTORY     indicated that his mother is . He indicated that his father is . He indicated that his brother is alive. He indicated that his maternal grandmother is . He indicated that his maternal grandfather is . He indicated that his paternal grandmother is . He indicated that his paternal grandfather is . SOCIAL HISTORY       Social History     Tobacco Use    Smoking status: Current Every Day Smoker     Packs/day: 0.50     Years: 30.00     Pack years: 15.00     Types: Cigarettes    Smokeless tobacco: Never Used    Tobacco comment: trying to quit   Substance Use Topics    Alcohol use: Yes     Alcohol/week: 1.2 oz     Types: 2 Cans of beer per week     Comment: SOCIALLY    Drug use:  Yes Department Physician who either signs or Co-signs this chart in the absence of a cardiologist.    EKG Interpretation    Interpreted by emergency department physician    Rhythm: normal sinus   Rate: 76  Axis: normal  Ectopy: none  Conduction: normal  ST Segments: normal  T Waves: normal  Q Waves: none    Clinical Impression: normal EKG    [facility]     RADIOLOGY:All plain film, CT, MRI, and formal ultrasound images (except ED bedside ultrasound) are read by the radiologist, see reports below, unless otherwisenoted in MDM or here. No orders to display     LABS: All lab results were reviewed by myself, and all abnormals are listed below. Labs Reviewed   BASIC METABOLIC PANEL W/ REFLEX TO MG FOR LOW K - Abnormal; Notable for the following components:       Result Value    Calcium 8.3 (*)     Potassium 3.5 (*)     CO2 19 (*)     All other components within normal limits   TROPONIN - Abnormal; Notable for the following components:    Troponin, High Sensitivity 28 (*)     All other components within normal limits   URINE DRUG SCREEN - Abnormal; Notable for the following components:    Cocaine Metabolite, Urine POSITIVE (*)     All other components within normal limits   TROPONIN - Abnormal; Notable for the following components:    Troponin, High Sensitivity 27 (*)     All other components within normal limits   CBC WITH AUTO DIFFERENTIAL   BRAIN NATRIURETIC PEPTIDE   URINALYSIS   [facility]     EMERGENCY DEPARTMENTCOURSE:   Vitals:    Vitals:    06/08/19 1406 06/08/19 1833   BP: (!) 193/94 138/77   Pulse: 96 87   Resp: 14 16   Temp: 98.1 °F (36.7 °C)    TempSrc: Oral    SpO2: 96% 98%   Weight: 230 lb (104.3 kg)    Height: 5' 9\" (1.753 m)        The patient was given the following medications while in the emergency department:  No orders of the defined types were placed in this encounter. CONSULTS:  None    FINAL IMPRESSION      1. Depression with suicidal ideation    2.  [facility]          DISPOSITION/PLAN DISPOSITION Decision To Discharge 06/08/2019 06:57:51 PM      PATIENT REFERRED TO:  [address]  [phone]  Go to   if your racing throughts or depression worsens    [address] [address]  [phone]  Go to   if you have any thoughts of hurting yourself    DISCHARGE MEDICATIONS:  New Prescriptions    No medications on file     Shahab Aguilar MD  Attending Emergency Physician  Bogdan voice recognition software used in portions of this document.                       Shahab Aguilar MD  06/08/19 7803

## 2019-06-08 NOTE — ED NOTES
Pt brings himself to this ER with concerns about being \"stressed out. \"  Pt also initially made nonspecific statements about wanting to hurt himself. Pt also reports using cocaine for about 10 hours starting last night and ending around 0600 this morning, but pt denies any chest pain. Pt arrives A+O x 4, GCS = 15, PMS x 4 intact, eupneic, and PWD. Pulse is regular et strong with s1 and s2 heart tones. Lung sounds clear t/o bilat. Pt is calm et cooperative.      Erik Jim RN  06/08/19 6256

## 2019-06-08 NOTE — ED NOTES
Provisional Diagnosis:   Major Depressive Disorder    Psychosocial and Contextual Factors:   Patient has limited family support. Patient has relationship issues. C-SSRS Summary:      Patient: X  Family:   Agency:         Present Suicidal Behavior:  X    Verbal:  Patient reports suicidal ideations. Attempt: Patient reports he tried to overdose on crack last night. Past Suicidal Behavior: X    Verbal: Patient reports a history of suicidal ideations. Attempt: Patient reports trying to overdose on crack in the past.           Substance Abuse: Crack     Self-Injurious/Self-Mutilation: Patient denies. Trauma Identified:   Patient reports his mom passing away 4 months ago has been very upsetting. Protective Factors:    Patient has housing. Patient has insurance. Patient has monthly income. Patient is linked with mental health agency. Risk Factors:    Patient is not taking psych medications. Patient has limited support system. Patient reports suicide attempt. Clinical Summary:    Patient is a 66year old  [place] male that brings himself to the ED for suicidal ideations. Patient reports he tried to end his life last night but overdosing on crack. Patient reports today he continues to have suicidal thoughts. Patient reports he has tried to overdose on crack in the past but does not provide more details. Patient denies H/I at this time. Patient denies auditory or visual hallucinations. Patient reports being linked with [facility] and being treated for depression. Patient reports being linked with a therapist that he sees weekly but is unable to remember his last appointment with a psychiatrist. Patient states he is not currently taking any psych medications. Patient states he has been hospitalized for psychiatric reasons in the past but states it was several years ago. Patient reports an increase in symptoms after his mom passed away 4 months ago.  Patient reports being close to

## 2019-06-10 LAB
EKG ATRIAL RATE: 76 BPM
EKG P AXIS: 69 DEGREES
EKG P-R INTERVAL: 204 MS
EKG Q-T INTERVAL: 416 MS
EKG QRS DURATION: 76 MS
EKG QTC CALCULATION (BAZETT): 468 MS
EKG R AXIS: 59 DEGREES
EKG T AXIS: 47 DEGREES
EKG VENTRICULAR RATE: 76 BPM

## 2019-06-10 PROCEDURE — 93010 ELECTROCARDIOGRAM REPORT: CPT | Performed by: INTERNAL MEDICINE

## 2019-06-19 ENCOUNTER — OFFICE VISIT (OUTPATIENT)
Dept: FAMILY MEDICINE CLINIC | Age: 64
End: 2019-06-19
Payer: COMMERCIAL

## 2019-06-19 VITALS
OXYGEN SATURATION: 96 % | WEIGHT: 226.4 LBS | HEIGHT: 69 IN | SYSTOLIC BLOOD PRESSURE: 133 MMHG | BODY MASS INDEX: 33.53 KG/M2 | HEART RATE: 94 BPM | DIASTOLIC BLOOD PRESSURE: 88 MMHG

## 2019-06-19 DIAGNOSIS — M17.0 PRIMARY OSTEOARTHRITIS OF BOTH KNEES: ICD-10-CM

## 2019-06-19 DIAGNOSIS — J44.9 CHRONIC OBSTRUCTIVE PULMONARY DISEASE, UNSPECIFIED COPD TYPE (HCC): Primary | Chronic | ICD-10-CM

## 2019-06-19 DIAGNOSIS — F33.1 MAJOR DEPRESSIVE DISORDER, RECURRENT EPISODE, MODERATE (HCC): ICD-10-CM

## 2019-06-19 DIAGNOSIS — I10 ESSENTIAL HYPERTENSION: ICD-10-CM

## 2019-06-19 PROCEDURE — G8926 SPIRO NO PERF OR DOC: HCPCS | Performed by: FAMILY MEDICINE

## 2019-06-19 PROCEDURE — 3023F SPIROM DOC REV: CPT | Performed by: FAMILY MEDICINE

## 2019-06-19 PROCEDURE — 99213 OFFICE O/P EST LOW 20 MIN: CPT | Performed by: FAMILY MEDICINE

## 2019-06-19 PROCEDURE — 3017F COLORECTAL CA SCREEN DOC REV: CPT | Performed by: FAMILY MEDICINE

## 2019-06-19 PROCEDURE — 4004F PT TOBACCO SCREEN RCVD TLK: CPT | Performed by: FAMILY MEDICINE

## 2019-06-19 PROCEDURE — G8427 DOCREV CUR MEDS BY ELIG CLIN: HCPCS | Performed by: FAMILY MEDICINE

## 2019-06-19 PROCEDURE — G8599 NO ASA/ANTIPLAT THER USE RNG: HCPCS | Performed by: FAMILY MEDICINE

## 2019-06-19 PROCEDURE — G8417 CALC BMI ABV UP PARAM F/U: HCPCS | Performed by: FAMILY MEDICINE

## 2019-06-19 RX ORDER — ALBUTEROL SULFATE 90 UG/1
2 AEROSOL, METERED RESPIRATORY (INHALATION) 4 TIMES DAILY PRN
Qty: 1 INHALER | Refills: 5 | Status: SHIPPED | OUTPATIENT
Start: 2019-06-19 | End: 2019-10-23

## 2019-06-19 RX ORDER — AMLODIPINE BESYLATE 5 MG/1
5 TABLET ORAL DAILY
Qty: 30 TABLET | Refills: 5 | Status: SHIPPED | OUTPATIENT
Start: 2019-06-19 | End: 2019-10-04 | Stop reason: SDUPTHER

## 2019-06-19 ASSESSMENT — ENCOUNTER SYMPTOMS
COUGH: 0
SHORTNESS OF BREATH: 0
WHEEZING: 0

## 2019-06-19 NOTE — PROGRESS NOTES
2019     Valorie Atkinson (:  1955) is a 66 y.o. male, here for evaluation of the following medical concerns:  Chief Complaint   Patient presents with   Manny Saba Establish Care     here to establish care COPD HTN     HPI   No recent exacerbation of COPD, not using any inhalers, has HTN, on amlodipine 5mg. Recently seen in er for depression, recent death of mother and ex girlfriend. Pending rt knee replacement. Going to counseling. Uses cocaine. Review of Systems   Constitutional: Negative for fatigue and fever. Respiratory: Negative for cough, shortness of breath and wheezing. Cardiovascular: Negative for chest pain, palpitations and leg swelling. Musculoskeletal: Positive for arthralgias. Back pain: left knee. Prior to Visit Medications    Medication Sig Taking? Authorizing Provider   amLODIPine (NORVASC) 5 MG tablet Take 1 tablet by mouth daily Yes Nadeen Salgado DO   albuterol sulfate  (90 Base) MCG/ACT inhaler Inhale 2 puffs into the lungs 4 times daily as needed for Wheezing Yes Nadeen Salgado DO   acetaminophen (TYLENOL) 325 MG tablet Take 1 tablet by mouth every 6 hours as needed for Pain  Flori Mcmanus DO   ibuprofen (ADVIL;MOTRIN) 800 MG tablet Take 1 tablet by mouth every 8 hours as needed for Pain  Olegario Hyman MD   predniSONE (DELTASONE) 50 MG tablet Take 1 tablet by mouth daily  Sharmin Falk MD      Allergies   Allergen Reactions    Motrin [Ibuprofen] Nausea And Vomiting    Nsaids Nausea And Vomiting    Other Nausea And Vomiting     Pt states he cannot take any muscle relaxers but does not know what specific muscle relaxer caused the nausea. Social History     Tobacco Use    Smoking status: Current Every Day Smoker     Packs/day: 0.50     Years: 30.00     Pack years: 15.00     Types: Cigarettes    Smokeless tobacco: Never Used    Tobacco comment: trying to quit   Substance Use Topics    Alcohol use:  Yes     Alcohol/week: 1.2 oz     Types: 2 Cans of beer per week     Comment: SOCIALLY        Vitals:    06/19/19 1449   BP: 133/88  Comment: machine   Site: Left Upper Arm   Position: Sitting   Cuff Size: Medium Adult   Pulse: 94   SpO2: 96%   Weight: 226 lb 6.4 oz (102.7 kg)   Height: 5' 9.02\" (1.753 m)     Estimated body mass index is 33.42 kg/m² as calculated from the following:    Height as of this encounter: 5' 9.02\" (1.753 m). Weight as of this encounter: 226 lb 6.4 oz (102.7 kg). Physical Exam   Constitutional: He appears well-developed and well-nourished. Cardiovascular: Normal rate and regular rhythm. Pulmonary/Chest: Effort normal and breath sounds normal.   Musculoskeletal: He exhibits no edema. Lymphadenopathy:     He has no cervical adenopathy. Psychiatric: He has a normal mood and affect. ASSESSMENT/PLAN:     Diagnosis Orders   1. Chronic obstructive pulmonary disease, unspecified COPD type ([facility] [address]     2. Essential hypertension  amLODIPine (NORVASC) 5 MG tablet    Lipid Panel   3. Primary osteoarthritis of both knees       Return for test results. on simvastatin 80mg, check lipids, ran out. Will either change amlodipine or change simvastatin due to interaction. PDMP reviewed, narcotics requested, not given due to drug history. Follow up with ortho for continued work up for knee replacement left, rt already done. Requested Prescriptions     Signed Prescriptions Disp Refills    amLODIPine (NORVASC) 5 MG tablet 30 tablet 5     Sig: Take 1 tablet by mouth daily    albuterol sulfate  (90 Base) MCG/ACT inhaler 1 Inhaler 5     Sig: Inhale 2 puffs into the lungs 4 times daily as needed for Wheezing     An electronic signature was used to authenticate this note.     --Emilee Lawrence DO on6/19/2019 at 4:34 PM

## 2019-06-19 NOTE — PATIENT INSTRUCTIONS
Thank you for letting us take care of you today. We hope all your questions were addressed. If a question was overlooked or something else comes to mind after you return home, please contact a member of your Care Team listed below. Please make sure you have a routine office visit set up to follow-up on [address]. Your Care Team at [place] [address] is Team #4  Kim Sauer MD (Faculty)  Lisbeth Abernathy MD (Beny Garcia MD (Resident)  Norma Hays MD (Resident)  Blayne Jc MD (Resident)  Nasra Ramirez MD (Resident)  Azael Carreno MD (Resident)  FRAN Finley, JOE Simental., [facility] office)  Fina Niño [facility] office)  Keri [facility] office)  Brianna Beltrangeorge, [address] ([address] [facility])  Dasie Dancer, Ph.D., (Behavioral Services)  Colonel Zamarripa, [address] [place] [address] (Clinical Pharmacist)       Office phone number: [phone]    If you need to get in right away due to illness, please be advised we have \"Same Day\" appointments available Monday-Friday. Please call us at [phone] option #3 to schedule your \"Same Day\" appointment.

## 2019-08-23 ENCOUNTER — CARE COORDINATION (OUTPATIENT)
Dept: CARE COORDINATION | Age: 64
End: 2019-08-23

## 2019-09-02 ENCOUNTER — CARE COORDINATION (OUTPATIENT)
Dept: CARE COORDINATION | Age: 64
End: 2019-09-02

## 2019-09-04 ENCOUNTER — OFFICE VISIT (OUTPATIENT)
Dept: FAMILY MEDICINE CLINIC | Age: 64
End: 2019-09-04
Payer: MEDICARE

## 2019-09-04 VITALS
SYSTOLIC BLOOD PRESSURE: 140 MMHG | TEMPERATURE: 98.1 F | HEART RATE: 85 BPM | BODY MASS INDEX: 33.65 KG/M2 | DIASTOLIC BLOOD PRESSURE: 90 MMHG | WEIGHT: 228 LBS

## 2019-09-04 DIAGNOSIS — R73.03 PREDIABETES: ICD-10-CM

## 2019-09-04 DIAGNOSIS — Z23 NEED FOR PROPHYLACTIC VACCINATION AGAINST STREPTOCOCCUS PNEUMONIAE (PNEUMOCOCCUS): ICD-10-CM

## 2019-09-04 DIAGNOSIS — M17.0 PRIMARY OSTEOARTHRITIS OF BOTH KNEES: ICD-10-CM

## 2019-09-04 DIAGNOSIS — Z23 NEED FOR PROPHYLACTIC VACCINATION AGAINST DIPHTHERIA-TETANUS-PERTUSSIS (DTP): ICD-10-CM

## 2019-09-04 DIAGNOSIS — Z23 NEED FOR PROPHYLACTIC VACCINATION AND INOCULATION AGAINST VARICELLA: ICD-10-CM

## 2019-09-04 LAB — HBA1C MFR BLD: 6 %

## 2019-09-04 PROCEDURE — 4004F PT TOBACCO SCREEN RCVD TLK: CPT | Performed by: FAMILY MEDICINE

## 2019-09-04 PROCEDURE — 99213 OFFICE O/P EST LOW 20 MIN: CPT | Performed by: FAMILY MEDICINE

## 2019-09-04 PROCEDURE — 83036 HEMOGLOBIN GLYCOSYLATED A1C: CPT | Performed by: FAMILY MEDICINE

## 2019-09-04 PROCEDURE — G8427 DOCREV CUR MEDS BY ELIG CLIN: HCPCS | Performed by: FAMILY MEDICINE

## 2019-09-04 PROCEDURE — 3017F COLORECTAL CA SCREEN DOC REV: CPT | Performed by: FAMILY MEDICINE

## 2019-09-04 PROCEDURE — 99211 OFF/OP EST MAY X REQ PHY/QHP: CPT | Performed by: FAMILY MEDICINE

## 2019-09-04 PROCEDURE — G8598 ASA/ANTIPLAT THER USED: HCPCS | Performed by: FAMILY MEDICINE

## 2019-09-04 PROCEDURE — G8417 CALC BMI ABV UP PARAM F/U: HCPCS | Performed by: FAMILY MEDICINE

## 2019-09-16 ENCOUNTER — APPOINTMENT (OUTPATIENT)
Dept: CT IMAGING | Age: 64
End: 2019-09-16
Payer: MEDICARE

## 2019-09-16 ENCOUNTER — HOSPITAL ENCOUNTER (OUTPATIENT)
Age: 64
Setting detail: OBSERVATION
Discharge: HOME | End: 2019-09-19
Attending: EMERGENCY MEDICINE | Admitting: PSYCHIATRY & NEUROLOGY
Payer: MEDICARE

## 2019-09-16 ENCOUNTER — CARE COORDINATION (OUTPATIENT)
Dept: CARE COORDINATION | Age: 64
End: 2019-09-16

## 2019-09-16 DIAGNOSIS — R20.2 PARESTHESIAS: ICD-10-CM

## 2019-09-16 DIAGNOSIS — G45.9 TIA (TRANSIENT ISCHEMIC ATTACK): Primary | ICD-10-CM

## 2019-09-16 PROCEDURE — 2060000000 HC ICU INTERMEDIATE R&B

## 2019-09-16 PROCEDURE — 70496 CT ANGIOGRAPHY HEAD: CPT

## 2019-09-16 PROCEDURE — 99285 EMERGENCY DEPT VISIT HI MDM: CPT

## 2019-09-16 PROCEDURE — 6360000004 HC RX CONTRAST MEDICATION: Performed by: STUDENT IN AN ORGANIZED HEALTH CARE EDUCATION/TRAINING PROGRAM

## 2019-09-16 PROCEDURE — 70450 CT HEAD/BRAIN W/O DYE: CPT

## 2019-09-16 PROCEDURE — G0378 HOSPITAL OBSERVATION PER HR: HCPCS

## 2019-09-16 PROCEDURE — 99218 PR INITIAL OBSERVATION CARE/DAY 30 MINUTES: CPT | Performed by: PSYCHIATRY & NEUROLOGY

## 2019-09-16 RX ADMIN — IOVERSOL 90 ML: 741 INJECTION INTRA-ARTERIAL; INTRAVENOUS at 21:02

## 2019-09-16 ASSESSMENT — ENCOUNTER SYMPTOMS
SHORTNESS OF BREATH: 0
VOMITING: 0
BACK PAIN: 0
NAUSEA: 0
COUGH: 0
ABDOMINAL PAIN: 0

## 2019-09-16 NOTE — ED PROVIDER NOTES
[facility]  ED  Emergency Department Encounter  Emergency Medicine Resident     Patient Name: Gretchen Stack  MRN: [id number]  Waltgfurt: 1955  Date of evaluation: 9/16/19  PCP:  Travon George       Chief Complaint   Patient presents with    Numbness     Right arm numbness that has been happening on and off every \"5 days or so\"       HISTORY OF PRESENT ILLNESS  (Location/Symptom, Timing/Onset, Context/Setting, Quality, Duration, Modifying Factors, Severity.)      Gretchen Stack is a 60 y.o. male who presents with acute right arm numbness. Patient states for the past 3 to 4 days he has had intermittent right arm numbness going down the entirety of his right arm stopping at the wrist that last less than 5 minutes. His most recent episode was 1 hour ago while at the gas station and lasted 3 minutes. He denies any weakness, headaches, lightheadedness or dizziness. He denies any recent trauma or injury. He does not taking any anticoagulants or antiplatelets. Patient has a past medical history of multiple TIAs. PAST MEDICAL / SURGICAL / SOCIAL / FAMILY HISTORY      has a past medical history of Aortic valve sclerosis, Back pain, COPD (chronic obstructive pulmonary disease) ([facility] [address] Dental bridge present, Depression, DJD (degenerative joint disease), Drug abuse ([facility] [address] High cholesterol, History of shingles, Hypertension, Pain at rest, Postlaminectomy syndrome, Sleep apnea, Suicidal ideations, TIA (transient ischemic attack), Toe contracture, left, Toe pain, left, Wears dentures, and Wears glasses. has a past surgical history that includes cyst removal (Bilateral); Patella surgery (Right); Knee arthroscopy (Bilateral); Finger trigger release (Left, 03/04/2014); Finger trigger release (Right, 1/5/15); Nerve Block (01/13/2017); Nerve Block (Left, 03/30/2017); Nerve Block (Bilateral, 04/07/2017); Total knee arthroplasty (Right, 01/2016);  Lumbar spine surgery (2005); lumbar fusion (6/15/16); arthroplasty (Left, 03/13/2019); and toe arthroplasty (Left, 3/13/2019). Social History     Socioeconomic History    Marital status: Single     Spouse name: Not on file    Number of children: Not on file    Years of education: Not on file    Highest education level: Not on file   Occupational History    Not on file   Social Needs    Financial resource strain: Not on file    Food insecurity:     Worry: Not on file     Inability: Not on file    Transportation needs:     Medical: Not on file     Non-medical: Not on file   Tobacco Use    Smoking status: Current Every Day Smoker     Packs/day: 0.50     Years: 30.00     Pack years: 15.00     Types: Cigarettes    Smokeless tobacco: Never Used    Tobacco comment: trying to quit   Substance and Sexual Activity    Alcohol use: Yes     Alcohol/week: 2.0 standard drinks     Types: 2 Cans of beer per week     Comment: SOCIALLY    Drug use: Yes     Types: Cocaine     Comment: drug abuse includes smoking cocaine;    Sexual activity: Yes     Partners: Female   Lifestyle    Physical activity:     Days per week: Not on file     Minutes per session: Not on file    Stress: Not on file   Relationships    Social connections:     Talks on phone: Not on file     Gets together: Not on file     Attends Zoroastrianism service: Not on file     Active member of club or organization: Not on file     Attends meetings of clubs or organizations: Not on file     Relationship status: Not on file    Intimate partner violence:     Fear of current or ex partner: Not on file     Emotionally abused: Not on file     Physically abused: Not on file     Forced sexual activity: Not on file   Other Topics Concern    Not on file   Social History Narrative    Not on file       Family History   Problem Relation Age of Onset    Diabetes Mother     Diabetes Brother        Allergies:  Motrin [ibuprofen];  Nsaids; and Other    Home Medications:  Prior to Admission contrast was ordered to rule out acute hemorrhagic stroke. Meanwhile the stroke team was consulted. The consulting neurologist recommended CTA and MRI studies as well as admission for observation and further MRI testing on the neurology floor. PROCEDURES:  None    CONSULTS:  IP CONSULT TO STROKE TEAM    CRITICAL CARE:  Please see attending physician note. FINAL IMPRESSION      1. TIA (transient ischemic attack)    2.  Paresthesias          DISPOSITION / PLAN     DISPOSITION Admitted 09/16/2019 09:23:07 PM    admitted    PATIENT REFERRED TO:  Srikanth De Santiago DO  [address] [facility]:  New Prescriptions    No medications on file       Darrin Concepcion DO  Emergency Medicine Resident    (Please note that portions ofthis note were completed with a voice recognition program.  Efforts were made to edit the dictations but occasionally words are mis-transcribed.)       Taz Raymond DO  Resident  09/16/19 0897

## 2019-09-16 NOTE — ED PROVIDER NOTES
NONTENDER. NORMAL BOWEL SOUNDS. NO CAROTID BRUITS. IMP-TIA  PLAN-CT OF BRAIN, STROKE CONSULT. Miguel Cast MD  09/16/19 1911      EVALUATED BY STROKE RESIDENT WHO INDICATED SHE WOULD DISCUSS PATIENT WITH STROKE ATTENDING AND DETERMINE DISPOSITION, BUT SUSPECTED PATIENT WOULD BE ADMITTED AND REASSESSED.      Miguel Cast MD  09/16/19 0580

## 2019-09-17 ENCOUNTER — APPOINTMENT (OUTPATIENT)
Dept: MRI IMAGING | Age: 64
End: 2019-09-17
Payer: MEDICARE

## 2019-09-17 PROBLEM — I63.511 ACUTE ISCHEMIC RIGHT MIDDLE CEREBRAL ARTERY (MCA) STROKE (HCC): Status: ACTIVE | Noted: 2017-04-22

## 2019-09-17 LAB
ANION GAP SERPL CALCULATED.3IONS-SCNC: 12 MMOL/L (ref 9–17)
BUN BLDV-MCNC: 22 MG/DL (ref 8–23)
BUN/CREAT BLD: ABNORMAL (ref 9–20)
CALCIUM SERPL-MCNC: 8.4 MG/DL (ref 8.6–10.4)
CHLORIDE BLD-SCNC: 107 MMOL/L (ref 98–107)
CO2: 22 MMOL/L (ref 20–31)
CREAT SERPL-MCNC: 1.26 MG/DL (ref 0.7–1.2)
ESTIMATED AVERAGE GLUCOSE: 128 MG/DL
GFR AFRICAN AMERICAN: >60 ML/MIN
GFR NON-AFRICAN AMERICAN: 58 ML/MIN
GFR SERPL CREATININE-BSD FRML MDRD: ABNORMAL ML/MIN/{1.73_M2}
GFR SERPL CREATININE-BSD FRML MDRD: ABNORMAL ML/MIN/{1.73_M2}
GLUCOSE BLD-MCNC: 117 MG/DL (ref 70–99)
GLUCOSE BLD-MCNC: 129 MG/DL (ref 75–110)
GLUCOSE BLD-MCNC: 137 MG/DL (ref 75–110)
GLUCOSE BLD-MCNC: 88 MG/DL (ref 75–110)
GLUCOSE BLD-MCNC: 99 MG/DL (ref 75–110)
HBA1C MFR BLD: 6.1 % (ref 4–6)
HCT VFR BLD CALC: 40.9 % (ref 40.7–50.3)
HEMOGLOBIN: 13.5 G/DL (ref 13–17)
LV EF: 55 %
LVEF MODALITY: NORMAL
MCH RBC QN AUTO: 29.5 PG (ref 25.2–33.5)
MCHC RBC AUTO-ENTMCNC: 33 G/DL (ref 28.4–34.8)
MCV RBC AUTO: 89.3 FL (ref 82.6–102.9)
NRBC AUTOMATED: 0 PER 100 WBC
PDW BLD-RTO: 14.1 % (ref 11.8–14.4)
PLATELET # BLD: 190 K/UL (ref 138–453)
PMV BLD AUTO: 10.8 FL (ref 8.1–13.5)
POTASSIUM SERPL-SCNC: 3.8 MMOL/L (ref 3.7–5.3)
RBC # BLD: 4.58 M/UL (ref 4.21–5.77)
SODIUM BLD-SCNC: 141 MMOL/L (ref 135–144)
WBC # BLD: 6.2 K/UL (ref 3.5–11.3)

## 2019-09-17 PROCEDURE — 97161 PT EVAL LOW COMPLEX 20 MIN: CPT

## 2019-09-17 PROCEDURE — 6360000002 HC RX W HCPCS: Performed by: STUDENT IN AN ORGANIZED HEALTH CARE EDUCATION/TRAINING PROGRAM

## 2019-09-17 PROCEDURE — 93306 TTE W/DOPPLER COMPLETE: CPT

## 2019-09-17 PROCEDURE — 96374 THER/PROPH/DIAG INJ IV PUSH: CPT

## 2019-09-17 PROCEDURE — 2060000000 HC ICU INTERMEDIATE R&B

## 2019-09-17 PROCEDURE — 83036 HEMOGLOBIN GLYCOSYLATED A1C: CPT

## 2019-09-17 PROCEDURE — 85027 COMPLETE CBC AUTOMATED: CPT

## 2019-09-17 PROCEDURE — 36415 COLL VENOUS BLD VENIPUNCTURE: CPT

## 2019-09-17 PROCEDURE — 6370000000 HC RX 637 (ALT 250 FOR IP): Performed by: STUDENT IN AN ORGANIZED HEALTH CARE EDUCATION/TRAINING PROGRAM

## 2019-09-17 PROCEDURE — 97530 THERAPEUTIC ACTIVITIES: CPT

## 2019-09-17 PROCEDURE — G0378 HOSPITAL OBSERVATION PER HR: HCPCS

## 2019-09-17 PROCEDURE — 99220 PR INITIAL OBSERVATION CARE/DAY 70 MINUTES: CPT | Performed by: PSYCHIATRY & NEUROLOGY

## 2019-09-17 PROCEDURE — 70551 MRI BRAIN STEM W/O DYE: CPT

## 2019-09-17 PROCEDURE — 92523 SPEECH SOUND LANG COMPREHEN: CPT

## 2019-09-17 PROCEDURE — 80048 BASIC METABOLIC PNL TOTAL CA: CPT

## 2019-09-17 PROCEDURE — 82947 ASSAY GLUCOSE BLOOD QUANT: CPT

## 2019-09-17 PROCEDURE — 2580000003 HC RX 258: Performed by: STUDENT IN AN ORGANIZED HEALTH CARE EDUCATION/TRAINING PROGRAM

## 2019-09-17 RX ORDER — ALBUTEROL SULFATE 90 UG/1
2 AEROSOL, METERED RESPIRATORY (INHALATION) 4 TIMES DAILY PRN
Status: DISCONTINUED | OUTPATIENT
Start: 2019-09-17 | End: 2019-09-19 | Stop reason: HOSPADM

## 2019-09-17 RX ORDER — ATORVASTATIN CALCIUM 40 MG/1
40 TABLET, FILM COATED ORAL NIGHTLY
Status: DISCONTINUED | OUTPATIENT
Start: 2019-09-17 | End: 2019-09-18

## 2019-09-17 RX ORDER — CLOPIDOGREL BISULFATE 75 MG/1
300 TABLET ORAL ONCE
Status: COMPLETED | OUTPATIENT
Start: 2019-09-17 | End: 2019-09-17

## 2019-09-17 RX ORDER — TRAMADOL HYDROCHLORIDE 50 MG/1
50 TABLET ORAL EVERY 6 HOURS PRN
Status: DISCONTINUED | OUTPATIENT
Start: 2019-09-17 | End: 2019-09-19 | Stop reason: HOSPADM

## 2019-09-17 RX ORDER — ACETAMINOPHEN 325 MG/1
325 TABLET ORAL EVERY 6 HOURS PRN
Status: DISCONTINUED | OUTPATIENT
Start: 2019-09-17 | End: 2019-09-19 | Stop reason: HOSPADM

## 2019-09-17 RX ORDER — ASPIRIN 81 MG/1
81 TABLET, CHEWABLE ORAL DAILY
Status: DISCONTINUED | OUTPATIENT
Start: 2019-09-17 | End: 2019-09-19 | Stop reason: HOSPADM

## 2019-09-17 RX ORDER — AMLODIPINE BESYLATE 5 MG/1
5 TABLET ORAL DAILY
Status: DISCONTINUED | OUTPATIENT
Start: 2019-09-17 | End: 2019-09-19 | Stop reason: HOSPADM

## 2019-09-17 RX ORDER — SODIUM CHLORIDE 0.9 % (FLUSH) 0.9 %
10 SYRINGE (ML) INJECTION PRN
Status: DISCONTINUED | OUTPATIENT
Start: 2019-09-17 | End: 2019-09-19 | Stop reason: HOSPADM

## 2019-09-17 RX ORDER — CLOPIDOGREL BISULFATE 75 MG/1
75 TABLET ORAL DAILY
Status: DISCONTINUED | OUTPATIENT
Start: 2019-09-17 | End: 2019-09-19 | Stop reason: HOSPADM

## 2019-09-17 RX ORDER — SODIUM CHLORIDE 0.9 % (FLUSH) 0.9 %
10 SYRINGE (ML) INJECTION EVERY 12 HOURS SCHEDULED
Status: DISCONTINUED | OUTPATIENT
Start: 2019-09-17 | End: 2019-09-19 | Stop reason: HOSPADM

## 2019-09-17 RX ORDER — SODIUM CHLORIDE 9 MG/ML
INJECTION, SOLUTION INTRAVENOUS CONTINUOUS
Status: DISCONTINUED | OUTPATIENT
Start: 2019-09-17 | End: 2019-09-19 | Stop reason: SDUPTHER

## 2019-09-17 RX ORDER — ONDANSETRON 2 MG/ML
4 INJECTION INTRAMUSCULAR; INTRAVENOUS EVERY 6 HOURS PRN
Status: DISCONTINUED | OUTPATIENT
Start: 2019-09-17 | End: 2019-09-19 | Stop reason: HOSPADM

## 2019-09-17 RX ORDER — DIPHENHYDRAMINE HYDROCHLORIDE 50 MG/ML
25 INJECTION INTRAMUSCULAR; INTRAVENOUS ONCE
Status: COMPLETED | OUTPATIENT
Start: 2019-09-17 | End: 2019-09-17

## 2019-09-17 RX ORDER — TRAMADOL HYDROCHLORIDE 50 MG/1
50 TABLET ORAL ONCE
Status: COMPLETED | OUTPATIENT
Start: 2019-09-17 | End: 2019-09-17

## 2019-09-17 RX ADMIN — ASPIRIN 81 MG: 81 TABLET, CHEWABLE ORAL at 08:33

## 2019-09-17 RX ADMIN — TRAMADOL HYDROCHLORIDE 50 MG: 50 TABLET, FILM COATED ORAL at 19:48

## 2019-09-17 RX ADMIN — TRAMADOL HYDROCHLORIDE 50 MG: 50 TABLET, FILM COATED ORAL at 02:04

## 2019-09-17 RX ADMIN — TRAMADOL HYDROCHLORIDE 50 MG: 50 TABLET, FILM COATED ORAL at 11:59

## 2019-09-17 RX ADMIN — CLOPIDOGREL 300 MG: 75 TABLET, FILM COATED ORAL at 02:04

## 2019-09-17 RX ADMIN — DIPHENHYDRAMINE HYDROCHLORIDE 25 MG: 50 INJECTION, SOLUTION INTRAMUSCULAR; INTRAVENOUS at 02:04

## 2019-09-17 RX ADMIN — AMLODIPINE BESYLATE 5 MG: 5 TABLET ORAL at 08:33

## 2019-09-17 RX ADMIN — CLOPIDOGREL 75 MG: 75 TABLET, FILM COATED ORAL at 08:33

## 2019-09-17 RX ADMIN — SODIUM CHLORIDE: 9 INJECTION, SOLUTION INTRAVENOUS at 02:04

## 2019-09-17 RX ADMIN — ASPIRIN 325 MG: 325 TABLET, COATED ORAL at 02:04

## 2019-09-17 RX ADMIN — DESMOPRESSIN ACETATE 40 MG: 0.2 TABLET ORAL at 19:48

## 2019-09-17 ASSESSMENT — PAIN DESCRIPTION - ORIENTATION
ORIENTATION: LOWER
ORIENTATION: LEFT;LOWER
ORIENTATION: LEFT;LOWER

## 2019-09-17 ASSESSMENT — PAIN SCALES - GENERAL
PAINLEVEL_OUTOF10: 9
PAINLEVEL_OUTOF10: 8
PAINLEVEL_OUTOF10: 9
PAINLEVEL_OUTOF10: 10
PAINLEVEL_OUTOF10: 3
PAINLEVEL_OUTOF10: 9
PAINLEVEL_OUTOF10: 3

## 2019-09-17 ASSESSMENT — PAIN DESCRIPTION - PAIN TYPE
TYPE: CHRONIC PAIN

## 2019-09-17 ASSESSMENT — PAIN DESCRIPTION - LOCATION
LOCATION: BACK
LOCATION: BACK;KNEE
LOCATION: BACK;KNEE

## 2019-09-17 ASSESSMENT — PAIN - FUNCTIONAL ASSESSMENT: PAIN_FUNCTIONAL_ASSESSMENT: ACTIVITIES ARE NOT PREVENTED

## 2019-09-17 NOTE — CONSULTS
negative for agitated     Review of systems otherwise negative.       EXAM:      Physical Exam        NEUROLOGIC EXAMINATION  MENTAL STATUS:  Alert, Oriented x 3 (Person, Place, Time),    Good Mood, Intact memory (Recent, Remote), No confusion,    Normal speech, Normal language (Spontaneous, Comprehension, Naming, Repetition, Reading, Writing)   No hallucination or delusion   Appropriate, Follows 1, 2, 3 step command, cross over   Normal Glabellar Response   CRANIAL NERVES: II     -      Visual fields intact to confrontation (blink to threat) and reporting fingers in all 4 quadrants on each eye, Negative visual extinction    III,IV,VI -  EOMs full intact, no ptosis, no diplopia, no nystagmus    V     -     Normal facial sensation bilaterally    VII    -     Normal facial symmetry    VIII   -     Intact hearing bilaterally (Finger Rub, Rinne's and Aman Parents)    IX,X -     Symmetrical palate    XI    -     Symmetrical shoulder shrug    XII   -     Midline tongue, no atrophy   MOTOR FUNCTION: Observation: No fasciculations, no atrophy, No tremors/involuntary movements in all 4 extremities proximally and distally    Passive Movement: Normal tone and bulk in all 4 extremities proximally and distally    Active Movement:  RUE: Significant for good strength of grade 5/5 in proximal muscle groups on abduction, adduction, flexion, extension, internal and external rotation and grade 5/5 on distal muscle groups on extension and flexion    LUE: Significant for good strength of grade 5/5 in proximal muscle groups on abduction, adduction, flexion, extension, internal and external rotation and grade 5/5 on distal muscle groups on extension and flexion    RLE: Significant for good strength of grade 5/5 in proximal muscle groups on abduction, adduction, flexion, extension, internal and external rotation and grade 5/5 on distal muscle groups on extension and flexion    LLE: Significant for good strength of grade 5/5 in proximal muscle exam and work quickly. Except where indicated, the patient should not be coached (i.e., repeated requests to patient to make a special effort). 1a.  Level of consciousness:  0 - alert; keenly responsive  1b. Level of consciousness questions:  0 - answers both questions correctly  1c. Level of consciousness questions:  0 - performs both tasks correctly  2. Best Gaze:  0 - normal  3. Visual:  0 - no visual loss  4. Facial Palsy:  0 - normal symmetric movement  5a. Motor left arm:  0 - no drift, limb holds 90 (or 45) degrees for full 10 seconds  5b. Motor right arm:  0 - no drift, limb holds 90 (or 45) degrees for full 10 seconds  6a. Motor left le - no drift; leg holds 30 degree position for full 5 seconds  6b. Motor right le - no drift; leg holds 30 degree position for full 5 seconds  7. Limb Ataxia:  0 - absent  8. Sensory:  1 - mild to moderate sensory loss; patient feels pinprick is less sharp or is dull on the affected side; there is a loss of superficial pain with pinprick but patient is aware of being touched   9. Best Language:  0 - no aphasia, normal  10. Dysarthria:  0 - normal  11. Extinction and Inattention:  0 - no abnormality    TOTAL:  0      ABCD2 Score  (Estimate Risk of Stroke after TIA)   POINTS   Age    < 61   ? 60     [] 0  [x] 1   BP:     SBP <140 or DBP < 90   SBP ? 140 or DBP ? 90       [x] 0  [] 1   Clinical Features of TIA     Other Symptoms                 Speech Disturbances W/O Weakness   Unilateral Weakness     [x] 0  [] 1  [] 2   Duration of symptoms     < 10 Minutes                                     10-59 Minutes   ?  61 Minutes     [] 0  [] 1  [x] 2   Diabetes     No                    Yes     [x] 0  [] 1   TOTAL 3   0-3 Points: Low Risk -> Work up could be done OPD   2-Day Stroke Risk: 1%   7- Day Stroke Risk: 1.2%   90 Days Stroke Risk: 3.1%    4-5 Points: Moderate Risk    2-Day Stroke Risk: 4.1%   7- Day Stroke Risk: 5.9%    90 Days Stroke

## 2019-09-17 NOTE — H&P
tablet by mouth every 8 hours as needed for Pain  Patient not taking: Reported on 2019   Dannie Ware MD   predniSONE (DELTASONE) 50 MG tablet Take 1 tablet by mouth daily  Patient not taking: Reported on 2019 3/26/19   Luna Carreno MD        Allergies:     Motrin [ibuprofen]; Nsaids; and Other    Social History:     Tobacco:    reports that he has been smoking cigarettes. He has a 15.00 pack-year smoking history. He has never used smokeless tobacco.  Alcohol:      reports that he drinks about 2.0 standard drinks of alcohol per week. Drug Use:  reports that he has current or past drug history. Drug: Cocaine. Family History:     Family History   Problem Relation Age of Onset    Diabetes Mother     Diabetes Brother        Review of Systems:     ROS:  Constitutional  Negative for fever and chills    HEENT  Negative for ear discharge, ear pain, nosebleed    Eyes  Negative for photophobia, pain and discharge    Respiratory  Negative for hemoptysis and sputum    Cardiovascular  Negative for orthopnea, claudication and PND    Gastrointestinal  Negative for abdominal pain, diarrhea, blood in stool    Musculoskeletal  Negative for joint pain, negative for myalgia    Skin  Negative for rash or itching    Endo/heme/allergies  Negative for polydipsia, environmental allergy    Psychiatric/behavioral  Negative for suicidal ideation. Patient is not anxious        Physical Exam:   BP (!) 141/80   Pulse 72   Temp 98.4 °F (36.9 °C) (Oral)   Resp 22   Ht 5' 9\" (1.753 m)   Wt 220 lb (99.8 kg)   SpO2 98%   BMI 32.49 kg/m²   Temp (24hrs), Av.6 °F (37 °C), Min:98.4 °F (36.9 °C), Max:98.7 °F (37.1 °C)    No results for input(s): POCGLU in the last 72 hours.   No intake or output data in the 24 hours ending 19    Physical Exam        NEUROLOGIC EXAMINATION  MENTAL STATUS:  Alert, Oriented x 3 (Person, Place, Time),    Good Mood, Intact memory (Recent, Remote), No confusion,    Normal speech, proprioception (Joint position sense)     LUE: Normal sensation to light touch, temperature, pin prick, vibration, proprioception(Joint position sense)     RLE: Normal sensation to light touch, temperature, pin prick, vibration, proprioception(Joint position sense)     LLE:Normal sensation to light touch, temperature, pin prick, vibration, proprioception(Joint position sense)   CEREBELLAR FUNCTION:  Intact fine motor control over bilateral upper extremities and bilateral lower extremities (finger to nose, rapid alternating hand movement, finger tapping and heel to shin)   REFLEX FUNCTION:  Right Triceps (C7): 2/2                                             Left Tricep (C7): 2/2   Right Bicep (C5, C6): 2/2                                         Left Bicep (C5, C6): 2/2   Right Brachiocephalic (C6): 2/2                               Left Brachiocephalic (C6): 2/2     Right Patella (L4): 2/2                                              Left Patella (L4): 2/2   Right Achilles (S1): 2/2                                            Left Achilles (S1): 2/2      Ogden Sign: absent   Plantar (Babinski) Response: downgoing     Negative Kerning & Brudzinski   STATION and GAIT  Not tested     INITIAL NIH STROKE SCALE     Time Performed:  8:30 PM     Administer stroke scale items in the order listed. Record performance in each category after each subscale exam. Do not go back and change scores. Follow directions provided for each exam technique. Scores should reflect what the patient does, not what the clinician thinks the patient can do. The clinician should record answers while administering the exam and work quickly. Except where indicated, the patient should not be coached (i.e., repeated requests to patient to make a special effort).      1a. Level of consciousness:  0 - alert; keenly responsive  1b. Level of consciousness questions:  0 - answers both questions correctly  1c.   Level of consciousness questions:  0 -

## 2019-09-17 NOTE — PROGRESS NOTES
Speech Language Pathology  Facility/Department: [facility]  Initial Speech/Language/Cognitive Assessment    NAME: Ion Kelly  :    MRN: [id number]  ADMISSION DATE: 2019  ADMITTING DIAGNOSIS: has Tobacco abuse; Back pain; Hypercholesteremia; Trigger finger of left hand; Trigger finger, right; Degenerative arthritis of knee, bilateral; Right shoulder pain; Essential hypertension; Chronic obstructive pulmonary disease ([address] Foot pain, right; Midline low back pain with right-sided sciatica; Spondylolisthesis, lumbar region; Chronic low back pain without sciatica; Right hip pain; CVA (cerebral vascular accident) ([address] Vision changes; Stroke-like symptoms; Vitamin D deficiency; Elevated LDL cholesterol level; Thalamic infarct, acute ([address] Simple chronic bronchitis ([address] Neural foraminal stenosis of lumbar spine; Postlaminectomy syndrome; Medication monitoring encounter; Idiopathic acute pancreatitis; Unstable gait; Major depressive disorder, recurrent episode, moderate ([address] Primary osteoarthritis of both knees; and TIA (transient ischemic attack) on their problem list.    Date of Eval: 2019   Evaluating Therapist: Arik Powers      Primary Complaint:The patient is a 60 y.o. Non-/non  male who presents with Numbness (Right arm numbness that has been happening on and off every \"5 days or so\")   and he is admitted to the hospital for the management of  Stroke/TIA. 60 y. o. male who presents with pmhx of htn ,hld sleep apnea , prior TIA in 2017 came to Ed due to on/off right hand numbness that has been going for 1 week now. The numbness episiodes last 20-30 mins and resolves on its own , not currently on any anti-platelets.   Review of charts stated patient has two TIA in 2017 one in which he had blurry vision(17) and Mri showed punctuate left cerebellar ischemic stroke and other in which he had left side numbness and Mri showed Right thalmic infarct he was

## 2019-09-17 NOTE — CARE COORDINATION
Case Management Initial Discharge Plan  Rishabh Scherer,             Met with:patient to discuss discharge plans. Information verified: address, contacts, phone number, , insurance Yes  PCP: Maryrose Gaucher, DO  Date of last visit: 2019    Insurance Provider: Laurel Mcmillan Dual    Discharge Planning    Living Arrangements:  Alone   Support Systems:  Family Members    Home has  stories  flight stairs to climb to get into front door, 0stairs to climb to reach second floor  Location of bedroom/bathroom in home lives second floor apartment      Patient able to perform ADL's:Independent    Current Services (outpatient & in home) none   DME equipment: [employer]  DME provider: n/a    Pharmacy: [facility] on [address] Medications:  No  Does patient want to participate in local refill/ meds to beds program?  No    Potential Assistance Needed:  N/A    Patient agreeable to home care: No  [place] of choice provided:  n/a    Prior SNF/Rehab Placement and Facility:   Agreeable to SNF/Rehab: No  [place] of choice provided: n/a   Evaluation: no    Expected Discharge date:  19  Patient expects to be discharged to:  home  Follow Up Appointment: Best Day/ Time: Thursday AM    Transportation provider: Self/family  Transportation arrangements needed for discharge: Yes    Readmission Risk              Risk of Unplanned Readmission:        12             Does patient have a readmission risk score greater than 14?: No  If yes, follow-up appointment must be made within 7 days of discharge.      Discharge Plan: home independently          Electronically signed by Chaney RN on 19 at 2:25 PM

## 2019-09-17 NOTE — ED PROVIDER NOTES
tissues. Area of hypoattenuation in the high right parietal lobe that likely represents an area of infarct of indeterminate age. No other brain parenchymal abnormality. Cta Head Neck W Contrast    Result Date: 9/16/2019  EXAMINATION: CTA OF THE HEAD AND NECK WITH CONTRAST 9/16/2019 8:59 pm: TECHNIQUE: CTA of the head and neck was performed with the administration of intravenous contrast. Multiplanar reformatted images are provided for review. MIP images are provided for review. Stenosis of the internal carotid arteries measured using NASCET criteria. Dose modulation, iterative reconstruction, and/or weight based adjustment of the mA/kV was utilized to reduce the radiation dose to as low as reasonably achievable. COMPARISON: CT brain performed 09/16/2019. HISTORY: ORDERING SYSTEM PROVIDED HISTORY: right hand numbness on/off for 1 week TECHNOLOGIST PROVIDED HISTORY: Reason for Exam: rt arm numbness Acuity: Unknown Type of Exam: Unknown FINDINGS: CTA NECK: AORTIC ARCH/ARCH VESSELS: There is common origin of the brachiocephalic and left common carotid arteries. No significant stenosis is seen of the innominate artery or subclavian arteries. CAROTID ARTERIES: The common carotid arteries are normal in appearance without evidence of a flow limiting stenosis. The internal carotid arteries are normal in appearance without evidence of a flow limiting stenosis by NASCET criteria. No dissection or arterial injury is seen. VERTEBRAL ARTERIES: The vertebral arteries both arise from the subclavian arteries and are normal in caliber without evidence of flow limiting stenosis or dissection. SOFT TISSUES: The lung apices are clear. No cervical or superior mediastinal lymphadenopathy. The visualized portion of the larynx and pharynx appear unremarkable. The parotid, submandibular and thyroid glands demonstrate no acute abnormality. BONES: The visualized osseous structures appear unremarkable.  CTA HEAD: ANTERIOR CIRCULATION: The internal carotid arteries are normal in course and caliber without focal stenosis. The anterior cerebral and middle cerebral arteries demonstrate no focal stenosis. POSTERIOR CIRCULATION: The posterior cerebral arteries demonstrate no focal stenosis. The vertebral and basilar arteries appear unremarkable. BRAIN: No mass effect or midline shift. No abnormal extra-axial fluid collection. The gray-white differentiation appears grossly maintained. Unremarkable CTA of the head and neck without significant stenosis or evidence for occlusion. Morna Rout RECENT VITALS:     Temp: 98.4 °F (36.9 °C),  Pulse: 80, Resp: 16, BP: 131/84, SpO2: 98 %    This patient is a 60 y.o. Male, PMH CVA, with intermittent right arm paresthesia. NIH 0 on arrival.   CT head age indeterminate age hypodensity in right parietal lobe. CTA head/neck negative. Stroke consult, recommended MRI. Stroke team admitted. OUTSTANDING TASKS / RECOMMENDATIONS:    1. Awaiting bed     FINAL IMPRESSION:     1. TIA (transient ischemic attack)    2.  Paresthesias        DISPOSITION:         DISPOSITION:  []  Discharge   []  Transfer -    [x]  Admission -     []  Against Medical Advice   []  Eloped   FOLLOW-UP: Jovanna Talavera DO  [address] [address] [facility]: New Prescriptions    No medications on file           Lucía Lehman MD  Emergency Medicine Resident  32 Claudia Polanco MD  Resident  09/17/19 [id number]

## 2019-09-17 NOTE — PROGRESS NOTES
3.8 09/17/2019     09/17/2019    CREATININE 1.26 (H) 09/17/2019    BUN 22 09/17/2019    CO2 22 09/17/2019    TSH 1.77 12/06/2012    INR 0.9 05/02/2017    LABA1C 6.1 (H) 09/17/2019         ASSESSMENT:  60 y.o.  male with:    New Acute ischemic R MCA infarction - with significant small vessel risk factors or HTN, HLD, NANCY, prior strokes. Possible cardioembolic phenomenon. Associated symptom on 1 week left arm and leg paresthesia. No endovascular as no source identified. No significant stenosis or occlusions on CTA H&N. Prior acute ischemic infarction - cerebellar    New Diagnosis Diabetes Mellitus      PLAN:    1. Cont asa/plavix DATP for 21 days, the ASA alone. Was loaded asa/plav  2. ppx lov   3. Monitor SCr, increase IVF  4. New diagnosis of diabetes - start metformin on discharge, and follow up with PCP. Diabetes education  5. PT/OT/ST and will follow recommendations  6. Possible discharge tomorrow  7. Start high intensity statn, f/u lipid panel  8.  Normotension, and will adjust antihypertensive home dose if needed    Electronically signed by Zina Yeung DO on 9/17/2019 at 11:42 AM

## 2019-09-17 NOTE — PROGRESS NOTES
Occupational Therapy    Occupational Therapy Not Seen Note    DATE: 2019  Name: Leticia Zarate  : 1955  MRN: [id number]    Patient not available for Occupational Therapy due to:    Pt independent with functional mobility and functional tasks.  Pt with no OT acute care needs at this time, will defer OT eval.    Next Scheduled Treatment: N/A    Electronically signed by Marianela Samuel OT on 2019 at 3:47 PM

## 2019-09-17 NOTE — ED NOTES
Patient requesting his IV be taken out so he can go out to his car. Explained to patient he really needed to no go out. Patient repositioned for comfort. Will continue to monitor.      Claudia Arndt RN  09/17/19 [id number]

## 2019-09-17 NOTE — PROGRESS NOTES
Physical Therapy    Facility/Department: [facility]UR[facility]  Initial Assessment    NAME: Ion Kelly  : 1955  MRN: [id number]    Date of Service: 2019  Chief Complaint   Patient presents with    Numbness     Right arm numbness that has been happening on and off every \"5 days or so\"       Discharge Recommendations:    No further therapy required at discharge. PT Equipment Recommendations  Equipment Needed: No    Assessment   Assessment: Discharge from acute care PT- pt at baseline functional level. Pt owns a single point cane which he uses for left knee pain and back pain as required for mobility. Pt demonstrates the ability to safely ambulate 240ft and ascend/descend a flight of stairs supervision. Pt without skilled PT requirements at this time. Prognosis: Good  Decision Making: Low Complexity  PT Education: Plan of Care;Gait Training;Functional Mobility Training  Patient Education: Educated on continued mobility at least 3x per day around unit to maintain functional mobility while in the hospital  Barriers to Learning: None  No Skilled PT: At baseline function  REQUIRES PT FOLLOW UP: No  Activity Tolerance  Activity Tolerance: Patient Tolerated treatment well       Patient Diagnosis(es): The primary encounter diagnosis was TIA (transient ischemic attack). A diagnosis of Paresthesias was also pertinent to this visit. has a past medical history of Aortic valve sclerosis, Back pain, COPD (chronic obstructive pulmonary disease) ([facility] [address] Dental bridge present, Depression, DJD (degenerative joint disease), Drug abuse ([facility] [address] High cholesterol, History of shingles, Hypertension, Pain at rest, Postlaminectomy syndrome, Sleep apnea, Suicidal ideations, TIA (transient ischemic attack), Toe contracture, left, Toe pain, left, Wears dentures, and Wears glasses. has a past surgical history that includes cyst removal (Bilateral); Patella surgery (Right); Knee arthroscopy (Bilateral);  Finger trigger release

## 2019-09-18 LAB
ABSOLUTE EOS #: 0.33 K/UL (ref 0–0.44)
ABSOLUTE IMMATURE GRANULOCYTE: <0.03 K/UL (ref 0–0.3)
ABSOLUTE LYMPH #: 2.78 K/UL (ref 1.1–3.7)
ABSOLUTE MONO #: 0.4 K/UL (ref 0.1–1.2)
ANION GAP SERPL CALCULATED.3IONS-SCNC: 11 MMOL/L (ref 9–17)
BASOPHILS # BLD: 1 % (ref 0–2)
BASOPHILS ABSOLUTE: 0.04 K/UL (ref 0–0.2)
BUN BLDV-MCNC: 15 MG/DL (ref 8–23)
BUN/CREAT BLD: ABNORMAL (ref 9–20)
CALCIUM SERPL-MCNC: 8.2 MG/DL (ref 8.6–10.4)
CHLORIDE BLD-SCNC: 105 MMOL/L (ref 98–107)
CHOLESTEROL/HDL RATIO: 5.6
CHOLESTEROL: 191 MG/DL
CO2: 23 MMOL/L (ref 20–31)
CREAT SERPL-MCNC: 1.11 MG/DL (ref 0.7–1.2)
DIFFERENTIAL TYPE: ABNORMAL
EOSINOPHILS RELATIVE PERCENT: 5 % (ref 1–4)
GFR AFRICAN AMERICAN: >60 ML/MIN
GFR NON-AFRICAN AMERICAN: >60 ML/MIN
GFR SERPL CREATININE-BSD FRML MDRD: ABNORMAL ML/MIN/{1.73_M2}
GFR SERPL CREATININE-BSD FRML MDRD: ABNORMAL ML/MIN/{1.73_M2}
GLUCOSE BLD-MCNC: 104 MG/DL (ref 70–99)
HCT VFR BLD CALC: 41.4 % (ref 40.7–50.3)
HDLC SERPL-MCNC: 34 MG/DL
HEMOGLOBIN: 13.3 G/DL (ref 13–17)
IMMATURE GRANULOCYTES: 0 %
LDL CHOLESTEROL: 120 MG/DL (ref 0–130)
LYMPHOCYTES # BLD: 44 % (ref 24–43)
MCH RBC QN AUTO: 28.8 PG (ref 25.2–33.5)
MCHC RBC AUTO-ENTMCNC: 32.1 G/DL (ref 28.4–34.8)
MCV RBC AUTO: 89.6 FL (ref 82.6–102.9)
MONOCYTES # BLD: 6 % (ref 3–12)
NRBC AUTOMATED: 0 PER 100 WBC
PDW BLD-RTO: 13.8 % (ref 11.8–14.4)
PLATELET # BLD: 177 K/UL (ref 138–453)
PLATELET ESTIMATE: ABNORMAL
PMV BLD AUTO: 10.3 FL (ref 8.1–13.5)
POTASSIUM SERPL-SCNC: 3.8 MMOL/L (ref 3.7–5.3)
RBC # BLD: 4.62 M/UL (ref 4.21–5.77)
RBC # BLD: ABNORMAL 10*6/UL
SEG NEUTROPHILS: 44 % (ref 36–65)
SEGMENTED NEUTROPHILS ABSOLUTE COUNT: 2.82 K/UL (ref 1.5–8.1)
SODIUM BLD-SCNC: 139 MMOL/L (ref 135–144)
TRIGL SERPL-MCNC: 186 MG/DL
VLDLC SERPL CALC-MCNC: ABNORMAL MG/DL (ref 1–30)
WBC # BLD: 6.4 K/UL (ref 3.5–11.3)
WBC # BLD: ABNORMAL 10*3/UL

## 2019-09-18 PROCEDURE — 2580000003 HC RX 258: Performed by: STUDENT IN AN ORGANIZED HEALTH CARE EDUCATION/TRAINING PROGRAM

## 2019-09-18 PROCEDURE — 36415 COLL VENOUS BLD VENIPUNCTURE: CPT

## 2019-09-18 PROCEDURE — G0378 HOSPITAL OBSERVATION PER HR: HCPCS

## 2019-09-18 PROCEDURE — 6360000002 HC RX W HCPCS

## 2019-09-18 PROCEDURE — 80061 LIPID PANEL: CPT

## 2019-09-18 PROCEDURE — 97127 HC SP THER IVNTJ W/FOCUS COG FUNCJ: CPT

## 2019-09-18 PROCEDURE — 85025 COMPLETE CBC W/AUTO DIFF WBC: CPT

## 2019-09-18 PROCEDURE — 94761 N-INVAS EAR/PLS OXIMETRY MLT: CPT

## 2019-09-18 PROCEDURE — 93325 DOPPLER ECHO COLOR FLOW MAPG: CPT

## 2019-09-18 PROCEDURE — 80048 BASIC METABOLIC PNL TOTAL CA: CPT

## 2019-09-18 PROCEDURE — 6370000000 HC RX 637 (ALT 250 FOR IP): Performed by: STUDENT IN AN ORGANIZED HEALTH CARE EDUCATION/TRAINING PROGRAM

## 2019-09-18 PROCEDURE — 93312 ECHO TRANSESOPHAGEAL: CPT

## 2019-09-18 PROCEDURE — 99218 PR INITIAL OBSERVATION CARE/DAY 30 MINUTES: CPT | Performed by: PSYCHIATRY & NEUROLOGY

## 2019-09-18 PROCEDURE — 96372 THER/PROPH/DIAG INJ SC/IM: CPT

## 2019-09-18 PROCEDURE — 2060000000 HC ICU INTERMEDIATE R&B

## 2019-09-18 PROCEDURE — G0108 DIAB MANAGE TRN  PER INDIV: HCPCS

## 2019-09-18 PROCEDURE — 6360000002 HC RX W HCPCS: Performed by: STUDENT IN AN ORGANIZED HEALTH CARE EDUCATION/TRAINING PROGRAM

## 2019-09-18 PROCEDURE — 2709999900 HC NON-CHARGEABLE SUPPLY

## 2019-09-18 RX ORDER — ATORVASTATIN CALCIUM 80 MG/1
80 TABLET, FILM COATED ORAL NIGHTLY
Status: DISCONTINUED | OUTPATIENT
Start: 2019-09-18 | End: 2019-09-19 | Stop reason: HOSPADM

## 2019-09-18 RX ADMIN — AMLODIPINE BESYLATE 5 MG: 5 TABLET ORAL at 10:08

## 2019-09-18 RX ADMIN — TRAMADOL HYDROCHLORIDE 50 MG: 50 TABLET, FILM COATED ORAL at 22:26

## 2019-09-18 RX ADMIN — SODIUM CHLORIDE, PRESERVATIVE FREE 10 ML: 5 INJECTION INTRAVENOUS at 22:26

## 2019-09-18 RX ADMIN — SODIUM CHLORIDE: 9 INJECTION, SOLUTION INTRAVENOUS at 15:49

## 2019-09-18 RX ADMIN — ASPIRIN 81 MG: 81 TABLET, CHEWABLE ORAL at 10:08

## 2019-09-18 RX ADMIN — CLOPIDOGREL 75 MG: 75 TABLET, FILM COATED ORAL at 10:08

## 2019-09-18 RX ADMIN — SODIUM CHLORIDE, PRESERVATIVE FREE 10 ML: 5 INJECTION INTRAVENOUS at 08:17

## 2019-09-18 RX ADMIN — ATORVASTATIN CALCIUM 80 MG: 80 TABLET, FILM COATED ORAL at 22:26

## 2019-09-18 RX ADMIN — ENOXAPARIN SODIUM 40 MG: 40 INJECTION SUBCUTANEOUS at 10:08

## 2019-09-18 RX ADMIN — TRAMADOL HYDROCHLORIDE 50 MG: 50 TABLET, FILM COATED ORAL at 08:16

## 2019-09-18 ASSESSMENT — PAIN - FUNCTIONAL ASSESSMENT
PAIN_FUNCTIONAL_ASSESSMENT: ACTIVITIES ARE NOT PREVENTED
PAIN_FUNCTIONAL_ASSESSMENT: ACTIVITIES ARE NOT PREVENTED

## 2019-09-18 ASSESSMENT — PAIN DESCRIPTION - PAIN TYPE
TYPE: ACUTE PAIN

## 2019-09-18 ASSESSMENT — PAIN SCALES - GENERAL
PAINLEVEL_OUTOF10: 0
PAINLEVEL_OUTOF10: 9
PAINLEVEL_OUTOF10: 3
PAINLEVEL_OUTOF10: 6
PAINLEVEL_OUTOF10: 9
PAINLEVEL_OUTOF10: 3
PAINLEVEL_OUTOF10: 10

## 2019-09-18 ASSESSMENT — PAIN DESCRIPTION - ORIENTATION
ORIENTATION: LOWER
ORIENTATION: LOWER

## 2019-09-18 ASSESSMENT — PAIN DESCRIPTION - LOCATION
LOCATION: BACK

## 2019-09-18 NOTE — PROGRESS NOTES
Met with patient to review A1C of 6.1% and Prediabetes. Patient was unaware but did mention that his mom who passed away 8 mo ago had T2DM and ended up taking insulin. He also has a brother with T2DM. Discussed normal BG levels and A1C , prediabetes range and range that confirms diagnosis of DM. Patient states that he does not skip meals but was eating a lot of sweets and would often feel very tired after that. He has also had 2 cans of Reg pop here in the hospital which he normally is not a pop drinker. He mainly drinks water and milk. Briefly discussed what foods contains CHO and label reading as patient does own grocery shopping. Stressed importance of eating breakfast, lunch , dinner using the healthy plate as a guide with small snack including protein. Patient will benefit from dietician discussing CHO counting plan for home. Also discussed importance of exercise but patient has some limitations with back and knees. Encouraged walking. Briefly discussed oral medication Metformin and its action. Patient was receptive but somewhat confused over this diagnosis. Patient questioned whether he will need to check BG and was informed to discuss with PCP. More often repeat A1C is followed every 3 mo in the beginning. Patient also feels he should lose some weight and would like guidelines. Please send referral for dietician to address CHO counting plan for home.

## 2019-09-18 NOTE — PROGRESS NOTES
alcohol use. Lives home alone. Mom just passed 8 months ago. PHYSICAL EXAM:      Blood pressure 130/70, pulse 71, temperature 97.6 °F (36.4 °C), temperature source Oral, resp. rate 13, height 5' 9\" (1.753 m), weight 215 lb (97.5 kg), SpO2 100 %. General Examination    General Resting comfortably in bed   Head Normocephalic, without obvious abnormality   Neck Supple, symmetrical. Good ROM. No midline or paraspinal tenderness. Lungs Respirations unlabored, no wheezing   Chest Wall No deformity   Heart RRR, no murmur   Abdomen Soft. Non-tender, non-distended   Extremities No cyanosis or edema or warmth. Pulses 2+ and symmetric   Skin: Skin  turgor normal, no rashes or lesions     Mental status  Speech Alert. Speech is fluent without paraphasic errors  Can do 1 step, 2 step commands  No hallucinations or delusions. No hemineglect, finger agnosia, or right/left disorientation    Cranial nerves   II - VFF  III, IV, VI - extra-ocular muscles full. No nystagmus. Pupils symmetric and responsive.    V - sensation symmetric         VII -  No facial droop or asymmetric NLF  VIII - intact hearing to conversational tone          IX, X - symmetrical palate elevation   XI - 5/5 strength symmetric  XII - tongue midline   Motor function  Strength: grossly 5/5 in b/l              Deltoid, biceps, triceps, wrist flexion, wrist extension             Hip flexion/extension, knee flexion/extension, plantar flexion  Bulk: grossly normal no atrophy  Tone: symmetric b/l arms and legs  Abnormal movements: No abnormal movements or tremor   Sensory function Symmetric to touch in all extremities bilaterally   Cerebellar No dysmetria or dysdiadochocinesia    Reflex function DTR:        2+ b/l symmetric in biceps, brachioradialis, patellar, calcaneal  Babinski b/l plantar downgoing   Gait                  Not assessed           DATA      Lab Results   Component Value Date    WBC 6.4 09/18/2019    HGB 13.3 09/18/2019    HCT 41.4 09/18/2019     09/18/2019    CHOL 191 09/18/2019    TRIG 186 (H) 09/18/2019    HDL 34 (L) 09/18/2019    ALT 13 12/23/2018    AST 13 12/23/2018     09/18/2019    K 3.8 09/18/2019     09/18/2019    CREATININE 1.11 09/18/2019    BUN 15 09/18/2019    CO2 23 09/18/2019    TSH 1.77 12/06/2012    INR 0.9 05/02/2017    LABA1C 6.1 (H) 09/17/2019         ASSESSMENT:  60 y.o.  male with:    New Acute ischemic R MCA infarction - with significant small vessel risk factors or HTN, HLD, NANCY, prior strokes. Possible cardioembolic phenomenon. Associated symptom on 1 week left arm and leg paresthesia. No endovascular as no source identified. No significant stenosis or occlusions on CTA H&N. Prior acute ischemic infarction - cerebellar    New Diagnosis Diabetes Mellitus      PLAN:    1. Cont asa/plavix DATP for 21 days, the ASA alone. Was loaded asa/plav  2. ppx lov   3. Can lower IVF today, stable Cr  4. New diagnosis of diabetes - start metformin on discharge, and follow up with PCP. Diabetes education  5. PT/OT/ST and will follow recommendations  6. Possible discharge tomorrow  7. Start high intensity statin lipitor 80 mg  8. Normotension, and will adjust antihypertensive home dose if needed  9. Cardiac monitor on discharge  10. F/u neuro in 6-8 weeks  11. Likely discharge today after [facility]  12. F/u PCP near future T2DM new diagnosis   13. Encouraged smoking cessation. Offered assistance including patch or gum  14.  Will f/u GWEN, additional recs to follow    Electronically signed by Shanelle Perez DO on 9/18/2019 at 10:26 AM

## 2019-09-18 NOTE — PROGRESS NOTES
Solving/Reasoning: Determining Category Exclusions: 9/10 (90%) increased to 10/10 (100%) with mod verbal cues  Stating Situational Problems: 7/10 (70%) increased to 10/10 (100%) with min-mod verbal cues     Other: Pt. Provided education regarding memory compensatory strategies. Memory tips sheet left at bedside. Pt. Provided education regarding the purpose of ST and treatment at this time. Plan:  [x] Continue ST services    [] Discharge from ST:      Discharge recommendations: [] Inpatient Rehab   [] [facility]   [] Outpatient Therapy  [] Follow up at trauma clinic   [x] Other: Further therapy recommended at discharge. Treatment completed by: Ellis Bone,  Clinician    Co-signed by Delmi Hernandez A.CCC/SLP

## 2019-09-19 VITALS
SYSTOLIC BLOOD PRESSURE: 145 MMHG | OXYGEN SATURATION: 98 % | HEART RATE: 72 BPM | TEMPERATURE: 97.4 F | DIASTOLIC BLOOD PRESSURE: 82 MMHG | BODY MASS INDEX: 31.84 KG/M2 | RESPIRATION RATE: 14 BRPM | HEIGHT: 69 IN | WEIGHT: 215 LBS

## 2019-09-19 PROBLEM — I63.9 ACUTE ISCHEMIC STROKE (HCC): Status: ACTIVE | Noted: 2019-09-19

## 2019-09-19 LAB
ABSOLUTE EOS #: 0.35 K/UL (ref 0–0.44)
ABSOLUTE IMMATURE GRANULOCYTE: <0.03 K/UL (ref 0–0.3)
ABSOLUTE LYMPH #: 2 K/UL (ref 1.1–3.7)
ABSOLUTE MONO #: 0.42 K/UL (ref 0.1–1.2)
ANION GAP SERPL CALCULATED.3IONS-SCNC: 12 MMOL/L (ref 9–17)
BASOPHILS # BLD: 1 % (ref 0–2)
BASOPHILS ABSOLUTE: 0.04 K/UL (ref 0–0.2)
BUN BLDV-MCNC: 17 MG/DL (ref 8–23)
BUN/CREAT BLD: ABNORMAL (ref 9–20)
CALCIUM SERPL-MCNC: 8.4 MG/DL (ref 8.6–10.4)
CHLORIDE BLD-SCNC: 104 MMOL/L (ref 98–107)
CO2: 23 MMOL/L (ref 20–31)
CREAT SERPL-MCNC: 1.06 MG/DL (ref 0.7–1.2)
DIFFERENTIAL TYPE: ABNORMAL
EOSINOPHILS RELATIVE PERCENT: 6 % (ref 1–4)
GFR AFRICAN AMERICAN: >60 ML/MIN
GFR NON-AFRICAN AMERICAN: >60 ML/MIN
GFR SERPL CREATININE-BSD FRML MDRD: ABNORMAL ML/MIN/{1.73_M2}
GFR SERPL CREATININE-BSD FRML MDRD: ABNORMAL ML/MIN/{1.73_M2}
GLUCOSE BLD-MCNC: 122 MG/DL (ref 75–110)
GLUCOSE BLD-MCNC: 98 MG/DL (ref 70–99)
HCT VFR BLD CALC: 43.4 % (ref 40.7–50.3)
HEMOGLOBIN: 14.3 G/DL (ref 13–17)
IMMATURE GRANULOCYTES: 0 %
LYMPHOCYTES # BLD: 32 % (ref 24–43)
MCH RBC QN AUTO: 29.3 PG (ref 25.2–33.5)
MCHC RBC AUTO-ENTMCNC: 32.9 G/DL (ref 28.4–34.8)
MCV RBC AUTO: 88.9 FL (ref 82.6–102.9)
MONOCYTES # BLD: 7 % (ref 3–12)
NRBC AUTOMATED: 0 PER 100 WBC
PDW BLD-RTO: 13.4 % (ref 11.8–14.4)
PLATELET # BLD: 209 K/UL (ref 138–453)
PLATELET ESTIMATE: ABNORMAL
PMV BLD AUTO: 10.8 FL (ref 8.1–13.5)
POTASSIUM SERPL-SCNC: 3.8 MMOL/L (ref 3.7–5.3)
RBC # BLD: 4.88 M/UL (ref 4.21–5.77)
RBC # BLD: ABNORMAL 10*6/UL
SEG NEUTROPHILS: 54 % (ref 36–65)
SEGMENTED NEUTROPHILS ABSOLUTE COUNT: 3.43 K/UL (ref 1.5–8.1)
SODIUM BLD-SCNC: 139 MMOL/L (ref 135–144)
WBC # BLD: 6.3 K/UL (ref 3.5–11.3)
WBC # BLD: ABNORMAL 10*3/UL

## 2019-09-19 PROCEDURE — 85025 COMPLETE CBC W/AUTO DIFF WBC: CPT

## 2019-09-19 PROCEDURE — C1764 EVENT RECORDER, CARDIAC: HCPCS

## 2019-09-19 PROCEDURE — 2709999900 HC NON-CHARGEABLE SUPPLY

## 2019-09-19 PROCEDURE — 99217 PR OBSERVATION CARE DISCHARGE MANAGEMENT: CPT | Performed by: PSYCHIATRY & NEUROLOGY

## 2019-09-19 PROCEDURE — 2580000003 HC RX 258: Performed by: STUDENT IN AN ORGANIZED HEALTH CARE EDUCATION/TRAINING PROGRAM

## 2019-09-19 PROCEDURE — G0378 HOSPITAL OBSERVATION PER HR: HCPCS

## 2019-09-19 PROCEDURE — 6360000002 HC RX W HCPCS: Performed by: STUDENT IN AN ORGANIZED HEALTH CARE EDUCATION/TRAINING PROGRAM

## 2019-09-19 PROCEDURE — 96372 THER/PROPH/DIAG INJ SC/IM: CPT

## 2019-09-19 PROCEDURE — 82947 ASSAY GLUCOSE BLOOD QUANT: CPT

## 2019-09-19 PROCEDURE — 80048 BASIC METABOLIC PNL TOTAL CA: CPT

## 2019-09-19 PROCEDURE — 33285 INSJ SUBQ CAR RHYTHM MNTR: CPT | Performed by: INTERNAL MEDICINE

## 2019-09-19 PROCEDURE — 36415 COLL VENOUS BLD VENIPUNCTURE: CPT

## 2019-09-19 PROCEDURE — 6370000000 HC RX 637 (ALT 250 FOR IP): Performed by: STUDENT IN AN ORGANIZED HEALTH CARE EDUCATION/TRAINING PROGRAM

## 2019-09-19 PROCEDURE — 2500000003 HC RX 250 WO HCPCS

## 2019-09-19 RX ORDER — SODIUM CHLORIDE 0.9 % (FLUSH) 0.9 %
10 SYRINGE (ML) INJECTION EVERY 12 HOURS SCHEDULED
Status: DISCONTINUED | OUTPATIENT
Start: 2019-09-19 | End: 2019-09-19 | Stop reason: HOSPADM

## 2019-09-19 RX ORDER — ASPIRIN 81 MG/1
81 TABLET, CHEWABLE ORAL DAILY
Qty: 30 TABLET | Refills: 3 | Status: ON HOLD | OUTPATIENT
Start: 2019-09-19 | End: 2021-04-28 | Stop reason: HOSPADM

## 2019-09-19 RX ORDER — SODIUM CHLORIDE 9 MG/ML
INJECTION, SOLUTION INTRAVENOUS CONTINUOUS
Status: DISCONTINUED | OUTPATIENT
Start: 2019-09-19 | End: 2019-09-19 | Stop reason: SDUPTHER

## 2019-09-19 RX ORDER — ATORVASTATIN CALCIUM 80 MG/1
80 TABLET, FILM COATED ORAL NIGHTLY
Qty: 30 TABLET | Refills: 3 | Status: SHIPPED | OUTPATIENT
Start: 2019-09-19 | End: 2020-01-13

## 2019-09-19 RX ORDER — CLOPIDOGREL BISULFATE 75 MG/1
75 TABLET ORAL DAILY
Qty: 30 TABLET | Refills: 3 | Status: SHIPPED | OUTPATIENT
Start: 2019-09-19 | End: 2019-10-23

## 2019-09-19 RX ORDER — SODIUM CHLORIDE 9 MG/ML
INJECTION, SOLUTION INTRAVENOUS CONTINUOUS
Status: DISCONTINUED | OUTPATIENT
Start: 2019-09-19 | End: 2019-09-19 | Stop reason: HOSPADM

## 2019-09-19 RX ORDER — SODIUM CHLORIDE 0.9 % (FLUSH) 0.9 %
10 SYRINGE (ML) INJECTION PRN
Status: DISCONTINUED | OUTPATIENT
Start: 2019-09-19 | End: 2019-09-19 | Stop reason: HOSPADM

## 2019-09-19 RX ORDER — NICOTINE 21 MG/24HR
1 PATCH, TRANSDERMAL 24 HOURS TRANSDERMAL DAILY
Qty: 14 PATCH | Refills: 5 | Status: SHIPPED | OUTPATIENT
Start: 2019-09-19 | End: 2019-12-05

## 2019-09-19 RX ADMIN — TRAMADOL HYDROCHLORIDE 50 MG: 50 TABLET, FILM COATED ORAL at 11:05

## 2019-09-19 RX ADMIN — ASPIRIN 81 MG: 81 TABLET, CHEWABLE ORAL at 11:04

## 2019-09-19 RX ADMIN — ENOXAPARIN SODIUM 40 MG: 40 INJECTION SUBCUTANEOUS at 11:05

## 2019-09-19 RX ADMIN — SODIUM CHLORIDE, PRESERVATIVE FREE 10 ML: 5 INJECTION INTRAVENOUS at 09:00

## 2019-09-19 RX ADMIN — AMLODIPINE BESYLATE 5 MG: 5 TABLET ORAL at 11:04

## 2019-09-19 RX ADMIN — CLOPIDOGREL 75 MG: 75 TABLET, FILM COATED ORAL at 11:05

## 2019-09-19 ASSESSMENT — PAIN SCALES - GENERAL: PAINLEVEL_OUTOF10: 9

## 2019-09-19 NOTE — FLOWSHEET NOTE
Patient Health Questionnaire-9 (PHQ-9)    Over the last 2 weeks, how often have you been bothered by any of the following problems? 1. Little Interest or pleasure in doing things? [] Not at all  [x] Several Days  [] More than half the day  []  Nearly every day    2. Feeling down, depressed or hopeless? [] Not at all  [] Several Days  [x] More than half the day  []  Nearly every day    3. Trouble falling or staying asleep, or sleeping too much? [] Not at all  [x] Several Days  [] More than half the day  []  Nearly every day    4. Feeling tired or having little energy? [] Not at all  [x] Several Days  [] More than half the day  []  Nearly every day    5. Poor apettite or overeating? [x] Not at all  [] Several Days  [] More than half the day  []  Nearly every day    6. Feeling bad about yourself-or that you are a failure or have let yourself or your family down? [] Not at all  [x] Several Days  [] More than half the day  []  Nearly every day    7. Trouble concentrating on things, such as reading the newspaper or watching television? [x] Not at all  [] Several Days  [] More than half the day  []  Nearly every day    8. Moving or speaking so slowly that other people could have noticed? Or the opposite-being so fidgety or restless that you have been moving around a lot more than usual?   [x] Not at all  [] Several Days  [] More than half the day  []  Nearly every day    9. Thoughts that you would be better off dead or of hurting yourself in some way? [x] Not at all  [] Several Days  [] More than half the day  []  Nearly every day    Total Score: 6    If you checked off any problems, how difficult have these problems made it for you to do your work, take care of things at home, or get along with other people?    [] Not difficult at all  [x] Somewhat Difficult  [] Very Difficult  []  Extremely Difficult    States he receives treatment at [facility] for his mental/emotional needs due to several deaths that

## 2019-09-19 NOTE — PROGRESS NOTES
EF55%, no thrombi, Basal septal hypertrophy (\"sigmoid septum\")  GWEN 9/18- Left atrial appendage showed no evidence of clot.  Inter-atrial septum is intact with no evidence for an atrial septal defect  Discharging with Holter monitor   Tobacco cessation was encouraged   Got T2Dm education, started metformin on discharge  To see PCP on d/c for HTN seen inpatient       atorvastatin  80 mg Oral Nightly    amLODIPine  5 mg Oral Daily    aspirin  81 mg Oral Daily    clopidogrel  75 mg Oral Daily    sodium chloride flush  10 mL Intravenous 2 times per day    enoxaparin  40 mg Subcutaneous Daily       Past Medical History:   Diagnosis Date    Aortic valve sclerosis     Back pain     [facility]     NEUBULIZER AS NEEDED    Dental bridge present     UPPER    Depression     DJD (degenerative joint disease)     Drug abuse ([address]     drug abuse includes smoking cocaine    High cholesterol     History of shingles     2013    Hypertension     PT NEVER FILLED  SCRIPT    Pain at rest     Knee-Left, back     Postlaminectomy syndrome 8/9/2017    Sleep apnea     does not have machine    Suicidal ideations     S/I's without attempt    TIA (transient ischemic attack) 10/2017    x 2    Toe contracture, left     5th    Toe pain, left     5th digit    Wears dentures     1 GOLD TOOTH ON FLIPPER UPPER    Wears glasses        Past Surgical History:   Procedure Laterality Date    ARTHROPLASTY Left 03/13/2019    toe    CYST REMOVAL Bilateral     \"groin\"    FINGER TRIGGER RELEASE Left 03/04/2014    ring finger    FINGER TRIGGER RELEASE Right 1/5/15    rt hand-RING FINGER    KNEE ARTHROSCOPY Bilateral     LUMBAR FUSION  6/15/16    posterior lumbar fusion L4-L5    LUMBAR SPINE SURGERY  2005    LUMBAR    NERVE BLOCK  01/13/2017    caudal #1    celestone 9mg  25mcq fentanyl    NERVE BLOCK Left 03/30/2017    left hip bursa injection depomedrol 40 mg    NERVE BLOCK Bilateral

## 2019-09-20 ENCOUNTER — HOSPITAL ENCOUNTER (EMERGENCY)
Age: 64
Discharge: HOME | End: 2019-09-20
Attending: EMERGENCY MEDICINE
Payer: MEDICARE

## 2019-09-20 ENCOUNTER — APPOINTMENT (OUTPATIENT)
Dept: GENERAL RADIOLOGY | Age: 64
End: 2019-09-20
Payer: MEDICARE

## 2019-09-20 VITALS
HEART RATE: 72 BPM | TEMPERATURE: 97 F | OXYGEN SATURATION: 100 % | BODY MASS INDEX: 32.58 KG/M2 | RESPIRATION RATE: 16 BRPM | HEIGHT: 69 IN | DIASTOLIC BLOOD PRESSURE: 71 MMHG | WEIGHT: 220 LBS | SYSTOLIC BLOOD PRESSURE: 122 MMHG

## 2019-09-20 DIAGNOSIS — G89.18 POST-OPERATIVE PAIN: Primary | ICD-10-CM

## 2019-09-20 LAB — GLUCOSE BLD-MCNC: 129 MG/DL (ref 75–110)

## 2019-09-20 PROCEDURE — 71046 X-RAY EXAM CHEST 2 VIEWS: CPT

## 2019-09-20 PROCEDURE — 6370000000 HC RX 637 (ALT 250 FOR IP): Performed by: EMERGENCY MEDICINE

## 2019-09-20 PROCEDURE — 99283 EMERGENCY DEPT VISIT LOW MDM: CPT

## 2019-09-20 RX ORDER — OXYCODONE HYDROCHLORIDE AND ACETAMINOPHEN 5; 325 MG/1; MG/1
1 TABLET ORAL EVERY 6 HOURS PRN
Qty: 10 TABLET | Refills: 0 | Status: SHIPPED | OUTPATIENT
Start: 2019-09-20 | End: 2019-09-21 | Stop reason: SDUPTHER

## 2019-09-20 RX ORDER — OXYCODONE HYDROCHLORIDE AND ACETAMINOPHEN 5; 325 MG/1; MG/1
1 TABLET ORAL ONCE
Status: COMPLETED | OUTPATIENT
Start: 2019-09-20 | End: 2019-09-20

## 2019-09-20 RX ADMIN — OXYCODONE HYDROCHLORIDE AND ACETAMINOPHEN 1 TABLET: 5; 325 TABLET ORAL at 02:23

## 2019-09-20 ASSESSMENT — PAIN DESCRIPTION - PAIN TYPE
TYPE: ACUTE PAIN
TYPE: ACUTE PAIN

## 2019-09-20 ASSESSMENT — ENCOUNTER SYMPTOMS
ABDOMINAL PAIN: 0
CONSTIPATION: 0
NAUSEA: 0
TROUBLE SWALLOWING: 0
DIARRHEA: 0
COUGH: 0
SHORTNESS OF BREATH: 0
EYE REDNESS: 0
COLOR CHANGE: 0
VOMITING: 0

## 2019-09-20 ASSESSMENT — PAIN DESCRIPTION - FREQUENCY: FREQUENCY: CONTINUOUS

## 2019-09-20 ASSESSMENT — PAIN SCALES - GENERAL
PAINLEVEL_OUTOF10: 10
PAINLEVEL_OUTOF10: 10
PAINLEVEL_OUTOF10: 9
PAINLEVEL_OUTOF10: 10

## 2019-09-20 ASSESSMENT — PAIN DESCRIPTION - LOCATION: LOCATION: CHEST

## 2019-09-20 NOTE — ED PROVIDER NOTES
Post-op pain, seroma, hematoma, device migration, pneumothorax, pleuritis, myalgia    DIAGNOSTIC RESULTS / EMERGENCY DEPARTMENT COURSE / MDM     LABS:  No results found for this visit on 09/20/19. IMPRESSION: 60year old male with pain overlying cardiac monitor placement done 1 day PTA. Denies fever, chills, diaphoresis, nausea, vomiting. Gradual onset pain following return home from procedure. No overlying skin changes, draining, bleeding. CXR unremarkable. Impression is post-op pain. RADIOLOGY:    Xr Chest Standard (2 Vw)    Result Date: 9/20/2019  No acute cardiopulmonary abnormality. Ct Head Wo Contrast    Result Date: 9/17/2019  Area of hypoattenuation in the high right parietal lobe that likely represents an area of infarct of indeterminate age. No other brain parenchymal abnormality. Cta Head Neck W Contrast    Result Date: 9/16/2019  Unremarkable CTA of the head and neck without significant stenosis or evidence for occlusion. Yamilet Pancho Brain Wo Contrast    Result Date: 9/17/2019  1. There are early subacute infarctions noted in the right parietal lobe, in the vascular distribution of the posterior right middle cerebral artery. 2. Mild chronic microvascular white matter ischemic disease. EKG  None    All EKG's are interpreted by the Emergency Department Physician who either signs or Co-signs this chart in the absence of a cardiologist.    EMERGENCY DEPARTMENT COURSE:    68-year-old male presents to ED with chest pain overlying site of mandible cardiac monitor placed day prior to arrival.  Patient denies immediate onset chest pain following procedure. States gradual onset of pain following returning home as analgesia or off. Attempted to use tramadol for symptomatic relief without improvement. Patient notes increased pain with deep inspiration. Vital signs reviewed and showed no acute abnormalities. Lungs clear to auscultation with bilateral breath sounds.   Tenderness overlying left

## 2019-09-20 NOTE — ED NOTES
Pt c/o pain from implant device in lt chest that was placed yesterday, dressing in place, appears to have serous drainage on it, took tramadol, did not help pain from incision site, drove self here, skin w/d, resp easy     Jazlyn Pradhan, RN  09/20/19 [id number]

## 2019-09-21 ENCOUNTER — HOSPITAL ENCOUNTER (EMERGENCY)
Age: 64
Discharge: HOME | End: 2019-09-21
Attending: EMERGENCY MEDICINE
Payer: COMMERCIAL

## 2019-09-21 VITALS
RESPIRATION RATE: 16 BRPM | OXYGEN SATURATION: 98 % | TEMPERATURE: 97.8 F | SYSTOLIC BLOOD PRESSURE: 138 MMHG | HEART RATE: 84 BPM | WEIGHT: 220 LBS | BODY MASS INDEX: 32.49 KG/M2 | DIASTOLIC BLOOD PRESSURE: 93 MMHG

## 2019-09-21 DIAGNOSIS — G89.18 POST-OPERATIVE PAIN: Primary | ICD-10-CM

## 2019-09-21 PROCEDURE — 93005 ELECTROCARDIOGRAM TRACING: CPT

## 2019-09-21 PROCEDURE — 99283 EMERGENCY DEPT VISIT LOW MDM: CPT

## 2019-09-21 RX ORDER — OXYCODONE HYDROCHLORIDE AND ACETAMINOPHEN 5; 325 MG/1; MG/1
1 TABLET ORAL EVERY 6 HOURS PRN
Qty: 8 TABLET | Refills: 0 | Status: SHIPPED | OUTPATIENT
Start: 2019-09-21 | End: 2019-09-23

## 2019-09-21 ASSESSMENT — PAIN DESCRIPTION - LOCATION: LOCATION: CHEST

## 2019-09-21 ASSESSMENT — ENCOUNTER SYMPTOMS
SHORTNESS OF BREATH: 0
NAUSEA: 0
ABDOMINAL PAIN: 0
COLOR CHANGE: 0
VOMITING: 0

## 2019-09-21 ASSESSMENT — PAIN DESCRIPTION - ORIENTATION: ORIENTATION: LEFT

## 2019-09-21 ASSESSMENT — PAIN SCALES - GENERAL: PAINLEVEL_OUTOF10: 10

## 2019-09-23 LAB
EKG ATRIAL RATE: 83 BPM
EKG P AXIS: 58 DEGREES
EKG P-R INTERVAL: 188 MS
EKG Q-T INTERVAL: 354 MS
EKG QRS DURATION: 92 MS
EKG QTC CALCULATION (BAZETT): 415 MS
EKG R AXIS: 30 DEGREES
EKG T AXIS: -33 DEGREES
EKG VENTRICULAR RATE: 83 BPM

## 2019-10-04 ENCOUNTER — OFFICE VISIT (OUTPATIENT)
Dept: FAMILY MEDICINE CLINIC | Age: 64
End: 2019-10-04
Payer: COMMERCIAL

## 2019-10-04 VITALS
TEMPERATURE: 98 F | HEART RATE: 93 BPM | WEIGHT: 220.8 LBS | BODY MASS INDEX: 32.7 KG/M2 | DIASTOLIC BLOOD PRESSURE: 79 MMHG | HEIGHT: 69 IN | SYSTOLIC BLOOD PRESSURE: 127 MMHG

## 2019-10-04 DIAGNOSIS — M17.0 PRIMARY OSTEOARTHRITIS OF BOTH KNEES: Primary | ICD-10-CM

## 2019-10-04 DIAGNOSIS — I10 ESSENTIAL HYPERTENSION: ICD-10-CM

## 2019-10-04 PROCEDURE — G8417 CALC BMI ABV UP PARAM F/U: HCPCS | Performed by: FAMILY MEDICINE

## 2019-10-04 PROCEDURE — G8598 ASA/ANTIPLAT THER USED: HCPCS | Performed by: FAMILY MEDICINE

## 2019-10-04 PROCEDURE — 99213 OFFICE O/P EST LOW 20 MIN: CPT | Performed by: FAMILY MEDICINE

## 2019-10-04 PROCEDURE — G8427 DOCREV CUR MEDS BY ELIG CLIN: HCPCS | Performed by: FAMILY MEDICINE

## 2019-10-04 PROCEDURE — 3017F COLORECTAL CA SCREEN DOC REV: CPT | Performed by: FAMILY MEDICINE

## 2019-10-04 PROCEDURE — 4004F PT TOBACCO SCREEN RCVD TLK: CPT | Performed by: FAMILY MEDICINE

## 2019-10-04 PROCEDURE — G8484 FLU IMMUNIZE NO ADMIN: HCPCS | Performed by: FAMILY MEDICINE

## 2019-10-04 RX ORDER — TRAMADOL HYDROCHLORIDE 50 MG/1
50 TABLET ORAL EVERY 6 HOURS PRN
Qty: 28 TABLET | Refills: 0 | Status: SHIPPED | OUTPATIENT
Start: 2019-10-04 | End: 2019-10-11

## 2019-10-04 RX ORDER — TRAZODONE HYDROCHLORIDE 50 MG/1
50 TABLET ORAL
COMMUNITY
End: 2019-10-23 | Stop reason: ALTCHOICE

## 2019-10-04 RX ORDER — TRAMADOL HYDROCHLORIDE 50 MG/1
50 TABLET ORAL
COMMUNITY
End: 2019-10-23 | Stop reason: ALTCHOICE

## 2019-10-04 RX ORDER — AMLODIPINE BESYLATE 5 MG/1
5 TABLET ORAL DAILY
Qty: 30 TABLET | Refills: 5 | Status: SHIPPED | OUTPATIENT
Start: 2019-10-04 | End: 2019-11-08 | Stop reason: SDUPTHER

## 2019-10-04 RX ORDER — HYDROXYZINE HYDROCHLORIDE 10 MG/1
10 TABLET, FILM COATED ORAL
Status: ON HOLD | COMMUNITY
End: 2019-11-13

## 2019-10-07 ASSESSMENT — ENCOUNTER SYMPTOMS
COUGH: 0
SHORTNESS OF BREATH: 0

## 2019-10-14 ASSESSMENT — ENCOUNTER SYMPTOMS: BACK PAIN: 1

## 2019-10-22 ASSESSMENT — ENCOUNTER SYMPTOMS
COUGH: 0
SHORTNESS OF BREATH: 0
BACK PAIN: 1

## 2019-10-23 ENCOUNTER — OFFICE VISIT (OUTPATIENT)
Dept: NEUROLOGY | Age: 64
End: 2019-10-23
Payer: COMMERCIAL

## 2019-10-23 VITALS
WEIGHT: 223 LBS | HEART RATE: 86 BPM | DIASTOLIC BLOOD PRESSURE: 82 MMHG | HEIGHT: 69 IN | RESPIRATION RATE: 20 BRPM | BODY MASS INDEX: 33.03 KG/M2 | SYSTOLIC BLOOD PRESSURE: 125 MMHG

## 2019-10-23 DIAGNOSIS — I63.511 ACUTE ISCHEMIC RIGHT MIDDLE CEREBRAL ARTERY (MCA) STROKE (HCC): ICD-10-CM

## 2019-10-23 DIAGNOSIS — I10 ESSENTIAL HYPERTENSION: ICD-10-CM

## 2019-10-23 DIAGNOSIS — I63.9 ACUTE ISCHEMIC STROKE (HCC): Primary | ICD-10-CM

## 2019-10-23 DIAGNOSIS — E78.00 ELEVATED LDL CHOLESTEROL LEVEL: ICD-10-CM

## 2019-10-23 DIAGNOSIS — Z72.0 TOBACCO ABUSE: ICD-10-CM

## 2019-10-23 DIAGNOSIS — G45.9 TIA (TRANSIENT ISCHEMIC ATTACK): ICD-10-CM

## 2019-10-23 DIAGNOSIS — E78.00 HYPERCHOLESTEREMIA: ICD-10-CM

## 2019-10-23 DIAGNOSIS — R20.2 PARESTHESIAS: ICD-10-CM

## 2019-10-23 DIAGNOSIS — G47.33 OSA (OBSTRUCTIVE SLEEP APNEA): ICD-10-CM

## 2019-10-23 PROCEDURE — 3017F COLORECTAL CA SCREEN DOC REV: CPT | Performed by: STUDENT IN AN ORGANIZED HEALTH CARE EDUCATION/TRAINING PROGRAM

## 2019-10-23 PROCEDURE — 4004F PT TOBACCO SCREEN RCVD TLK: CPT | Performed by: STUDENT IN AN ORGANIZED HEALTH CARE EDUCATION/TRAINING PROGRAM

## 2019-10-23 PROCEDURE — G8417 CALC BMI ABV UP PARAM F/U: HCPCS | Performed by: STUDENT IN AN ORGANIZED HEALTH CARE EDUCATION/TRAINING PROGRAM

## 2019-10-23 PROCEDURE — G8598 ASA/ANTIPLAT THER USED: HCPCS | Performed by: STUDENT IN AN ORGANIZED HEALTH CARE EDUCATION/TRAINING PROGRAM

## 2019-10-23 PROCEDURE — G8428 CUR MEDS NOT DOCUMENT: HCPCS | Performed by: STUDENT IN AN ORGANIZED HEALTH CARE EDUCATION/TRAINING PROGRAM

## 2019-10-23 PROCEDURE — 99215 OFFICE O/P EST HI 40 MIN: CPT | Performed by: STUDENT IN AN ORGANIZED HEALTH CARE EDUCATION/TRAINING PROGRAM

## 2019-10-23 PROCEDURE — G8484 FLU IMMUNIZE NO ADMIN: HCPCS | Performed by: STUDENT IN AN ORGANIZED HEALTH CARE EDUCATION/TRAINING PROGRAM

## 2019-10-23 ASSESSMENT — ENCOUNTER SYMPTOMS
DIARRHEA: 0
VOMITING: 0
PHOTOPHOBIA: 0
COUGH: 0
NAUSEA: 0
ABDOMINAL PAIN: 0
SHORTNESS OF BREATH: 0
CONSTIPATION: 0
EYE PAIN: 0

## 2019-10-24 ENCOUNTER — TELEPHONE (OUTPATIENT)
Dept: FAMILY MEDICINE CLINIC | Age: 64
End: 2019-10-24

## 2019-11-05 ENCOUNTER — APPOINTMENT (OUTPATIENT)
Dept: GENERAL RADIOLOGY | Age: 64
End: 2019-11-05
Payer: COMMERCIAL

## 2019-11-05 ENCOUNTER — HOSPITAL ENCOUNTER (EMERGENCY)
Age: 64
Discharge: HOME | End: 2019-11-05
Attending: EMERGENCY MEDICINE
Payer: COMMERCIAL

## 2019-11-05 VITALS
SYSTOLIC BLOOD PRESSURE: 120 MMHG | WEIGHT: 220 LBS | TEMPERATURE: 97.9 F | BODY MASS INDEX: 32.58 KG/M2 | OXYGEN SATURATION: 98 % | RESPIRATION RATE: 15 BRPM | HEART RATE: 97 BPM | DIASTOLIC BLOOD PRESSURE: 73 MMHG | HEIGHT: 69 IN

## 2019-11-05 DIAGNOSIS — M62.830 BACK MUSCLE SPASM: ICD-10-CM

## 2019-11-05 DIAGNOSIS — S39.012A STRAIN OF LUMBAR REGION, INITIAL ENCOUNTER: ICD-10-CM
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
## 2019-11-05 DIAGNOSIS — S83.92XA SPRAIN OF LEFT KNEE, UNSPECIFIED LIGAMENT, INITIAL ENCOUNTER: ICD-10-CM

## 2019-11-05 DIAGNOSIS — S20.212A CONTUSION OF LEFT CHEST WALL, INITIAL ENCOUNTER: ICD-10-CM

## 2019-11-05 PROCEDURE — 71046 X-RAY EXAM CHEST 2 VIEWS: CPT

## 2019-11-05 PROCEDURE — 96372 THER/PROPH/DIAG INJ SC/IM: CPT

## 2019-11-05 PROCEDURE — 6370000000 HC RX 637 (ALT 250 FOR IP): Performed by: EMERGENCY MEDICINE

## 2019-11-05 PROCEDURE — 6360000002 HC RX W HCPCS: Performed by: EMERGENCY MEDICINE

## 2019-11-05 PROCEDURE — 73562 X-RAY EXAM OF KNEE 3: CPT

## 2019-11-05 PROCEDURE — 99283 EMERGENCY DEPT VISIT LOW MDM: CPT

## 2019-11-05 PROCEDURE — 72100 X-RAY EXAM L-S SPINE 2/3 VWS: CPT

## 2019-11-05 RX ORDER — OXYCODONE HYDROCHLORIDE AND ACETAMINOPHEN 5; 325 MG/1; MG/1
1 TABLET ORAL EVERY 6 HOURS PRN
Qty: 6 TABLET | Refills: 0 | Status: SHIPPED | OUTPATIENT
Start: 2019-11-05 | End: 2019-11-08

## 2019-11-05 RX ORDER — DIAZEPAM 2 MG/1
2 TABLET ORAL EVERY 8 HOURS PRN
Qty: 20 TABLET | Refills: 0 | Status: SHIPPED | OUTPATIENT
Start: 2019-11-05 | End: 2019-11-15

## 2019-11-05 RX ORDER — NAPROXEN 500 MG/1
500 TABLET ORAL 2 TIMES DAILY
Qty: 60 TABLET | Refills: 0 | Status: SHIPPED | OUTPATIENT
Start: 2019-11-05 | End: 2019-12-05

## 2019-11-05 RX ORDER — OXYCODONE HYDROCHLORIDE AND ACETAMINOPHEN 5; 325 MG/1; MG/1
1 TABLET ORAL ONCE
Status: COMPLETED | OUTPATIENT
Start: 2019-11-05 | End: 2019-11-05

## 2019-11-05 RX ORDER — ORPHENADRINE CITRATE 30 MG/ML
60 INJECTION INTRAMUSCULAR; INTRAVENOUS ONCE
Status: COMPLETED | OUTPATIENT
Start: 2019-11-05 | End: 2019-11-05

## 2019-11-05 RX ORDER — LIDOCAINE 50 MG/G
1 PATCH TOPICAL DAILY
Qty: 10 PATCH | Refills: 0 | Status: ON HOLD | OUTPATIENT
Start: 2019-11-05 | End: 2019-11-13

## 2019-11-05 RX ADMIN — ORPHENADRINE CITRATE 60 MG: 30 INJECTION INTRAMUSCULAR; INTRAVENOUS at 12:00

## 2019-11-05 RX ADMIN — OXYCODONE HYDROCHLORIDE AND ACETAMINOPHEN 1 TABLET: 5; 325 TABLET ORAL at 10:04

## 2019-11-05 ASSESSMENT — PAIN SCALES - GENERAL: PAINLEVEL_OUTOF10: 10

## 2019-11-07 ENCOUNTER — CARE COORDINATION (OUTPATIENT)
Dept: CARE COORDINATION | Age: 64
End: 2019-11-07

## 2019-11-08 ENCOUNTER — OFFICE VISIT (OUTPATIENT)
Dept: FAMILY MEDICINE CLINIC | Age: 64
End: 2019-11-08
Payer: MEDICARE

## 2019-11-08 VITALS
DIASTOLIC BLOOD PRESSURE: 72 MMHG | SYSTOLIC BLOOD PRESSURE: 130 MMHG | BODY MASS INDEX: 33.37 KG/M2 | WEIGHT: 226 LBS | HEART RATE: 86 BPM

## 2019-11-08 DIAGNOSIS — I63.9 ACUTE ISCHEMIC STROKE (HCC): ICD-10-CM

## 2019-11-08 DIAGNOSIS — M17.0 PRIMARY OSTEOARTHRITIS OF BOTH KNEES: Primary | ICD-10-CM

## 2019-11-08 DIAGNOSIS — I10 ESSENTIAL HYPERTENSION: ICD-10-CM

## 2019-11-08 PROCEDURE — 4004F PT TOBACCO SCREEN RCVD TLK: CPT | Performed by: FAMILY MEDICINE

## 2019-11-08 PROCEDURE — G8427 DOCREV CUR MEDS BY ELIG CLIN: HCPCS | Performed by: FAMILY MEDICINE

## 2019-11-08 PROCEDURE — G8417 CALC BMI ABV UP PARAM F/U: HCPCS | Performed by: FAMILY MEDICINE

## 2019-11-08 PROCEDURE — 99211 OFF/OP EST MAY X REQ PHY/QHP: CPT | Performed by: FAMILY MEDICINE

## 2019-11-08 PROCEDURE — G8484 FLU IMMUNIZE NO ADMIN: HCPCS | Performed by: FAMILY MEDICINE

## 2019-11-08 PROCEDURE — G8598 ASA/ANTIPLAT THER USED: HCPCS | Performed by: FAMILY MEDICINE

## 2019-11-08 PROCEDURE — 3017F COLORECTAL CA SCREEN DOC REV: CPT | Performed by: FAMILY MEDICINE

## 2019-11-08 PROCEDURE — 99213 OFFICE O/P EST LOW 20 MIN: CPT | Performed by: FAMILY MEDICINE

## 2019-11-08 RX ORDER — TRAMADOL HYDROCHLORIDE 50 MG/1
50 TABLET ORAL EVERY 6 HOURS PRN
Qty: 28 TABLET | Refills: 0 | Status: ON HOLD | OUTPATIENT
Start: 2019-11-08 | End: 2019-11-13

## 2019-11-08 RX ORDER — AMLODIPINE BESYLATE 5 MG/1
5 TABLET ORAL DAILY
Qty: 30 TABLET | Refills: 5 | Status: SHIPPED | OUTPATIENT
Start: 2019-11-08 | End: 2020-06-25

## 2019-11-09 ASSESSMENT — ENCOUNTER SYMPTOMS
DIARRHEA: 0
ABDOMINAL PAIN: 0
SHORTNESS OF BREATH: 0
COUGH: 0
NAUSEA: 0
SORE THROAT: 0
EYE PAIN: 0

## 2019-11-13 ENCOUNTER — APPOINTMENT (OUTPATIENT)
Dept: GENERAL RADIOLOGY | Age: 64
End: 2019-11-13
Payer: MEDICARE

## 2019-11-13 ENCOUNTER — HOSPITAL ENCOUNTER (OUTPATIENT)
Age: 64
Setting detail: OBSERVATION
Discharge: HOME | End: 2019-11-13
Attending: EMERGENCY MEDICINE | Admitting: EMERGENCY MEDICINE
Payer: MEDICARE

## 2019-11-13 ENCOUNTER — APPOINTMENT (OUTPATIENT)
Dept: MRI IMAGING | Age: 64
End: 2019-11-13
Payer: MEDICARE

## 2019-11-13 ENCOUNTER — APPOINTMENT (OUTPATIENT)
Dept: CT IMAGING | Age: 64
End: 2019-11-13
Payer: MEDICARE

## 2019-11-13 VITALS
WEIGHT: 225 LBS | BODY MASS INDEX: 33.33 KG/M2 | HEART RATE: 78 BPM | OXYGEN SATURATION: 97 % | RESPIRATION RATE: 24 BRPM | HEIGHT: 69 IN | TEMPERATURE: 97 F | SYSTOLIC BLOOD PRESSURE: 125 MMHG | DIASTOLIC BLOOD PRESSURE: 74 MMHG

## 2019-11-13 DIAGNOSIS — R29.90 STROKE-LIKE SYMPTOMS: ICD-10-CM

## 2019-11-13 DIAGNOSIS — M25.512 ACUTE PAIN OF LEFT SHOULDER: Primary | ICD-10-CM

## 2019-11-13 LAB
% CKMB: 1.3 % (ref 0–3.5)
ABSOLUTE EOS #: 0.52 K/UL (ref 0–0.44)
ABSOLUTE IMMATURE GRANULOCYTE: <0.03 K/UL (ref 0–0.3)
ABSOLUTE LYMPH #: 2.73 K/UL (ref 1.1–3.7)
ABSOLUTE MONO #: 0.66 K/UL (ref 0.1–1.2)
ALLEN TEST: ABNORMAL
ANION GAP SERPL CALCULATED.3IONS-SCNC: 14 MMOL/L (ref 9–17)
ANION GAP: 8 MMOL/L (ref 7–16)
BASOPHILS # BLD: 1 % (ref 0–2)
BASOPHILS ABSOLUTE: 0.04 K/UL (ref 0–0.2)
BUN BLDV-MCNC: 20 MG/DL (ref 8–23)
BUN/CREAT BLD: ABNORMAL (ref 9–20)
CALCIUM SERPL-MCNC: 8.6 MG/DL (ref 8.6–10.4)
CHLORIDE BLD-SCNC: 104 MMOL/L (ref 98–107)
CHP ED QC CHECK: YES
CK MB: 2.1 NG/ML
CKMB INTERPRETATION: ABNORMAL
CO2: 22 MMOL/L (ref 20–31)
CREAT SERPL-MCNC: 1.12 MG/DL (ref 0.7–1.2)
DIFFERENTIAL TYPE: ABNORMAL
EKG ATRIAL RATE: 88 BPM
EKG P AXIS: 63 DEGREES
EKG P-R INTERVAL: 196 MS
EKG Q-T INTERVAL: 374 MS
EKG QRS DURATION: 84 MS
EKG QTC CALCULATION (BAZETT): 452 MS
EKG R AXIS: 55 DEGREES
EKG T AXIS: 41 DEGREES
EKG VENTRICULAR RATE: 88 BPM
EOSINOPHILS RELATIVE PERCENT: 7 % (ref 1–4)
FIO2: ABNORMAL
GFR AFRICAN AMERICAN: >60 ML/MIN
GFR NON-AFRICAN AMERICAN: >60 ML/MIN
GFR NON-AFRICAN AMERICAN: >60 ML/MIN
GFR SERPL CREATININE-BSD FRML MDRD: >60 ML/MIN
GFR SERPL CREATININE-BSD FRML MDRD: ABNORMAL ML/MIN/{1.73_M2}
GFR SERPL CREATININE-BSD FRML MDRD: ABNORMAL ML/MIN/{1.73_M2}
GFR SERPL CREATININE-BSD FRML MDRD: NORMAL ML/MIN/{1.73_M2}
GLUCOSE BLD-MCNC: 105 MG/DL
GLUCOSE BLD-MCNC: 105 MG/DL (ref 74–100)
GLUCOSE BLD-MCNC: 107 MG/DL (ref 70–99)
HCO3 VENOUS: 25.4 MMOL/L (ref 22–29)
HCT VFR BLD CALC: 41.6 % (ref 40.7–50.3)
HEMOGLOBIN: 13.3 G/DL (ref 13–17)
IMMATURE GRANULOCYTES: 0 %
INR BLD: 0.9
LYMPHOCYTES # BLD: 36 % (ref 24–43)
MCH RBC QN AUTO: 28.5 PG (ref 25.2–33.5)
MCHC RBC AUTO-ENTMCNC: 32 G/DL (ref 28.4–34.8)
MCV RBC AUTO: 89.1 FL (ref 82.6–102.9)
MODE: ABNORMAL
MONOCYTES # BLD: 9 % (ref 3–12)
MYOGLOBIN: 40 NG/ML (ref 28–72)
NEGATIVE BASE EXCESS, VEN: ABNORMAL (ref 0–2)
NRBC AUTOMATED: 0 PER 100 WBC
O2 DEVICE/FLOW/%: ABNORMAL
O2 SAT, VEN: 99 % (ref 60–85)
PARTIAL THROMBOPLASTIN TIME: 25.5 SEC (ref 20.5–30.5)
PATIENT TEMP: ABNORMAL
PCO2, VEN: 38.1 MM HG (ref 41–51)
PDW BLD-RTO: 14 % (ref 11.8–14.4)
PH VENOUS: 7.43 (ref 7.32–7.43)
PLATELET # BLD: 206 K/UL (ref 138–453)
PLATELET ESTIMATE: ABNORMAL
PMV BLD AUTO: 11.1 FL (ref 8.1–13.5)
PO2, VEN: 145.1 MM HG (ref 30–50)
POC CHLORIDE: 107 MMOL/L (ref 98–107)
POC CREATININE: 1.06 MG/DL (ref 0.51–1.19)
POC HEMATOCRIT: 41 % (ref 41–53)
POC HEMOGLOBIN: 14 G/DL (ref 13.5–17.5)
POC IONIZED CALCIUM: 1.1 MMOL/L (ref 1.15–1.33)
POC LACTIC ACID: 0.99 MMOL/L (ref 0.56–1.39)
POC PCO2 TEMP: ABNORMAL MM HG
POC PH TEMP: ABNORMAL
POC PO2 TEMP: ABNORMAL MM HG
POC POTASSIUM: 4 MMOL/L (ref 3.5–4.5)
POC SODIUM: 140 MMOL/L (ref 138–146)
POSITIVE BASE EXCESS, VEN: 1 (ref 0–3)
POTASSIUM SERPL-SCNC: 4.1 MMOL/L (ref 3.7–5.3)
PROTHROMBIN TIME: 10.1 SEC (ref 9–12)
RBC # BLD: 4.67 M/UL (ref 4.21–5.77)
RBC # BLD: ABNORMAL 10*6/UL
SAMPLE SITE: ABNORMAL
SEG NEUTROPHILS: 47 % (ref 36–65)
SEGMENTED NEUTROPHILS ABSOLUTE COUNT: 3.64 K/UL (ref 1.5–8.1)
SODIUM BLD-SCNC: 140 MMOL/L (ref 135–144)
TOTAL CK: 158 U/L (ref 39–308)
TOTAL CO2, VENOUS: 27 MMOL/L (ref 23–30)
TROPONIN INTERP: ABNORMAL
TROPONIN INTERP: NORMAL
TROPONIN T: ABNORMAL NG/ML
TROPONIN T: NORMAL NG/ML
TROPONIN, HIGH SENSITIVITY: 21 NG/L (ref 0–22)
TROPONIN, HIGH SENSITIVITY: 22 NG/L (ref 0–22)
WBC # BLD: 7.6 K/UL (ref 3.5–11.3)
WBC # BLD: ABNORMAL 10*3/UL

## 2019-11-13 PROCEDURE — 93010 ELECTROCARDIOGRAM REPORT: CPT | Performed by: INTERNAL MEDICINE

## 2019-11-13 PROCEDURE — 83605 ASSAY OF LACTIC ACID: CPT

## 2019-11-13 PROCEDURE — G0378 HOSPITAL OBSERVATION PER HR: HCPCS

## 2019-11-13 PROCEDURE — 70496 CT ANGIOGRAPHY HEAD: CPT

## 2019-11-13 PROCEDURE — 84132 ASSAY OF SERUM POTASSIUM: CPT

## 2019-11-13 PROCEDURE — 70450 CT HEAD/BRAIN W/O DYE: CPT

## 2019-11-13 PROCEDURE — 97530 THERAPEUTIC ACTIVITIES: CPT

## 2019-11-13 PROCEDURE — 96374 THER/PROPH/DIAG INJ IV PUSH: CPT

## 2019-11-13 PROCEDURE — 71046 X-RAY EXAM CHEST 2 VIEWS: CPT

## 2019-11-13 PROCEDURE — 85610 PROTHROMBIN TIME: CPT

## 2019-11-13 PROCEDURE — 99285 EMERGENCY DEPT VISIT HI MDM: CPT

## 2019-11-13 PROCEDURE — 83874 ASSAY OF MYOGLOBIN: CPT

## 2019-11-13 PROCEDURE — 96375 TX/PRO/DX INJ NEW DRUG ADDON: CPT

## 2019-11-13 PROCEDURE — 82435 ASSAY OF BLOOD CHLORIDE: CPT

## 2019-11-13 PROCEDURE — 73030 X-RAY EXAM OF SHOULDER: CPT

## 2019-11-13 PROCEDURE — 82565 ASSAY OF CREATININE: CPT

## 2019-11-13 PROCEDURE — 97166 OT EVAL MOD COMPLEX 45 MIN: CPT

## 2019-11-13 PROCEDURE — 80048 BASIC METABOLIC PNL TOTAL CA: CPT

## 2019-11-13 PROCEDURE — 85014 HEMATOCRIT: CPT

## 2019-11-13 PROCEDURE — 6360000002 HC RX W HCPCS: Performed by: STUDENT IN AN ORGANIZED HEALTH CARE EDUCATION/TRAINING PROGRAM

## 2019-11-13 PROCEDURE — 82803 BLOOD GASES ANY COMBINATION: CPT

## 2019-11-13 PROCEDURE — 6370000000 HC RX 637 (ALT 250 FOR IP): Performed by: STUDENT IN AN ORGANIZED HEALTH CARE EDUCATION/TRAINING PROGRAM

## 2019-11-13 PROCEDURE — 99219 PR INITIAL OBSERVATION CARE/DAY 50 MINUTES: CPT | Performed by: PSYCHIATRY & NEUROLOGY

## 2019-11-13 PROCEDURE — 2580000003 HC RX 258: Performed by: STUDENT IN AN ORGANIZED HEALTH CARE EDUCATION/TRAINING PROGRAM

## 2019-11-13 PROCEDURE — 6360000004 HC RX CONTRAST MEDICATION: Performed by: STUDENT IN AN ORGANIZED HEALTH CARE EDUCATION/TRAINING PROGRAM

## 2019-11-13 PROCEDURE — 97161 PT EVAL LOW COMPLEX 20 MIN: CPT

## 2019-11-13 PROCEDURE — 82550 ASSAY OF CK (CPK): CPT

## 2019-11-13 PROCEDURE — 82553 CREATINE MB FRACTION: CPT

## 2019-11-13 PROCEDURE — 97535 SELF CARE MNGMENT TRAINING: CPT

## 2019-11-13 PROCEDURE — 93005 ELECTROCARDIOGRAM TRACING: CPT | Performed by: STUDENT IN AN ORGANIZED HEALTH CARE EDUCATION/TRAINING PROGRAM

## 2019-11-13 PROCEDURE — 84295 ASSAY OF SERUM SODIUM: CPT

## 2019-11-13 PROCEDURE — 85730 THROMBOPLASTIN TIME PARTIAL: CPT

## 2019-11-13 PROCEDURE — 84484 ASSAY OF TROPONIN QUANT: CPT

## 2019-11-13 PROCEDURE — 82947 ASSAY GLUCOSE BLOOD QUANT: CPT

## 2019-11-13 PROCEDURE — 70551 MRI BRAIN STEM W/O DYE: CPT

## 2019-11-13 PROCEDURE — 82330 ASSAY OF CALCIUM: CPT

## 2019-11-13 PROCEDURE — 85025 COMPLETE CBC W/AUTO DIFF WBC: CPT

## 2019-11-13 RX ORDER — LIDOCAINE 50 MG/G
1 PATCH TOPICAL DAILY
Qty: 10 PATCH | Refills: 0 | Status: SHIPPED | OUTPATIENT
Start: 2019-11-13 | End: 2019-11-23

## 2019-11-13 RX ORDER — TRAMADOL HYDROCHLORIDE 50 MG/1
50 TABLET ORAL EVERY 6 HOURS PRN
Status: DISCONTINUED | OUTPATIENT
Start: 2019-11-13 | End: 2019-11-13 | Stop reason: HOSPADM

## 2019-11-13 RX ORDER — TRAMADOL HYDROCHLORIDE 50 MG/1
100 TABLET ORAL EVERY 6 HOURS PRN
Status: DISCONTINUED | OUTPATIENT
Start: 2019-11-13 | End: 2019-11-13 | Stop reason: HOSPADM

## 2019-11-13 RX ORDER — SODIUM CHLORIDE 0.9 % (FLUSH) 0.9 %
10 SYRINGE (ML) INJECTION EVERY 12 HOURS SCHEDULED
Status: DISCONTINUED | OUTPATIENT
Start: 2019-11-13 | End: 2019-11-13 | Stop reason: HOSPADM

## 2019-11-13 RX ORDER — ASPIRIN 81 MG/1
81 TABLET, CHEWABLE ORAL DAILY
Status: DISCONTINUED | OUTPATIENT
Start: 2019-11-13 | End: 2019-11-13 | Stop reason: HOSPADM

## 2019-11-13 RX ORDER — SODIUM CHLORIDE 0.9 % (FLUSH) 0.9 %
10 SYRINGE (ML) INJECTION PRN
Status: DISCONTINUED | OUTPATIENT
Start: 2019-11-13 | End: 2019-11-13 | Stop reason: HOSPADM

## 2019-11-13 RX ORDER — IBUPROFEN 600 MG/1
600 TABLET ORAL 3 TIMES DAILY PRN
Qty: 30 TABLET | Refills: 0 | Status: SHIPPED | OUTPATIENT
Start: 2019-11-13 | End: 2019-12-05

## 2019-11-13 RX ORDER — KETOROLAC TROMETHAMINE 30 MG/ML
30 INJECTION, SOLUTION INTRAMUSCULAR; INTRAVENOUS ONCE
Status: COMPLETED | OUTPATIENT
Start: 2019-11-13 | End: 2019-11-13

## 2019-11-13 RX ORDER — ACETAMINOPHEN 325 MG/1
650 TABLET ORAL EVERY 4 HOURS PRN
Status: DISCONTINUED | OUTPATIENT
Start: 2019-11-13 | End: 2019-11-13 | Stop reason: HOSPADM

## 2019-11-13 RX ORDER — AMLODIPINE BESYLATE 5 MG/1
5 TABLET ORAL DAILY
Status: DISCONTINUED | OUTPATIENT
Start: 2019-11-13 | End: 2019-11-13 | Stop reason: HOSPADM

## 2019-11-13 RX ORDER — ATORVASTATIN CALCIUM 80 MG/1
80 TABLET, FILM COATED ORAL NIGHTLY
Status: DISCONTINUED | OUTPATIENT
Start: 2019-11-13 | End: 2019-11-13 | Stop reason: HOSPADM

## 2019-11-13 RX ORDER — FENTANYL CITRATE 50 UG/ML
50 INJECTION, SOLUTION INTRAMUSCULAR; INTRAVENOUS ONCE
Status: COMPLETED | OUTPATIENT
Start: 2019-11-13 | End: 2019-11-13

## 2019-11-13 RX ORDER — NICOTINE 21 MG/24HR
1 PATCH, TRANSDERMAL 24 HOURS TRANSDERMAL DAILY
Status: DISCONTINUED | OUTPATIENT
Start: 2019-11-13 | End: 2019-11-13 | Stop reason: HOSPADM

## 2019-11-13 RX ADMIN — AMLODIPINE BESYLATE 5 MG: 5 TABLET ORAL at 10:48

## 2019-11-13 RX ADMIN — TRAMADOL HYDROCHLORIDE 100 MG: 50 TABLET, FILM COATED ORAL at 10:48

## 2019-11-13 RX ADMIN — ASPIRIN 81 MG: 81 TABLET, CHEWABLE ORAL at 10:48

## 2019-11-13 RX ADMIN — Medication 10 ML: at 10:49

## 2019-11-13 RX ADMIN — IOHEXOL 90 ML: 350 INJECTION, SOLUTION INTRAVENOUS at 04:58

## 2019-11-13 RX ADMIN — FENTANYL CITRATE 50 MCG: 50 INJECTION, SOLUTION INTRAMUSCULAR; INTRAVENOUS at 05:53

## 2019-11-13 RX ADMIN — KETOROLAC TROMETHAMINE 30 MG: 30 INJECTION, SOLUTION INTRAMUSCULAR at 06:37

## 2019-11-13 ASSESSMENT — PAIN DESCRIPTION - LOCATION
LOCATION: SHOULDER

## 2019-11-13 ASSESSMENT — ENCOUNTER SYMPTOMS
NAUSEA: 0
SORE THROAT: 0
BACK PAIN: 0
VOMITING: 0
ABDOMINAL PAIN: 0
TROUBLE SWALLOWING: 0
SHORTNESS OF BREATH: 0
VOICE CHANGE: 0

## 2019-11-13 ASSESSMENT — PAIN DESCRIPTION - ORIENTATION
ORIENTATION: LEFT

## 2019-11-13 ASSESSMENT — PAIN SCALES - GENERAL
PAINLEVEL_OUTOF10: 10
PAINLEVEL_OUTOF10: 8
PAINLEVEL_OUTOF10: 10
PAINLEVEL_OUTOF10: 10
PAINLEVEL_OUTOF10: 8
PAINLEVEL_OUTOF10: 9

## 2019-11-13 ASSESSMENT — PAIN DESCRIPTION - PAIN TYPE: TYPE: ACUTE PAIN

## 2019-11-13 ASSESSMENT — PAIN DESCRIPTION - DESCRIPTORS
DESCRIPTORS: DISCOMFORT
DESCRIPTORS: SHOOTING;SHARP
DESCRIPTORS: SHOOTING;SHARP

## 2019-11-13 ASSESSMENT — PAIN DESCRIPTION - FREQUENCY: FREQUENCY: CONTINUOUS

## 2019-11-14 ASSESSMENT — ENCOUNTER SYMPTOMS
BACK PAIN: 1
SHORTNESS OF BREATH: 0
COUGH: 0

## 2019-12-05 ENCOUNTER — HOSPITAL ENCOUNTER (EMERGENCY)
Age: 64
Discharge: HOME | End: 2019-12-05
Attending: EMERGENCY MEDICINE
Payer: MEDICARE

## 2019-12-05 ENCOUNTER — APPOINTMENT (OUTPATIENT)
Dept: GENERAL RADIOLOGY | Age: 64
End: 2019-12-05
Payer: MEDICARE

## 2019-12-05 VITALS
OXYGEN SATURATION: 99 % | DIASTOLIC BLOOD PRESSURE: 69 MMHG | SYSTOLIC BLOOD PRESSURE: 157 MMHG | RESPIRATION RATE: 17 BRPM | HEART RATE: 88 BPM | TEMPERATURE: 98.1 F

## 2019-12-05 DIAGNOSIS — F19.10 POLYSUBSTANCE ABUSE (HCC): Primary | ICD-10-CM

## 2019-12-05 LAB
ABSOLUTE EOS #: 0.1 K/UL (ref 0–0.4)
ABSOLUTE IMMATURE GRANULOCYTE: ABNORMAL K/UL (ref 0–0.3)
ABSOLUTE LYMPH #: 1.8 K/UL (ref 1–4.8)
ABSOLUTE MONO #: 0.6 K/UL (ref 0.1–1.3)
ACETAMINOPHEN LEVEL: <5 UG/ML (ref 10–30)
AMPHETAMINE SCREEN URINE: NEGATIVE
BARBITURATE SCREEN URINE: NEGATIVE
BASOPHILS # BLD: 1 % (ref 0–2)
BASOPHILS ABSOLUTE: 0.1 K/UL (ref 0–0.2)
BENZODIAZEPINE SCREEN, URINE: NEGATIVE
BUPRENORPHINE URINE: ABNORMAL
CANNABINOID SCREEN URINE: NEGATIVE
COCAINE METABOLITE, URINE: POSITIVE
DIFFERENTIAL TYPE: ABNORMAL
EOSINOPHILS RELATIVE PERCENT: 1 % (ref 0–4)
ETHANOL PERCENT: <0.01 %
ETHANOL: <10 MG/DL
HCT VFR BLD CALC: 42.3 % (ref 41–53)
HEMOGLOBIN: 14.2 G/DL (ref 13.5–17.5)
IMMATURE GRANULOCYTES: ABNORMAL %
LYMPHOCYTES # BLD: 19 % (ref 24–44)
MCH RBC QN AUTO: 28.9 PG (ref 26–34)
MCHC RBC AUTO-ENTMCNC: 33.5 G/DL (ref 31–37)
MCV RBC AUTO: 86.5 FL (ref 80–100)
MDMA URINE: ABNORMAL
METHADONE SCREEN, URINE: NEGATIVE
METHAMPHETAMINE, URINE: ABNORMAL
MONOCYTES # BLD: 6 % (ref 1–7)
NRBC AUTOMATED: ABNORMAL PER 100 WBC
OPIATES, URINE: NEGATIVE
OXYCODONE SCREEN URINE: NEGATIVE
PDW BLD-RTO: 14.6 % (ref 11.5–14.9)
PHENCYCLIDINE, URINE: NEGATIVE
PLATELET # BLD: 233 K/UL (ref 150–450)
PLATELET ESTIMATE: ABNORMAL
PMV BLD AUTO: 8.1 FL (ref 6–12)
PROPOXYPHENE, URINE: ABNORMAL
RBC # BLD: 4.9 M/UL (ref 4.5–5.9)
RBC # BLD: ABNORMAL 10*6/UL
SALICYLATE LEVEL: <1 MG/DL (ref 3–10)
SEG NEUTROPHILS: 73 % (ref 36–66)
SEGMENTED NEUTROPHILS ABSOLUTE COUNT: 7.1 K/UL (ref 1.3–9.1)
TEST INFORMATION: ABNORMAL
TOXIC TRICYCLIC SC,BLOOD: ABNORMAL
TRICYCLIC ANTIDEP,URINE: NEGATIVE
TRICYCLIC ANTIDEPRESSANTS, UR: ABNORMAL
TROPONIN INTERP: NORMAL
TROPONIN INTERP: NORMAL
TROPONIN T: NORMAL NG/ML
TROPONIN T: NORMAL NG/ML
TROPONIN, HIGH SENSITIVITY: 19 NG/L (ref 0–22)
TROPONIN, HIGH SENSITIVITY: 22 NG/L (ref 0–22)
WBC # BLD: 9.7 K/UL (ref 3.5–11)
WBC # BLD: ABNORMAL 10*3/UL

## 2019-12-05 PROCEDURE — 80307 DRUG TEST PRSMV CHEM ANLYZR: CPT

## 2019-12-05 PROCEDURE — G0480 DRUG TEST DEF 1-7 CLASSES: HCPCS

## 2019-12-05 PROCEDURE — 85025 COMPLETE CBC W/AUTO DIFF WBC: CPT

## 2019-12-05 PROCEDURE — 84484 ASSAY OF TROPONIN QUANT: CPT

## 2019-12-05 PROCEDURE — 71046 X-RAY EXAM CHEST 2 VIEWS: CPT

## 2019-12-05 PROCEDURE — 99285 EMERGENCY DEPT VISIT HI MDM: CPT

## 2019-12-05 PROCEDURE — 36415 COLL VENOUS BLD VENIPUNCTURE: CPT

## 2019-12-05 PROCEDURE — 93005 ELECTROCARDIOGRAM TRACING: CPT | Performed by: EMERGENCY MEDICINE

## 2019-12-05 RX ORDER — CLOPIDOGREL BISULFATE 75 MG/1
75 TABLET ORAL DAILY
COMMUNITY
End: 2020-01-13

## 2019-12-05 RX ORDER — ESCITALOPRAM OXALATE 10 MG/1
10 TABLET ORAL DAILY
COMMUNITY
End: 2021-04-22

## 2019-12-05 RX ORDER — TRAZODONE HYDROCHLORIDE 50 MG/1
50 TABLET ORAL NIGHTLY
COMMUNITY
End: 2021-04-22

## 2019-12-05 RX ORDER — HYDROXYZINE HYDROCHLORIDE 25 MG/1
25 TABLET, FILM COATED ORAL 2 TIMES DAILY PRN
COMMUNITY

## 2019-12-05 ASSESSMENT — PAIN SCALES - GENERAL: PAINLEVEL_OUTOF10: 9

## 2019-12-05 ASSESSMENT — PAIN DESCRIPTION - PAIN TYPE: TYPE: CHRONIC PAIN

## 2019-12-06 LAB
EKG ATRIAL RATE: 66 BPM
EKG P AXIS: 65 DEGREES
EKG P-R INTERVAL: 204 MS
EKG Q-T INTERVAL: 426 MS
EKG QRS DURATION: 76 MS
EKG QTC CALCULATION (BAZETT): 446 MS
EKG R AXIS: 62 DEGREES
EKG T AXIS: -27 DEGREES
EKG VENTRICULAR RATE: 66 BPM

## 2019-12-06 PROCEDURE — 93010 ELECTROCARDIOGRAM REPORT: CPT | Performed by: INTERNAL MEDICINE

## 2019-12-23 PROBLEM — F14.10 COCAINE ABUSE (HCC): Status: ACTIVE | Noted: 2019-12-23

## 2019-12-27 ENCOUNTER — CARE COORDINATION (OUTPATIENT)
Dept: CARE COORDINATION | Age: 64
End: 2019-12-27

## 2020-01-03 ENCOUNTER — OFFICE VISIT (OUTPATIENT)
Dept: NEUROLOGY | Age: 65
End: 2020-01-03
Payer: MEDICARE

## 2020-01-03 VITALS
SYSTOLIC BLOOD PRESSURE: 118 MMHG | OXYGEN SATURATION: 94 % | DIASTOLIC BLOOD PRESSURE: 82 MMHG | WEIGHT: 222.6 LBS | HEIGHT: 69 IN | HEART RATE: 97 BPM | BODY MASS INDEX: 32.97 KG/M2

## 2020-01-03 PROCEDURE — G8427 DOCREV CUR MEDS BY ELIG CLIN: HCPCS | Performed by: STUDENT IN AN ORGANIZED HEALTH CARE EDUCATION/TRAINING PROGRAM

## 2020-01-03 PROCEDURE — G8484 FLU IMMUNIZE NO ADMIN: HCPCS | Performed by: STUDENT IN AN ORGANIZED HEALTH CARE EDUCATION/TRAINING PROGRAM

## 2020-01-03 PROCEDURE — G8417 CALC BMI ABV UP PARAM F/U: HCPCS | Performed by: STUDENT IN AN ORGANIZED HEALTH CARE EDUCATION/TRAINING PROGRAM

## 2020-01-03 PROCEDURE — 3017F COLORECTAL CA SCREEN DOC REV: CPT | Performed by: STUDENT IN AN ORGANIZED HEALTH CARE EDUCATION/TRAINING PROGRAM

## 2020-01-03 PROCEDURE — 4004F PT TOBACCO SCREEN RCVD TLK: CPT | Performed by: STUDENT IN AN ORGANIZED HEALTH CARE EDUCATION/TRAINING PROGRAM

## 2020-01-03 PROCEDURE — 99214 OFFICE O/P EST MOD 30 MIN: CPT | Performed by: STUDENT IN AN ORGANIZED HEALTH CARE EDUCATION/TRAINING PROGRAM

## 2020-01-03 ASSESSMENT — ENCOUNTER SYMPTOMS
EYE PAIN: 0
COUGH: 0
ABDOMINAL PAIN: 0
NAUSEA: 0
SHORTNESS OF BREATH: 0
VOMITING: 0
BACK PAIN: 1
CONSTIPATION: 0
DIARRHEA: 0
PHOTOPHOBIA: 0

## 2020-01-03 NOTE — PROGRESS NOTES
KNEE ARTHROPLASTY Right 01/2016    KNEE        Social History     Socioeconomic History    Marital status: Single     Spouse name: Not on file    Number of children: Not on file    Years of education: Not on file    Highest education level: Not on file   Occupational History    Not on file   Social Needs    Financial resource strain: Not on file    Food insecurity:     Worry: Not on file     Inability: Not on file    Transportation needs:     Medical: Not on file     Non-medical: Not on file   Tobacco Use    Smoking status: Current Every Day Smoker     Packs/day: 0.50     Years: 30.00     Pack years: 15.00     Types: Cigarettes    Smokeless tobacco: Never Used    Tobacco comment: trying to quit   Substance and Sexual Activity    Alcohol use:  Yes     Alcohol/week: 2.0 standard drinks     Types: 2 Cans of beer per week     Comment: SOCIALLY    Drug use: Yes     Types: Cocaine     Comment: drug abuse includes smoking cocaine;    Sexual activity: Yes     Partners: Female   Lifestyle    Physical activity:     Days per week: Not on file     Minutes per session: Not on file    Stress: Not on file   Relationships    Social connections:     Talks on phone: Not on file     Gets together: Not on file     Attends [employer] service: Not on file     Active member of club or organization: Not on file     Attends meetings of clubs or organizations: Not on file     Relationship status: Not on file    Intimate partner violence:     Fear of current or ex partner: Not on file     Emotionally abused: Not on file     Physically abused: Not on file     Forced sexual activity: Not on file   Other Topics Concern    Not on file   Social History Narrative    Not on file        Current Outpatient Medications   Medication Sig Dispense Refill    clopidogrel (PLAVIX) 75 MG tablet Take 75 mg by mouth daily      traZODone (DESYREL) 50 MG tablet Take 50 mg by mouth nightly      hydrOXYzine (ATARAX) 25 MG tablet Take 25 mg by mouth 2 times daily as needed for Anxiety      amLODIPine (NORVASC) 5 MG tablet Take 1 tablet by mouth daily 30 tablet 5    atorvastatin (LIPITOR) 80 MG tablet Take 1 tablet by mouth nightly 30 tablet 3    escitalopram (LEXAPRO) 10 MG tablet Take 10 mg by mouth daily      aspirin 81 MG chewable tablet Take 1 tablet by mouth daily (Patient not taking: Reported on 1/3/2020) 30 tablet 3     No current facility-administered medications for this visit. Allergies  Allergies   Allergen Reactions    Motrin [Ibuprofen] Nausea And Vomiting    Nsaids Nausea And Vomiting    Other Nausea And Vomiting     Pt states he cannot take any muscle relaxers but does not know what specific muscle relaxer caused the nausea. REVIEW OF SYSTEMS:     ROS  Review of Systems   Constitutional: Negative for appetite change, chills, fatigue, fever and unexpected weight change. Eyes: Negative for photophobia, pain and visual disturbance. Respiratory: Negative for cough and shortness of breath. Cardiovascular: Negative for chest pain and palpitations. Gastrointestinal: Negative for abdominal pain, constipation, diarrhea, nausea and vomiting. Endocrine: Negative for polydipsia and polyuria. Genitourinary: Negative for difficulty urinating, dysuria and hematuria. Musculoskeletal: Positive for arthralgias, back pain and gait problem (Due to left knee pain). Skin: Negative for pallor and rash. Neurological: Negative for dizziness, tremors, seizures, syncope, facial asymmetry, speech difficulty, weakness, light-headedness, numbness and headaches. Psychiatric/Behavioral: Positive for confusion, decreased concentration and dysphoric mood. Negative for behavioral problems and hallucinations.           VITALS  /82 (Site: Right Upper Arm, Position: Sitting, Cuff Size: Large Adult)   Pulse 97   Ht 5' 9\" (1.753 m)   Wt 222 lb 9.6 oz (101 kg)   SpO2 94%   BMI 32.87 kg/m²      PHYSICAL EXAMINATION: LDLCHOLESTEROL 120 09/18/2019    LDLCHOLESTEROL 79 05/03/2017    LDLCHOLESTEROL 123 04/23/2017       Lab Results   Component Value Date    HDL 34 (L) 09/18/2019    HDL 45 05/03/2017    HDL 45 04/23/2017     Lab Results   Component Value Date    TRIG 186 (H) 09/18/2019    TRIG 72 05/03/2017    TRIG 131 04/23/2017       IMAGING:     MRI brain without contrast: 9/17/19  Impression   1. There are early subacute infarctions noted in the right parietal lobe, in   the vascular distribution of the posterior right middle cerebral artery. 2. Mild chronic microvascular white matter ischemic disease. CTA head and neck:9/16/19  Impression   Unremarkable CTA of the head and neck without significant stenosis or   evidence for occlusion. .         2D Echo:9/17/19  Summary  Normal left ventricle size, wall thickness and function with an estimated EF  > 55%. Basal septal hypertrophy (\"sigmoid septum\"). Negative bubble study, no shunt noted. Focal thickening on tips of left and non coronary cusps. Moderate aortic insufficiency. Trivial valve disease elsewhere. GWEN:9/18/19  Summary  The study was performed by the Cardiologist, the Cardiology Fellow and the  sonographer, in the Cath Lab. The study was performed under conscious sedation. Normal left ventricular chamber dimension and function. Left atrium is normal in size. Left atrial appendage showed no evidence of clot. Inter-atrial septum is intact with no evidence for an atrial septal defect. Negative bubble study, no shunt noted. Mild aortic insufficiency. Mild to moderate mitral regurgitation. Mild tricuspid regurgitation. MRI brain 11/13/2019  Impression   Unremarkable MRI of the brain without acute intracranial abnormality. CTA head and neck with contrast 11/13/2019  Impression   1. No high-grade stenosis or focal occlusion involving intracranial or   cervical vasculature.  No evidence of acute dissection.  No evidence of   aneurysm.       2. Mild emphysema at the lung apices. Cardiac monitoring( Holter/Event ): Loop recorder placed during hospital admission in Sept 2019      ASSESSMENT / PLAN:       Laura Francis is a 60 y.o.  male  was seen in the clinic for post hospital follow up. · Transient episode of left arm pain and dysesthesia in November 2019. Currently denies any pain or paresthesias. .  Patient was evaluated in the ER by the stroke team.  MRI brain negative for acute infarct. CTA head and neck was unremarkable. 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 9/18/2019    HbA1c 6.5    PLAN:   -Continue Plavix 75 mg.  -Continue Lipitor 80 mg for now, ordered lipid panel. Instructed patient to follow-up with the PCP regarding the LDL level, goal LDL less than 70. Further dose adjustment of Lipitor as per the LDL level. - Ordered Lipid panel  - Follow-up with cardiology for loop recorder.  - Smoking status: Discussed smoking cessation with the patient, currently patient not interested. - Stroke risk factors including hypertension, hyperlipidemia and Diabetes management as per PCP  - Education regarding secondary stroke prevention was provided. - Follow up in the clinic as needed  - Instructed patient to call the clinic if symptoms worsen or develop any new symptoms. I have spent 25 minutes face to face with the patient more than 50% of this time was spent counseling and coordinating care.       Electronically signed by Dick Ledezma MD on 1/3/2020 at 12:30 PM

## 2020-01-12 ENCOUNTER — APPOINTMENT (OUTPATIENT)
Dept: CT IMAGING | Age: 65
End: 2020-01-12
Payer: MEDICARE

## 2020-01-12 ENCOUNTER — APPOINTMENT (OUTPATIENT)
Dept: GENERAL RADIOLOGY | Age: 65
End: 2020-01-12
Payer: MEDICARE

## 2020-01-12 ENCOUNTER — HOSPITAL ENCOUNTER (EMERGENCY)
Age: 65
Discharge: HOME | End: 2020-01-13
Attending: EMERGENCY MEDICINE
Payer: MEDICARE

## 2020-01-12 VITALS
BODY MASS INDEX: 33.47 KG/M2 | DIASTOLIC BLOOD PRESSURE: 96 MMHG | HEART RATE: 84 BPM | HEIGHT: 69 IN | WEIGHT: 226 LBS | SYSTOLIC BLOOD PRESSURE: 122 MMHG | OXYGEN SATURATION: 98 % | RESPIRATION RATE: 16 BRPM

## 2020-01-12 LAB
ABSOLUTE EOS #: 0.25 K/UL (ref 0–0.44)
ABSOLUTE IMMATURE GRANULOCYTE: <0.03 K/UL (ref 0–0.3)
ABSOLUTE LYMPH #: 2.26 K/UL (ref 1.1–3.7)
ABSOLUTE MONO #: 0.46 K/UL (ref 0.1–1.2)
ANION GAP SERPL CALCULATED.3IONS-SCNC: 14 MMOL/L (ref 9–17)
BASOPHILS # BLD: 1 % (ref 0–2)
BASOPHILS ABSOLUTE: 0.05 K/UL (ref 0–0.2)
BNP INTERPRETATION: NORMAL
BUN BLDV-MCNC: 29 MG/DL (ref 8–23)
BUN/CREAT BLD: ABNORMAL (ref 9–20)
CALCIUM SERPL-MCNC: 8.7 MG/DL (ref 8.6–10.4)
CHLORIDE BLD-SCNC: 105 MMOL/L (ref 98–107)
CO2: 21 MMOL/L (ref 20–31)
CREAT SERPL-MCNC: 1.59 MG/DL (ref 0.7–1.2)
D-DIMER QUANTITATIVE: 0.34 MG/L FEU
DIFFERENTIAL TYPE: NORMAL
EOSINOPHILS RELATIVE PERCENT: 3 % (ref 1–4)
ETHANOL PERCENT: <0.01 %
ETHANOL: <10 MG/DL
GFR AFRICAN AMERICAN: 54 ML/MIN
GFR NON-AFRICAN AMERICAN: 44 ML/MIN
GFR SERPL CREATININE-BSD FRML MDRD: ABNORMAL ML/MIN/{1.73_M2}
GFR SERPL CREATININE-BSD FRML MDRD: ABNORMAL ML/MIN/{1.73_M2}
GLUCOSE BLD-MCNC: 104 MG/DL (ref 70–99)
HCT VFR BLD CALC: 43.7 % (ref 40.7–50.3)
HEMOGLOBIN: 14.2 G/DL (ref 13–17)
IMMATURE GRANULOCYTES: 0 %
LYMPHOCYTES # BLD: 28 % (ref 24–43)
MCH RBC QN AUTO: 28 PG (ref 25.2–33.5)
MCHC RBC AUTO-ENTMCNC: 32.5 G/DL (ref 28.4–34.8)
MCV RBC AUTO: 86.2 FL (ref 82.6–102.9)
MONOCYTES # BLD: 6 % (ref 3–12)
NRBC AUTOMATED: 0 PER 100 WBC
PDW BLD-RTO: 14.3 % (ref 11.8–14.4)
PLATELET # BLD: 224 K/UL (ref 138–453)
PLATELET ESTIMATE: NORMAL
PMV BLD AUTO: 10.3 FL (ref 8.1–13.5)
POTASSIUM SERPL-SCNC: 4.2 MMOL/L (ref 3.7–5.3)
PRO-BNP: <20 PG/ML
RBC # BLD: 5.07 M/UL (ref 4.21–5.77)
RBC # BLD: NORMAL 10*6/UL
SEG NEUTROPHILS: 62 % (ref 36–65)
SEGMENTED NEUTROPHILS ABSOLUTE COUNT: 5.09 K/UL (ref 1.5–8.1)
SODIUM BLD-SCNC: 140 MMOL/L (ref 135–144)
TROPONIN INTERP: NORMAL
TROPONIN INTERP: NORMAL
TROPONIN T: NORMAL NG/ML
TROPONIN T: NORMAL NG/ML
TROPONIN, HIGH SENSITIVITY: 17 NG/L (ref 0–22)
TROPONIN, HIGH SENSITIVITY: 19 NG/L (ref 0–22)
WBC # BLD: 8.1 K/UL (ref 3.5–11.3)
WBC # BLD: NORMAL 10*3/UL

## 2020-01-12 PROCEDURE — 6360000004 HC RX CONTRAST MEDICATION: Performed by: EMERGENCY MEDICINE

## 2020-01-12 PROCEDURE — 85025 COMPLETE CBC W/AUTO DIFF WBC: CPT

## 2020-01-12 PROCEDURE — 93005 ELECTROCARDIOGRAM TRACING: CPT

## 2020-01-12 PROCEDURE — 83880 ASSAY OF NATRIURETIC PEPTIDE: CPT

## 2020-01-12 PROCEDURE — 85379 FIBRIN DEGRADATION QUANT: CPT

## 2020-01-12 PROCEDURE — 71046 X-RAY EXAM CHEST 2 VIEWS: CPT

## 2020-01-12 PROCEDURE — 80048 BASIC METABOLIC PNL TOTAL CA: CPT

## 2020-01-12 PROCEDURE — 70498 CT ANGIOGRAPHY NECK: CPT

## 2020-01-12 PROCEDURE — 84484 ASSAY OF TROPONIN QUANT: CPT

## 2020-01-12 PROCEDURE — G0480 DRUG TEST DEF 1-7 CLASSES: HCPCS

## 2020-01-12 PROCEDURE — 70450 CT HEAD/BRAIN W/O DYE: CPT

## 2020-01-12 PROCEDURE — 99285 EMERGENCY DEPT VISIT HI MDM: CPT

## 2020-01-12 RX ADMIN — IOHEXOL 90 ML: 350 INJECTION, SOLUTION INTRAVENOUS at 22:13

## 2020-01-12 ASSESSMENT — ENCOUNTER SYMPTOMS
SHORTNESS OF BREATH: 1
ABDOMINAL PAIN: 0
VOMITING: 0
SORE THROAT: 0
NAUSEA: 0

## 2020-01-12 ASSESSMENT — PAIN SCALES - GENERAL: PAINLEVEL_OUTOF10: 10

## 2020-01-12 ASSESSMENT — PAIN DESCRIPTION - LOCATION: LOCATION: BACK

## 2020-01-13 RX ORDER — ATORVASTATIN CALCIUM 80 MG/1
80 TABLET, FILM COATED ORAL NIGHTLY
Qty: 30 TABLET | Refills: 3 | Status: SHIPPED | OUTPATIENT
Start: 2020-01-13 | End: 2020-10-21 | Stop reason: SDUPTHER

## 2020-01-13 NOTE — ED PROVIDER NOTES
(Left, 03/13/2019); toe arthroplasty (Left, 3/13/2019); and Insertable Cardiac Monitor (09/19/2019). Social History     Socioeconomic History    Marital status: Single     Spouse name: Not on file    Number of children: Not on file    Years of education: Not on file    Highest education level: Not on file   Occupational History    Not on file   Social Needs    Financial resource strain: Not on file    Food insecurity:     Worry: Not on file     Inability: Not on file    Transportation needs:     Medical: Not on file     Non-medical: Not on file   Tobacco Use    Smoking status: Current Every Day Smoker     Packs/day: 0.50     Years: 30.00     Pack years: 15.00     Types: Cigarettes    Smokeless tobacco: Never Used    Tobacco comment: trying to quit   Substance and Sexual Activity    Alcohol use: Yes     Alcohol/week: 2.0 standard drinks     Types: 2 Cans of beer per week     Comment: SOCIALLY    Drug use: Yes     Types: Cocaine     Comment: drug abuse includes smoking cocaine;    Sexual activity: Yes     Partners: Female   Lifestyle    Physical activity:     Days per week: Not on file     Minutes per session: Not on file    Stress: Not on file   Relationships    Social connections:     Talks on phone: Not on file     Gets together: Not on file     Attends Zoroastrian service: Not on file     Active member of club or organization: Not on file     Attends meetings of clubs or organizations: Not on file     Relationship status: Not on file    Intimate partner violence:     Fear of current or ex partner: Not on file     Emotionally abused: Not on file     Physically abused: Not on file     Forced sexual activity: Not on file   Other Topics Concern    Not on file   Social History Narrative    Not on file       Family History   Problem Relation Age of Onset    Diabetes Mother     Diabetes Brother        Allergies:  Motrin [ibuprofen];  Nsaids; and Other    Home Medications:  Prior to Admission discharge. Extraocular Movements: Extraocular movements intact. Pupils: Pupils are equal, round, and reactive to light. Neck:      Trachea: No tracheal deviation. Cardiovascular:      Rate and Rhythm: Normal rate and regular rhythm. Pulses: Normal pulses. Heart sounds: Normal heart sounds. Pulmonary:      Effort: Pulmonary effort is normal. No respiratory distress. Breath sounds: Normal breath sounds. No stridor. No wheezing, rhonchi or rales. Abdominal:      General: Bowel sounds are normal. There is no distension. Palpations: Abdomen is soft. There is no mass. Tenderness: There is no tenderness. There is no guarding or rebound. Musculoskeletal: Normal range of motion. General: No deformity. Skin:     General: Skin is warm and dry. Findings: No rash. Neurological:      Mental Status: He is alert and oriented to person, place, and time. Comments: Mental Status: alert & oriented X4, attentive, follows simple and complex commands, no aphasia, no dysarthria  Cranial nerves:  no gross visual field deficit bilaterally  visual acuity grossly intact bilaterally  PERRL  no ptosis, nystagmus noted bilaterally but fatigues, EOMI  facial sensation grossly intact and symmetric bilaterally  symmetric facial expressions b/l, no facial droop  hearing grossly intact b/l  symmetric movement of the palate b/l  sternocleidomastoid and trapezius muscle strength symmetric and strong b/l  symmetric tongue movement b/l  Motor: strength testing 5/5 in b/l biceps, triceps, hand interossei, iliopsoas, hamstrings, ankle plantar and dorsiflexors. No pronator drift in b/l upper extremities. Coordination: smooth and normal finger to nose testing bilaterally without tremor. Heel-knee-shin testing smooth and symmetric b/l without tremor. No involuntary movements noted.   Reflexes: b/l brachioradialis and patellar 2+/4 and equal b/l  Gait: testing deferred  Sensation: light touch Range    Glucose 104 (H) 70 - 99 mg/dL    BUN 29 (H) 8 - 23 mg/dL    CREATININE 1.59 (H) 0.70 - 1.20 mg/dL    Bun/Cre Ratio NOT REPORTED 9 - 20    Calcium 8.7 8.6 - 10.4 mg/dL    Sodium 140 135 - 144 mmol/L    Potassium 4.2 3.7 - 5.3 mmol/L    Chloride 105 98 - 107 mmol/L    CO2 21 20 - 31 mmol/L    Anion Gap 14 9 - 17 mmol/L    GFR Non-African American 44 (L) >60 mL/min    GFR  54 (L) >60 mL/min    GFR Comment          GFR Staging NOT REPORTED    Brain Natriuretic Peptide   Result Value Ref Range    Pro-BNP <20 <300 pg/mL    BNP Interpretation Pro-BNP Reference Range:    D-Dimer, Quantitative   Result Value Ref Range    D-Dimer, Quant 0.34 mg/L FEU   Troponin   Result Value Ref Range    Troponin, High Sensitivity 19 0 - 22 ng/L    Troponin T NOT REPORTED <0.03 ng/mL    Troponin Interp NOT REPORTED    Troponin   Result Value Ref Range    Troponin, High Sensitivity 17 0 - 22 ng/L    Troponin T NOT REPORTED <0.03 ng/mL    Troponin Interp NOT REPORTED    ETHANOL   Result Value Ref Range    Ethanol <10 <10 mg/dL    Ethanol percent <0.010 <0.010 %   EKG 12 Lead   Result Value Ref Range    Ventricular Rate 88 BPM    Atrial Rate 88 BPM    P-R Interval 194 ms    QRS Duration 78 ms    Q-T Interval 368 ms    QTc Calculation (Bazett) 445 ms    P Axis 61 degrees    R Axis 55 degrees    T Axis 32 degrees       RADIOLOGY:  Xr Chest Standard (2 Vw)    Result Date: 1/12/2020  EXAMINATION: TWO XRAY VIEWS OF THE CHEST 1/12/2020 8:31 pm COMPARISON: 12/05/2019 HISTORY: ORDERING SYSTEM PROVIDED HISTORY: sob TECHNOLOGIST PROVIDED HISTORY: sob Reason for Exam: hx copd Acuity: Unknown Type of Exam: Unknown FINDINGS: Heart size and configuration are normal.  Hilar and mediastinal structures are unremarkable. The lungs are clear. No pneumothorax or pleural fluid. There is an implanted cardiac event monitor. Thoracic spondylosis. No acute bone finding. No acute cardiopulmonary disease.      Ct Head Wo Contrast    Result Date: 1/12/2020  EXAMINATION: CT OF THE HEAD WITHOUT CONTRAST; CTA OF THE HEAD AND NECK WITH CONTRAST 1/12/2020 10:04 pm: TECHNIQUE: CT of the head was performed without the administration of intravenous contrast. Dose modulation, iterative reconstruction, and/or weight based adjustment of the mA/kV was utilized to reduce the radiation dose to as low as reasonably achievable.; CTA of the head and neck was performed with the administration of intravenous contrast. Multiplanar reformatted images are provided for review. MIP images are provided for review. Stenosis of the internal carotid arteries measured using NASCET criteria. Dose modulation, iterative reconstruction, and/or weight based adjustment of the mA/kV was utilized to reduce the radiation dose to as low as reasonably achievable. COMPARISON: 11/13/2019 HISTORY: ORDERING SYSTEM PROVIDED HISTORY: left arm numbness TECHNOLOGIST PROVIDED HISTORY: left arm numbness Reason for Exam: left arm numbness Acuity: Unknown Type of Exam: Unknown; ORDERING SYSTEM PROVIDED HISTORY: left arm numbness TECHNOLOGIST PROVIDED HISTORY: left arm numbness Reason for Exam: left arm numbness Acuity: Unknown Type of Exam: Unknown FINDINGS: CTA NECK: AORTIC ARCH/ARCH VESSELS: No dissection or arterial injury. No significant stenosis of the brachiocephalic or subclavian arteries. CAROTID ARTERIES: No dissection, arterial injury, or hemodynamically significant stenosis by NASCET criteria. VERTEBRAL ARTERIES: No dissection, arterial injury, or significant stenosis. SOFT TISSUES: There are mild emphysematous changes of the lungs. BONES: Degenerative changes of the spine are present. CTA HEAD: ANTERIOR CIRCULATION: No significant stenosis of the intracranial internal carotid, anterior cerebral, or middle cerebral arteries. No aneurysm. POSTERIOR CIRCULATION: No significant stenosis of the vertebral, basilar, or posterior cerebral arteries. No aneurysm.  OTHER: No dural venous sinus thrombosis on this non-dedicated study. BRAIN: No mass effect or midline shift. No extra-axial fluid collection. There is a chronic appearing infarct within the high right parietal lobe. Chronic infarct within the high right parietal lobe. No flow limiting stenosis or large vessel occlusion detected within the head or neck. Incidentally noted pulmonary emphysema. Cta Head Neck W Contrast    Result Date: 1/12/2020  EXAMINATION: CT OF THE HEAD WITHOUT CONTRAST; CTA OF THE HEAD AND NECK WITH CONTRAST 1/12/2020 10:04 pm: TECHNIQUE: CT of the head was performed without the administration of intravenous contrast. Dose modulation, iterative reconstruction, and/or weight based adjustment of the mA/kV was utilized to reduce the radiation dose to as low as reasonably achievable.; CTA of the head and neck was performed with the administration of intravenous contrast. Multiplanar reformatted images are provided for review. MIP images are provided for review. Stenosis of the internal carotid arteries measured using NASCET criteria. Dose modulation, iterative reconstruction, and/or weight based adjustment of the mA/kV was utilized to reduce the radiation dose to as low as reasonably achievable. COMPARISON: 11/13/2019 HISTORY: ORDERING SYSTEM PROVIDED HISTORY: left arm numbness TECHNOLOGIST PROVIDED HISTORY: left arm numbness Reason for Exam: left arm numbness Acuity: Unknown Type of Exam: Unknown; ORDERING SYSTEM PROVIDED HISTORY: left arm numbness TECHNOLOGIST PROVIDED HISTORY: left arm numbness Reason for Exam: left arm numbness Acuity: Unknown Type of Exam: Unknown FINDINGS: CTA NECK: AORTIC ARCH/ARCH VESSELS: No dissection or arterial injury. No significant stenosis of the brachiocephalic or subclavian arteries. CAROTID ARTERIES: No dissection, arterial injury, or hemodynamically significant stenosis by NASCET criteria. VERTEBRAL ARTERIES: No dissection, arterial injury, or significant stenosis.  SOFT TISSUES: There are mild emphysematous changes of the lungs. BONES: Degenerative changes of the spine are present. CTA HEAD: ANTERIOR CIRCULATION: No significant stenosis of the intracranial internal carotid, anterior cerebral, or middle cerebral arteries. No aneurysm. POSTERIOR CIRCULATION: No significant stenosis of the vertebral, basilar, or posterior cerebral arteries. No aneurysm. OTHER: No dural venous sinus thrombosis on this non-dedicated study. BRAIN: No mass effect or midline shift. No extra-axial fluid collection. There is a chronic appearing infarct within the high right parietal lobe. Chronic infarct within the high right parietal lobe. No flow limiting stenosis or large vessel occlusion detected within the head or neck. Incidentally noted pulmonary emphysema. EKG  Rhythm: normal sinus   Rate: normal  Axis: normal  Ectopy: none  Conduction: normal  ST Segments: no acute change  T Waves: non specific changes  Q Waves: none    Clinical Impression: non-specific EKG compared to prior dated 05-DEC-2019    Adrienne Chau DO     All EKG's are interpreted by the Emergency Department Physician who either signs or Co-signs this chart in the absence of a cardiologist.    DIFFERENTIAL DIAGNOSIS:  ACS/MI, CVA, TIA, radiculopathy  PE, pneumonia, pneumothorax, CHF exacerbation    EMERGENCY DEPARTMENT COURSE & MDM:  60 y.o. male presents with a chief complaint of shortness of breath, left arm numbness, and suicidal ideation. Vitals notable for hypertension otherwise stable. Patient is well-appearing and in no acute distress. Other than subjective left arm numbness, there are no objective neurologic deficits. Plan is for CT scan of the head and neck to further assess. My clinical concern for radiculopathy is low given patient's history. Will get cardiopulmonary work-up to further assess. Will place suicide precautions and anticipate evaluation by psychiatry once patient is medically cleared.     ED Course as of Jan 13

## 2020-01-13 NOTE — TELEPHONE ENCOUNTER
E-scribe request for atorvastatin. Please review and e-scribe if applicable.      Last Visit Date:  11/08/2019  Next Visit Date:  1/12/2020    Hemoglobin A1C (%)   Date Value   09/17/2019 6.1 (H)   09/04/2019 6.0   06/27/2018 5.9             ( goal A1C is < 7)   No results found for: LABMICR  LDL Cholesterol (mg/dL)   Date Value   09/18/2019 120       (goal LDL is <100)   AST (U/L)   Date Value   12/23/2018 13     ALT (U/L)   Date Value   12/23/2018 13     BUN (mg/dL)   Date Value   01/12/2020 29 (H)     BP Readings from Last 3 Encounters:   01/12/20 (!) 122/96   01/03/20 118/82   12/05/19 (!) 157/69          (goal 120/80)        Patient Active Problem List:     Tobacco abuse     Back pain     Hypercholesteremia     Trigger finger of left hand     Trigger finger, right     Degenerative arthritis of knee, bilateral     Right shoulder pain     Essential hypertension     Chronic obstructive pulmonary disease (HCC)     Foot pain, right     Midline low back pain with right-sided sciatica     Spondylolisthesis, lumbar region     Chronic low back pain without sciatica     Right hip pain     Acute ischemic right middle cerebral artery (MCA) stroke (HCC)     Vision changes     Stroke-like symptoms     Vitamin D deficiency     Elevated LDL cholesterol level     Thalamic infarct, acute (HCC)     Simple chronic bronchitis (HCC)     Neural foraminal stenosis of lumbar spine     Postlaminectomy syndrome     Medication monitoring encounter     Idiopathic acute pancreatitis     Unstable gait     Major depressive disorder, recurrent episode, moderate (HCC)     Primary osteoarthritis of both knees     TIA (transient ischemic attack)     Paresthesias     Acute ischemic stroke ([facility] [address]     Cocaine abuse ([facility] [address]      ----[employer]

## 2020-01-13 NOTE — ED PROVIDER NOTES
[facility]     Emergency Department     Faculty Attestation        I performed a history and physical examination of the patient and discussed management with the resident. I reviewed the residents note and agree with the documented findings and plan of care. Any areas of disagreement are noted on the chart. I was personally present for the key portions of any procedures. I have documented in the chart those procedures where I was not present during the key portions. I have reviewed the emergency nurses triage note. I agree with the chief complaint, past medical history, past surgical history, allergies, medications, social and family history as documented unless otherwise noted below. For mid-level providers such as nurse practitioners as well as physicians assistants:    I have personally seen and evaluated the patient. I find the patient's history and physical exam are consistent with NP/PA documentation. I agree with the care provided, treatment rendered, disposition, & follow-up plan. Additional findings are as noted. Vital Signs: BP (!) 122/96   Pulse 84   Resp 16   Ht 5' 9\" (1.753 m)   Wt 226 lb (102.5 kg)   SpO2 98%   BMI 33.37 kg/m²   PCP:  Niesha Birmingham, DO    Pertinent Comments:           Critical Care  None         Note, if the patient's blood pressure was elevated, and they have no history of hypertension, they were informed of the following: The patient may have Pre-hypertension/Hypertension: The patient has been informed that they may have pre-hypertension or Hypertension based on a blood pressure reading in the emergency department. I recommend that the patient call the primary care provider listed on their discharge instructions or a physician of their choice this week to arrange follow up for further evaluation of possible pre-hypertension or Hypertension.   (Please note that portions of this note were completed with a voice recognition program.  Efforts were made to edit the dictations but occasionally words are mis-transcribed. )    Reanna Vyas MD  Attending Emergency Medicine Physician              Windy Acosta MD  01/12/20 2038

## 2020-01-13 NOTE — CONSULTS
HGB 14.2   HCT 43.7        Chemistry:  Recent Labs     01/12/20  2054      K 4.2      CO2 21   GLUCOSE 104*   BUN 29*   CREATININE 1.59*   CALCIUM 8.7     No results for input(s): PROT, LABALBU, LABA1C, N3PTASN, Y2DBRMS, FT4, TSH, AST, ALT, LDH, AMMONIA, CHOL, HDL, LDLCHOLESTEROL, CHOLHDLRATIO, TRIG, VLDL, CKTOTAL, CKMB, CKMBINDEX, RF, DAVID in the last 72 hours. No results found for: PHENYTOIN, PHENYTOIN, VALPROATE, CBMZ        Diagnostic data reviewed:  CT head and CTA head and neck:    Impression   Chronic infarct within the high right parietal lobe.       No flow limiting stenosis or large vessel occlusion detected within the head   or neck.       Incidentally noted pulmonary emphysema.          Assessment Recommendations:  ASSESSMENT RECOMMENDATIONS:     Mr. Manson Primrose is a 60 y.o. male with bilateral extremity numbness lasting 10 minutes-TIA versus stress related(patient mother that anniversary in 3 days)  -Continue Plavix 75 mg and Lipitor 80 mg  -She has a loop recorder inserted to follow-up with cardiology for loop recorder status  -Patient to follow-up with neurology outpatient previously seen by Dr. Deepak Marinelli in neurology clinic    Patient is stable from neurological standpoint to be discharged      Masoud Portillo MD Pager: [phone]  Electronically signed 1/12/2020 at 11:11 PM

## 2020-01-13 NOTE — ED PROVIDER NOTES
[facility] [address] [facility]  Emergency Department  Emergency Medicine Resident Sign-out     Care of Alexia Ortiz was assumed from Dr. Sadaf Mcelroy and is being seen for Shortness of Breath; Suicidal; and Numbness (BILATERAL ARM NUMBNESS)  . The patient's initial evaluation and plan have been discussed with the prior provider who initially evaluated the patient. EMERGENCY DEPARTMENT COURSE / MEDICAL DECISION MAKING:       MEDICATIONS GIVEN:  Orders Placed This Encounter   Medications    iohexol (OMNIPAQUE 350) solution 90 mL       LABS / RADIOLOGY:     Labs Reviewed   BASIC METABOLIC PANEL W/ REFLEX TO MG FOR LOW K - Abnormal; Notable for the following components:       Result Value    Glucose 104 (*)     BUN 29 (*)     CREATININE 1.59 (*)     GFR Non- 44 (*)     GFR  54 (*)     All other components within normal limits   CBC WITH AUTO DIFFERENTIAL   BRAIN NATRIURETIC PEPTIDE   D-DIMER, QUANTITATIVE   TROPONIN   TROPONIN   ETHANOL       Xr Chest Standard (2 Vw)    Result Date: 1/12/2020  EXAMINATION: TWO XRAY VIEWS OF THE CHEST 1/12/2020 8:31 pm COMPARISON: 12/05/2019 HISTORY: ORDERING SYSTEM PROVIDED HISTORY: sob TECHNOLOGIST PROVIDED HISTORY: sob Reason for Exam: hx copd Acuity: Unknown Type of Exam: Unknown FINDINGS: Heart size and configuration are normal.  Hilar and mediastinal structures are unremarkable. The lungs are clear. No pneumothorax or pleural fluid. There is an implanted cardiac event monitor. Thoracic spondylosis. No acute bone finding. No acute cardiopulmonary disease. RECENT VITALS:      ,  Pulse: 84, Resp: 16, BP: (!) 122/96, SpO2: 98 %    This patient is a 60 y.o. Male with left arm numbness, chest pain/SOB. CTH pending. Labs unremarkable. Also suicidal.       Eneida Suh / RECOMMENDATIONS:    1. Medical clearance     FINAL IMPRESSION:     1.  Suicidal ideation        DISPOSITION:         DISPOSITION:  []  Discharge   [x]  Transfer -

## 2020-01-19 LAB
EKG ATRIAL RATE: 88 BPM
EKG P AXIS: 61 DEGREES
EKG P-R INTERVAL: 194 MS
EKG Q-T INTERVAL: 368 MS
EKG QRS DURATION: 78 MS
EKG QTC CALCULATION (BAZETT): 445 MS
EKG R AXIS: 55 DEGREES
EKG T AXIS: 32 DEGREES
EKG VENTRICULAR RATE: 88 BPM

## 2020-01-31 ENCOUNTER — CARE COORDINATION (OUTPATIENT)
Dept: CARE COORDINATION | Age: 65
End: 2020-01-31

## 2020-01-31 NOTE — CARE COORDINATION
Ambulatory Care Coordination Note  1/31/2020  CM Risk Score: 2  Charlson 10 Year Mortality Risk Score: 47%     ACC: Radha Lady    Summary Note: Mr. Dick Beck is currently ordering breakfast sandwiches with another male at [facility].  is not able to speak to Mr. Pa for a long period of time. He did take the phone number for [facility] to call and schedule an appointment. He is late scheduling. Plan:  F/U with Mr. Dick Beck in 2 weeks. Care Coordination Interventions    Program Enrollment:  Complex Care  Referral from Primary Care Provider:  No  Suggested Interventions and Community Resources  Behavorial Health:  Completed (Comment: Anitha)         Goals Addressed    None         Prior to Admission medications    Medication Sig Start Date End Date Taking? Authorizing Provider   atorvastatin (LIPITOR) 80 MG tablet take 1 tablet by mouth nightly 1/13/20   Fernando Leon, DO   clopidogrel (PLAVIX) 75 MG tablet take 1 tablet by mouth once daily 1/13/20   Fernando Leon, DO   traZODone (DESYREL) 50 MG tablet Take 50 mg by mouth nightly    Historical Provider, MD   hydrOXYzine (ATARAX) 25 MG tablet Take 25 mg by mouth 2 times daily as needed for Anxiety    Historical Provider, MD   escitalopram (LEXAPRO) 10 MG tablet Take 10 mg by mouth daily    Historical Provider, MD   amLODIPine (NORVASC) 5 MG tablet Take 1 tablet by mouth daily 11/8/19   Fernando Leon,    aspirin 81 MG chewable tablet Take 1 tablet by mouth daily  Patient not taking: Reported on 1/3/2020 9/19/19   Henriette Ahumada, DO       No future appointments.

## 2020-02-05 ENCOUNTER — OFFICE VISIT (OUTPATIENT)
Dept: FAMILY MEDICINE CLINIC | Age: 65
End: 2020-02-05
Payer: MEDICARE

## 2020-02-05 VITALS
HEIGHT: 69 IN | HEART RATE: 89 BPM | OXYGEN SATURATION: 98 % | SYSTOLIC BLOOD PRESSURE: 130 MMHG | BODY MASS INDEX: 33.59 KG/M2 | DIASTOLIC BLOOD PRESSURE: 72 MMHG | WEIGHT: 226.8 LBS | TEMPERATURE: 96.2 F

## 2020-02-05 PROCEDURE — 99213 OFFICE O/P EST LOW 20 MIN: CPT | Performed by: FAMILY MEDICINE

## 2020-02-05 PROCEDURE — 4004F PT TOBACCO SCREEN RCVD TLK: CPT | Performed by: FAMILY MEDICINE

## 2020-02-05 PROCEDURE — G8484 FLU IMMUNIZE NO ADMIN: HCPCS | Performed by: FAMILY MEDICINE

## 2020-02-05 PROCEDURE — G8417 CALC BMI ABV UP PARAM F/U: HCPCS | Performed by: FAMILY MEDICINE

## 2020-02-05 PROCEDURE — G8427 DOCREV CUR MEDS BY ELIG CLIN: HCPCS | Performed by: FAMILY MEDICINE

## 2020-02-05 PROCEDURE — 3017F COLORECTAL CA SCREEN DOC REV: CPT | Performed by: FAMILY MEDICINE

## 2020-02-05 PROCEDURE — 99211 OFF/OP EST MAY X REQ PHY/QHP: CPT | Performed by: FAMILY MEDICINE

## 2020-02-05 RX ORDER — TRAMADOL HYDROCHLORIDE 50 MG/1
50 TABLET ORAL EVERY 6 HOURS PRN
Qty: 28 TABLET | Refills: 0 | Status: SHIPPED | OUTPATIENT
Start: 2020-02-05 | End: 2020-02-12

## 2020-02-05 NOTE — PATIENT INSTRUCTIONS
Thank you for letting us take care of you today. We hope all your questions were addressed. If a question was overlooked or something else comes to mind after you return home, please contact a member of your Care Team listed below. Please make sure you have a routine office visit set up to follow-up on [address]. Your Care Team at [place] [address] is Team #1  Herman Sherman MD (Faculty)  Alda Leblanc DO (Faculty)  Mario Garcia (Resident)  Gurmeet Swann (Resident)  Wyatt Brumfield MD (Resident)  Samaria March MD (Resident)  Eva Benavidez., JOE Raymundo., [facility] office)  Esequiel Cobb [facility] office)  Shikha Larson [facility] office)  FRAN Tadeoaston Lambkrystal, [facility] ([address] [facility])  Liberty Vasquez, [facility] (Clinical Pharmacist)     Office phone number: [phone]    If you need to get in right away due to illness, please be advised we have \"Same Day\" appointments available Monday-Friday. Please call us at [phone] option #3 to schedule your \"Same Day\" appointment.

## 2020-02-05 NOTE — PROGRESS NOTES
Visit Information    Have you changed or started any medications since your last visit including any over-the-counter medicines, vitamins, or herbal medicines? no   Have you stopped taking any of your medications? Is so, why? -  no  Are you having any side effects from any of your medications? - no    Have you seen any other physician or provider since your last visit?  no   Have you had any other diagnostic tests since your last visit?  no   Have you been seen in the emergency room and/or had an admission in a hospital since we last saw you? Yes 01/12/20 [facility]   Have you had your routine dental cleaning in the past 6 months?  no     Do you have an active MyChart account? If no, what is the barrier?   Yes    Patient Care Team:  Jt Noel DO as PCP - General (Family Medicine)  Jt Noel DO as PCP - [facility]  Kishan Morrison as Surgeon (Orthopedic Surgery)  Dick Everett as [address])    Medical History Review  Past Medical, Family, and Social History reviewed and does contribute to the patient presenting condition    Health Maintenance   Topic Date Due    Annual Wellness Visit (AWV)  10/04/2019    DTaP/Tdap/Td vaccine (1 - Tdap) 11/08/2020 (Originally 8/30/1968)    Flu vaccine (1) 11/08/2020 (Originally 9/1/2019)    Shingles Vaccine (1 of 2) 11/08/2020 (Originally 8/30/2007)    Hepatitis C screen  11/08/2020 (Originally 1955)    HIV screen  11/08/2020 (Originally 8/30/1972)    Pneumococcal 0-64 years Vaccine (1 of 1 - PPSV23) 12/01/2020 (Originally 8/30/1963)    A1C test (Diabetic or Prediabetic)  09/17/2020    Lipid screen  09/18/2020    Potassium monitoring  01/12/2021    Creatinine monitoring  01/12/2021    Colon cancer screen colonoscopy  03/15/2026    Hepatitis A vaccine  Aged Out    Hepatitis B vaccine  Aged Out    Hib vaccine  Aged Out    Meningococcal (ACWY) vaccine  Aged Out

## 2020-02-13 ASSESSMENT — ENCOUNTER SYMPTOMS
COUGH: 0
SHORTNESS OF BREATH: 0

## 2020-03-09 ENCOUNTER — TELEPHONE (OUTPATIENT)
Dept: FAMILY MEDICINE CLINIC | Age: 65
End: 2020-03-09

## 2020-03-17 ENCOUNTER — CARE COORDINATION (OUTPATIENT)
Dept: CARE COORDINATION | Age: 65
End: 2020-03-17

## 2020-03-17 NOTE — CARE COORDINATION
Ambulatory Care Coordination Note  3/17/2020  CM Risk Score: 2  Charlson 10 Year Mortality Risk Score: 47%     ACC: Ana Lund    Summary Note: Mr. Pradeep Smith reports his appointment with his PCP was canceled d/t COVID -19. He is agreeable to call his orthopedist to see if he needs to keep his appointment scheduled for 3.24.20. He does not have the surgical clearance as yet. Plan:  F/U with Mr. Pradeep Smith in 2 weeks when he can be seen in the office. Care Coordination Interventions    Program Enrollment:  Complex Care  Referral from Primary Care Provider:  No  Suggested Interventions and Community Resources  Behavorial Health:  Completed (Comment: Anitha)         Goals Addressed    None         Prior to Admission medications    Medication Sig Start Date End Date Taking? Authorizing Provider   atorvastatin (LIPITOR) 80 MG tablet take 1 tablet by mouth nightly 1/13/20   Mario Harmon DO   clopidogrel (PLAVIX) 75 MG tablet take 1 tablet by mouth once daily 1/13/20   Mario Harmon DO   traZODone (DESYREL) 50 MG tablet Take 50 mg by mouth nightly    Historical Provider, MD   hydrOXYzine (ATARAX) 25 MG tablet Take 25 mg by mouth 2 times daily as needed for Anxiety    Historical Provider, MD   escitalopram (LEXAPRO) 10 MG tablet Take 10 mg by mouth daily    Historical Provider, MD   amLODIPine (NORVASC) 5 MG tablet Take 1 tablet by mouth daily 11/8/19   Mario Harmon DO   aspirin 81 MG chewable tablet Take 1 tablet by mouth daily  Patient not taking: Reported on 2/5/2020 9/19/19   Vickie Bain,        No future appointments.

## 2020-05-14 ENCOUNTER — TELEPHONE (OUTPATIENT)
Dept: ADMINISTRATIVE | Age: 65
End: 2020-05-14

## 2020-05-20 ENCOUNTER — CARE COORDINATION (OUTPATIENT)
Dept: CARE COORDINATION | Age: 65
End: 2020-05-20

## 2020-05-20 NOTE — CARE COORDINATION
Ambulatory Care Coordination Note  5/20/2020  CM Risk Score: 2  Charlson 10 Year Mortality Risk Score: 47%     ACC: Tia Corcoran    Summary Note: Mr. Bola Thomas voices no c/o. He has no needs for CC at this time. He reports he has all his medications. CC discussed graduation from care coordination with Mr. Bola Thomas. He expressed an understanding and is agreeable. Plan:  Graduate from care coordination. Care Coordination Interventions    Program Enrollment:  Complex Care  Referral from Primary Care Provider:  No  Suggested Interventions and Community Resources  Behavorial Health:  Completed (Comment: Anitha)         Goals Addressed                 This Visit's Progress     COMPLETED: Behavioral Health        I will work towards the following [facility] goals: I will continue to follow up with my psychologist /counselor and/or psychiatrist., I will take my medications daily as prescribed. , I will seek treatment for alcohol/substance use., I will increase my cardiovascular exercise to 30 minutes a day, 3-5 days per week., I will work on improving sleep habits to have consistent sleep/awake time. and I will take a multivitamin daily. Barriers: impairment:  substance abuse: cocaine, lack of motivation and lack of support  Plan for overcoming my barriers: Continue going to [place], work with care coordinator  Confidence: 6/10  Anticipated Goal Completion Date: 3/2020            Prior to Admission medications    Medication Sig Start Date End Date Taking?  Authorizing Provider   atorvastatin (LIPITOR) 80 MG tablet take 1 tablet by mouth nightly 1/13/20   Pink Early, DO   clopidogrel (PLAVIX) 75 MG tablet take 1 tablet by mouth once daily 1/13/20   Pink Early, DO   traZODone (DESYREL) 50 MG tablet Take 50 mg by mouth nightly    Historical Provider, MD   hydrOXYzine (ATARAX) 25 MG tablet Take 25 mg by mouth 2 times daily as needed for Anxiety    Historical Provider, MD   escitalopram (LEXAPRO) 10 MG

## 2020-05-22 ENCOUNTER — OFFICE VISIT (OUTPATIENT)
Dept: FAMILY MEDICINE CLINIC | Age: 65
End: 2020-05-22
Payer: MEDICARE

## 2020-05-22 VITALS
HEART RATE: 89 BPM | WEIGHT: 229 LBS | BODY MASS INDEX: 33.92 KG/M2 | TEMPERATURE: 96.8 F | HEIGHT: 69 IN | SYSTOLIC BLOOD PRESSURE: 146 MMHG | DIASTOLIC BLOOD PRESSURE: 88 MMHG

## 2020-05-22 PROCEDURE — G8427 DOCREV CUR MEDS BY ELIG CLIN: HCPCS | Performed by: FAMILY MEDICINE

## 2020-05-22 PROCEDURE — G8417 CALC BMI ABV UP PARAM F/U: HCPCS | Performed by: FAMILY MEDICINE

## 2020-05-22 PROCEDURE — 99213 OFFICE O/P EST LOW 20 MIN: CPT | Performed by: FAMILY MEDICINE

## 2020-05-22 PROCEDURE — 3017F COLORECTAL CA SCREEN DOC REV: CPT | Performed by: FAMILY MEDICINE

## 2020-05-22 PROCEDURE — 4004F PT TOBACCO SCREEN RCVD TLK: CPT | Performed by: FAMILY MEDICINE

## 2020-05-27 ENCOUNTER — TELEPHONE (OUTPATIENT)
Dept: FAMILY MEDICINE CLINIC | Age: 65
End: 2020-05-27

## 2020-05-27 PROBLEM — R06.02 SHORTNESS OF BREATH: Status: ACTIVE | Noted: 2020-05-27

## 2020-05-27 NOTE — TELEPHONE ENCOUNTER
Judit from scheduling calling in for new diagnosis codes for the patients Stress Test. She states if the patient has a HX of chest pain or SOB could the DX Code be changed to one of those for insurance coverage. Please advise.

## 2020-06-23 ENCOUNTER — TELEPHONE (OUTPATIENT)
Dept: FAMILY MEDICINE CLINIC | Age: 65
End: 2020-06-23

## 2020-06-23 NOTE — TELEPHONE ENCOUNTER
Writer left v-message to call office for medical clearance appointment with Dr Isrrael Ma, Baby [place], schedule appointment in July after P A T on  06/25/20. Will scan medical clearance request letter in [address] [place] [address] Rd, letter filed in brown folder.     Surgery Date --07/13/2020    Type of Surgery --Lt -Knee, Total    Surgeon -- Dr Pepe Grey / [facility]

## 2020-06-25 RX ORDER — AMLODIPINE BESYLATE 5 MG/1
TABLET ORAL
Qty: 90 TABLET | Refills: 2 | Status: SHIPPED | OUTPATIENT
Start: 2020-06-25 | End: 2021-04-19

## 2020-06-25 NOTE — TELEPHONE ENCOUNTER
Last visit:   Last Med refill:   Does patient have enough medication for 72 hours: No:     Next Visit Date:  Future Appointments   Date Time Provider Radha Dianai   8/19/2020  8:20 AM Eliseo Pichardo MD Neuro Spec [address] Maintenance   Topic Date Due    DTaP/Tdap/Td vaccine (1 - Tdap) 11/08/2020 (Originally 8/30/1976)    Flu vaccine (Season Ended) 11/08/2020 (Originally 9/1/2020)    Shingles Vaccine (1 of 2) 11/08/2020 (Originally 8/30/2007)    Hepatitis C screen  11/08/2020 (Originally 1955)    HIV screen  11/08/2020 (Originally 8/30/1972)    Pneumococcal 0-64 years Vaccine (1 of 1 - PPSV23) 12/01/2020 (Originally 8/30/1963)    A1C test (Diabetic or Prediabetic)  09/17/2020    Lipid screen  09/18/2020    Potassium monitoring  01/12/2021    Creatinine monitoring  01/12/2021    Colon cancer screen colonoscopy  03/15/2026    Hepatitis A vaccine  Aged Out    Hepatitis B vaccine  Aged Out    Hib vaccine  Aged Out    Meningococcal (ACWY) vaccine  Aged Out       Hemoglobin A1C (%)   Date Value   09/17/2019 6.1 (H)   09/04/2019 6.0   06/27/2018 5.9             ( goal A1C is < 7)   No results found for: LABMICR  LDL Cholesterol (mg/dL)   Date Value   09/18/2019 120   05/03/2017 79       (goal LDL is <100)   AST (U/L)   Date Value   12/23/2018 13     ALT (U/L)   Date Value   12/23/2018 13     BUN (mg/dL)   Date Value   01/12/2020 29 (H)     BP Readings from Last 3 Encounters:   05/22/20 (!) 146/88   02/05/20 130/72   01/12/20 (!) 122/96          (goal 120/80)    All Future Testing planned in [facility]  Lab Frequency Next Occurrence   Lipid Panel Once 01/03/2021   NM CARDIAC MUGA W STRESS Once 29/72/9471   Basic Metabolic Panel Once 84/85/6353   NM MYOCARDIAL SPECT REST EXERCISE OR RX Once 06/01/2021               Patient Active Problem List:     Tobacco abuse     Back pain     Hypercholesteremia     Trigger finger of left hand     Trigger finger, right     Primary osteoarthritis of left

## 2020-07-14 ENCOUNTER — NURSE TRIAGE (OUTPATIENT)
Dept: OTHER | Facility: CLINIC | Age: 65
End: 2020-07-14

## 2020-07-14 NOTE — TELEPHONE ENCOUNTER
Received call from Belinda Velásquez, [address], [place]. Patient called in c/o pain and swelling to his left hand x 1 month states he was messing around with his brother and c/o 5th digit knuckle pain and swelling, see triage assessment below. Care Advice provided; patient acknowledged understanding of care advice and is in agreement with plan. Skype message to [facility] to schedule appointment. Please do not reply to the triage nurse through this encounter. Any subsequent communication should be directly with the patient. Reason for Disposition   SEVERE pain (e.g., excruciating)    Answer Assessment - Initial Assessment Questions  1. MECHANISM: \"How did the injury happen? \"     Antolin Belt around with his brother  2. ONSET: \"When did the injury happen? \" (Minutes or hours ago)      About a month ago  3. APPEARANCE of INJURY: \"What does the injury look like? \"       Swelling and pain  4. SEVERITY: \"Can you use the hand normally? \" \"Can you bend your fingers into a ball and then fully open them? \"      No, limited range and motion due to pain and swelling  5. SIZE: For cuts, bruises, or swelling, ask: \"How large is it? \" (e.g., inches or centimeters;  entire hand or wrist)       Swelling around middle, ring, and pinky knuckle on left hand  6. PAIN: \"Is there pain? \" If so, ask: \"How bad is the pain? \"  (Scale 1-10; or mild, moderate, severe)      Yes, pain 8/10  7. TETANUS: For any breaks in the skin, ask: \"When was the last tetanus booster? \"     NA  8. OTHER SYMPTOMS: \"Do you have any other symptoms? \"       Denies any other symptoms  9. PREGNANCY: \"Is there any chance you are pregnant? \" \"When was your last menstrual period? \"      NA    Protocols used: HAND AND WRIST INJURY-ADULT-[place]

## 2020-07-15 ENCOUNTER — OFFICE VISIT (OUTPATIENT)
Dept: FAMILY MEDICINE CLINIC | Age: 65
End: 2020-07-15
Payer: MEDICAID

## 2020-07-15 VITALS
BODY MASS INDEX: 34.67 KG/M2 | DIASTOLIC BLOOD PRESSURE: 84 MMHG | WEIGHT: 234.8 LBS | HEART RATE: 83 BPM | SYSTOLIC BLOOD PRESSURE: 125 MMHG | TEMPERATURE: 97.9 F

## 2020-07-15 PROBLEM — M79.89 SWELLING OF LEFT HAND: Status: ACTIVE | Noted: 2020-07-15

## 2020-07-15 PROCEDURE — 99213 OFFICE O/P EST LOW 20 MIN: CPT | Performed by: FAMILY MEDICINE

## 2020-07-15 PROCEDURE — G8417 CALC BMI ABV UP PARAM F/U: HCPCS | Performed by: FAMILY MEDICINE

## 2020-07-15 PROCEDURE — 3017F COLORECTAL CA SCREEN DOC REV: CPT | Performed by: FAMILY MEDICINE

## 2020-07-15 PROCEDURE — G8427 DOCREV CUR MEDS BY ELIG CLIN: HCPCS | Performed by: FAMILY MEDICINE

## 2020-07-15 PROCEDURE — 4004F PT TOBACCO SCREEN RCVD TLK: CPT | Performed by: FAMILY MEDICINE

## 2020-07-15 RX ORDER — OXYCODONE HYDROCHLORIDE AND ACETAMINOPHEN 5; 325 MG/1; MG/1
1 TABLET ORAL EVERY 6 HOURS PRN
Qty: 20 TABLET | Refills: 0 | Status: SHIPPED | OUTPATIENT
Start: 2020-07-15 | End: 2020-07-20

## 2020-07-15 ASSESSMENT — ENCOUNTER SYMPTOMS
GASTROINTESTINAL NEGATIVE: 1
ALLERGIC/IMMUNOLOGIC NEGATIVE: 1
EYES NEGATIVE: 1
RESPIRATORY NEGATIVE: 1

## 2020-07-15 ASSESSMENT — PATIENT HEALTH QUESTIONNAIRE - PHQ9
1. LITTLE INTEREST OR PLEASURE IN DOING THINGS: 0
SUM OF ALL RESPONSES TO PHQ QUESTIONS 1-9: 0
SUM OF ALL RESPONSES TO PHQ QUESTIONS 1-9: 0
2. FEELING DOWN, DEPRESSED OR HOPELESS: 0
SUM OF ALL RESPONSES TO PHQ9 QUESTIONS 1 & 2: 0

## 2020-07-15 NOTE — PROGRESS NOTES
Subjective:      Patient ID: Martha Bhardwaj is a 60 y.o. male. here to follow-up on left hand pain and swelling of the knuckes since last 04 weeks. has a prior history of Hypertension/COPD and osteoarthritis. HPI    Review of Systems   Constitutional: Negative. HENT: Negative. Eyes: Negative. Respiratory: Negative. Cardiovascular: Negative. Gastrointestinal: Negative. Endocrine: Negative. Genitourinary: Negative. Musculoskeletal: Positive for arthralgias. Allergic/Immunologic: Negative. Neurological: Negative. Hematological: Negative. Psychiatric/Behavioral: Negative. Objective:   Physical Exam  Vitals signs and nursing note reviewed. Constitutional:       General: He is not in acute distress. Appearance: He is obese. He is not ill-appearing, toxic-appearing or diaphoretic. Cardiovascular:      Rate and Rhythm: Normal rate and regular rhythm. Heart sounds: Normal heart sounds. No murmur. No friction rub. No gallop. Pulmonary:      Effort: Pulmonary effort is normal. No respiratory distress. Breath sounds: Normal breath sounds. No stridor. No wheezing, rhonchi or rales. Chest:      Chest wall: No tenderness. Musculoskeletal:        Arms:    Neurological:      Mental Status: He is alert and oriented to person, place, and time. Assessment:    hypertension. Left hand swelling. Plan:    Hypertension stable and under good control. Modify Activity. Topical ice three times daily. Compression with Ace wrap that was applied here. Elevation at night. Patient Allergic to NSAIDS. Get x-Rays of left hand three views. Given 20 tablets of percocet.           Corry Cordero MD

## 2020-07-15 NOTE — PROGRESS NOTES
Visit Information    Have you changed or started any medications since your last visit including any over-the-counter medicines, vitamins, or herbal medicines? no   Have you stopped taking any of your medications? Is so, why? -  no  Are you having any side effects from any of your medications? - no    Have you seen any other physician or provider since your last visit?  no   Have you had any other diagnostic tests since your last visit?  no   Have you been seen in the emergency room and/or had an admission in a hospital since we last saw you?  no   Have you had your routine dental cleaning in the past 6 months?  no     Do you have an active MyChart account? If no, what is the barrier?   No:     Patient Care Team:  Pinky Marie DO as PCP - General (Family Medicine)  Pinky Marie DO as PCP - [facility]  Belen Kim as Surgeon (Orthopedic Surgery)  [address])    Medical History Review  Past Medical, Family, and Social History reviewed and does not contribute to the patient presenting condition    Health Maintenance   Topic Date Due    DTaP/Tdap/Td vaccine (1 - Tdap) 11/08/2020 (Originally 8/30/1976)    Shingles Vaccine (1 of 2) 11/08/2020 (Originally 8/30/2007)    Hepatitis C screen  11/08/2020 (Originally 1955)    HIV screen  11/08/2020 (Originally 8/30/1972)    Pneumococcal 0-64 years Vaccine (1 of 1 - PPSV23) 12/01/2020 (Originally 8/30/1963)    Flu vaccine (1) 09/01/2020    A1C test (Diabetic or Prediabetic)  09/17/2020    Lipid screen  09/18/2020    Potassium monitoring  01/12/2021    Creatinine monitoring  01/12/2021    Colon cancer screen colonoscopy  03/15/2026    Hepatitis A vaccine  Aged Out    Hepatitis B vaccine  Aged Out    Hib vaccine  Aged Out    Meningococcal (ACWY) vaccine  Aged Out

## 2020-07-15 NOTE — PATIENT INSTRUCTIONS
Thank you for letting us take care of you today. We hope all your questions were addressed. If a question was overlooked or something else comes to mind after you return home, please contact a member of your Care Team listed below. Please make sure you have a routine office visit set up to follow-up on [address]. Your Care Team at [place] [address] is Team #3  Paulino Delgado MD (Faculty)  Fatimah Don MD (Faculty  Pearmeg Mead MD (Resident)  Benjamin Bustillo MD (Resident)   Evgeny Reid MD (Resident)  Brandon Vasques MD (Resident)  Ham Das MD (Resident)  FRAN Lovell., JOE Rivera., [facility] office)  [facility] office)  Smita [place] ([facility])  Jordon Patiño, [facility] ([address] [facility])  Thao Lovelace, Ph.D., (Behavioral Services)  [facility], [address] [place] [address] (Clinical Pharmacist)     Office phone number: [phone]    If you need to get in right away due to illness, please be advised we have \"Same Day\" appointments available Monday-Friday. Please call us at [phone] option #3 to schedule your \"Same Day\" appointment.

## 2020-08-03 ENCOUNTER — OFFICE VISIT (OUTPATIENT)
Dept: FAMILY MEDICINE CLINIC | Age: 65
End: 2020-08-03
Payer: MEDICAID

## 2020-08-03 VITALS
HEIGHT: 69 IN | HEART RATE: 85 BPM | BODY MASS INDEX: 34.78 KG/M2 | WEIGHT: 234.8 LBS | DIASTOLIC BLOOD PRESSURE: 88 MMHG | SYSTOLIC BLOOD PRESSURE: 139 MMHG | TEMPERATURE: 97 F

## 2020-08-03 PROCEDURE — 99213 OFFICE O/P EST LOW 20 MIN: CPT | Performed by: STUDENT IN AN ORGANIZED HEALTH CARE EDUCATION/TRAINING PROGRAM

## 2020-08-03 RX ORDER — ACETAMINOPHEN 500 MG
1000 TABLET ORAL 3 TIMES DAILY PRN
Qty: 180 TABLET | Refills: 0 | Status: SHIPPED | OUTPATIENT
Start: 2020-08-03 | End: 2020-08-19 | Stop reason: ALTCHOICE

## 2020-08-03 ASSESSMENT — ENCOUNTER SYMPTOMS
CHEST TIGHTNESS: 0
VOMITING: 0
GASTROINTESTINAL NEGATIVE: 1
ABDOMINAL DISTENTION: 0
COUGH: 0
CONSTIPATION: 0
RESPIRATORY NEGATIVE: 1
NAUSEA: 0
SHORTNESS OF BREATH: 0
EYES NEGATIVE: 1

## 2020-08-03 NOTE — PROGRESS NOTES
Visit Information    Have you changed or started any medications since your last visit including any over-the-counter medicines, vitamins, or herbal medicines? no   Have you stopped taking any of your medications? Is so, why? -  no  Are you having any side effects from any of your medications? - no    Have you seen any other physician or provider since your last visit?  no   Have you had any other diagnostic tests since your last visit?  no   Have you been seen in the emergency room and/or had an admission in a hospital since we last saw you?  yes - ibrahim   Have you had your routine dental cleaning in the past 6 months?  no     Do you have an active MyChart account? If no, what is the barrier?   Yes    Patient Care Team:  Dwight Bravo DO as PCP - General (Family Medicine)  Dwight Bravo DO as PCP - [facility] [place] as Surgeon (Orthopedic Surgery)  Pete [address] [place] [address])    Medical History Review  Past Medical, Family, and Social History reviewed and does not contribute to the patient presenting condition    Health Maintenance   Topic Date Due    DTaP/Tdap/Td vaccine (1 - Tdap) 11/08/2020 (Originally 8/30/1976)    Shingles Vaccine (1 of 2) 11/08/2020 (Originally 8/30/2007)    Hepatitis C screen  11/08/2020 (Originally 1955)    HIV screen  11/08/2020 (Originally 8/30/1972)    Pneumococcal 0-64 years Vaccine (1 of 1 - PPSV23) 12/01/2020 (Originally 8/30/1963)    Flu vaccine (1) 09/01/2020    A1C test (Diabetic or Prediabetic)  09/17/2020    Lipid screen  09/18/2020    Potassium monitoring  01/12/2021    Creatinine monitoring  01/12/2021    Colon cancer screen colonoscopy  03/15/2026    Hepatitis A vaccine  Aged Out    Hepatitis B vaccine  Aged Out    Hib vaccine  Aged Out    Meningococcal (ACWY) vaccine  Aged Out

## 2020-08-03 NOTE — PROGRESS NOTES
Attending Physician Statement  I have discussed the care of Constance morley.o.  male, including pertinent history and exam findings,  with the resident Dr. Ayde Felder MD.  History and Exam:   Chief Complaint   Patient presents with    Swelling     2 week follow up for left hand swelling. 10/10 Pain scale     Past Medical History:   Diagnosis Date    Aortic valve sclerosis     Back pain     COPD (chronic obstructive pulmonary disease) (HCC)     NEUBULIZER AS NEEDED    Dental bridge present     UPPER    Depression     DJD (degenerative joint disease)     Drug abuse ([address]     drug abuse includes smoking cocaine    High cholesterol     History of shingles     2013    Hypertension     PT NEVER FILLED  SCRIPT    Pain at rest     Knee-Left, back     Postlaminectomy syndrome 8/9/2017    Sleep apnea     does not have machine    Suicidal ideations     S/I's without attempt    TIA (transient ischemic attack) 10/2017    x 2    Toe contracture, left     5th    Toe pain, left     5th digit    Wears dentures     1 GOLD TOOTH ON FLIPPER UPPER    Wears glasses      Allergies   Allergen Reactions    Motrin [Ibuprofen] Nausea And Vomiting    Nsaids Nausea And Vomiting    Other Nausea And Vomiting     Pt states he cannot take any muscle relaxers but does not know what specific muscle relaxer caused the nausea. I have seen and examined the patient and the key elements of the encounter have been performed by me.   BP Readings from Last 3 Encounters:   08/03/20 139/88   07/15/20 125/84   05/22/20 (!) 146/88     /88 (Site: Right Upper Arm, Position: Sitting, Cuff Size: Large Adult) Comment: machine  Pulse 85   Temp 97 °F (36.1 °C) (Temporal)   Ht 5' 9.02\" (1.753 m)   Wt 234 lb 12.8 oz (106.5 kg)   BMI 34.66 kg/m²   Lab Results   Component Value Date    WBC 8.1 01/12/2020    HGB 14.2 01/12/2020    HCT 43.7 01/12/2020     01/12/2020    CHOL 191 09/18/2019    TRIG 186 (H) 09/18/2019    HDL 34

## 2020-08-03 NOTE — PROGRESS NOTES
8/3/2020     Matthew Alicia (:  1955) is a 60 y.o. male, here for evaluation of the following medical concerns:    HPI     Today 63/20: 62-year-old male presenting for follow-up of left hand pain 10 out of 10 nonradiating not erythematous constant with swelling lasting since 4 weeks prior. Patient has a past history of hypertension COPD and osteoarthritis. Patient had a x-ray on 2020 showing no dislocation or malalignment of joints with narrowing of joint space and mild bony spurring in the left fifth DIP joint suggesting degenerative arthritis and no acute fracture. Patient states that moving his left hand in any direction resulting pain 10 out of 10 and resting and Percocet alleviates the pain. Denies nausea, vomiting, diarrhea, chest pain, abdominal pain, myalgias, headache, dizziness, lightheadedness, dysuria, dyschezia. From 07/15/2020: Alexis Stapleton is a 60 y.o. male. here to follow-up on left hand pain and swelling of the knuckes since/ last 04 weeks. has a prior history of Hypertension/COPD and osteoarthritis. \"    Review of Systems   Constitutional: Negative. HENT: Negative. Eyes: Negative. Respiratory: Negative. Negative for cough, chest tightness and shortness of breath. Cardiovascular: Negative. Negative for chest pain, palpitations and leg swelling. Gastrointestinal: Negative. Negative for abdominal distention, constipation, nausea and vomiting. Endocrine: Negative. Genitourinary: Negative. Musculoskeletal:        Patient states he has swelling and difficulty moving the left hand especially the left fourth and fifth digits. Neurological: Negative. Negative for tremors and numbness. Hematological: Negative. Psychiatric/Behavioral: Negative. All other systems reviewed and are negative. Prior to Visit Medications    Medication Sig Taking?  Authorizing Provider   acetaminophen (TYLENOL) 500 MG tablet Take 2 tablets by mouth 3 times daily as needed for Pain Yes John Mora MD   amLODIPine (NORVASC) 5 MG tablet take 1 tablet by mouth once daily Yes Donovan Frank MD   atorvastatin (LIPITOR) 80 MG tablet take 1 tablet by mouth nightly Yes Jerral Cough, DO   clopidogrel (PLAVIX) 75 MG tablet take 1 tablet by mouth once daily Yes Jerral Cough, DO   traZODone (DESYREL) 50 MG tablet Take 50 mg by mouth nightly Yes Historical Provider, MD   aspirin 81 MG chewable tablet Take 1 tablet by mouth daily Yes Cha Dong DO   hydrOXYzine (ATARAX) 25 MG tablet Take 25 mg by mouth 2 times daily as needed for Anxiety  Historical Provider, MD   escitalopram (LEXAPRO) 10 MG tablet Take 10 mg by mouth daily  Historical Provider, MD        Social History     Tobacco Use    Smoking status: Current Every Day Smoker     Packs/day: 0.50     Years: 30.00     Pack years: 15.00     Types: Cigarettes    Smokeless tobacco: Never Used    Tobacco comment: trying to quit   Substance Use Topics    Alcohol use: Yes     Alcohol/week: 2.0 standard drinks     Types: 2 Cans of beer per week     Comment: SOCIALLY        Vitals:    08/03/20 1334   BP: 139/88  Comment: machine   Site: Right Upper Arm   Position: Sitting   Cuff Size: Large Adult   Pulse: 85   Temp: 97 °F (36.1 °C)   TempSrc: Temporal   Weight: 234 lb 12.8 oz (106.5 kg)   Height: 5' 9.02\" (1.753 m)     Estimated body mass index is 34.66 kg/m² as calculated from the following:    Height as of this encounter: 5' 9.02\" (1.753 m). Weight as of this encounter: 234 lb 12.8 oz (106.5 kg). Physical Exam  Vitals signs and nursing note reviewed. Constitutional:       Appearance: Normal appearance. He is obese. HENT:      Head: Normocephalic and atraumatic. Right Ear: External ear normal.      Left Ear: External ear normal.      Nose: Nose normal.      Mouth/Throat:      Mouth: Mucous membranes are moist.   Eyes:      General: No scleral icterus. Right eye: No discharge.          Left eye: No

## 2020-08-03 NOTE — PATIENT INSTRUCTIONS
Thank you for letting us take care of you today. We hope all your questions were addressed. If a question was overlooked or something else comes to mind after you return home, please contact a member of your Care Team listed below. Your Care Team at [place] [address] is Team #5  Jaime Rueda MD (Faculty)  Danae Lopez MD (Resident)  Army Ovi MD (Resident)  Eze Cisse MD (Resident)  FRAN Guzman ,AARON Rodriguez Number ([address] [facility] office)  [facility] office)  Rosie Norman, [address] (Clinical Practice Manager)  Brii Pulido, [address] [place] [address] (Clinical Pharmacist)       Office phone number: [phone]    If you need to get in right away due to illness, please be advised we have \"Same Day\" appointments available Monday-Friday. Please call us at [phone] option #3 to schedule your \"Same Day\" appointment.

## 2020-08-04 ENCOUNTER — TELEPHONE (OUTPATIENT)
Dept: PAIN MANAGEMENT | Age: 65
End: 2020-08-04

## 2020-08-04 NOTE — TELEPHONE ENCOUNTER
[facility] see UDS in [facility] 2012 and 2019 Marina Preston for consideration for consultation referral.

## 2020-08-19 ENCOUNTER — OFFICE VISIT (OUTPATIENT)
Dept: NEUROLOGY | Age: 65
End: 2020-08-19
Payer: MEDICAID

## 2020-08-19 VITALS
HEART RATE: 85 BPM | BODY MASS INDEX: 34.21 KG/M2 | DIASTOLIC BLOOD PRESSURE: 80 MMHG | TEMPERATURE: 97.2 F | HEIGHT: 69 IN | RESPIRATION RATE: 20 BRPM | WEIGHT: 231 LBS | SYSTOLIC BLOOD PRESSURE: 124 MMHG

## 2020-08-19 PROCEDURE — G8427 DOCREV CUR MEDS BY ELIG CLIN: HCPCS | Performed by: PSYCHIATRY & NEUROLOGY

## 2020-08-19 PROCEDURE — 3017F COLORECTAL CA SCREEN DOC REV: CPT | Performed by: PSYCHIATRY & NEUROLOGY

## 2020-08-19 PROCEDURE — 4004F PT TOBACCO SCREEN RCVD TLK: CPT | Performed by: PSYCHIATRY & NEUROLOGY

## 2020-08-19 PROCEDURE — 99214 OFFICE O/P EST MOD 30 MIN: CPT | Performed by: PSYCHIATRY & NEUROLOGY

## 2020-08-19 PROCEDURE — G8417 CALC BMI ABV UP PARAM F/U: HCPCS | Performed by: PSYCHIATRY & NEUROLOGY

## 2020-08-19 NOTE — PROGRESS NOTES
[facility], [address], [address] [facility]  Ph: [phone] or [phone]  FAX: [phone]    Chief Complaint: referred for surgical clearance, hx of CVA     Dear Darron Ballard, DO     I had the pleasure of seeing your patient today in neurology consultation for his symptoms. As you would recall Lorraine Haynes is a 60 y.o. male. The history has been obtained from the patient and from the accompanying medical records. The patient has a history of stroke September 2019 and October 2019. He is here for surgical clearance for total knee replacement. Of note, the patient has not had a loop recoder interpretor machine for about a week. He is taking Asprin, but not Plavix. Neurological work up:  CT head  CTA head and neck  MRI brain   2 D echo     Past Medical History:   Diagnosis Date    Aortic valve sclerosis     Back pain     COPD (chronic obstructive pulmonary disease) (HCC)     NEUBULIZER AS NEEDED    Dental bridge present     UPPER    Depression     DJD (degenerative joint disease)     Drug abuse ([facility] [address]     drug abuse includes smoking cocaine    High cholesterol     History of shingles     2013    Hypertension     PT NEVER FILLED  SCRIPT    Pain at rest     Knee-Left, back     Postlaminectomy syndrome 8/9/2017    Sleep apnea     does not have machine    Suicidal ideations     S/I's without attempt    TIA (transient ischemic attack) 10/2017    x 2    Toe contracture, left     5th    Toe pain, left     5th digit    Wears dentures     1 GOLD TOOTH ON FLIPPER UPPER    Wears glasses      Past Surgical History:   Procedure Laterality Date    ARTHROPLASTY Left 03/13/2019    toe    CYST REMOVAL Bilateral     \"groin\"    FINGER TRIGGER RELEASE Left 03/04/2014    ring finger    FINGER TRIGGER RELEASE Right 1/5/15    rt hand-RING FINGER    INSERTABLE CARDIAC MONITOR  09/19/2019    PT HAS [email or web address] [id number] REVEAL LOOP RECORDER.  MRI CONDITONAL 3T OK, SAFE IMMEDIATELY POST IMPLANT.  KNEE ARTHROSCOPY Bilateral     LUMBAR FUSION  6/15/16    posterior lumbar fusion L4-L5    LUMBAR SPINE SURGERY  2005    LUMBAR    NERVE BLOCK  01/13/2017    caudal #1    celestone 9mg  25mcq fentanyl    NERVE BLOCK Left 03/30/2017    left hip bursa injection depomedrol 40 mg    NERVE BLOCK Bilateral 04/07/2017    bilateral si joints injections, depo 40    PATELLA SURGERY Right     x2    TOE ARTHROPLASTY Left 3/13/2019    5TH TOE TOTAL ARTHROPLASTY, TNC AT [facility]TH [facility] performed by Sanjay Miller DPM at [address] [place] [address] Right 01/2016    KNEE     Allergies   Allergen Reactions    Motrin [Ibuprofen] Nausea And Vomiting    Nsaids Nausea And Vomiting    Other Nausea And Vomiting     Pt states he cannot take any muscle relaxers but does not know what specific muscle relaxer caused the nausea. Family History   Problem Relation Age of Onset    Diabetes Mother     Diabetes Brother       Social History     Socioeconomic History    Marital status: Single     Spouse name: Not on file    Number of children: Not on file    Years of education: Not on file    Highest education level: Not on file   Occupational History    Not on file   Social Needs    Financial resource strain: Not on file    Food insecurity     Worry: Not on file     Inability: Not on file   [employer] needs     Medical: Not on file     Non-medical: Not on file   Tobacco Use    Smoking status: Current Every Day Smoker     Packs/day: 0.50     Years: 30.00     Pack years: 15.00     Types: Cigarettes    Smokeless tobacco: Never Used    Tobacco comment: trying to quit   Substance and Sexual Activity    Alcohol use:  Yes     Alcohol/week: 2.0 standard drinks     Types: 2 Cans of beer per week     Comment: SOCIALLY    Drug use: Yes     Types: Cocaine     Comment: drug abuse includes smoking cocaine;    Sexual activity: Yes     Partners: Female   Lifestyle    Physical activity     Days per week: Not on file     Minutes per session: Not on file    Stress: Not on file   Relationships    Social connections     Talks on phone: Not on file     Gets together: Not on file     Attends Quaker service: Not on file     Active member of club or organization: Not on file     Attends meetings of clubs or organizations: Not on file     Relationship status: Not on file    Intimate partner violence     Fear of current or ex partner: Not on file     Emotionally abused: Not on file     Physically abused: Not on file     Forced sexual activity: Not on file   Other Topics Concern    Not on file   Social History Narrative    Not on file      /80 (Site: Left Upper Arm, Position: Sitting, Cuff Size: Large Adult)   Pulse 85   Temp 97.2 °F (36.2 °C) (Temporal)   Resp 20   Ht 5' 9\" (1.753 m)   Wt 231 lb (104.8 kg)   BMI 34.11 kg/m²      Physical examination:  General appearance: Normal. Well groomed.  In no acute distress    Head: Normocephalic, atraumatic  Eyes: Extraocular movements intact, eye lids normal  Lungs: Respirations unlabored, chest wall no deformity  ENT: Normal external ear canals, no sinus tenderness  Heart: Regular rate rhythm  Abdomen: No masses, tenderness  Extremities: No cyanosis or edema, 2+ pulses  Musculoskeletal: Normal range of motion in all joints  Skin: Intact, normal skin color    Neurological examination:    Mental status   Alert and oriented; intact memory with no confusion, speech or language problems; no hallucinations or delusions     Cranial nerves   II - visual fields intact to confrontation                                                III, IV, VI - extra-ocular muscles full: no pupillary defect; no BRIAN, no nystagmus, no ptosis   V - normal facial sensation                                                               VII - normal facial symmetry                                                             VIII - intact hearing IX, X - symmetrical palate                                                                  XI - symmetrical shoulder shrug                                                       XII - midline tongue without atrophy or fasciculation     Motor function  Normal muscle bulk and tone; normal power 5/5, including fine motor movements     Sensory function 80% sensation to touch left hand     Cerebellar Intact fine motor movement. No involuntary movements or tremors     Reflex function Intact 2+ DTR and symmetric. Negative Babinski     Gait                  Normal station and gait       Lab Results   Component Value Date    LDLCHOLESTEROL 120 09/18/2019     No components found for: CHLPL  Lab Results   Component Value Date    TRIG 186 (H) 09/18/2019    TRIG 72 05/03/2017    TRIG 131 04/23/2017     Lab Results   Component Value Date    HDL 34 (L) 09/18/2019    HDL 45 05/03/2017    HDL 45 04/23/2017     No results found for: LDLCALC  No results found for: LABVLDL  Lab Results   Component Value Date    LABA1C 6.1 (H) 09/17/2019     Lab Results   Component Value Date     09/17/2019     No results found for: Juliet Villa     All of patient's labs were personally reviewed. All the imaging studies were personally reviewed and discussed with the patient. Assessment Recommendations:  History of right parietal stroke    The patient shows no new signs of stoke/TIA. From a neurological standpoint, he is clear to undergo knee sugery    My office will provide informaiton on obtaining a second  loop recoder interpretor machine. He will No follow-ups on file. or sooner if the patient's symptoms worsen or if there are any side effects. Jacey Montana, DO I would like to thank you for the consult. Please do not hesitate if you have any questions about the patient care.      Scribe Attestation:   By signing my name below, I, Zakia Dutton, attest that this documentation has been prepared under the direction and in the presence of Isaac Mcneal MD.     Electronically Signed: Misty Simmons. 8/19/20. 12:06 PM EDT. Physician Attestation:   Allyssa Steward MD, personally performed the services described in this documentation. All medical record entries made by the scribe were at my direction and in my presence. I have reviewed the chart and discharge instructions (if applicable) and agree that the record reflects my personal performance and is accurate and complete.     Electronically Signed: Nat Rogers 8/23/2020 5:16 PM    Diplomate, American Board of Psychiatry and Neurology  Diplomate, American Board of Clinical Neurophysiology  Diplomate, American Board of Epilepsy

## 2020-09-22 ENCOUNTER — OFFICE VISIT (OUTPATIENT)
Dept: FAMILY MEDICINE CLINIC | Age: 65
End: 2020-09-22
Payer: MEDICARE

## 2020-09-22 VITALS
SYSTOLIC BLOOD PRESSURE: 128 MMHG | TEMPERATURE: 96.9 F | DIASTOLIC BLOOD PRESSURE: 87 MMHG | HEART RATE: 85 BPM | BODY MASS INDEX: 34.11 KG/M2 | WEIGHT: 231 LBS

## 2020-09-22 PROBLEM — M79.642 LEFT HAND PAIN: Status: ACTIVE | Noted: 2020-09-22

## 2020-09-22 PROBLEM — R73.03 PREDIABETES: Status: ACTIVE | Noted: 2020-09-22

## 2020-09-22 PROBLEM — G89.29 CHRONIC PAIN OF LEFT KNEE: Status: ACTIVE | Noted: 2020-09-22
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
## 2020-09-22 PROBLEM — M25.562 CHRONIC PAIN OF LEFT KNEE: Status: ACTIVE | Noted: 2020-09-22

## 2020-09-22 PROCEDURE — 99213 OFFICE O/P EST LOW 20 MIN: CPT | Performed by: STUDENT IN AN ORGANIZED HEALTH CARE EDUCATION/TRAINING PROGRAM

## 2020-09-22 PROCEDURE — 4004F PT TOBACCO SCREEN RCVD TLK: CPT | Performed by: STUDENT IN AN ORGANIZED HEALTH CARE EDUCATION/TRAINING PROGRAM

## 2020-09-22 PROCEDURE — G8427 DOCREV CUR MEDS BY ELIG CLIN: HCPCS | Performed by: STUDENT IN AN ORGANIZED HEALTH CARE EDUCATION/TRAINING PROGRAM

## 2020-09-22 PROCEDURE — G8417 CALC BMI ABV UP PARAM F/U: HCPCS | Performed by: STUDENT IN AN ORGANIZED HEALTH CARE EDUCATION/TRAINING PROGRAM

## 2020-09-22 PROCEDURE — 3017F COLORECTAL CA SCREEN DOC REV: CPT | Performed by: STUDENT IN AN ORGANIZED HEALTH CARE EDUCATION/TRAINING PROGRAM

## 2020-09-22 RX ORDER — ACETAMINOPHEN 500 MG
1000 TABLET ORAL 3 TIMES DAILY PRN
Qty: 180 TABLET | Refills: 0 | Status: SHIPPED | OUTPATIENT
Start: 2020-09-22 | End: 2021-01-29 | Stop reason: SDUPTHER

## 2020-09-22 RX ORDER — OXYCODONE HYDROCHLORIDE AND ACETAMINOPHEN 5; 325 MG/1; MG/1
1 TABLET ORAL EVERY 6 HOURS PRN
Qty: 28 TABLET | Refills: 0 | Status: SHIPPED | OUTPATIENT
Start: 2020-09-22 | End: 2020-09-29

## 2020-09-22 NOTE — PROGRESS NOTES
Right Foot/toe pain  As far as trauma to the area, the patient indicates a fall. There is  pain with weight bearing. The patient states numbness and tingling is not present. Catching and locking has not been present. Review of Systems  Review of Systems   Constitutional: Negative for activity change, appetite change, chills, diaphoresis, fatigue, fever and unexpected weight change. HENT: Negative for sinus pressure, sinus pain, sore throat and trouble swallowing. Respiratory: Negative for cough, shortness of breath and wheezing. Cardiovascular: Negative for chest pain, palpitations and leg swelling. Gastrointestinal: Negative for abdominal pain, diarrhea, nausea and vomiting. Endocrine: Negative for cold intolerance, polydipsia, polyphagia and polyuria. Genitourinary: Negative for difficulty urinating, flank pain and frequency. Musculoskeletal: Negative for gait problem and joint swelling. Negative for back pain, neck pain and neck stiffness. Patient uses a cane to ambulate. Skin: Negative for color change and wound. Negative for pallor and rash. Allergic/Immunologic: Negative for environmental allergies and food allergies. Neurological: Negative for light-headedness, numbness and headaches. Psychiatric/Behavioral: Negative for sleep disturbance. Negative for confusion and suicidal ideas. Objective:    /87   Pulse 85   Temp 96.9 °F (36.1 °C)   Wt 231 lb (104.8 kg)   BMI 34.11 kg/m²    BP Readings from Last 3 Encounters:   09/22/20 128/87   08/19/20 124/80   08/03/20 139/88       Physical Exam  Constitutional: Patient is oriented to person, place, and time. Patient appears well-developed and well-nourished. No distress. HENT: Head: Normocephalic and atraumatic. Eyes: Pupils are equal, round, and reactive to light. Conjunctivae are normal. Right eye exhibits no discharge. Left eye exhibits no discharge.    Cardiovascular: Normal rate, regular rhythm and normal heart sounds. Pulmonary/Chest: Effort normal and breath sounds normal. No respiratory distress. Patient has no wheezes. Abdominal: Soft. Bowel sounds are normal. Patient exhibits no distension. There is no tenderness. Musculoskeletal:  Patient exhibits no edema and tenderness. Patient exhibits no deformity. Neurological: Patient is alert and oriented to person, place, and time. Skin: Skin is warm and dry. Patient is not diaphoretic. Psychiatric: Patient's speech is normal and behavior is normal. Thought content normal. Patient's mood appears anxious. Vitals reviewed. Ankle Exam:    Reveals there is not effusion. Swelling is not present. Edema is not present. Ecchymoses is not present. Palpation-Tenderness - near hallux  The foot is in WNL alignment. ROM:  40 degrees plantarflexion and 20 degrees dorsiflexion. Subtalar motion is 30 degrees inversion and 20 degrees eversion. Strength-WNL  Sensation-normal to light touch  Special Tests-Ankle inversion: laxity negative  Ankle eversion: laxity negative  Ankle drawer: laxity negative  External rotation:negative  Syndesmotic Squeeze test: negative  The patient is  able to single toe raise. Left Knee Exam  Musculoskeletal/Neurologic:  Inspection-Swelling: moderate, Ecchymosis: no  Palpation-Tenderness:yes patellar  Pain with patellar grind: yes  ROM-reduced due to pain  Strength- WNL  Sensation-normal to light touch  Special Tests-  Varus Laxity: negative   Valgus Laxity:  negative   Anterior Drawer: negative   Posterior Drawer: negative  Lachman's: negative    Hand/Wrist Exam  ROM:  Full flexor and extensor tendon function reduced  Finger, hand, and wrist range of motion are reduced to pain  Inspection-Deformity: no  Palpation-Tenderness: yes near the MCP's  There is is not thenar and is not interosseous atrophy. Strength- WNL  NEURO: Radial, ulnar, and median nerves are intact to motor and sensory testing.   Two point discrimination is less than six mm. There is not decreased sensation to light touch and pinprick in the median and ulnar nerve distribution. CTS: Tinel's test at the wrist is negative. Flexion compression test negative  Phalen's test is negative. Finkelstein's test is negative. Elbow:  Range of motion and strength about the elbow is intact. Tinel's test is negative at the elbow. Lab Results   Component Value Date    WBC 8.1 01/12/2020    HGB 14.2 01/12/2020    HCT 43.7 01/12/2020     01/12/2020    CHOL 191 09/18/2019    TRIG 186 (H) 09/18/2019    HDL 34 (L) 09/18/2019    ALT 13 12/23/2018    AST 13 12/23/2018     01/12/2020    K 4.2 01/12/2020     01/12/2020    CREATININE 1.59 (H) 01/12/2020    BUN 29 (H) 01/12/2020    CO2 21 01/12/2020    TSH 1.77 12/06/2012    INR 0.9 11/13/2019    LABA1C 6.1 (H) 09/17/2019     Lab Results   Component Value Date    CALCIUM 8.7 01/12/2020     Lab Results   Component Value Date    LDLCHOLESTEROL 120 09/18/2019         Assessment and Plan:    1. Right foot pain  - XR FOOT RIGHT (MIN 3 VIEWS); Future  - oxyCODONE-acetaminophen (PERCOCET) 5-325 MG per tablet; Take 1 tablet by mouth every 6 hours as needed for Pain for up to 7 days. Intended supply: 7 days. Take lowest dose possible to manage pain  Dispense: 28 tablet; Refill: 0  - acetaminophen (TYLENOL) 500 MG tablet; Take 2 tablets by mouth 3 times daily as needed for Pain  Dispense: 180 tablet; Refill: 0    2. Chronic pain of left knee  - XR KNEE LEFT (3 VIEWS); Future  - [facility]  - oxyCODONE-acetaminophen (PERCOCET) 5-325 MG per tablet; Take 1 tablet by mouth every 6 hours as needed for Pain for up to 7 days. Intended supply: 7 days. Take lowest dose possible to manage pain  Dispense: 28 tablet; Refill: 0  - acetaminophen (TYLENOL) 500 MG tablet; Take 2 tablets by mouth 3 times daily as needed for Pain  Dispense: 180 tablet; Refill: 0    3.  Left hand pain  - XR HAND LEFT (MIN 3 VIEWS); further imaging studies and as a last resort surgery. Requested Prescriptions     Signed Prescriptions Disp Refills    oxyCODONE-acetaminophen (PERCOCET) 5-325 MG per tablet 28 tablet 0     Sig: Take 1 tablet by mouth every 6 hours as needed for Pain for up to 7 days. Intended supply: 7 days. Take lowest dose possible to manage pain    acetaminophen (TYLENOL) 500 MG tablet 180 tablet 0     Sig: Take 2 tablets by mouth 3 times daily as needed for Pain       There are no discontinued medications. Kenny Claudio received counseling on the following healthy behaviors: nutrition, exercise and medication adherence    Discussed use, benefit, and side effects of prescribed medications. Barriers to medication compliance addressed. All patient questions answered. Pt voiced understanding, with use of teach-back method. Return in about 1 week (around 9/29/2020), or if symptoms worsen or fail to improve, for f/u left knee pain. Hm - flu vaccine when available per patient preference.

## 2020-09-22 NOTE — PROGRESS NOTES
Visit Information    Have you changed or started any medications since your last visit including any over-the-counter medicines, vitamins, or herbal medicines? no   Have you stopped taking any of your medications? Is so, why? -  no  Are you having any side effects from any of your medications? - no    Have you seen any other physician or provider since your last visit? yes - pain management and neurology   Have you had any other diagnostic tests since your last visit?  no   Have you been seen in the emergency room and/or had an admission in a hospital since we last saw you?  no   Have you had your routine dental cleaning in the past 6 months?  no     Do you have an active MyChart account? If no, what is the barrier?   Yes    Patient Care Team:  Lisa Quintero DO as PCP - General (Family Medicine)  Lisa Quintero DO as PCP - [facility]  Niki Montero as Surgeon (Orthopedic Surgery)  Pete [address] [place] [address])    Medical History Review  Past Medical, Family, and Social History reviewed and does not contribute to the patient presenting condition    Health Maintenance   Topic Date Due    Flu vaccine (1) 09/01/2020    A1C test (Diabetic or Prediabetic)  09/17/2020    Annual Wellness Visit (AWV)  09/17/2020    Lipid screen  09/18/2020    DTaP/Tdap/Td vaccine (1 - Tdap) 11/08/2020 (Originally 8/30/1976)    Shingles Vaccine (1 of 2) 11/08/2020 (Originally 8/30/2007)    Hepatitis C screen  11/08/2020 (Originally 1955)    HIV screen  11/08/2020 (Originally 8/30/1972)    Pneumococcal 0-64 years Vaccine (1 of 1 - PPSV23) 12/01/2020 (Originally 8/30/1963)    Potassium monitoring  01/12/2021    Creatinine monitoring  01/12/2021    Colon cancer screen colonoscopy  03/15/2026    Hepatitis A vaccine  Aged Out    Hepatitis B vaccine  Aged Out    Hib vaccine  Aged Out    Meningococcal (ACWY) vaccine  Aged Out

## 2020-09-28 ENCOUNTER — TELEPHONE (OUTPATIENT)
Dept: PAIN MANAGEMENT | Age: 65
End: 2020-09-28

## 2020-09-28 NOTE — TELEPHONE ENCOUNTER
Pt requesting non-narcotics for his pain. Scheduled appt for 10/20 at 1:40    Interventions only, no narcotics from this office.

## 2020-10-02 ENCOUNTER — TELEPHONE (OUTPATIENT)
Dept: FAMILY MEDICINE CLINIC | Age: 65
End: 2020-10-02

## 2020-10-02 NOTE — LETTER
[facility]  [facility]  [address]  [address] [place] [id number]  Dept: [phone]    October 2, 2020  [facility], [place] [id number]       Dear Gene Miladys Sauloemily,    We want you to receive wellness care; health care that may lower your risk of illness or injury. Medicare pays for some wellness care, but it does not pay for all the wellness care you might need. We want you to know about Medicare benefits and how we can help you get the most from them. The term \"physical\" is often used to describe wellness care. But Medicare does not pay for a traditional head-to-toe physical. Medicare does pay for a wellness visit once per year to identify health risks and help you to reduce them. At your wellness visit, our health care team will take a complete health history and provide several other services:    Screenings to detect depression, risk for falling and other problems. A limited physical exam to check your blood pressure, weight, vision and other things depending on your age, gender and level of activity. Recommendations for other wellness services and healthy lifestyle changes. Before your appointment, our staff will ask you to complete a questionnaire about your health. A wellness visit does not deal with new or existing health problems. That would be a separate service and requires a longer appointment. Please let our staff know if you need help with a health problem, a medication refill or something else. We may need to schedule a separate appointment. A separate charge applies to these services, whether provided on the same date or a different date than the wellness visit. We hope to help you get the most from your Medicare wellness benefits. Please contact us with any questions or if you would like to schedule an appointment for your Medicare wellness visit.     Sincerely,    [address] Team

## 2020-10-15 ENCOUNTER — TELEPHONE (OUTPATIENT)
Dept: PAIN MANAGEMENT | Age: 65
End: 2020-10-15

## 2020-10-19 NOTE — PROGRESS NOTES
Josh Ta is a 63 y.o. male evaluated on 10/20/2020. Patient is referred by Dr. Isabelle Osorio MD        Modality of virtual service provided -via  telephone   Consent:  Patient and/or health care decision maker is aware that that patient may receive a bill for this telephone service, depending on one's insurance coverage, and has provided verbal consent to proceed: Yes    Patient identification was verified at the start of the visit: Yes    Chief complaint: Josh Ta is 63 y.o.,  male, with left knee pain. Referring Diagnosis   M25.562, G89.29 (ICD-10-CM) - Chronic pain of left knee     Patient is a 62-year-old male referred to the pain clinic in consultation for left knee pain of longstanding duration. Patient reports he needs knee replacement but he is putting out because of the Covid situation. He reports he needs Covid testing to be done before he can have the knee joint replaced. He reports he had arthroscopy of the left knee in 1992. Patient also complaining of pain involving the low back and he had 2 back surgeries. Patient apparently had knee joint injections in the past.  Patient smokes cigarettes about 1 and half packs every 4 days. He denies use of alcohol. He admits to using THC long time ago but has not used it recently. Knee Pain    The incident occurred more than 1 week ago. Injury mechanism: Arthroscopic surgery 1992. The pain is present in the left knee. The quality of the pain is described as aching and stabbing Gilberto Zoraida). The pain is at a severity of 10/10. The pain is severe. The pain has been constant since onset. Associated symptoms include an inability to bear weight, muscle weakness, numbness and tingling. The symptoms are aggravated by movement and weight bearing (Walking).       Alleviating factors:heat, ice, rest and medication  Lifestyle changes experienced with pain: Wakes from sleep, Prevents or limits ADLs, Increases w/activity.  , Increases w/prolonged sitting/standing/walking  Mood changes,depressed  Patient currently unemployed. Physical therapy did not help the pain. Are you under psychological counseling at present: no  Goals for treatment include:  Decrease in pain  Enjoy daily and recreational activities, return to previous status. ACTIVITY/SOCIAL/EMOTIONAL:  Sleep Pattern: 5 hours per night. nightime awakenings and generally restful sleep  Home Exercises: daily none  Activity:decreased  Emotional Issues: History of depression in the past.   Currently seeing a Psychiatrist or Psychologist:  No      Past Medical History:   Diagnosis Date    Aortic valve sclerosis     Back pain     COPD (chronic obstructive pulmonary disease) ([facility]     NEUBULIZER AS NEEDED    Dental bridge present     UPPER    Depression     DJD (degenerative joint disease)     Drug abuse ([facility]     drug abuse includes smoking cocaine    High cholesterol     History of shingles     2013    Hypertension     PT NEVER FILLED  SCRIPT    Pain at rest     Knee-Left, back     Postlaminectomy syndrome 8/9/2017    Sleep apnea     does not have machine    Suicidal ideations     S/I's without attempt    TIA (transient ischemic attack) 10/2017    x 2    Toe contracture, left     5th    Toe pain, left     5th digit    Wears dentures     1 GOLD TOOTH ON FLIPPER UPPER    Wears glasses        Past Surgical History:   Procedure Laterality Date    ARTHROPLASTY Left 03/13/2019    toe    CYST REMOVAL Bilateral     \"groin\"    FINGER TRIGGER RELEASE Left 03/04/2014    ring finger    FINGER TRIGGER RELEASE Right 1/5/15    rt hand-RING FINGER    INSERTABLE CARDIAC MONITOR  09/19/2019    PT HAS [facility] [id number] REVEAL LOOP RECORDER. MRI CONDITONAL 3T OK, SAFE IMMEDIATELY POST IMPLANT.      KNEE ARTHROSCOPY Bilateral     LUMBAR FUSION  6/15/16    posterior lumbar fusion L4-L5    LUMBAR SPINE SURGERY  2005    LUMBAR    NERVE BLOCK  01/13/2017    caudal #1    celestone 9mg  25mcq fentanyl  NERVE BLOCK Left 03/30/2017    left hip bursa injection depomedrol 40 mg    NERVE BLOCK Bilateral 04/07/2017    bilateral si joints injections, depo 40    PATELLA SURGERY Right     x2    TOE ARTHROPLASTY Left 3/13/2019    5TH TOE TOTAL ARTHROPLASTY, TNC AT 5TH MTJ performed by Fay Claudio DPM at [facility] Right 01/2016    KNEE       Family History   Problem Relation Age of Onset    Diabetes Mother     Diabetes Brother        Social History     Socioeconomic History    Marital status: Single     Spouse name: Not on file    Number of children: Not on file    Years of education: Not on file    Highest education level: Not on file   Occupational History    Not on file   Social Needs    Financial resource strain: Not on file    Food insecurity     Worry: Not on file     Inability: Not on file   [employer] needs     Medical: Not on file     Non-medical: Not on file   Tobacco Use    Smoking status: Current Every Day Smoker     Packs/day: 0.50     Years: 30.00     Pack years: 15.00     Types: Cigarettes    Smokeless tobacco: Never Used    Tobacco comment: trying to quit   Substance and Sexual Activity    Alcohol use:  Yes     Alcohol/week: 2.0 standard drinks     Types: 2 Cans of beer per week     Comment: SOCIALLY    Drug use: Yes     Types: Cocaine     Comment: drug abuse includes smoking cocaine;    Sexual activity: Yes     Partners: Female   Lifestyle    Physical activity     Days per week: Not on file     Minutes per session: Not on file    Stress: Not on file   Relationships    Social connections     Talks on phone: Not on file     Gets together: Not on file     Attends Buddhism service: Not on file     Active member of club or organization: Not on file     Attends meetings of clubs or organizations: Not on file     Relationship status: Not on file    Intimate partner violence     Fear of current or ex partner: Not on file     Emotionally abused: Not on file     Physically abused: Not on file     Forced sexual activity: Not on file   Other Topics Concern    Not on file   Social History Narrative    Not on file       Allergies   Allergen Reactions    Motrin [Ibuprofen] Nausea And Vomiting    Nsaids Nausea And Vomiting    Other Nausea And Vomiting     Pt states he cannot take any muscle relaxers but does not know what specific muscle relaxer caused the nausea. Current Outpatient Medications on File Prior to Encounter   Medication Sig Dispense Refill    acetaminophen (TYLENOL) 500 MG tablet Take 2 tablets by mouth 3 times daily as needed for Pain 180 tablet 0    amLODIPine (NORVASC) 5 MG tablet take 1 tablet by mouth once daily 90 tablet 2    atorvastatin (LIPITOR) 80 MG tablet take 1 tablet by mouth nightly 30 tablet 3    clopidogrel (PLAVIX) 75 MG tablet take 1 tablet by mouth once daily (Patient not taking: Reported on 8/19/2020) 30 tablet 3    traZODone (DESYREL) 50 MG tablet Take 50 mg by mouth nightly      hydrOXYzine (ATARAX) 25 MG tablet Take 25 mg by mouth 2 times daily as needed for Anxiety      escitalopram (LEXAPRO) 10 MG tablet Take 10 mg by mouth daily      aspirin 81 MG chewable tablet Take 1 tablet by mouth daily 30 tablet 3     No current facility-administered medications on file prior to encounter. Review of Systems   Constitutional: Negative. Negative for activity change, chills, fatigue and fever. HENT: Negative. Negative for congestion and hearing loss. Eyes: Negative for photophobia, pain and visual disturbance. Respiratory: Negative for cough and shortness of breath. Cardiovascular: Negative for chest pain and palpitations. Gastrointestinal: Negative for abdominal pain, constipation, nausea and vomiting. Endocrine: Negative for heat intolerance and polyuria. Genitourinary: Negative for dysuria and hematuria. Musculoskeletal: Positive for arthralgias and back pain. Skin: Negative for rash. Allergic/Immunologic: Negative for environmental allergies and immunocompromised state. Neurological: Positive for tingling and numbness. Negative for headaches. Hematological: Negative. Does not bruise/bleed easily. Psychiatric/Behavioral: Negative for self-injury, sleep disturbance and suicidal ideas. The patient is not nervous/anxious. Physical Exam  Musculoskeletal:      Comments: Uses a cane to get around   Skin:         Neurological:      Mental Status: He is alert and oriented to person, place, and time. Psychiatric:         Mood and Affect: Mood normal.            DATA:  LAB. :  1/12/2020  9:44 PM - Estefania Leach Incoming Lab Results From [facility]     Component  Value  Ref Range & Units  Status  Collected  Lab    Glucose  104High    70 - 99 mg/dL  Final  01/12/2020  8:54 PM  [address]    BUN  29High    8 - 23 mg/dL  Final  01/12/2020  8:54 PM  [address]  1. 59High    0.70 - 1.20 mg/dL  Final  01/12/2020  8:54 PM  [address]    Bun/Cre Ratio  NOT REPORTED  9 - 20  Final  01/12/2020  8:54 PM  [address]    Calcium  8.7  8.6 - 10.4 mg/dL  Final  01/12/2020  8:54 PM  [address]    Sodium  140  135 - 144 mmol/L  Final  01/12/2020  8:54 PM  [address]    Potassium  4.2  3.7 - 5.3 mmol/L  Final  01/12/2020  8:54 PM  [address]    Chloride  105  98 - 107 mmol/L  Final  01/12/2020  8:54 PM  [address]    CO2  21  20 - 31 mmol/L  Final  01/12/2020  8:54 PM  [address]    Anion Gap  14  9 - 17 mmol/L  Final  01/12/2020  8:54 PM  [address]    GFR Non-  44Low    >60 mL/min  Final  01/12/2020  8:54 PM  [address]    GFR African American  54Low    >60 mL/min  Final  01/12/2020  8:54 PM  [address]    GFR Comment         Final        12/5/2019  6:58 PM - Estefania Leach Incoming Lab Results From [facility]     Component  Value  Ref Range & Units  Status  Collected  Lab    Amphetamine Screen, Ur  NEGATIVE  NEGATIVE  Final  12/05/2019  5:30 PM  [address] Lab           (Positive cutoff 1000 ng/mL)                Barbiturate Screen, Ur  NEGATIVE  NEGATIVE  Final  12/05/2019  5:30 PM  [address] [facility]           (Positive cutoff 200 ng/mL)                Benzodiazepine Screen, Urine  NEGATIVE  NEGATIVE  Final  12/05/2019  5:30 PM  [address] Lab           (Positive cutoff 200 ng/mL)                Cocaine Metabolite, Urine  POSITIVEAbnormal    NEGATIVE  Final  12/05/2019  5:30 PM  [address] Lab           (Positive cutoff 300 ng/mL)              X-Ray reports:  11/5/2019 10:29 am         COMPARISON:    April 26, 2019         HISTORY:    ORDERING SYSTEM PROVIDED HISTORY: Pain    TECHNOLOGIST PROVIDED HISTORY:    Pain    Reason for Exam: fall, lt knee gave out, pain to lat aspect and posterior    knee, low back pain, chest pain    Acuity: Acute    Type of Exam: Initial         FINDINGS:    Advanced tricompartmental osteoarthropathy.  No acute osseous abnormality or    significant joint effusion identified.              Impression    Advanced tricompartmental osteoarthropathy again noted without acute osseous    abnormality identified. EXAMINATION:    THREE XRAY VIEWS OF THE LUMBAR SPINE         11/5/2019 10:29 am         COMPARISON:    Radiographs dated 11/27/2016         HISTORY:    ORDERING SYSTEM PROVIDED HISTORY: Pain    TECHNOLOGIST PROVIDED HISTORY:    3 view please    Pain    Reason for Exam: fall, lt knee gave out, pain to lat aspect and posterior    knee, low back pain, chest pain    Acuity: Acute    Type of Exam: Initial         FINDINGS:    The lumbar spine demonstrates normal vertebral body heights and alignment.     There is posterior lumbar fixation hardware at the L4-L5 level.  The hardware    is intact.  There is minimal lucency surrounding the pedicle screws    bilaterally, which appears similar to the comparison study.  There is    moderate disc space narrowing and endplate spurring at O0-Y8, mildly    progressed.  There is moderate multilevel facet arthropathy.              Impression    1.  No evidence of acute compression fracture or malalignment in the lumbar    spine.         2.  Stable radiographic appearance of the L4-L5 posterior fixation hardware.         3.  Mild-to-moderate degenerative changes. Clinical  impression:  1. Simple chronic bronchitis ([address]    2. Postlaminectomy syndrome        Patient's   [x] x-ray    [] CT scan    [] MRI  Were/was  Reviewed. These findings are consistent with the patient's symptoms and physical examination. [] Patient's findings on the x-ray were explained to the patient using a bone modal.    Other reports reviewed include    [] Bone scan   [] EMG and nerve conduction studies   [] Referral reports-  I also discussed with him the following treatment options Including advantages and disadvantages of each:    [] Physical therapy    [] Interventional pain treatment    [] Medication management    [] Surgical options    Patient's OARRS were reviewed. It is acceptable and appears patient is not receiving prescriptions from multiple prescribers. Patient is  forthcoming regarding prescriptions for pain medication in the past  Controlled Substances Monitoring: Periodic Controlled Substance Monitoring: No signs of potential drug abuse or diversion identified. , Assessed functional status. (Mukesh Alexis MD)      Counselling/Preventive measures for pain  Control:    [x]  Spine strengthening exercises are discussed with patient in detail. Orders Placed This Encounter   Procedures    DRUG SCREEN, PAIN     Standing Status:   Future     Standing Expiration Date:   10/20/2021     Patient is not upfront regarding her drug use.   His last urine screen 2019 showed the presence of cocaine. I ordered a urine screen on him and at that time patient was asking what the consequences of finding the THC in the urine. Patient was told that we will not prescribe any narcotics if the urine screen is positive for unprescribed drugs. Patient was told to come for the urine screen within 24 hours. Was the report of the urine screen not available then we will plan accordingly. Decision Making Process : Patient's health history and referral records thoroughly reviewed before focused physical examination and discussion with patient. Over 50% of today's visit is spent on examining the patient and counseling. Level of complexity of date to be reviewed is Moderate. The chart date reviewed include the following: Imaging Reports. Summary of Care. Time spent reviewing with patient the below reports:   Medication safety, Treatment options. Level of diagnosis and management options of this case is multiple: involving the following management options: Interventions as needed, medication management as appropriate, future visits, activity modification, heat/ice as needed, Urine drug screen as required. [x]The patient's questions were answered to the best of my abilities. .  Due to the COVID-19 pandemic and the appropriate interventions by [facility], our non-urgent pain management patients will not be seen in the office at this time for their protection and the protection of our staff.  To offer continuity of care, their prescriptions will be escribed this month after a careful chart review and review of their OARRS report  Pursuant to the emergency declaration under the [address] and [place] [address] waiver authority and the [employer] Act, this Virtual Visit was conducted, with patient's consent, to reduce the patient's risk of exposure to COVID-19 and provide continuity of care for an established patient. Services were provided through a video synchronous discussion virtually to substitute for in-person appointment. \"  Documentation:  I communicated with the patient and/or health care decision maker about plan of care  Details of this discussion including any medical advice provided: Total Time: 45 to 55 minutes    I affirm this is a Patient Initiated Episode with an Established Patient who has not had a related appointment within my department in the past 7 days or scheduled within the next 24 hours. This note was created using voice recognition software. There may be inaccuracies of transcription  that are inadvertently overlooked prior to the signature. There is any questions about the transcription please contact me.     Electronically signed by Tayler Boyer MD on 10/20/2020 at 7:05 PM

## 2020-10-20 ENCOUNTER — HOSPITAL ENCOUNTER (OUTPATIENT)
Dept: PAIN MANAGEMENT | Age: 65
Discharge: HOME | End: 2020-10-20
Payer: MEDICARE

## 2020-10-20 PROCEDURE — 99443 PR PHYS/QHP TELEPHONE EVALUATION 21-30 MIN: CPT | Performed by: PAIN MEDICINE

## 2020-10-20 PROCEDURE — 99203 OFFICE O/P NEW LOW 30 MIN: CPT

## 2020-10-20 ASSESSMENT — ENCOUNTER SYMPTOMS
CONSTIPATION: 0
EYE PAIN: 0
VOMITING: 0
BACK PAIN: 1
ABDOMINAL PAIN: 0
SHORTNESS OF BREATH: 0
PHOTOPHOBIA: 0
COUGH: 0
NAUSEA: 0

## 2020-10-20 NOTE — TELEPHONE ENCOUNTER
Kareen Request for pending medication.     Last Visit Date: 9/22/2020  Next Visit Date:  Future Appointments   Date Time Provider Radha Sierra   2/19/2021 10:10 AM Julieth Chanel MD [address] [facility] Maintenance   Topic Date Due    Flu vaccine (1) 09/01/2020    A1C test (Diabetic or Prediabetic)  09/17/2020    Annual Wellness Visit (AWV)  09/17/2020    Lipid screen  09/18/2020    DTaP/Tdap/Td vaccine (1 - Tdap) 11/08/2020 (Originally 8/30/1976)    Shingles Vaccine (1 of 2) 11/08/2020 (Originally 8/30/2007)    Hepatitis C screen  11/08/2020 (Originally 1955)    HIV screen  11/08/2020 (Originally 8/30/1972)    Pneumococcal 0-64 years Vaccine (1 of 1 - PPSV23) 12/01/2020 (Originally 8/30/1963)    Potassium monitoring  01/12/2021    Creatinine monitoring  01/12/2021    Colon cancer screen colonoscopy  03/15/2026    Hepatitis A vaccine  Aged Out    Hepatitis B vaccine  Aged Out    Hib vaccine  Aged Out    Meningococcal (ACWY) vaccine  Aged Out       Hemoglobin A1C (%)   Date Value   09/17/2019 6.1 (H)   09/04/2019 6.0   06/27/2018 5.9             ( goal A1C is < 7)   No results found for: LABMICR  LDL Cholesterol (mg/dL)   Date Value   09/18/2019 120       (goal LDL is <100)   AST (U/L)   Date Value   12/23/2018 13     ALT (U/L)   Date Value   12/23/2018 13     BUN (mg/dL)   Date Value   01/12/2020 29 (H)     BP Readings from Last 3 Encounters:   09/22/20 128/87   08/19/20 124/80   08/03/20 139/88          (goal 120/80)    All Future Testing planned in [facility]  Lab Frequency Next Occurrence   Lipid Panel Once 01/03/2021   NM CARDIAC MUGA W STRESS Once 24/66/4775   Basic Metabolic Panel Once 61/46/8929   NM MYOCARDIAL SPECT REST EXERCISE OR RX Once 06/01/2021   XR HAND LEFT (MIN 3 VIEWS) Once 07/15/2021   XR KNEE LEFT (3 VIEWS) Once 09/22/2021   XR FOOT RIGHT (MIN 3 VIEWS) Once 09/22/2021   XR HAND LEFT (MIN 3 VIEWS) Once 09/22/2021   Hemoglobin A1C Once 09/22/2021   DRUG SCREEN, PAIN Once 04/20/2021       Next Visit Date:  Future Appointments   Date Time Provider Radha Dianai   2/19/2021 10:10 AM Mercedes Garrido MD Neuro [facility]         Patient Active Problem List:     Tobacco abuse     Back pain     Hypercholesteremia     Trigger finger of left hand     Trigger finger, right     Primary osteoarthritis of left knee     Right shoulder pain     Essential hypertension     Chronic obstructive pulmonary disease ([address]     Right foot pain     Midline low back pain with right-sided sciatica     Spondylolisthesis, lumbar region     Chronic low back pain without sciatica     Right hip pain     Acute ischemic right middle cerebral artery (MCA) stroke (HCC)     Vision changes     Stroke-like symptoms     Vitamin D deficiency     Elevated LDL cholesterol level     Thalamic infarct, acute (HCC)     Simple chronic bronchitis (HCC)     Neural foraminal stenosis of lumbar spine     Postlaminectomy syndrome     Medication monitoring encounter     Idiopathic acute pancreatitis     Unstable gait     Major depressive disorder, recurrent episode, moderate (HCC)     Primary osteoarthritis of both knees     TIA (transient ischemic attack)     Paresthesias     Acute ischemic stroke (HCC)     Cocaine abuse (HCC)     Shortness of breath     Swelling of left hand     Acute pain of left knee     Left hand pain     Fall     Prediabetes

## 2020-10-21 ENCOUNTER — HOSPITAL ENCOUNTER (OUTPATIENT)
Age: 65
Setting detail: SPECIMEN
Discharge: HOME | End: 2020-10-21
Payer: MEDICARE

## 2020-10-21 PROCEDURE — 80307 DRUG TEST PRSMV CHEM ANLYZR: CPT

## 2020-10-21 RX ORDER — ATORVASTATIN CALCIUM 80 MG/1
80 TABLET, FILM COATED ORAL NIGHTLY
Qty: 30 TABLET | Refills: 3 | Status: SHIPPED | OUTPATIENT
Start: 2020-10-21 | End: 2021-03-29 | Stop reason: SDUPTHER
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
## 2020-10-25 LAB
6-ACETYLMORPHINE, UR: NOT DETECTED
7-AMINOCLONAZEPAM, URINE: NOT DETECTED
ALPHA-OH-ALPRAZ, URINE: NOT DETECTED
ALPRAZOLAM, URINE: NOT DETECTED
AMPHETAMINES, URINE: NOT DETECTED
BARBITURATES, URINE: NOT DETECTED
BENZOYLECGONINE, UR: NOT DETECTED
BUPRENORPHINE URINE: NOT DETECTED
CARISOPRODOL, UR: NOT DETECTED
CLONAZEPAM, URINE: NOT DETECTED
CODEINE, URINE: NOT DETECTED
CREATININE URINE: 21.1 MG/DL (ref 20–400)
DIAZEPAM, URINE: NOT DETECTED
DRUGS EXPECTED, UR: NORMAL
EER HI RES INTERP UR: NORMAL
ETHYL GLUCURONIDE UR: NOT DETECTED
FENTANYL URINE: NOT DETECTED
HYDROCODONE, URINE: NOT DETECTED
HYDROMORPHONE, URINE: NOT DETECTED
LORAZEPAM, URINE: NOT DETECTED
MARIJUANA METAB, UR: NOT DETECTED
MDA, UR: NOT DETECTED
MDEA, EVE, UR: NOT DETECTED
MDMA URINE: NOT DETECTED
MEPERIDINE METAB, UR: NOT DETECTED
METHADONE, URINE: NOT DETECTED
METHAMPHETAMINE, URINE: NOT DETECTED
METHYLPHENIDATE: NOT DETECTED
MIDAZOLAM, URINE: NOT DETECTED
MORPHINE URINE: NOT DETECTED
NORBUPRENORPHINE, URINE: NOT DETECTED
NORDIAZEPAM, URINE: NOT DETECTED
NORFENTANYL, URINE: NOT DETECTED
NORHYDROCODONE, URINE: NOT DETECTED
NOROXYCODONE, URINE: NOT DETECTED
NOROXYMORPHONE, URINE: NOT DETECTED
OXAZEPAM, URINE: NOT DETECTED
OXYCODONE URINE: NOT DETECTED
OXYMORPHONE, URINE: NOT DETECTED
PAIN MANAGEMENT DRUG PANEL INTERP, URINE: NORMAL
PAIN MGT DRUG PANEL, HI RES, UR: NORMAL
PCP,URINE: NOT DETECTED
PHENTERMINE, UR: NOT DETECTED
PROPOXYPHENE, URINE: NOT DETECTED
TAPENTADOL, URINE: NOT DETECTED
TAPENTADOL-O-SULFATE, URINE: NOT DETECTED
TEMAZEPAM, URINE: NOT DETECTED
TRAMADOL, URINE: NOT DETECTED
ZOLPIDEM, URINE: NOT DETECTED

## 2020-11-03 ENCOUNTER — OFFICE VISIT (OUTPATIENT)
Dept: ORTHOPEDIC SURGERY | Age: 65
End: 2020-11-03
Payer: MEDICARE

## 2020-11-03 PROCEDURE — 99203 OFFICE O/P NEW LOW 30 MIN: CPT | Performed by: PHYSICIAN ASSISTANT

## 2020-11-03 PROCEDURE — G8417 CALC BMI ABV UP PARAM F/U: HCPCS | Performed by: PHYSICIAN ASSISTANT

## 2020-11-03 PROCEDURE — 4004F PT TOBACCO SCREEN RCVD TLK: CPT | Performed by: PHYSICIAN ASSISTANT

## 2020-11-03 PROCEDURE — 3017F COLORECTAL CA SCREEN DOC REV: CPT | Performed by: PHYSICIAN ASSISTANT

## 2020-11-03 PROCEDURE — G8427 DOCREV CUR MEDS BY ELIG CLIN: HCPCS | Performed by: PHYSICIAN ASSISTANT

## 2020-11-03 PROCEDURE — G8484 FLU IMMUNIZE NO ADMIN: HCPCS | Performed by: PHYSICIAN ASSISTANT

## 2020-11-03 ASSESSMENT — ENCOUNTER SYMPTOMS
SHORTNESS OF BREATH: 0
VOMITING: 0
COLOR CHANGE: 0
COUGH: 0

## 2020-11-03 NOTE — PROGRESS NOTES
4 weeks then follow-up. The patient's initial evaluation was approximately 1 week after the initial injury. The patient states that he lost the paper to get the brace made for his left hand and went back last week to get another paper. The patient was instructed to follow-up at [facility] [place] to have the brace made for his left hand. The patient came to our office today for evaluation. The patient states that he does not have the paper for the custom brace for the left hand. The patient is unaware where he should take that paper. Prior to 6 weeks ago the patient denies previous injury or trauma to the left hand. The patient is a previous patient of Dr. Nena Serrato DO and has been evaluated for left shoulder pain in the past (2017). Past Medical History:    Past Medical History:   Diagnosis Date    Aortic valve sclerosis     Back pain     COPD (chronic obstructive pulmonary disease) (HCC)     NEUBULIZER AS NEEDED    Dental bridge present     UPPER    Depression     DJD (degenerative joint disease)     Drug abuse ([facility] [address]     drug abuse includes smoking cocaine    High cholesterol     History of shingles     2013    Hypertension     PT NEVER FILLED  SCRIPT    Pain at rest     Knee-Left, back     Postlaminectomy syndrome 8/9/2017    Sleep apnea     does not have machine    Suicidal ideations     S/I's without attempt    TIA (transient ischemic attack) 10/2017    x 2    Toe contracture, left     5th    Toe pain, left     5th digit    Wears dentures     1 GOLD TOOTH ON FLIPPER UPPER    Wears glasses        Past Surgical History:    Past Surgical History:   Procedure Laterality Date    ARTHROPLASTY Left 03/13/2019    toe    CYST REMOVAL Bilateral     \"groin\"    FINGER TRIGGER RELEASE Left 03/04/2014    ring finger    FINGER TRIGGER RELEASE Right 1/5/15    rt hand-RING FINGER    INSERTABLE CARDIAC MONITOR  09/19/2019    PT HAS Pinstant Karma [id number] REVEAL LOOP RECORDER.  MRI CONDITONAL 3T OK, SAFE IMMEDIATELY POST IMPLANT.  KNEE ARTHROSCOPY Bilateral     LUMBAR FUSION  6/15/16    posterior lumbar fusion L4-L5    LUMBAR SPINE SURGERY  2005    LUMBAR    NERVE BLOCK  01/13/2017    caudal #1    celestone 9mg  25mcq fentanyl    NERVE BLOCK Left 03/30/2017    left hip bursa injection depomedrol 40 mg    NERVE BLOCK Bilateral 04/07/2017    bilateral si joints injections, depo 40    PATELLA SURGERY Right     x2    TOE ARTHROPLASTY Left 3/13/2019    5TH TOE TOTAL ARTHROPLASTY, TNC AT [facility]TH [facility] performed by Francesco Liriano DPM at [address] [place] [address] Right 01/2016    KNEE       Current Medications:   Current Outpatient Medications   Medication Sig Dispense Refill    atorvastatin (LIPITOR) 80 MG tablet Take 1 tablet by mouth nightly 30 tablet 3    acetaminophen (TYLENOL) 500 MG tablet Take 2 tablets by mouth 3 times daily as needed for Pain 180 tablet 0    amLODIPine (NORVASC) 5 MG tablet take 1 tablet by mouth once daily 90 tablet 2    clopidogrel (PLAVIX) 75 MG tablet take 1 tablet by mouth once daily 30 tablet 3    traZODone (DESYREL) 50 MG tablet Take 50 mg by mouth nightly      hydrOXYzine (ATARAX) 25 MG tablet Take 25 mg by mouth 2 times daily as needed for Anxiety      escitalopram (LEXAPRO) 10 MG tablet Take 10 mg by mouth daily      aspirin 81 MG chewable tablet Take 1 tablet by mouth daily 30 tablet 3     No current facility-administered medications for this visit. Allergies:    Motrin [ibuprofen];  Nsaids; and Other    Social History:   Social History     Socioeconomic History    Marital status: Single     Spouse name: Not on file    Number of children: Not on file    Years of education: Not on file    Highest education level: Not on file   Occupational History    Not on file   Social Needs    Financial resource strain: Not on file    Food insecurity     Worry: Not on file     Inability: Not on file    Transportation needs     Medical: Not on file Non-medical: Not on file   Tobacco Use    Smoking status: Current Every Day Smoker     Packs/day: 0.50     Years: 30.00     Pack years: 15.00     Types: Cigarettes    Smokeless tobacco: Never Used    Tobacco comment: trying to quit   Substance and Sexual Activity    Alcohol use: Yes     Alcohol/week: 2.0 standard drinks     Types: 2 Cans of beer per week     Comment: SOCIALLY    Drug use: Yes     Types: Cocaine     Comment: drug abuse includes smoking cocaine;    Sexual activity: Yes     Partners: Female   Lifestyle    Physical activity     Days per week: Not on file     Minutes per session: Not on file    Stress: Not on file   Relationships    Social connections     Talks on phone: Not on file     Gets together: Not on file     Attends Jewish service: Not on file     Active member of club or organization: Not on file     Attends meetings of clubs or organizations: Not on file     Relationship status: Not on file    Intimate partner violence     Fear of current or ex partner: Not on file     Emotionally abused: Not on file     Physically abused: Not on file     Forced sexual activity: Not on file   Other Topics Concern    Not on file   Social History Narrative    Not on file       Family History:  Family History   Problem Relation Age of Onset    Diabetes Mother     Diabetes Brother          REVIEW OF SYSTEMS:  Review of Systems   Constitutional: Negative for activity change and fever. HENT: Negative for sneezing. Respiratory: Negative for cough and shortness of breath. Cardiovascular: Negative for chest pain. Gastrointestinal: Negative for vomiting. Musculoskeletal: Positive for arthralgias (Left hand). Negative for joint swelling and myalgias. Skin: Negative for color change. Neurological: Positive for weakness (Left hand). Negative for numbness. Psychiatric/Behavioral: Negative for sleep disturbance. PHYSICAL EXAM:  There were no vitals taken for this visit.  There is no from that office. I discussed with the patient that if he brings in the paper that he was given I will look it over and discuss where he should take it to have that left hand brace made. Based on what the patient is holding I believe that he will likely need to be seen by occupational therapy to have that brace made for his left hand. I instructed the patient to bring that paper in and we will discuss next steps in his treatment. We would be happy to take over treatment of the patient's left hand pain after he gets as proper paperwork and documentation from his previous appointments. The patient should continue taking Tylenol for his left hand pain. The patient was instructed to call our office with any questions or concerns. Return if symptoms worsen or fail to improve. No orders of the defined types were placed in this encounter. No orders of the defined types were placed in this encounter. This note is created with the assistance of a speech recognition program.  While intending to generate a document that actually reflects the content of the visit, the document can still have some errors including those of syntax and sound a like substitutions which may escape proof reading.   In such instances, actual meaning can be extrapolated by contextual diversion     Electronically signed by Vega Renee PA-C 11/3/2020 at 12:37 PM

## 2020-11-06 ENCOUNTER — OFFICE VISIT (OUTPATIENT)
Dept: FAMILY MEDICINE CLINIC | Age: 65
End: 2020-11-06
Payer: MEDICARE

## 2020-11-06 VITALS
WEIGHT: 231.4 LBS | HEART RATE: 98 BPM | HEIGHT: 69 IN | BODY MASS INDEX: 34.27 KG/M2 | SYSTOLIC BLOOD PRESSURE: 127 MMHG | DIASTOLIC BLOOD PRESSURE: 75 MMHG | TEMPERATURE: 97.2 F

## 2020-11-06 LAB — HBA1C MFR BLD: 5.9 %

## 2020-11-06 PROCEDURE — 90715 TDAP VACCINE 7 YRS/> IM: CPT | Performed by: FAMILY MEDICINE

## 2020-11-06 PROCEDURE — 83036 HEMOGLOBIN GLYCOSYLATED A1C: CPT | Performed by: STUDENT IN AN ORGANIZED HEALTH CARE EDUCATION/TRAINING PROGRAM

## 2020-11-06 PROCEDURE — G8427 DOCREV CUR MEDS BY ELIG CLIN: HCPCS | Performed by: STUDENT IN AN ORGANIZED HEALTH CARE EDUCATION/TRAINING PROGRAM

## 2020-11-06 PROCEDURE — 4004F PT TOBACCO SCREEN RCVD TLK: CPT | Performed by: STUDENT IN AN ORGANIZED HEALTH CARE EDUCATION/TRAINING PROGRAM

## 2020-11-06 PROCEDURE — G0009 ADMIN PNEUMOCOCCAL VACCINE: HCPCS | Performed by: FAMILY MEDICINE

## 2020-11-06 PROCEDURE — G0008 ADMIN INFLUENZA VIRUS VAC: HCPCS | Performed by: FAMILY MEDICINE

## 2020-11-06 PROCEDURE — 99213 OFFICE O/P EST LOW 20 MIN: CPT | Performed by: STUDENT IN AN ORGANIZED HEALTH CARE EDUCATION/TRAINING PROGRAM

## 2020-11-06 PROCEDURE — G8417 CALC BMI ABV UP PARAM F/U: HCPCS | Performed by: STUDENT IN AN ORGANIZED HEALTH CARE EDUCATION/TRAINING PROGRAM

## 2020-11-06 PROCEDURE — 3017F COLORECTAL CA SCREEN DOC REV: CPT | Performed by: STUDENT IN AN ORGANIZED HEALTH CARE EDUCATION/TRAINING PROGRAM

## 2020-11-06 PROCEDURE — G8482 FLU IMMUNIZE ORDER/ADMIN: HCPCS | Performed by: STUDENT IN AN ORGANIZED HEALTH CARE EDUCATION/TRAINING PROGRAM

## 2020-11-06 ASSESSMENT — ENCOUNTER SYMPTOMS
ABDOMINAL PAIN: 0
SINUS PRESSURE: 0
SHORTNESS OF BREATH: 0
VOICE CHANGE: 0
COLOR CHANGE: 0
WHEEZING: 0
COUGH: 0
SINUS PAIN: 0
ABDOMINAL DISTENTION: 0
NAUSEA: 0
VOMITING: 0

## 2020-11-06 NOTE — PATIENT INSTRUCTIONS
Thank you for letting us take care of you today. We hope all your questions were addressed. If a question was overlooked or something else comes to mind after you return home, please contact a member of your Care Team listed below. Your Care Team at [place] [address] is Team #4  Kash Rehman MD (Faculty)  Champ Gaytan MD (Resident)  Jacques Conti MD (Resident)  Milton Wright MD (Resident)  Delfin Islas MD (Resident)  FRAN Lozoya RMA [address] [facility] office)  [address], [address] (Clinical Practice Manager)  Doreene Phalen, [facility] (Clinical Pharmacist)       Office phone number: [phone]    If you need to get in right away due to illness, please be advised we have \"Same Day\" appointments available Monday-Friday. Please call us at [phone] option #3 to schedule your \"Same Day\" appointment. Patient Education        Influenza (Flu) Vaccine (Inactivated or Recombinant): What You Need to Know  Why get vaccinated? Influenza vaccine can prevent influenza (flu). Flu is a contagious disease that spreads around the [place] every year, usually between October and May. Anyone can get the flu, but it is more dangerous for some people. Infants and young children, people 72years of age and older, pregnant women, and people with certain health conditions or a weakened immune system are at greatest risk of flu complications. Pneumonia, bronchitis, sinus infections and ear infections are examples of flu-related complications. If you have a medical condition, such as heart disease, cancer or diabetes, flu can make it worse. Flu can cause fever and chills, sore throat, muscle aches, fatigue, cough, headache, and runny or stuffy nose. Some people may have vomiting and diarrhea, though this is more common in children than adults. Each year, thousands of people in the [facility] die from flu, and many more are hospitalized.  Flu vaccine prevents millions of illnesses and flu-related visits to the doctor each year. Influenza vaccine  CDC recommends everyone 10months of age and older get vaccinated every flu season. Children 6 months through 6years of age may need 2 doses during a single flu season. Everyone else needs only 1 dose each flu season. It takes about 2 weeks for protection to develop after vaccination. There are many flu viruses, and they are always changing. Each year a new flu vaccine is made to protect against three or four viruses that are likely to cause disease in the upcoming flu season. Even when the vaccine doesn't exactly match these viruses, it may still provide some protection. Influenza vaccine does not cause flu. Influenza vaccine may be given at the same time as other vaccines. Talk with your health care provider  Tell your vaccine provider if the person getting the vaccine:  · Has had an allergic reaction after a previous dose of influenza vaccine, or has any severe, life-threatening allergies. · Has ever had Guillain-Barré Syndrome (also called GBS). In some cases, your health care provider may decide to postpone influenza vaccination to a future visit. People with minor illnesses, such as a cold, may be vaccinated. People who are moderately or severely ill should usually wait until they recover before getting influenza vaccine. Your health care provider can give you more information. Risks of a vaccine reaction  · Soreness, redness, and swelling where shot is given, fever, muscle aches, and headache can happen after influenza vaccine. · There may be a very small increased risk of Guillain-Barré Syndrome (GBS) after inactivated influenza vaccine (the flu shot). [facility] children who get the flu shot along with pneumococcal vaccine (PCV13), and/or DTaP vaccine at the same time might be slightly more likely to have a seizure caused by fever.  Tell your health care provider if a child who is getting flu vaccine has ever had a seizure. People sometimes faint after medical procedures, including vaccination. Tell your provider if you feel dizzy or have vision changes or ringing in the ears. As with any medicine, there is a very remote chance of a vaccine causing a severe allergic reaction, other serious injury, or death. What if there is a serious problem? An allergic reaction could occur after the vaccinated person leaves the clinic. If you see signs of a severe allergic reaction (hives, swelling of the face and throat, difficulty breathing, a fast heartbeat, dizziness, or weakness), call 9-1-1 and get the person to the nearest hospital.  For other signs that concern you, call your health care provider. Adverse reactions should be reported to the Vaccine Adverse Event Reporting System (VAERS). Your health care provider will usually file this report, or you can do it yourself. Visit the VAERS website at www.vaers. hhs.gov or call 7-758.555.2336. VAERS is only for reporting reactions, and VAERS staff do not give medical advice. The National Vaccine Injury Compensation Program  The National Vaccine Injury Compensation Program (VICP) is a federal program that was created to compensate people who may have been injured by certain vaccines. Visit the VICP website at www.hrsa.gov/vaccinecompensation or call 7-206.305.7988 to learn about the program and about filing a claim. There is a time limit to file a claim for compensation. How can I learn more? · Ask your healthcare provider. · Call your local or state health department. · Contact the Centers for Disease Control and Prevention (CDC):  ? Call 2-387.608.4912 (1-800-CDC-INFO) or  ? Visit CDC's website at www.cdc.gov/flu  Vaccine Information Statement (Interim)  Inactivated Influenza Vaccine  8/15/2019  [id number]  Department of Health and Human Services  Centers for Disease Control and Prevention  Many Vaccine Information Statements are available in Chinese and other languages. See www.immunize.org/vis. Muchas hojas de información sobre vacunas están disponibles en español y en otros idiomas. Visite www.immunize.org/vis. Care instructions adapted under license by [facility] ([facility]). If you have questions about a medical condition or this instruction, always ask your healthcare professional. [facility] any warranty or liability for your use of this information. Patient Education        Pneumococcal Polysaccharide Vaccine: What You Need to Know  Why get vaccinated? Pneumococcal polysaccharide vaccine (PPSV23) can prevent pneumococcal disease. Pneumococcal disease refers to any illness caused by pneumococcal bacteria. These bacteria can cause many types of illnesses, including pneumonia, which is an infection of the lungs. Pneumococcal bacteria are one of the most common causes of pneumonia. Besides pneumonia, pneumococcal bacteria can also cause:  · Ear infections,  · Sinus infections  · Meningitis (infection of the tissue covering the brain and spinal cord)  · Bacteremia (bloodstream infection)  Anyone can get pneumococcal disease, but children under 3years of age, people with certain medical conditions, adults 72 years or older, and cigarette smokers are at the highest risk. Most pneumococcal infections are mild. However, some can result in long-term problems, such as brain damage or hearing loss. Meningitis, bacteremia, and pneumonia caused by pneumococcal disease can be fatal.  PPSV23  PPSV23 protects against 23 types of bacteria that cause pneumococcal disease. PPSV23 is recommended for:  · All adults 72 years or older,  · Anyone 2 years or older with certain medical conditions that can lead to an increased risk for pneumococcal disease. Most people need only one dose of PPSV23. A second dose of PPSV23, and another type of pneumococcal vaccine called PCV13, are recommended for certain high-risk groups.  Your health care provider can give you more information. People 65 years or older should get a dose of PPSV23 even if they have already gotten one or more doses of the vaccine before they turned 65. Talk with your health care provider  Tell your vaccine provider if the person getting the vaccine:  · Has had an allergic reaction after a previous dose of PPSV23, or has any severe, life-threatening allergies. In some cases, your health care provider may decide to postpone PPSV23 vaccination to a future visit. People with minor illnesses, such as a cold, may be vaccinated. People who are moderately or severely ill should usually wait until they recover before getting PPSV23. Your health care provider can give you more information. Risks of a vaccine reaction  · Redness or pain where the shot is given, feeling tired, fever, or muscle aches can happen after PPSV23. People sometimes faint after medical procedures, including vaccination. Tell your provider if you feel dizzy or have vision changes or ringing in the ears. As with any medicine, there is a very remote chance of a vaccine causing a severe allergic reaction, other serious injury, or death. What if there is a serious problem? An allergic reaction could occur after the vaccinated person leaves the clinic. If you see signs of a severe allergic reaction (hives, swelling of the face and throat, difficulty breathing, a fast heartbeat, dizziness, or weakness), call 9-1-1 and get the person to the nearest hospital.  For other signs that concern you, call your health care provider. Adverse reactions should be reported to the Vaccine Adverse Event Reporting System (VAERS). Your health care provider will usually file this report, or you can do it yourself. Visit the VAERS website at www.vaers. hhs.gov at www.vaers. hhs.gov or call 5-603.112.4899. VAERS is only for reporting reactions, and VAERS staff do not give medical advice. How can I learn more? · Ask your health care provider.   · Call your local or [facility] health department. · Contact the Centers for Disease Control and Prevention (CDC):  ? Call 8-859.190.8577 (1-800-CDC-INFO) or  ? Visit CDC's website at www.cdc.gov/vaccines  Vaccine Information Statement  PPSV23 Vaccine  10/30/2019  [facility] for Disease Control and Prevention  Many Vaccine Information Statements are available in Nepali and other languages. See www.immunize.org/vis. Hojas de información Sobre Vacunas están disponibles en español y en muchos otros idiomas. Visite [place]. Care instructions adapted under license by [facility] ([facility]). If you have questions about a medical condition or this instruction, always ask your healthcare professional. [address] any warranty or liability for your use of this information. Patient Education        Tdap (Tetanus, Diphtheria, Pertussis) Vaccine: What You Need to Know  Why get vaccinated? Tdap vaccine can prevent tetanus, diphtheria, and pertussis. Diphtheria and pertussis spread from person to person. Tetanus enters the body through cuts or wounds. · TETANUS (T) causes painful stiffening of the muscles. Tetanus can lead to serious health problems, including being unable to open the mouth, having trouble swallowing and breathing, or death. · DIPHTHERIA (D) can lead to difficulty breathing, heart failure, paralysis, or death. · PERTUSSIS (aP), also known as \"whooping cough,\" can cause uncontrollable, violent coughing which makes it hard to breathe, eat, or drink. Pertussis can be extremely serious in babies and young children, causing pneumonia, convulsions, brain damage, or death. In teens and adults, it can cause weight loss, loss of bladder control, passing out, and rib fractures from severe coughing. Tdap vaccine  Tdap is only for children 7 years and older, adolescents, and adults.   Adolescents should receive a single dose of Tdap, preferably at age 6 or 15 years.  Pregnant women should get a dose of Tdap during every pregnancy, to protect the  from pertussis. Infants are most at risk for severe, life threatening complications from pertussis. Adults who have never received Tdap should get a dose of Tdap. Also, adults should receive a booster dose every 10 years, or earlier in the case of a severe and dirty wound or burn. Booster doses can be either Tdap or Td (a different vaccine that protects against tetanus and diphtheria but not pertussis). Tdap may be given at the same time as other vaccines. Talk with your health care provider  Tell your vaccine provider if the person getting the vaccine:  · Has had an allergic reaction after a previous dose of any vaccine that protects against tetanus, diphtheria, or pertussis, or has any severe, life threatening allergies. · Has had a coma, decreased level of consciousness, or prolonged seizures within 7 days after a previous dose of any pertussis vaccine (DTP, DTaP, or Tdap). · Has seizures or another nervous system problem. · Has ever had Guillain-Barré Syndrome (also called GBS). · Has had severe pain or swelling after a previous dose of any vaccine that protects against tetanus or diphtheria. In some cases, your health care provider may decide to postpone Tdap vaccination to a future visit. People with minor illnesses, such as a cold, may be vaccinated. People who are moderately or severely ill should usually wait until they recover before getting Tdap vaccine. Your health care provider can give you more information. Risks of a vaccine reaction  · Pain, redness, or swelling where the shot was given, mild fever, headache, feeling tired, and nausea, vomiting, diarrhea, or stomachache sometimes happen after Tdap vaccine. People sometimes faint after medical procedures, including vaccination. Tell your provider if you feel dizzy or have vision changes or ringing in the ears.   As with any medicine, there is a very remote chance of a vaccine causing a severe allergic reaction, other serious injury, or death. What if there is a serious problem? An allergic reaction could occur after the vaccinated person leaves the clinic. If you see signs of a severe allergic reaction (hives, swelling of the face and throat, difficulty breathing, a fast heartbeat, dizziness, or weakness), call 9-1-1 and get the person to the nearest hospital.  For other signs that concern you, call your health care provider. Adverse reactions should be reported to the Vaccine Adverse Event Reporting System (VAERS). Your health care provider will usually file this report, or you can do it yourself. Visit the VAERS website at www.vaers. hhs.gov or call 8-633.630.8891. VAERS is only for reporting reactions, and VAERS staff do not give medical advice. The National Vaccine Injury Compensation Program  The National Vaccine Injury Compensation Program (VICP) is a federal program that was created to compensate people who may have been injured by certain vaccines. Visit the VICP website at www.hrsa.gov/vaccinecompensation or call 3-291.488.1202 to learn about the program and about filing a claim. There is a time limit to file a claim for compensation. How can I learn more? · Ask your health care provider. · Call your local or state health department. · Contact the Centers for Disease Control and Prevention (CDC):  ? Call 1-564.270.2255 (1-800-CDC-INFO) or  ? Visit CDC's website at www.cdc.gov/vaccines  Vaccine Information Statement (Interim)  Tdap (Tetanus, Diphtheria, Pertussis) Vaccine  04/01/2020  [id number]  Department of Health and Human Services  Centers for Disease Control and Prevention  Many Vaccine Information Statements are available in [place]men and other languages. See www.immunize.org/vis. Muchas hojas de información sobre vacunas están disponibles en español y en otros idiomas. Visite www.immunize.org/vis.   Care instructions adapted under license by [facility] ([facility]). If you have questions about a medical condition or this instruction, always ask your healthcare professional. [address] any warranty or liability for your use of this information.
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
## 2020-11-06 NOTE — PROGRESS NOTES
8/30/2007)    Hepatitis C screen  11/08/2020 (Originally 1955)    HIV screen  11/08/2020 (Originally 8/30/1972)    Pneumococcal 0-64 years Vaccine (1 of 1 - PPSV23) 12/01/2020 (Originally [birth year]30/1961)    Potassium monitoring  01/12/2021    Creatinine monitoring  01/12/2021    Colon cancer screen colonoscopy  03/15/2026    Hepatitis A vaccine  Aged Out    Hepatitis B vaccine  Aged Out    Hib vaccine  Aged Out    Meningococcal (ACWY) vaccine  Aged Out

## 2020-11-06 NOTE — PROGRESS NOTES
Attending Physician Statement  I have discussed the care of Claudean Gaskins, including pertinent history and exam findings,  with the resident. I have reviewed the key elements of all parts of the encounter with the resident. I agree with the assessment, plan and orders as documented by the resident.   (GE Modifier)    Obed Bazzi

## 2020-11-09 ENCOUNTER — TELEPHONE (OUTPATIENT)
Dept: FAMILY MEDICINE CLINIC | Age: 65
End: 2020-11-09

## 2020-11-09 NOTE — TELEPHONE ENCOUNTER
Call from [facility] @ [facility]. Stating that referral needs to say Occupational Therapy on the referral for patient.     Please review and advise

## 2020-11-11 ENCOUNTER — TELEPHONE (OUTPATIENT)
Dept: FAMILY MEDICINE CLINIC | Age: 65
End: 2020-11-11

## 2020-11-11 NOTE — TELEPHONE ENCOUNTER
Writer just to with Physical Therapy stating that the referral for Left knee pain needs to say Physical Therapy and then the Left hand pain needs to say  Occupational Therapy, knee and hand therapy goes to two different department.      Please review and advise

## 2020-11-11 NOTE — TELEPHONE ENCOUNTER
Writer call patient explaining that a new referral is ready for, he can let Occupational Therapy @ [facility] know that the referral is ready I will try to call [facility] too.

## 2020-11-19 ENCOUNTER — TELEPHONE (OUTPATIENT)
Dept: FAMILY MEDICINE CLINIC | Age: 65
End: 2020-11-19

## 2020-11-28 ENCOUNTER — TELEPHONE (OUTPATIENT)
Dept: FAMILY MEDICINE CLINIC | Age: 65
End: 2020-11-28

## 2020-12-09 ENCOUNTER — OFFICE VISIT (OUTPATIENT)
Dept: FAMILY MEDICINE CLINIC | Age: 65
End: 2020-12-09
Payer: MEDICARE

## 2020-12-09 VITALS
WEIGHT: 235.4 LBS | TEMPERATURE: 97.2 F | HEART RATE: 93 BPM | HEIGHT: 69 IN | SYSTOLIC BLOOD PRESSURE: 125 MMHG | DIASTOLIC BLOOD PRESSURE: 79 MMHG | BODY MASS INDEX: 34.87 KG/M2

## 2020-12-09 PROCEDURE — G8417 CALC BMI ABV UP PARAM F/U: HCPCS | Performed by: FAMILY MEDICINE

## 2020-12-09 PROCEDURE — G8482 FLU IMMUNIZE ORDER/ADMIN: HCPCS | Performed by: FAMILY MEDICINE

## 2020-12-09 PROCEDURE — 4004F PT TOBACCO SCREEN RCVD TLK: CPT | Performed by: FAMILY MEDICINE

## 2020-12-09 PROCEDURE — 3017F COLORECTAL CA SCREEN DOC REV: CPT | Performed by: FAMILY MEDICINE

## 2020-12-09 PROCEDURE — G8427 DOCREV CUR MEDS BY ELIG CLIN: HCPCS | Performed by: FAMILY MEDICINE

## 2020-12-09 PROCEDURE — 99213 OFFICE O/P EST LOW 20 MIN: CPT | Performed by: FAMILY MEDICINE

## 2020-12-09 RX ORDER — GABAPENTIN 100 MG/1
100 CAPSULE ORAL 2 TIMES DAILY
Qty: 30 CAPSULE | Refills: 0 | Status: SHIPPED | OUTPATIENT
Start: 2020-12-09 | End: 2021-06-22 | Stop reason: SDUPTHER

## 2020-12-09 ASSESSMENT — ENCOUNTER SYMPTOMS
SHORTNESS OF BREATH: 0
COUGH: 0
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
## 2020-12-09 NOTE — PROGRESS NOTES
Visit Information    Have you changed or started any medications since your last visit including any over-the-counter medicines, vitamins, or herbal medicines? no   Have you stopped taking any of your medications? Is so, why? -  no  Are you having any side effects from any of your medications? - no    Have you seen any other physician or provider since your last visit?  no   Have you had any other diagnostic tests since your last visit?  no   Have you been seen in the emergency room and/or had an admission in a hospital since we last saw you?  no   Have you had your routine dental cleaning in the past 6 months?  no     Do you have an active MyChart account? If no, what is the barrier?   Yes    Patient Care Team:  Laurent Doty DO as PCP - General (Family Medicine)  Laurent Doty DO as PCP - [facility]  Yinka King as Surgeon (Orthopedic Surgery)  [address])    Medical History Review  Past Medical, Family, and Social History reviewed and does not contribute to the patient presenting condition    Health Maintenance   Topic Date Due    Hepatitis C screen  1955    HIV screen  08/30/1972    Shingles Vaccine (1 of 2) 08/30/2007    Annual Wellness Visit (AWV)  09/17/2020    Lipid screen  09/18/2020    Potassium monitoring  01/12/2021    Creatinine monitoring  01/12/2021    A1C test (Diabetic or Prediabetic)  11/06/2021    Colon cancer screen colonoscopy  03/15/2026    DTaP/Tdap/Td vaccine (2 - Td) 11/06/2030    Flu vaccine  Completed    Pneumococcal 0-64 years Vaccine  Completed    Hepatitis A vaccine  Aged Out    Hepatitis B vaccine  Aged Out    Hib vaccine  Aged Out    Meningococcal (ACWY) vaccine  Aged Out

## 2020-12-10 ENCOUNTER — TELEPHONE (OUTPATIENT)
Dept: FAMILY MEDICINE CLINIC | Age: 65
End: 2020-12-10

## 2020-12-10 NOTE — TELEPHONE ENCOUNTER
Robina from [facility] called asking for what type of brace for the patient swollen hand.  EXT# [id number]

## 2020-12-10 NOTE — TELEPHONE ENCOUNTER
Patient injured his left hand. No fracture. He was given a prescription for a hand brace and was told to go to occupational therapy . It was given to him by an orthopedic doctor. Sorry so vague. I believe the patient has the order. It came from [address] [place] [address] . Perhaps we can try to track it down then can let occupational therapy know.

## 2020-12-15 ENCOUNTER — TELEPHONE (OUTPATIENT)
Dept: FAMILY MEDICINE CLINIC | Age: 65
End: 2020-12-15

## 2020-12-15 NOTE — TELEPHONE ENCOUNTER
[address] sent over presciption clarification for medication diclofenac sodium 1% gel:    SIG validation need exact dose per application in GM'S.  Please advise, Thank you

## 2020-12-17 ENCOUNTER — HOSPITAL ENCOUNTER (OUTPATIENT)
Dept: OCCUPATIONAL THERAPY | Age: 65
Setting detail: THERAPIES SERIES
Discharge: HOME | End: 2020-12-17
Payer: MEDICARE

## 2020-12-17 NOTE — SIGNIFICANT EVENT
[x] [address]  [address]    [phone]  F: [phone]   [] [address]  [facility]   [address]  P: [phone]  F: [phone]  [] [place]  [address] [place] [address]  P: [phone]  F: [phone] [] [address]: [phone]  F: [phone]  [] [address]  [facility]   [address]   [place]: [phone]  F: [phone]   [] [place] [id number] [facility] [address]  [place]: [phone]   F: [phone]     Occupational Therapy Cancel/No Show note    Date: 2020  Patient: Deb Villareal  : 1955  MRN: [id number]    Cancels/No Shows to date:   For today's appointment patient:    [x]  Cancelled    [x] Rescheduled appointment    [] No-show     Reason given by patient:    []  Patient ill    []  Conflicting appointment    [x] No transportation      [] Conflict with work    [] No reason given    [] Weather related    [] COVID-19    [] Other:      Comments:        [x] Next appointment was confirmed    Electronically signed by: SILVIO Gonzalez/MELISSA

## 2020-12-18 NOTE — ED PROVIDER NOTES
[address]  ED  Emergency Department Encounter  EmergencyMedicine Resident     Pt Marcel Quintanilla  MRN: [id number]  [address] 1955  Date of evaluation: 9/21/19  PCP:  Floresita Sosa DO    CHIEF COMPLAINT       Chief Complaint   Patient presents with    Post-op Problem     pt had a device implanted on Monday, no having pain at site to left chest with drainage. pt states out of percocet       HISTORY OF PRESENT ILLNESS  (Location/Symptom, Timing/Onset, Context/Setting, Quality, Duration, Modifying Factors, Severity.)      Suri Badillo is a 60 y.o. male who presents with continued pain at site of loop recorder placement. This was placed on 9/19/2019. He denies any associated redness, purulent drainage, fever, chills, myalgias. Patient was seen yesterday with similar symptoms and treated with Percocet. He states that his pain has been improved with Percocet. He states he was concerned about the continued pain and just wanted to have the site checked again. He has a past history of COPD, TIA, hypertension. He states that he is taking all of his medications as prescribed. He denies any associated shortness of breath, nausea, vomiting, diaphoresis. He states that the site is tender to touch. He denies any change in his symptoms from recent evaluation yesterday. PAST MEDICAL / SURGICAL / SOCIAL / FAMILY HISTORY      has a past medical history of Aortic valve sclerosis, Back pain, COPD (chronic obstructive pulmonary disease) ([address] Dental bridge present, Depression, DJD (degenerative joint disease), Drug abuse ([facility] [address] High cholesterol, History of shingles, Hypertension, Pain at rest, Postlaminectomy syndrome, Sleep apnea, Suicidal ideations, TIA (transient ischemic attack), Toe contracture, left, Toe pain, left, Wears dentures, and Wears glasses. has a past surgical history that includes cyst removal (Bilateral); Patella surgery (Right); Knee arthroscopy (Bilateral);  Finger 12-18-Cm notePt has Zero copay for Eliquis.  Electronically signed by Mirza Gutierrez RN on 12/18/2020 at 3:10 PM The pulmonary vasculature is within normal limits. There is no focal consolidation, pleural effusion or pneumothorax. The visualized osseous structures demonstrate no acute abnormality. Loop recorder noted left anterior chest.     No acute cardiopulmonary abnormality. Ct Head Wo Contrast    Result Date: 9/17/2019  EXAMINATION: CT OF THE HEAD WITHOUT CONTRAST  9/16/2019 9:00 pm TECHNIQUE: CT of the head was performed without the administration of intravenous contrast. Dose modulation, iterative reconstruction, and/or weight based adjustment of the mA/kV was utilized to reduce the radiation dose to as low as reasonably achievable. COMPARISON: 05/02/2017 HISTORY: ORDERING SYSTEM PROVIDED HISTORY: R arm numbness TECHNOLOGIST PROVIDED HISTORY: Reason for Exam: rt arm numbness Acuity: Unknown Type of Exam: Unknown Initial evaluation. FINDINGS: BRAIN/VENTRICLES:  The ventricles and cisternal spaces are normal in size, shape, and configuration for the age of the patient. There is an area of low attenuation in the high right parietal lobe that likely represents an area of infarct of indeterminate age. No other areas of abnormal attenuation are identified within the visualized brain parenchyma. There is no midline shift. No hemorrhage is identified in the brain parenchyma. MRI is more sensitive for evaluation of the brain parenchyma if indicated. ORBITS: The visualized portion of the orbits demonstrate no acute abnormality. SINUSES:  The visualized paranasal sinuses and mastoid air cells are clear. SOFT TISSUES/SKULL:  No acute abnormality of the visualized skull or soft tissues. Area of hypoattenuation in the high right parietal lobe that likely represents an area of infarct of indeterminate age. No other brain parenchymal abnormality.      Cta Head Neck W Contrast    Result Date: 9/16/2019  EXAMINATION: CTA OF THE HEAD AND NECK WITH CONTRAST 9/16/2019 8:59 pm: TECHNIQUE: CTA of the head and neck was performed with the administration of intravenous contrast. Multiplanar reformatted images are provided for review. MIP images are provided for review. Stenosis of the internal carotid arteries measured using NASCET criteria. Dose modulation, iterative reconstruction, and/or weight based adjustment of the mA/kV was utilized to reduce the radiation dose to as low as reasonably achievable. COMPARISON: CT brain performed 09/16/2019. HISTORY: ORDERING SYSTEM PROVIDED HISTORY: right hand numbness on/off for 1 week TECHNOLOGIST PROVIDED HISTORY: Reason for Exam: rt arm numbness Acuity: Unknown Type of Exam: Unknown FINDINGS: CTA NECK: AORTIC ARCH/ARCH VESSELS: There is common origin of the brachiocephalic and left common carotid arteries. No significant stenosis is seen of the innominate artery or subclavian arteries. CAROTID ARTERIES: The common carotid arteries are normal in appearance without evidence of a flow limiting stenosis. The internal carotid arteries are normal in appearance without evidence of a flow limiting stenosis by NASCET criteria. No dissection or arterial injury is seen. VERTEBRAL ARTERIES: The vertebral arteries both arise from the subclavian arteries and are normal in caliber without evidence of flow limiting stenosis or dissection. SOFT TISSUES: The lung apices are clear. No cervical or superior mediastinal lymphadenopathy. The visualized portion of the larynx and pharynx appear unremarkable. The parotid, submandibular and thyroid glands demonstrate no acute abnormality. BONES: The visualized osseous structures appear unremarkable. CTA HEAD: ANTERIOR CIRCULATION: The internal carotid arteries are normal in course and caliber without focal stenosis. The anterior cerebral and middle cerebral arteries demonstrate no focal stenosis. POSTERIOR CIRCULATION: The posterior cerebral arteries demonstrate no focal stenosis. The vertebral and basilar arteries appear unremarkable.  BRAIN: No mass effect or midline shift. No abnormal extra-axial fluid collection. The gray-white differentiation appears grossly maintained. Unremarkable CTA of the head and neck without significant stenosis or evidence for occlusion. [facility] Brain Wo Contrast    Result Date: 9/17/2019  EXAMINATION: MRI OF THE BRAIN WITHOUT CONTRAST  9/17/2019 9:08 am TECHNIQUE: Multiplanar multisequence MRI of the brain was performed without the administration of intravenous contrast. COMPARISON: 09/16/2019 HISTORY: ORDERING SYSTEM PROVIDED HISTORY: right hand numbness on/off for 1 week Follow-up. FINDINGS: INTRACRANIAL STRUCTURES/VENTRICLES: There are areas of restricted diffusion with associated areas of increased T2 signal in the right parietal lobe, in the vascular distribution of the posterior right middle cerebral artery. These are compatible with early subacute infarcts. The cerebral and cerebellar parenchyma demonstrate normal volume for age. There are several scattered areas of increased T2 signal noted supratentorially which are compatible with mild chronic microvascular white matter ischemic disease. No abnormal extra-axial fluid collections. The ventricles are normal in size. Normal major intracranial flow voids are noted. There is a chronic infarct noted in the posterolateral left cerebellar hemisphere. There are no areas of blooming artifact noted on the gradient echo sequences to suggest sequela of acute or chronic hemorrhage. ORBITS: The visualized portion of the orbits demonstrate no acute abnormality. SINUSES: There is scattered trace paranasal sinus disease. The mastoid air cells are clear. BONES/SOFT TISSUES: Craniocervical junction is unremarkable. 1. There are early subacute infarctions noted in the right parietal lobe, in the vascular distribution of the posterior right middle cerebral artery. 2. Mild chronic microvascular white matter ischemic disease.      EKG  EKG Interpretation    Interpreted by me    Rhythm: normal sinus

## 2020-12-21 ENCOUNTER — HOSPITAL ENCOUNTER (OUTPATIENT)
Dept: OCCUPATIONAL THERAPY | Age: 65
Setting detail: THERAPIES SERIES
Discharge: HOME | End: 2020-12-21
Payer: MEDICARE

## 2020-12-21 PROCEDURE — 97763 ORTHC/PROSTC MGMT SBSQ ENC: CPT

## 2020-12-21 PROCEDURE — 97760 ORTHOTIC MGMT&TRAING 1ST ENC: CPT

## 2021-01-18 ENCOUNTER — OFFICE VISIT (OUTPATIENT)
Dept: FAMILY MEDICINE CLINIC | Age: 66
End: 2021-01-18
Payer: MEDICARE

## 2021-01-18 VITALS
HEART RATE: 86 BPM | RESPIRATION RATE: 18 BRPM | BODY MASS INDEX: 34.21 KG/M2 | OXYGEN SATURATION: 97 % | HEIGHT: 69 IN | DIASTOLIC BLOOD PRESSURE: 86 MMHG | WEIGHT: 231 LBS | TEMPERATURE: 97.3 F | SYSTOLIC BLOOD PRESSURE: 132 MMHG

## 2021-01-18 DIAGNOSIS — Z00.00 ROUTINE GENERAL MEDICAL EXAMINATION AT A HEALTH CARE FACILITY: Primary | ICD-10-CM

## 2021-01-18 PROCEDURE — G8482 FLU IMMUNIZE ORDER/ADMIN: HCPCS | Performed by: FAMILY MEDICINE

## 2021-01-18 PROCEDURE — G0438 PPPS, INITIAL VISIT: HCPCS | Performed by: FAMILY MEDICINE

## 2021-01-18 PROCEDURE — 3017F COLORECTAL CA SCREEN DOC REV: CPT | Performed by: FAMILY MEDICINE

## 2021-01-18 ASSESSMENT — LIFESTYLE VARIABLES
HAVE YOU OR SOMEONE ELSE BEEN INJURED AS A RESULT OF YOUR DRINKING: 0
AUDIT-C TOTAL SCORE: 3
HOW OFTEN DURING THE LAST YEAR HAVE YOU BEEN UNABLE TO REMEMBER WHAT HAPPENED THE NIGHT BEFORE BECAUSE YOU HAD BEEN DRINKING: 0
AUDIT TOTAL SCORE: 3

## 2021-01-18 ASSESSMENT — PATIENT HEALTH QUESTIONNAIRE - PHQ9
SUM OF ALL RESPONSES TO PHQ QUESTIONS 1-9: 1
SUM OF ALL RESPONSES TO PHQ QUESTIONS 1-9: 1
1. LITTLE INTEREST OR PLEASURE IN DOING THINGS: 1
SUM OF ALL RESPONSES TO PHQ QUESTIONS 1-9: 1
2. FEELING DOWN, DEPRESSED OR HOPELESS: 0

## 2021-01-18 NOTE — PROGRESS NOTES
Medicare Annual Wellness Visit  Name: Margarita Banks Date: 2021   MRN: [id number] Sex: Male   Age: 63 y.o. Ethnicity: Non-/Non    : 1955 Race: Black      Reid Mullen is here for Medicare AWV    Screenings for behavioral, psychosocial and functional/safety risks, and cognitive dysfunction are all negative except as indicated below. These results, as well as other patient data from the [address] form, are documented in Flowsheets linked to this Encounter. Allergies   Allergen Reactions    Motrin [Ibuprofen] Nausea And Vomiting    Nsaids Nausea And Vomiting    Other Nausea And Vomiting     Pt states he cannot take any muscle relaxers but does not know what specific muscle relaxer caused the nausea. Prior to Visit Medications    Medication Sig Taking? Authorizing Provider   diclofenac sodium (VOLTAREN) 1 % GEL Apply 2 g topically 4 times daily as needed for Pain To the left hand Yes Howie Dupont DO   atorvastatin (LIPITOR) 80 MG tablet Take 1 tablet by mouth nightly Yes Howie Dupont DO   acetaminophen (TYLENOL) 500 MG tablet Take 2 tablets by mouth 3 times daily as needed for Pain Yes Rupert Bryant MD   amLODIPine (NORVASC) 5 MG tablet take 1 tablet by mouth once daily Yes Kami Paz MD   clopidogrel (PLAVIX) 75 MG tablet take 1 tablet by mouth once daily Yes Howie Dupont DO   traZODone (DESYREL) 50 MG tablet Take 50 mg by mouth nightly Yes Historical Provider, MD   hydrOXYzine (ATARAX) 25 MG tablet Take 25 mg by mouth 2 times daily as needed for Anxiety Yes Historical Provider, MD   escitalopram (LEXAPRO) 10 MG tablet Take 10 mg by mouth daily Yes Historical Provider, MD   aspirin 81 MG chewable tablet Take 1 tablet by mouth daily Yes Marvel Ritter DO   gabapentin (NEURONTIN) 100 MG capsule Take 1 capsule by mouth 2 times daily for 30 doses.   Howie Dupont DO       Past Medical History:   Diagnosis Date    Aortic valve sclerosis  Back pain     COPD (chronic obstructive pulmonary disease) ([facility])     NEUBULIZER AS NEEDED    Dental bridge present     UPPER    Depression     DJD (degenerative joint disease)     Drug abuse ([facility] [address]     drug abuse includes smoking cocaine    High cholesterol     History of shingles     2013    Hypertension     PT NEVER FILLED  SCRIPT    Pain at rest     Knee-Left, back     Postlaminectomy syndrome 8/9/2017    Sleep apnea     does not have machine    Suicidal ideations     S/I's without attempt    TIA (transient ischemic attack) 10/2017    x 2    Toe contracture, left     5th    Toe pain, left     5th digit    Wears dentures     1 GOLD TOOTH ON FLIPPER UPPER    Wears glasses        Past Surgical History:   Procedure Laterality Date    ARTHROPLASTY Left 03/13/2019    toe    CYST REMOVAL Bilateral     \"groin\"    FINGER TRIGGER RELEASE Left 03/04/2014    ring finger    FINGER TRIGGER RELEASE Right 1/5/15    rt hand-RING FINGER    INSERTABLE CARDIAC MONITOR  09/19/2019    PT HAS [facility] [id number] REVEAL LOOP RECORDER. MRI CONDITONAL 3T OK, SAFE IMMEDIATELY POST IMPLANT.      KNEE ARTHROSCOPY Bilateral     LUMBAR FUSION  6/15/16    posterior lumbar fusion L4-L5    LUMBAR SPINE SURGERY  2005    LUMBAR    NERVE BLOCK  01/13/2017    caudal #1    celestone 9mg  25mcq fentanyl    NERVE BLOCK Left 03/30/2017    left hip bursa injection depomedrol 40 mg    NERVE BLOCK Bilateral 04/07/2017    bilateral si joints injections, depo 40    PATELLA SURGERY Right     x2    TOE ARTHROPLASTY Left 3/13/2019    5TH TOE TOTAL ARTHROPLASTY, TNC AT 5TH MTJ performed by Alcon Weber DPM at [address] Right 01/2016    KNEE       Family History   Problem Relation Age of Onset    Diabetes Mother     Diabetes Brother        CareTeam (Including outside providers/suppliers regularly involved in providing care):   Patient Care Team:  Butch Cagle DO as PCP - General (Family Medicine)  Chidi Garcia DO as PCP - [facility] Empaneled Provider  Robert Caruso as Surgeon (Orthopedic Surgery)  [phone]    [facility] Readings from Last 3 Encounters:   01/18/21 231 lb (104.8 kg)   12/09/20 235 lb 6.4 oz (106.8 kg)   11/06/20 231 lb 6.4 oz (105 kg)     Vitals:    01/18/21 1405   BP: 132/86   Site: Right Upper Arm   Position: Sitting   Cuff Size: Large Adult   Pulse: 86   Resp: 18   Temp: 97.3 °F (36.3 °C)   TempSrc: Temporal   SpO2: 97%   Weight: 231 lb (104.8 kg)   Height: 5' 9\" (1.753 m)     Body mass index is 34.11 kg/m². Based upon direct observation of the patient, evaluation of cognition reveals recent and remote memory intact. Patient's complete Health Risk Assessment and screening values have been reviewed and are found in Flowsheets. The following problems were reviewed today and where indicated follow up appointments were made and/or referrals ordered. Positive Risk Factor Screenings with Interventions:     Fall Risk:  Timed Up and Go Test > 12 seconds? (Complete if either Fall Risk answers are Yes): no  2 or more falls in past year?: no  Fall with injury in past year?: (!) yes(fell 1 yr back and knee no fx)  Fall Risk Interventions:    · Home safety tips provided       Substance History:  Social History     Tobacco History     Smoking Status  Current Every Day Smoker Smoking Frequency  0.5 packs/day for 30 years (15 pk yrs) Smoking Tobacco Type  Cigarettes    Smokeless Tobacco Use  Never Used    Tobacco Comment  trying to quit          Alcohol History     Alcohol Use Status  Yes Drinks/Week  2 Cans of beer, 0 Standard drinks or equivalent per week Amount  2.0 standard drinks of alcohol/wk Comment  SOCIALLY          Drug Use     Drug Use Status  Yes Types  Cocaine Comment  drug abuse includes smoking cocaine; Sexual Activity     Sexually Active  Yes Partners  Female               Alcohol Screening:   Audit-C Score: 3  Total Score: non-slip mats or surfaces in all bathtubs/showers?: Yes  Do all of your stairways have a railing or banister?: (!) No(pt on first floor)  Are your doorways, halls and stairs free of clutter?: Yes  Do you always fasten your seatbelt when you are in a car?: Yes  Safety Interventions:  · Patient declines any further evaluation/treatment for this issue     Personalized Preventive Plan   Current Health Maintenance Status  Immunization History   Administered Date(s) Administered    Influenza, Quadv, IM, PF (6 mo and older Fluzone, Flulaval, Fluarix, and 3 yrs and older Afluria) 11/06/2020    Pneumococcal Polysaccharide ([id number]) 11/06/2020    Tdap (Boostrix, Adacel) 11/06/2020        Health Maintenance   Topic Date Due    Hepatitis C screen  1955    HIV screen  08/30/1972    Shingles Vaccine (1 of 2) 08/30/2007    Annual Wellness Visit (AWV)  09/17/2020    Lipid screen  09/18/2020    Potassium monitoring  01/12/2021    Creatinine monitoring  01/12/2021    A1C test (Diabetic or Prediabetic)  11/06/2021    Colon cancer screen colonoscopy  03/15/2026    DTaP/Tdap/Td vaccine (2 - Td) 11/06/2030    Flu vaccine  Completed    Pneumococcal 0-64 years Vaccine  Completed    Hepatitis A vaccine  Aged Out    Hepatitis B vaccine  Aged Out    Hib vaccine  Aged Out    Meningococcal (ACWY) vaccine  Aged Out     Recommendations for Advanced Brain Monitoring Due: see orders and patient instructions/AVS.  . Recommended screening schedule for the next 5-10 years is provided to the patient in written form: see Patient Instructions/AVS.    Julio C Espino LPN, 1/77/3485, performed the documented evaluation under the direct supervision of the attending physician.

## 2021-01-18 NOTE — PATIENT INSTRUCTIONS
Personalized Preventive Plan for Sara Blend - 1/18/2021  Medicare offers a range of preventive health benefits. Some of the tests and screenings are paid in full while other may be subject to a deductible, co-insurance, and/or copay. Some of these benefits include a comprehensive review of your medical history including lifestyle, illnesses that may run in your family, and various assessments and screenings as appropriate. After reviewing your medical record and screening and assessments performed today your provider may have ordered immunizations, labs, imaging, and/or referrals for you. A list of these orders (if applicable) as well as your Preventive Care list are included within your After Visit Summary for your review. Other Preventive Recommendations:    · A preventive eye exam performed by an eye specialist is recommended every 1-2 years to screen for glaucoma; cataracts, macular degeneration, and other eye disorders. · A preventive dental visit is recommended every 6 months. · Try to get at least 150 minutes of exercise per week or 10,000 steps per day on a pedometer . · Order or download the FREE \"Exercise & Physical Activity: Your Everyday Guide\" from [employer]. Call 6-960.848.3779 or search [employer] online. · You need 8125-3615 mg of calcium and 0856-9504 IU of vitamin D per day. It is possible to meet your calcium requirement with diet alone, but a vitamin D supplement is usually necessary to meet this goal.  · When exposed to the sun, use a sunscreen that protects against both UVA and UVB radiation with an SPF of 30 or greater. Reapply every 2 to 3 hours or after sweating, drying off with a towel, or swimming. · Always wear a seat belt when traveling in a car. Always wear a helmet when riding a bicycle or motorcycle.

## 2021-01-29 ENCOUNTER — OFFICE VISIT (OUTPATIENT)
Dept: FAMILY MEDICINE CLINIC | Age: 66
End: 2021-01-29
Payer: MEDICARE

## 2021-01-29 VITALS
HEIGHT: 69 IN | BODY MASS INDEX: 35.4 KG/M2 | WEIGHT: 239 LBS | SYSTOLIC BLOOD PRESSURE: 125 MMHG | HEART RATE: 91 BPM | TEMPERATURE: 98.2 F | DIASTOLIC BLOOD PRESSURE: 70 MMHG

## 2021-01-29 DIAGNOSIS — G89.29 CHRONIC PAIN OF LEFT KNEE: ICD-10-CM

## 2021-01-29 DIAGNOSIS — G89.29 CHRONIC MIDLINE LOW BACK PAIN WITH RIGHT-SIDED SCIATICA: Primary | ICD-10-CM

## 2021-01-29 DIAGNOSIS — K08.89 PAIN, DENTAL: ICD-10-CM

## 2021-01-29 DIAGNOSIS — M54.41 CHRONIC MIDLINE LOW BACK PAIN WITH RIGHT-SIDED SCIATICA: Primary | ICD-10-CM

## 2021-01-29 DIAGNOSIS — Z13.220 SCREENING FOR HYPERLIPIDEMIA: ICD-10-CM

## 2021-01-29 DIAGNOSIS — M25.562 CHRONIC PAIN OF LEFT KNEE: ICD-10-CM

## 2021-01-29 PROCEDURE — 3017F COLORECTAL CA SCREEN DOC REV: CPT | Performed by: STUDENT IN AN ORGANIZED HEALTH CARE EDUCATION/TRAINING PROGRAM

## 2021-01-29 PROCEDURE — G8482 FLU IMMUNIZE ORDER/ADMIN: HCPCS | Performed by: STUDENT IN AN ORGANIZED HEALTH CARE EDUCATION/TRAINING PROGRAM

## 2021-01-29 PROCEDURE — 4004F PT TOBACCO SCREEN RCVD TLK: CPT | Performed by: STUDENT IN AN ORGANIZED HEALTH CARE EDUCATION/TRAINING PROGRAM

## 2021-01-29 PROCEDURE — G8427 DOCREV CUR MEDS BY ELIG CLIN: HCPCS | Performed by: STUDENT IN AN ORGANIZED HEALTH CARE EDUCATION/TRAINING PROGRAM

## 2021-01-29 PROCEDURE — 99211 OFF/OP EST MAY X REQ PHY/QHP: CPT | Performed by: FAMILY MEDICINE

## 2021-01-29 PROCEDURE — G8417 CALC BMI ABV UP PARAM F/U: HCPCS | Performed by: STUDENT IN AN ORGANIZED HEALTH CARE EDUCATION/TRAINING PROGRAM

## 2021-01-29 PROCEDURE — 99213 OFFICE O/P EST LOW 20 MIN: CPT | Performed by: STUDENT IN AN ORGANIZED HEALTH CARE EDUCATION/TRAINING PROGRAM

## 2021-01-29 RX ORDER — ACETAMINOPHEN 500 MG
1000 TABLET ORAL EVERY 6 HOURS PRN
Qty: 180 TABLET | Refills: 0 | Status: SHIPPED | OUTPATIENT
Start: 2021-01-29 | End: 2021-02-19 | Stop reason: SDUPTHER

## 2021-01-29 RX ORDER — PENICILLIN V POTASSIUM 500 MG/1
500 TABLET ORAL 4 TIMES DAILY
Qty: 28 TABLET | Refills: 0 | Status: SHIPPED | OUTPATIENT
Start: 2021-01-29 | End: 2021-02-05

## 2021-01-29 ASSESSMENT — PATIENT HEALTH QUESTIONNAIRE - PHQ9
1. LITTLE INTEREST OR PLEASURE IN DOING THINGS: 0
2. FEELING DOWN, DEPRESSED OR HOPELESS: 0
SUM OF ALL RESPONSES TO PHQ9 QUESTIONS 1 & 2: 0

## 2021-01-29 ASSESSMENT — ENCOUNTER SYMPTOMS
BACK PAIN: 1
COUGH: 0
WHEEZING: 0
TROUBLE SWALLOWING: 0
CONSTIPATION: 0
ABDOMINAL PAIN: 0
CHEST TIGHTNESS: 0
RHINORRHEA: 0
SHORTNESS OF BREATH: 0
DIARRHEA: 0

## 2021-01-29 NOTE — PROGRESS NOTES
Visit Information    Have you changed or started any medications since your last visit including any over-the-counter medicines, vitamins, or herbal medicines? no   Have you stopped taking any of your medications? Is so, why? -  no  Are you having any side effects from any of your medications? - no    Have you seen any other physician or provider since your last visit?  no   Have you had any other diagnostic tests since your last visit?  no   Have you been seen in the emergency room and/or had an admission in a hospital since we last saw you?  no   Have you had your routine dental cleaning in the past 6 months?  no     Do you have an active MyChart account? If no, what is the barrier?   Yes    Patient Care Team:  Mason Fournier DO as PCP - General (Family Medicine)  Mason Fournier DO as PCP - Leonard Hardin Provider  Yadira Mcclelland as Surgeon (Orthopedic Surgery)  [phone])    Medical History Review  Past Medical, Family, and Social History reviewed and does contribute to the patient presenting condition    Health Maintenance   Topic Date Due    Hepatitis C screen  1955    HIV screen  08/30/1972    Shingles Vaccine (1 of 2) 08/30/2007    Lipid screen  09/18/2020    Potassium monitoring  01/12/2021    Creatinine monitoring  01/12/2021    A1C test (Diabetic or Prediabetic)  11/06/2021    Annual Wellness Visit (AWV)  01/19/2022    Colon cancer screen colonoscopy  03/15/2026    DTaP/Tdap/Td vaccine (2 - Td) 11/06/2030    Flu vaccine  Completed    Pneumococcal 0-64 years Vaccine  Completed    Hepatitis A vaccine  Aged Out    Hepatitis B vaccine  Aged Out    Hib vaccine  Aged Out    Meningococcal (ACWY) vaccine  Aged Out

## 2021-01-29 NOTE — PATIENT INSTRUCTIONS
Thank you for letting us take care of you today. We hope all your questions were addressed. If a question was overlooked or something else comes to mind after you return home, please contact a member of your Care Team listed below. Your Care Team at [place] [address] is Team #5  Cristobal Giang MD (Faculty)  Sharmin Leija MD (Resident)  Hadley Elizabeth MD (Resident)  Isaura Kc MD (Resident)  FRAN Milton. ,AARON WHITE, AARON Regan ([address] [facility] office)  [facility] office)  Clementine Avila, [address] (Clinical Practice Manager)  Dmitry Garza [facility] (Clinical Pharmacist)       Office phone number: [phone]    If you need to get in right away due to illness, please be advised we have \"Same Day\" appointments available Monday-Friday. Please call us at [phone] option #3 to schedule your \"Same Day\" appointment.

## 2021-01-29 NOTE — PROGRESS NOTES
peniSubjective:    Eufemia Berry is a 63 y.o. male with  has a past medical history of Aortic valve sclerosis, Back pain, COPD (chronic obstructive pulmonary disease) ([facility] [address] Dental bridge present, Depression, DJD (degenerative joint disease), Drug abuse ([facility] [address] High cholesterol, History of shingles, Hypertension, Pain at rest, Postlaminectomy syndrome, Sleep apnea, Suicidal ideations, TIA (transient ischemic attack), Toe contracture, left, Toe pain, left, Wears dentures, and Wears glasses. Presented to the office today for:  Chief Complaint   Patient presents with    Follow-up     followup for back pain and to discuss Lt knee replacement    Lower Back Pain       HPI     Pt is a 61-year-old male here today for left front lateral incisor pain. Patient states he does have half dentures. Patient has been having left from lateral incisor pain for the past few days. Patient states he has been taking ibuprofen 800 mg has not been providing any relief. Patient has an appointment scheduled with his dentist on Wednesday. Patient denies any trouble swallowing or breathing. Patient denies any fever, chills, or any drainage. Chronic low back pain: Patient states he has had multiple surgeries and is seeing a spine surgeon. Patient states his last surgery was in 2016. Patient is still waiting to see pain management specialist.    Left knee pain patient is currently seeing his orthopedic surgeon. Patient was told he needs left knee replacement surgery however needs Covid vaccine before the procedure. Review of Systems   Constitutional: Negative for activity change, appetite change, chills and fever. HENT: Positive for dental problem. Negative for congestion, drooling, ear discharge, hearing loss, postnasal drip, rhinorrhea and trouble swallowing. Eyes: Negative for visual disturbance. Respiratory: Negative for cough, chest tightness, shortness of breath and wheezing.     Cardiovascular: Negative for chest pain, palpitations and leg swelling. Gastrointestinal: Negative for abdominal pain, constipation and diarrhea. Genitourinary: Negative for difficulty urinating. Musculoskeletal: Positive for arthralgias and back pain. Negative for gait problem, joint swelling, myalgias, neck pain and neck stiffness. Neurological: Negative for dizziness, weakness, numbness and headaches. The patient has a   Family History   Problem Relation Age of Onset    Diabetes Mother     Diabetes Brother        Objective:    /70 (Site: Right Lower Arm, Position: Sitting, Cuff Size: Large Adult)   Pulse 91   Temp 98.2 °F (36.8 °C)   Ht 5' 9\" (1.753 m)   Wt 239 lb (108.4 kg)   BMI 35.29 kg/m²    BP Readings from Last 3 Encounters:   01/29/21 125/70   01/18/21 132/86   12/09/20 125/79       Physical Exam  Vitals signs reviewed. Constitutional:       Appearance: Normal appearance. HENT:      Mouth/Throat:      Lips: No lesions. Mouth: Mucous membranes are moist. No injury or oral lesions. Dentition: Abnormal dentition. Has dentures. Dental tenderness (left upper lateral incisor) present. No gingival swelling, dental caries, dental abscesses or gum lesions. Tongue: No lesions. Pharynx: No pharyngeal swelling, oropharyngeal exudate, posterior oropharyngeal erythema or uvula swelling. Cardiovascular:      Rate and Rhythm: Normal rate and regular rhythm. Pulses: Normal pulses. Heart sounds: Normal heart sounds. Pulmonary:      Effort: Pulmonary effort is normal.      Breath sounds: Normal breath sounds. No wheezing. Skin:     General: Skin is warm. Neurological:      Mental Status: He is alert.          Lab Results   Component Value Date    WBC 8.1 01/12/2020    HGB 14.2 01/12/2020    HCT 43.7 01/12/2020     01/12/2020    CHOL 191 09/18/2019    TRIG 186 (H) 09/18/2019    HDL 34 (L) 09/18/2019    ALT 13 12/23/2018    AST 13 12/23/2018     01/12/2020    K 4.2 01/12/2020     01/12/2020    CREATININE 1.59 (H) 01/12/2020    BUN 29 (H) 01/12/2020    CO2 21 01/12/2020    TSH 1.77 12/06/2012    INR 0.9 11/13/2019    LABA1C 5.9 11/06/2020     Lab Results   Component Value Date    CALCIUM 8.7 01/12/2020     Lab Results   Component Value Date    LDLCHOLESTEROL 120 09/18/2019       Assessment and Plan:    1. Screening for hyperlipidemia  Lipid panel    2. Chronic pain of left knee  Patient is currently following up with orthopedic surgery. Patient was told he needs left knee replacement. Patient educated to continue following up with orthopedic  - acetaminophen (TYLENOL) 500 MG tablet; Take 2 tablets by mouth every 6 hours as needed for Pain  Dispense: 180 tablet; Refill: 0    3. Pain, dental  Patient educated to take ibuprofen along with Tylenol for pain relief. Patient educated if he has trouble breathing or swallowing to go to the emergency department. He has an appointment with his dentist on Wednesday  - penicillin v potassium (VEETID) 500 MG tablet; Take 1 tablet by mouth 4 times daily for 7 days  Dispense: 28 tablet; Refill: 0    4. Chronic midline low back pain with right-sided sciatica  Patient follows with spine surgery. Patient was given referral for pain management at his last visit patient will be scheduling an appointment          Requested Prescriptions     Signed Prescriptions Disp Refills    acetaminophen (TYLENOL) 500 MG tablet 180 tablet 0     Sig: Take 2 tablets by mouth every 6 hours as needed for Pain    penicillin v potassium (VEETID) 500 MG tablet 28 tablet 0     Sig: Take 1 tablet by mouth 4 times daily for 7 days       Medications Discontinued During This Encounter   Medication Reason    acetaminophen (TYLENOL) 500 MG tablet Anel Chilel received counseling on the following healthy behaviors: nutrition, exercise and medication adherence    Discussed use,benefit, and side effects of prescribed medications.   Barriers to medication compliance addressed. All patient questions answered. Pt voiced understanding. Return in about 4 weeks (around 2/26/2021), or follow up. Disclaimer: Some orall of this note was transcribed using voice-recognition software. This may cause typographical errors occasionally. Although all effort is made to fix these errors, please do not hesitate to contact our office if there Ranell Joe concern with the understanding of this note.

## 2021-01-29 NOTE — PROGRESS NOTES
Attending Physician Statement  I have discussed the care of Arianne Sitter, including pertinent history and exam findings,  with the resident. I have reviewed the key elements of all parts of the encounter with the resident. I agree with the assessment, plan and orders as documented by the resident.   (GE Modifier)    Chuyita Browne

## 2021-02-12 ENCOUNTER — TELEPHONE (OUTPATIENT)
Dept: FAMILY MEDICINE CLINIC | Age: 66
End: 2021-02-12

## 2021-02-19 ENCOUNTER — OFFICE VISIT (OUTPATIENT)
Dept: NEUROLOGY | Age: 66
End: 2021-02-19
Payer: MEDICARE

## 2021-02-19 VITALS
OXYGEN SATURATION: 98 % | WEIGHT: 239 LBS | HEIGHT: 69 IN | DIASTOLIC BLOOD PRESSURE: 73 MMHG | SYSTOLIC BLOOD PRESSURE: 127 MMHG | BODY MASS INDEX: 35.4 KG/M2 | HEART RATE: 87 BPM

## 2021-02-19 DIAGNOSIS — G45.9 TIA (TRANSIENT ISCHEMIC ATTACK): ICD-10-CM

## 2021-02-19 DIAGNOSIS — Z86.73 HISTORY OF CVA (CEREBROVASCULAR ACCIDENT): Primary | ICD-10-CM

## 2021-02-19 DIAGNOSIS — G47.33 OSA (OBSTRUCTIVE SLEEP APNEA): ICD-10-CM

## 2021-02-19 DIAGNOSIS — F17.200 CURRENT SMOKER: ICD-10-CM

## 2021-02-19 DIAGNOSIS — E78.2 MIXED HYPERLIPIDEMIA: ICD-10-CM

## 2021-02-19 DIAGNOSIS — I10 ESSENTIAL HYPERTENSION: ICD-10-CM

## 2021-02-19 PROCEDURE — 4004F PT TOBACCO SCREEN RCVD TLK: CPT | Performed by: STUDENT IN AN ORGANIZED HEALTH CARE EDUCATION/TRAINING PROGRAM

## 2021-02-19 PROCEDURE — G8417 CALC BMI ABV UP PARAM F/U: HCPCS | Performed by: STUDENT IN AN ORGANIZED HEALTH CARE EDUCATION/TRAINING PROGRAM

## 2021-02-19 PROCEDURE — 99214 OFFICE O/P EST MOD 30 MIN: CPT | Performed by: STUDENT IN AN ORGANIZED HEALTH CARE EDUCATION/TRAINING PROGRAM

## 2021-02-19 PROCEDURE — 3017F COLORECTAL CA SCREEN DOC REV: CPT | Performed by: STUDENT IN AN ORGANIZED HEALTH CARE EDUCATION/TRAINING PROGRAM

## 2021-02-19 PROCEDURE — G8482 FLU IMMUNIZE ORDER/ADMIN: HCPCS | Performed by: STUDENT IN AN ORGANIZED HEALTH CARE EDUCATION/TRAINING PROGRAM

## 2021-02-19 PROCEDURE — G8427 DOCREV CUR MEDS BY ELIG CLIN: HCPCS | Performed by: STUDENT IN AN ORGANIZED HEALTH CARE EDUCATION/TRAINING PROGRAM

## 2021-02-19 RX ORDER — METHYLPREDNISOLONE 4 MG/1
TABLET ORAL
COMMUNITY
Start: 2020-08-19 | End: 2021-04-22

## 2021-02-19 ASSESSMENT — ENCOUNTER SYMPTOMS
PHOTOPHOBIA: 0
EYE PAIN: 0
NAUSEA: 0
ABDOMINAL PAIN: 0
BACK PAIN: 1
DIARRHEA: 0
SHORTNESS OF BREATH: 0
COUGH: 0
CONSTIPATION: 0
VOMITING: 0

## 2021-02-19 NOTE — PROGRESS NOTES
hyperlipidemia, drug abuse-history of cocaine abuse, COPD was seen in the clinic for post stroke hospital follow up. History obtained from patient and medical chart review. Patient was admitted on 9/16/2019 for fluctuating right hand numbness that was going on for 1 week. MRI brain showed right parietal infarct. CTA head and neck did not show large vessel occlusion or stenosis. 2D echo showed EF of greater than 55%, basal septal hypertrophy. GWEN did not show any thrombus or vegetation or ASD. Loop recorder was placed during the admission. Patient was then discharged once his medical condition was stabilized. Since discharge patient denies any recurrence of numbness or weakness. Denies any headache, vision changes, dizziness or lightheadedness. Denies any speech difficulty. Denies any new concerns. Currently he is taking aspirin and Lipitor 80 mg. denies any side effects on the medication. Patient was taking Plavix too, plan was to continue Plavix only for 21 days, instructed patient to discontinue Plavix.      PATIENT HISTORY:     Past Medical History:   Diagnosis Date    Aortic valve sclerosis     Back pain     [facility]     NEUBULIZER AS NEEDED    Dental bridge present     UPPER    Depression     DJD (degenerative joint disease)     Drug abuse ([facility] [place] [address]     drug abuse includes smoking cocaine    High cholesterol     History of shingles     2013    Hypertension     PT NEVER FILLED  SCRIPT    Pain at rest     Knee-Left, back     Postlaminectomy syndrome 8/9/2017    Sleep apnea     does not have machine    Suicidal ideations     S/I's without attempt    TIA (transient ischemic attack) 10/2017    x 2    Toe contracture, left     5th    Toe pain, left     5th digit    Wears dentures     1 GOLD TOOTH ON FLIPPER UPPER    Wears glasses         Past Surgical History:   Procedure Laterality Date    ARTHROPLASTY Left 03/13/2019    toe    CYST REMOVAL Bilateral     \"groin\"    FINGER TRIGGER RELEASE Left 03/04/2014    ring finger    FINGER TRIGGER RELEASE Right 1/5/15    rt hand-RING FINGER    INSERTABLE CARDIAC MONITOR  09/19/2019    PT HAS [place] [id number] REVEAL LOOP RECORDER. MRI CONDITONAL 3T OK, SAFE IMMEDIATELY POST IMPLANT.  KNEE ARTHROSCOPY Bilateral     LUMBAR FUSION  6/15/16    posterior lumbar fusion L4-L5    LUMBAR SPINE SURGERY  2005    LUMBAR    NERVE BLOCK  01/13/2017    caudal #1    celestone 9mg  25mcq fentanyl    NERVE BLOCK Left 03/30/2017    left hip bursa injection depomedrol 40 mg    NERVE BLOCK Bilateral 04/07/2017    bilateral si joints injections, depo 40    PATELLA SURGERY Right     x2    TOE ARTHROPLASTY Left 3/13/2019    5TH TOE TOTAL ARTHROPLASTY, TNC AT [facility] performed by Debbie Braun DPM at [place] [id number] Right 01/2016    KNEE        Social History     Socioeconomic History    Marital status: Single     Spouse name: Not on file    Number of children: Not on file    Years of education: Not on file    Highest education level: Not on file   Occupational History    Not on file   Social Needs    Financial resource strain: Not on file    Food insecurity     Worry: Not on file     Inability: Not on file    Transportation needs     Medical: Not on file     Non-medical: Not on file   Tobacco Use    Smoking status: Current Every Day Smoker     Packs/day: 0.50     Years: 30.00     Pack years: 15.00     Types: Cigarettes    Smokeless tobacco: Never Used    Tobacco comment: trying to quit   Substance and Sexual Activity    Alcohol use:  Yes     Alcohol/week: 2.0 standard drinks     Types: 2 Cans of beer per week     Comment: SOCIALLY    Drug use: Yes     Types: Cocaine     Comment: drug abuse includes smoking cocaine;    Sexual activity: Yes     Partners: Female   Lifestyle    Physical activity     Days per week: Not on file     Minutes per session: Not on file    Stress: Not on file   Relationships    Social connections     Talks on phone: Not on file     Gets together: Not on file     Attends Shinto service: Not on file     Active member of club or organization: Not on file     Attends meetings of clubs or organizations: Not on file     Relationship status: Not on file    Intimate partner violence     Fear of current or ex partner: Not on file     Emotionally abused: Not on file     Physically abused: Not on file     Forced sexual activity: Not on file   Other Topics Concern    Not on file   Social History Narrative    Not on file        Current Outpatient Medications   Medication Sig Dispense Refill    methylPREDNISolone (MEDROL) 4 MG tablet as directed      atorvastatin (LIPITOR) 80 MG tablet Take 1 tablet by mouth nightly 30 tablet 3    amLODIPine (NORVASC) 5 MG tablet take 1 tablet by mouth once daily 90 tablet 2    traZODone (DESYREL) 50 MG tablet Take 50 mg by mouth nightly      hydrOXYzine (ATARAX) 25 MG tablet Take 25 mg by mouth 2 times daily as needed for Anxiety      aspirin 81 MG chewable tablet Take 1 tablet by mouth daily 30 tablet 3    diclofenac sodium (VOLTAREN) 1 % GEL Apply 2 g topically 4 times daily as needed for Pain To the left hand (Patient not taking: Reported on 2/19/2021) 350 g 1    gabapentin (NEURONTIN) 100 MG capsule Take 1 capsule by mouth 2 times daily for 30 doses. 30 capsule 0    clopidogrel (PLAVIX) 75 MG tablet take 1 tablet by mouth once daily (Patient not taking: Reported on 2/19/2021) 30 tablet 3    escitalopram (LEXAPRO) 10 MG tablet Take 10 mg by mouth daily       No current facility-administered medications for this visit. Allergies  Allergies   Allergen Reactions    Acetaminophen     Cyclobenzaprine Rash    Motrin [Ibuprofen] Nausea And Vomiting    Nsaids Nausea And Vomiting    Other Nausea And Vomiting     Pt states he cannot take any muscle relaxers but does not know what specific muscle relaxer caused the nausea.  Tramadol Nausea Only and Rash        REVIEW OF SYSTEMS:     ROS  Review of Systems   Constitutional: Negative for appetite change, chills, fatigue, fever and unexpected weight change. Eyes: Negative for photophobia, pain and visual disturbance. Respiratory: Negative for cough and shortness of breath. Cardiovascular: Negative for chest pain and palpitations. Gastrointestinal: Negative for abdominal pain, constipation, diarrhea, nausea and vomiting. Endocrine: Negative for polydipsia and polyuria. Genitourinary: Negative for difficulty urinating, dysuria and hematuria. Musculoskeletal: Positive for arthralgias, back pain and gait problem (Due to left knee pain). Skin: Negative for pallor and rash. Neurological: Negative for dizziness, tremors, seizures, syncope, facial asymmetry, speech difficulty, weakness, light-headedness, numbness and headaches. Psychiatric/Behavioral: Negative for behavioral problems, confusion, decreased concentration, dysphoric mood and hallucinations. VITALS  /73 (Site: Right Upper Arm, Position: Sitting, Cuff Size: Medium Adult)   Pulse 87   Ht 5' 9\" (1.753 m)   Wt 239 lb (108.4 kg)   SpO2 98%   BMI 35.29 kg/m²      PHYSICAL EXAMINATION:     Constitutional: Well developed, well nourished and in no acute distress. Head:  normocephalic, atraumatic. Neck: supple, no carotid bruits  Respiratory: Respirations unlabored,  Cardiovascular: normal rate, regular rhythm  Abdomen: Soft, nontender, nondistended  Extremities:  peripheral pulses palpable, no pedal edema  Psych: normal affect    Neurological examination:    Mental status   Alert and oriented; intact memory with no confusion, speech or language problems; no hallucinations or delusions     Cranial nerves   II - visual fields intact to confrontation                                                III, IV, VI - extra-ocular muscles full: no pupillary defect; no BRIAN, no nystagmus, no ptosis.    V - estimated EF  > 55%. Basal septal hypertrophy (\"sigmoid septum\"). Negative bubble study, no shunt noted. Focal thickening on tips of left and non coronary cusps. Moderate aortic insufficiency. Trivial valve disease elsewhere. GWEN:9/18/19  Summary  The study was performed by the Cardiologist, the Cardiology Fellow and the  sonographer, in the Cath Lab. The study was performed under conscious sedation. Normal left ventricular chamber dimension and function. Left atrium is normal in size. Left atrial appendage showed no evidence of clot. Inter-atrial septum is intact with no evidence for an atrial septal defect. Negative bubble study, no shunt noted. Mild aortic insufficiency. Mild to moderate mitral regurgitation. Mild tricuspid regurgitation. MRI brain 11/13/2019  Impression   Unremarkable MRI of the brain without acute intracranial abnormality. CTA head and neck with contrast 11/13/2019  Impression   1. No high-grade stenosis or focal occlusion involving intracranial or   cervical vasculature.  No evidence of acute dissection.  No evidence of   aneurysm.       2. Mild emphysema at the lung apices. Cardiac monitoring( Holter/Event ): Loop recorder placed during hospital admission in Sept 2019      ASSESSMENT / PLAN:       Rhea Jackson is a 60 y.o.  male  was seen in the clinic for post hospital follow up. · Transient episode of left arm pain and dysesthesia in November 2019. Currently denies any pain or paresthesias. .  Patient was evaluated in the ER by the stroke team.  MRI brain negative for acute infarct. CTA head and neck was unremarkable.   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      9/18/2019    HbA1c 6.5    PLAN:   -Continue Aspirin 81 mg.  -Continue Lipitor 80 mg for now, ordered lipid panel. Instructed patient to follow-up with the PCP regarding the LDL level, goal LDL less than 70. Further dose adjustment of Lipitor as per the LDL level. - Ordered Lipid panel  - Follow-up with cardiology for loop recorder.  - Smoking status: Discussed smoking cessation with the patient, currently patient not interested. - Stroke risk factors including hypertension, hyperlipidemia and Diabetes management as per PCP  - Education regarding secondary stroke prevention was provided. - Follow up in the clinic as needed  - Instructed patient to call the clinic if symptoms worsen or develop any new symptoms. I have spent total 31 years  minutes  with the patient more than 50% of this time was spent reviewing medical records, discussing imaging findings, counseling regarding medications side effects and compliance, healthy habits and coordinating care.       Electronically signed by Margaux Tineo MD on 2/19/2021 at 10:57 AM

## 2021-02-26 ENCOUNTER — OFFICE VISIT (OUTPATIENT)
Dept: FAMILY MEDICINE CLINIC | Age: 66
End: 2021-02-26
Payer: MEDICARE

## 2021-02-26 VITALS
DIASTOLIC BLOOD PRESSURE: 78 MMHG | BODY MASS INDEX: 34.78 KG/M2 | HEIGHT: 69 IN | TEMPERATURE: 97.2 F | HEART RATE: 95 BPM | SYSTOLIC BLOOD PRESSURE: 144 MMHG | WEIGHT: 234.8 LBS

## 2021-02-26 DIAGNOSIS — J41.0 SIMPLE CHRONIC BRONCHITIS (HCC): Primary | ICD-10-CM

## 2021-02-26 DIAGNOSIS — R73.03 PREDIABETES: ICD-10-CM

## 2021-02-26 DIAGNOSIS — Z01.818 PRE-OPERATIVE CLEARANCE: ICD-10-CM

## 2021-02-26 DIAGNOSIS — Z13.220 SCREENING FOR HYPERLIPIDEMIA: ICD-10-CM

## 2021-02-26 DIAGNOSIS — M47.817 LUMBAR AND SACRAL OSTEOARTHRITIS: ICD-10-CM

## 2021-02-26 DIAGNOSIS — Z72.0 SMOKING TRYING TO QUIT: ICD-10-CM

## 2021-02-26 PROCEDURE — G8417 CALC BMI ABV UP PARAM F/U: HCPCS | Performed by: STUDENT IN AN ORGANIZED HEALTH CARE EDUCATION/TRAINING PROGRAM

## 2021-02-26 PROCEDURE — 3023F SPIROM DOC REV: CPT | Performed by: STUDENT IN AN ORGANIZED HEALTH CARE EDUCATION/TRAINING PROGRAM

## 2021-02-26 PROCEDURE — G8926 SPIRO NO PERF OR DOC: HCPCS | Performed by: STUDENT IN AN ORGANIZED HEALTH CARE EDUCATION/TRAINING PROGRAM

## 2021-02-26 PROCEDURE — G8482 FLU IMMUNIZE ORDER/ADMIN: HCPCS | Performed by: STUDENT IN AN ORGANIZED HEALTH CARE EDUCATION/TRAINING PROGRAM

## 2021-02-26 PROCEDURE — 3017F COLORECTAL CA SCREEN DOC REV: CPT | Performed by: STUDENT IN AN ORGANIZED HEALTH CARE EDUCATION/TRAINING PROGRAM

## 2021-02-26 PROCEDURE — G8427 DOCREV CUR MEDS BY ELIG CLIN: HCPCS | Performed by: STUDENT IN AN ORGANIZED HEALTH CARE EDUCATION/TRAINING PROGRAM

## 2021-02-26 PROCEDURE — 99213 OFFICE O/P EST LOW 20 MIN: CPT | Performed by: STUDENT IN AN ORGANIZED HEALTH CARE EDUCATION/TRAINING PROGRAM

## 2021-02-26 PROCEDURE — 99211 OFF/OP EST MAY X REQ PHY/QHP: CPT | Performed by: STUDENT IN AN ORGANIZED HEALTH CARE EDUCATION/TRAINING PROGRAM

## 2021-02-26 PROCEDURE — 4004F PT TOBACCO SCREEN RCVD TLK: CPT | Performed by: STUDENT IN AN ORGANIZED HEALTH CARE EDUCATION/TRAINING PROGRAM

## 2021-02-26 RX ORDER — BUDESONIDE AND FORMOTEROL FUMARATE DIHYDRATE 160; 4.5 UG/1; UG/1
2 AEROSOL RESPIRATORY (INHALATION) 2 TIMES DAILY
Qty: 1 INHALER | Refills: 0 | Status: SHIPPED | OUTPATIENT
Start: 2021-02-26 | End: 2021-03-29 | Stop reason: SDUPTHER

## 2021-02-26 RX ORDER — ALBUTEROL SULFATE 90 UG/1
2 AEROSOL, METERED RESPIRATORY (INHALATION) 4 TIMES DAILY PRN
Qty: 1 INHALER | Refills: 0 | Status: SHIPPED | OUTPATIENT
Start: 2021-02-26 | End: 2021-03-29 | Stop reason: SDUPTHER

## 2021-02-26 ASSESSMENT — PATIENT HEALTH QUESTIONNAIRE - PHQ9
SUM OF ALL RESPONSES TO PHQ9 QUESTIONS 1 & 2: 2
SUM OF ALL RESPONSES TO PHQ QUESTIONS 1-9: 2
2. FEELING DOWN, DEPRESSED OR HOPELESS: 1
SUM OF ALL RESPONSES TO PHQ QUESTIONS 1-9: 2

## 2021-02-26 ASSESSMENT — ENCOUNTER SYMPTOMS
DIARRHEA: 0
CONSTIPATION: 0
ABDOMINAL PAIN: 0
SHORTNESS OF BREATH: 0
WHEEZING: 0

## 2021-02-26 NOTE — PROGRESS NOTES
Attending Physician Statement  I have discussed the care of Elisabeth Maddox 63 y.o. male, including pertinent history and exam findings, with the resident Dr. Troy Enciso MD.    History and Exam:   Chief Complaint   Patient presents with    Follow-up     here for f/u for back and knee pain      Past Medical History:   Diagnosis Date    Aortic valve sclerosis     Back pain     COPD (chronic obstructive pulmonary disease) ([facility] [address]     NEUBULIZER AS NEEDED    Dental bridge present     UPPER    Depression     DJD (degenerative joint disease)     Drug abuse ([facility] [address]     drug abuse includes smoking cocaine    High cholesterol     History of shingles     2013    Hypertension     PT NEVER FILLED  SCRIPT    Pain at rest     Knee-Left, back     Postlaminectomy syndrome 8/9/2017    Sleep apnea     does not have machine    Suicidal ideations     S/I's without attempt    TIA (transient ischemic attack) 10/2017    x 2    Toe contracture, left     5th    Toe pain, left     5th digit    Wears dentures     1 GOLD TOOTH ON FLIPPER UPPER    Wears glasses      Allergies   Allergen Reactions    Acetaminophen     Cyclobenzaprine Rash    Motrin [Ibuprofen] Nausea And Vomiting    Nsaids Nausea And Vomiting    Other Nausea And Vomiting     Pt states he cannot take any muscle relaxers but does not know what specific muscle relaxer caused the nausea.  Tramadol Nausea Only and Rash      I have seen and examined the patient and the key elements of the encounter have been performed by me.   BP Readings from Last 3 Encounters:   02/26/21 (!) 144/78   02/19/21 127/73   01/29/21 125/70     BP (!) 144/78 (Site: Right Lower Arm, Position: Sitting, Cuff Size: Medium Adult)   Pulse 95   Temp 97.2 °F (36.2 °C) (Temporal)   Ht 5' 9\" (1.753 m)   Wt 234 lb 12.8 oz (106.5 kg)   BMI 34.67 kg/m²   Lab Results   Component Value Date    WBC 8.1 01/12/2020    HGB 14.2 01/12/2020    HCT 43.7 01/12/2020     01/12/2020    CHOL 191 09/18/2019    TRIG 186 (H) 09/18/2019    HDL 34 (L) 09/18/2019    ALT 13 12/23/2018    AST 13 12/23/2018     01/12/2020    K 4.2 01/12/2020     01/12/2020    CREATININE 1.59 (H) 01/12/2020    BUN 29 (H) 01/12/2020    CO2 21 01/12/2020    TSH 1.77 12/06/2012    INR 0.9 11/13/2019    LABA1C 5.9 11/06/2020     Lab Results   Component Value Date    LABPROT 7.5 12/06/2012    LABALBU 3.8 12/23/2018     No results found for: IRON, TIBC, FERRITIN  Lab Results   Component Value Date    LDLCHOLESTEROL 120 09/18/2019     I agree with the assessment, plan and the diagnosis of    Diagnosis Orders   1. Simple chronic bronchitis (HCC)  budesonide-formoterol (SYMBICORT) 160-4.5 MCG/ACT AERO    Full PFT Study With Bronchodilator    albuterol sulfate HFA (VENTOLIN HFA) 108 (90 Base) MCG/ACT inhaler   2. Screening for hyperlipidemia  Lipid Panel   3. Lumbar and sacral osteoarthritis  MRI LUMBAR SPINE WO CONTRAST   4. Smoking trying to quit  nicotine polacrilex (NICORETTE) 2 MG gum   5. Pre-operative clearance      . I agree with orders as documented by the resident. Recommendations:  PFT study for SOB, on symbicort and albuterol  MRI lumbar spine ordered. Requested by ortho  Pro op clearance- cleared by cardio and neurology. Low intermediate risk. Cleared from our perspective    More than 25 minutes spent  in face to face encounter with the patient and more than half in counseling. Patient's questions were answered. Patient Voiced understanding to the counseling. Return in about 3 months (around 5/26/2021), or follow up, for blood pressure recheck .    (GC Modifier)-Dr. William Hager MD

## 2021-02-26 NOTE — PATIENT INSTRUCTIONS
Thank you for letting us take care of you today. We hope all your questions were addressed. If a question was overlooked or something else comes to mind after you return home, please contact a member of your Care Team listed below. Your Care Team at [place] [address] is Team #5  Constantino Ngo MD (Faculty)  Anupama Reyes MD (Resident)  Latricia Caba MD (Resident)  Cecilia Vance MD (Resident)  FRAN Phillips ,AARON WHITE, AARON Naik ([facility] office)  [facility] office)  Chun Meyer (Clinical Practice Manager)  Glory Granger, [address] [place] [address] (Clinical Pharmacist)       Office phone number: [phone]    If you need to get in right away due to illness, please be advised we have \"Same Day\" appointments available Monday-Friday. Please call us at [phone] option #3 to schedule your \"Same Day\" appointment.

## 2021-02-26 NOTE — PROGRESS NOTES
(e.g., eating, dressing, bathing)  Walking 2 mph  Writing       chronic low back pain: Patient states he has had multiple surgeries and is seeing a spine surgeon. Patient states his last surgery was in 2016. Patient is currently seeing an spine surgeon in [facility]. Patient was told by his surgeon to go to his PCP to get MRI of his lumbar spine.     Chronic active cough/wheezing: Patient is a current smoker. Patient has been trying to cut smoking smoking half a pack a day. Patient admits to having wheezing during the day and nighttime. Patient has not ever used inhaler before. She has never had a pulmonary function test in the past.  Patient denies any fever, chills, change in mentation, or change in production or color of sputum. Smoke: pack/2 days  Nicotine gum       Review of Systems   Constitutional: Negative for activity change, appetite change, chills and fever. Respiratory: Negative for shortness of breath and wheezing. Cardiovascular: Negative for chest pain, palpitations and leg swelling. Gastrointestinal: Negative for abdominal pain, constipation and diarrhea. Genitourinary: Negative for difficulty urinating. Musculoskeletal: Negative for arthralgias. Neurological: Negative for dizziness, weakness, numbness and headaches. Psychiatric/Behavioral: Negative for agitation. The patient has a   Family History   Problem Relation Age of Onset    Diabetes Mother     Diabetes Brother        Objective:    BP (!) 144/78 (Site: Right Lower Arm, Position: Sitting, Cuff Size: Medium Adult)   Pulse 95   Temp 97.2 °F (36.2 °C) (Temporal)   Ht 5' 9\" (1.753 m)   Wt 234 lb 12.8 oz (106.5 kg)   BMI 34.67 kg/m²    BP Readings from Last 3 Encounters:   02/26/21 (!) 144/78   02/19/21 127/73   01/29/21 125/70       Physical Exam  Vitals signs reviewed. Constitutional:       Appearance: Normal appearance. Cardiovascular:      Rate and Rhythm: Normal rate and regular rhythm. Pulses: Normal pulses. Heart sounds: Normal heart sounds. Pulmonary:      Effort: Pulmonary effort is normal.      Breath sounds: Normal breath sounds. No wheezing. Musculoskeletal:      Right lower leg: No edema. Left lower leg: No edema. Skin:     General: Skin is warm. Neurological:      Mental Status: He is alert. Lab Results   Component Value Date    WBC 8.1 01/12/2020    HGB 14.2 01/12/2020    HCT 43.7 01/12/2020     01/12/2020    CHOL 191 09/18/2019    TRIG 186 (H) 09/18/2019    HDL 34 (L) 09/18/2019    ALT 13 12/23/2018    AST 13 12/23/2018     01/12/2020    K 4.2 01/12/2020     01/12/2020    CREATININE 1.59 (H) 01/12/2020    BUN 29 (H) 01/12/2020    CO2 21 01/12/2020    TSH 1.77 12/06/2012    INR 0.9 11/13/2019    LABA1C 5.9 11/06/2020     Lab Results   Component Value Date    CALCIUM 8.7 01/12/2020     Lab Results   Component Value Date    LDLCHOLESTEROL 120 09/18/2019       Assessment and Plan:    1. Screening for hyperlipidemia  - Lipid Panel; Future    2. Lumbar and sacral osteoarthritis  Pt is following up with spine surgery at [facility]. Patient was told by his surgeon to get MRI of lumbar spine.  - MRI LUMBAR SPINE WO CONTRAST; Future    3. Simple chronic bronchitis ([facility] [address]  Pulmonary function tests ordered. Patient educated to use Symbicort twice a day and albuterol as rescue inhaler.  - budesonide-formoterol (SYMBICORT) 160-4.5 MCG/ACT AERO; Inhale 2 puffs into the lungs 2 times daily  Dispense: 1 Inhaler; Refill: 0  - Full PFT Study With Bronchodilator; Future  - albuterol sulfate HFA (VENTOLIN HFA) 108 (90 Base) MCG/ACT inhaler; Inhale 2 puffs into the lungs 4 times daily as needed for Wheezing  Dispense: 1 Inhaler; Refill: 0    4. Smoking trying to quit  - nicotine polacrilex (NICORETTE) 2 MG gum; Take 1 each by mouth as needed for Smoking cessation  Dispense: 110 each; Refill: 3    5.  Pre-operative clearance  RCI 1  6.0% 30-day risk of death, MI, or cardiac arrest  Patient was cleared by neurology and cardiology. Patient cleared for arthroplasty of left knee per orthopedic surgery. Requested Prescriptions     Signed Prescriptions Disp Refills    budesonide-formoterol (SYMBICORT) 160-4.5 MCG/ACT AERO 1 Inhaler 0     Sig: Inhale 2 puffs into the lungs 2 times daily    albuterol sulfate HFA (VENTOLIN HFA) 108 (90 Base) MCG/ACT inhaler 1 Inhaler 0     Sig: Inhale 2 puffs into the lungs 4 times daily as needed for Wheezing    nicotine polacrilex (NICORETTE) 2 MG gum 110 each 3     Sig: Take 1 each by mouth as needed for Smoking cessation       There are no discontinued medications. Manuel Olvera received counseling on the following healthy behaviors: nutrition, exercise and medication adherence    Discussed use,benefit, and side effects of prescribed medications. Barriers to medication compliance addressed. All patient questions answered. Pt voiced understanding. Return in about 3 months (around 5/26/2021), or follow up, for blood pressure recheck . Disclaimer: Some orall of this note was transcribed using voice-recognition software. This may cause typographical errors occasionally. Although all effort is made to fix these errors, please do not hesitate to contact our office if there Brady Esters concern with the understanding of this note.

## 2021-02-26 NOTE — PROGRESS NOTES
Visit Information    Have you changed or started any medications since your last visit including any over-the-counter medicines, vitamins, or herbal medicines? no   Have you stopped taking any of your medications? Is so, why? -  no  Are you having any side effects from any of your medications? - no    Have you seen any other physician or provider since your last visit?  no   Have you had any other diagnostic tests since your last visit?  no   Have you been seen in the emergency room and/or had an admission in a hospital since we last saw you?  no   Have you had your routine dental cleaning in the past 6 months?  no     Do you have an active MyChart account? If no, what is the barrier?   Yes    Patient Care Team:  Flint Dakins, DO as PCP - General (Family Medicine)  Flint Dakins, DO as PCP - [facility]  Gudelia Apple as Surgeon (Orthopedic Surgery)  [address])    Medical History Review  Past Medical, Family, and Social History reviewed and does contribute to the patient presenting condition    Health Maintenance   Topic Date Due    Hepatitis C screen  1955    HIV screen  08/30/1972    Shingles Vaccine (1 of 2) 08/30/2007    Lipid screen  09/18/2020    Potassium monitoring  01/12/2021    Creatinine monitoring  01/12/2021    A1C test (Diabetic or Prediabetic)  11/06/2021    Annual Wellness Visit (AWV)  01/19/2022    Colon cancer screen colonoscopy  03/15/2026    DTaP/Tdap/Td vaccine (2 - Td) 11/06/2030    Flu vaccine  Completed    Pneumococcal 0-64 years Vaccine  Completed    Hepatitis A vaccine  Aged Out    Hepatitis B vaccine  Aged Out    Hib vaccine  Aged Out    Meningococcal (ACWY) vaccine  Aged Out

## 2021-02-28 PROBLEM — Z13.220 SCREENING FOR HYPERLIPIDEMIA: Status: RESOLVED | Noted: 2017-10-05 | Resolved: 2021-02-28

## 2021-03-29 ENCOUNTER — OFFICE VISIT (OUTPATIENT)
Dept: FAMILY MEDICINE CLINIC | Age: 66
End: 2021-03-29
Payer: MEDICARE

## 2021-03-29 VITALS
DIASTOLIC BLOOD PRESSURE: 68 MMHG | WEIGHT: 234.2 LBS | TEMPERATURE: 96.9 F | SYSTOLIC BLOOD PRESSURE: 138 MMHG | HEART RATE: 108 BPM | HEIGHT: 69 IN | BODY MASS INDEX: 34.69 KG/M2

## 2021-03-29 DIAGNOSIS — Z13.220 SCREENING FOR HYPERLIPIDEMIA: ICD-10-CM

## 2021-03-29 DIAGNOSIS — J41.0 SIMPLE CHRONIC BRONCHITIS (HCC): ICD-10-CM

## 2021-03-29 DIAGNOSIS — R73.03 PREDIABETES: ICD-10-CM

## 2021-03-29 DIAGNOSIS — L03.011 PARONYCHIA OF RIGHT INDEX FINGER: ICD-10-CM

## 2021-03-29 DIAGNOSIS — G47.33 OBSTRUCTIVE SLEEP APNEA SYNDROME: ICD-10-CM

## 2021-03-29 LAB — HBA1C MFR BLD: 6 %

## 2021-03-29 PROCEDURE — 3023F SPIROM DOC REV: CPT | Performed by: FAMILY MEDICINE

## 2021-03-29 PROCEDURE — 83036 HEMOGLOBIN GLYCOSYLATED A1C: CPT | Performed by: FAMILY MEDICINE

## 2021-03-29 PROCEDURE — 99211 OFF/OP EST MAY X REQ PHY/QHP: CPT | Performed by: FAMILY MEDICINE

## 2021-03-29 PROCEDURE — 3017F COLORECTAL CA SCREEN DOC REV: CPT | Performed by: FAMILY MEDICINE

## 2021-03-29 PROCEDURE — G8417 CALC BMI ABV UP PARAM F/U: HCPCS | Performed by: FAMILY MEDICINE

## 2021-03-29 PROCEDURE — G8482 FLU IMMUNIZE ORDER/ADMIN: HCPCS | Performed by: FAMILY MEDICINE

## 2021-03-29 PROCEDURE — G8926 SPIRO NO PERF OR DOC: HCPCS | Performed by: FAMILY MEDICINE

## 2021-03-29 PROCEDURE — 4004F PT TOBACCO SCREEN RCVD TLK: CPT | Performed by: FAMILY MEDICINE

## 2021-03-29 PROCEDURE — G8427 DOCREV CUR MEDS BY ELIG CLIN: HCPCS | Performed by: FAMILY MEDICINE

## 2021-03-29 PROCEDURE — 99214 OFFICE O/P EST MOD 30 MIN: CPT | Performed by: FAMILY MEDICINE

## 2021-03-29 RX ORDER — ALBUTEROL SULFATE 90 UG/1
2 AEROSOL, METERED RESPIRATORY (INHALATION) 4 TIMES DAILY PRN
Qty: 1 INHALER | Refills: 0 | Status: SHIPPED | OUTPATIENT
Start: 2021-03-29 | End: 2021-06-22 | Stop reason: SDUPTHER

## 2021-03-29 RX ORDER — SULFAMETHOXAZOLE AND TRIMETHOPRIM 800; 160 MG/1; MG/1
TABLET ORAL
COMMUNITY
Start: 2021-03-26 | End: 2021-04-22

## 2021-03-29 RX ORDER — BUDESONIDE AND FORMOTEROL FUMARATE DIHYDRATE 160; 4.5 UG/1; UG/1
2 AEROSOL RESPIRATORY (INHALATION) 2 TIMES DAILY
Qty: 1 INHALER | Refills: 0 | Status: SHIPPED | OUTPATIENT
Start: 2021-03-29 | End: 2021-06-22 | Stop reason: SDUPTHER

## 2021-03-29 RX ORDER — CEPHALEXIN 500 MG/1
CAPSULE ORAL
COMMUNITY
Start: 2021-03-26 | End: 2021-04-22

## 2021-03-29 RX ORDER — ATORVASTATIN CALCIUM 80 MG/1
80 TABLET, FILM COATED ORAL NIGHTLY
Qty: 30 TABLET | Refills: 3 | Status: SHIPPED | OUTPATIENT
Start: 2021-03-29 | End: 2022-10-12 | Stop reason: SDUPTHER

## 2021-03-29 ASSESSMENT — ENCOUNTER SYMPTOMS
WHEEZING: 1
COUGH: 1
SHORTNESS OF BREATH: 0
CHOKING: 1

## 2021-03-29 NOTE — PATIENT INSTRUCTIONS
Thank you for letting us take care of you today. We hope all your questions were addressed. If a question was overlooked or something else comes to mind after you return home, please contact a member of your Care Team listed below. Your Care Team at [place] [address] is Team #5  Delon Posada MD (Faculty)  Annmarie Cuenca MD (Resident)  Bimal Bagley MD (Resident)  Ck Toscano MD (Resident)  Virgia Goodpasture, LPN Georgiana Basta. ,AARON WHITE, AARON Mancia ([address] office)  [facility] office)  Mila Gresham, [address] (Clinical Practice Manager)  Mandy Ram [facility] (Clinical Pharmacist)       Office phone number: [phone]    If you need to get in right away due to illness, please be advised we have \"Same Day\" appointments available Monday-Friday. Please call us at [phone] option #3 to schedule your \"Same Day\" appointment.

## 2021-03-29 NOTE — PROGRESS NOTES
Visit Information    Have you changed or started any medications since your last visit including any over-the-counter medicines, vitamins, or herbal medicines? no   Have you stopped taking any of your medications? Is so, why? -  yes - see list   Are you having any side effects from any of your medications? - no    Have you seen any other physician or provider since your last visit?  no   Have you had any other diagnostic tests since your last visit?  no   Have you been seen in the emergency room and/or had an admission in a hospital since we last saw you?  yes - [facility] 03-25-21   Have you had your routine dental cleaning in the past 6 months?  01/2021    Do you have an active MyChart account? If no, what is the barrier?   Yes    Patient Care Team:  Gato Nickerson DO as PCP - General (Family Medicine)  Gato Nickerson DO as PCP - [facility] Provider  Trevor Montgomery as Surgeon (Orthopedic Surgery)  Pete Brown [address]    Medical History Review  Past Medical, Family, and Social History reviewed and does not contribute to the patient presenting condition    Health Maintenance   Topic Date Due    Hepatitis C screen  Never done    HIV screen  Never done    COVID-19 Vaccine (1) Never done    Shingles Vaccine (1 of 2) Never done    Lipid screen  09/18/2020    Potassium monitoring  01/12/2021    Creatinine monitoring  01/12/2021    A1C test (Diabetic or Prediabetic)  11/06/2021    Annual Wellness Visit (AWV)  01/19/2022    Colon cancer screen colonoscopy  03/15/2026    DTaP/Tdap/Td vaccine (2 - Td) 11/06/2030    Flu vaccine  Completed    Pneumococcal 0-64 years Vaccine  Completed    Hepatitis A vaccine  Aged Out    Hepatitis B vaccine  Aged Out    Hib vaccine  Aged Out    Meningococcal (ACWY) vaccine  Aged Out

## 2021-03-29 NOTE — PROGRESS NOTES
3/29/2021     Yi Hale (:  1955) is a 63 y.o. male, here for evaluation of the following medical concerns:  Chief Complaint   Patient presents with    Nail Problem     right pointer fingernail tip of finger nail     Health Maintenance     declined shingles vaccine / pt had both covid vaccines, only has the record of the second one will update     Labs Only     HPI   Seen in ED for rt index finger infection rt cuticle. On ABX  Wakes up coughing , choking . Had sleep studies done years ago, never got a CPAP, wants to start over. He will check with [employer]   Has COPD, previously prescribed albuterol and symbicort, didn't get because too costly. Now has new insurance and will see if he can get. He gets intermittent wheezing , productive cough. Knee pain left knee, has pending but not scheduled knee replacement  Due for lipid panel and A1c, A1c 6.0 today in office. He is aware of being prediabetic weight is stable    Review of Systems   Constitutional: Negative for fatigue and fever. Respiratory: Positive for cough, choking and wheezing. Negative for shortness of breath. Cardiovascular: Negative for chest pain and palpitations. Musculoskeletal: Positive for arthralgias (left knee). Prior to Visit Medications    Medication Sig Taking?  Authorizing Provider   cephALEXin (KEFLEX) 500 MG capsule take 1 capsule by mouth every 6 hours Yes Historical Provider, MD   sulfamethoxazole-trimethoprim (BACTRIM DS;SEPTRA DS) 800-160 MG per tablet take 1 tablet by mouth twice a day Yes Historical Provider, MD   budesonide-formoterol (SYMBICORT) 160-4.5 MCG/ACT AERO Inhale 2 puffs into the lungs 2 times daily Yes Mason Fournier, DO   albuterol sulfate HFA (VENTOLIN HFA) 108 (90 Base) MCG/ACT inhaler Inhale 2 puffs into the lungs 4 times daily as needed for Wheezing Yes Mason Fournier DO   atorvastatin (LIPITOR) 80 MG tablet Take 1 tablet by mouth nightly Yes Mason Fournier, DO   amLODIPine ([address]) 5 MG tablet take 1 tablet by mouth once daily Yes Jd Michaud MD   escitalopram (LEXAPRO) 10 MG tablet Take 10 mg by mouth daily Yes Historical Provider, MD   aspirin 81 MG chewable tablet Take 1 tablet by mouth daily Yes Zaheer Mcclelland,    nicotine polacrilex (NICORETTE) 2 MG gum Take 1 each by mouth as needed for Smoking cessation  Patient not taking: Reported on 3/29/2021  Jackeline Bentley MD   methylPREDNISolone (MEDROL) 4 MG tablet as directed  Historical Provider, MD   diclofenac sodium (VOLTAREN) 1 % GEL Apply 2 g topically 4 times daily as needed for Pain To the left hand  Patient not taking: Reported on 2/19/2021  Troy More DO   gabapentin (NEURONTIN) 100 MG capsule Take 1 capsule by mouth 2 times daily for 30 doses. Troy More DO   clopidogrel (PLAVIX) 75 MG tablet take 1 tablet by mouth once daily  Patient not taking: Reported on 2/19/2021  Troy More DO   traZODone (DESYREL) 50 MG tablet Take 50 mg by mouth nightly  Historical Provider, MD   hydrOXYzine (ATARAX) 25 MG tablet Take 25 mg by mouth 2 times daily as needed for Anxiety  Historical Provider, MD      Allergies   Allergen Reactions    Acetaminophen     Cyclobenzaprine Rash    Motrin [Ibuprofen] Nausea And Vomiting    Nsaids Nausea And Vomiting    Other Nausea And Vomiting     Pt states he cannot take any muscle relaxers but does not know what specific muscle relaxer caused the nausea.  Tramadol Nausea Only and Rash     Social History     Tobacco Use    Smoking status: Current Every Day Smoker     Packs/day: 0.50     Years: 30.00     Pack years: 15.00     Types: Cigarettes    Smokeless tobacco: Never Used    Tobacco comment: trying to quit   Substance Use Topics    Alcohol use:  Yes     Alcohol/week: 2.0 standard drinks     Types: 2 Cans of beer per week     Comment: SOCIALLY        Vitals:    03/29/21 0837 03/29/21 0850   BP: (!) 143/70 138/68  Comment: manual   Site: Left Lower Arm Left Lower Arm   Position: Sitting Sitting   Cuff Size: Medium Adult Medium Adult   Pulse: 108    Temp: 96.9 °F (36.1 °C)    TempSrc: Temporal    Weight: 234 lb 3.2 oz (106.2 kg)    Height: 5' 9\" (1.753 m)      Estimated body mass index is 34.59 kg/m² as calculated from the following:    Height as of this encounter: 5' 9\" (1.753 m). Weight as of this encounter: 234 lb 3.2 oz (106.2 kg). Physical Exam  Constitutional:       Appearance: Normal appearance. Cardiovascular:      Rate and Rhythm: Normal rate and regular rhythm. Pulmonary:      Effort: Pulmonary effort is normal.      Breath sounds: Normal breath sounds. Musculoskeletal:      Right lower leg: No edema. Left lower leg: No edema. Skin:     Findings: Erythema: rt index finger paronychia. Neurological:      Mental Status: He is alert. Psychiatric:         Mood and Affect: Mood normal.         Behavior: Behavior normal.         ASSESSMENT/PLAN:     Diagnosis Orders   1. Screening for hyperlipidemia  Lipid Panel   2. Simple chronic bronchitis (HCC)  budesonide-formoterol (SYMBICORT) 160-4.5 MCG/ACT AERO    albuterol sulfate HFA (VENTOLIN HFA) 108 (90 Base) MCG/ACT inhaler   3. Prediabetes  POCT glycosylated hemoglobin (Hb A1C)   4. Paronychia of right index finger     5. Obstructive sleep apnea syndrome       Return in about 3 months (around 6/29/2021) for recheck. I    Instructed on use of inhalers  Refuses lancing of paronychia, agrees to hot compress at home  He will check with [facility] as to where he can get sleep study done. Lipid panel ordered.   Aware he needs to lose weight due to A!c    Requested Prescriptions     Signed Prescriptions Disp Refills    budesonide-formoterol (SYMBICORT) 160-4.5 MCG/ACT AERO 1 Inhaler 0     Sig: Inhale 2 puffs into the lungs 2 times daily    albuterol sulfate HFA (VENTOLIN HFA) 108 (90 Base) MCG/ACT inhaler 1 Inhaler 0     Sig: Inhale 2 puffs into the lungs 4 times daily as needed for Wheezing    atorvastatin (LIPITOR) 80 MG tablet 30 tablet 3     Sig: Take 1 tablet by mouth nightly     An electronic signature was used to authenticate this note.     --Yony Rivas DO on3/29/2021 at 12:20 PM

## 2021-04-05 ENCOUNTER — OFFICE VISIT (OUTPATIENT)
Dept: FAMILY MEDICINE CLINIC | Age: 66
End: 2021-04-05
Payer: MEDICARE

## 2021-04-05 VITALS
DIASTOLIC BLOOD PRESSURE: 84 MMHG | HEART RATE: 91 BPM | WEIGHT: 233.2 LBS | SYSTOLIC BLOOD PRESSURE: 122 MMHG | HEIGHT: 69 IN | TEMPERATURE: 96.9 F | BODY MASS INDEX: 34.54 KG/M2

## 2021-04-05 DIAGNOSIS — I63.9 ACUTE ISCHEMIC STROKE (HCC): ICD-10-CM

## 2021-04-05 DIAGNOSIS — J41.0 SIMPLE CHRONIC BRONCHITIS (HCC): Chronic | ICD-10-CM

## 2021-04-05 DIAGNOSIS — I10 ESSENTIAL HYPERTENSION: Chronic | ICD-10-CM

## 2021-04-05 DIAGNOSIS — M17.12 PRIMARY OSTEOARTHRITIS OF LEFT KNEE: Primary | ICD-10-CM

## 2021-04-05 PROCEDURE — 99212 OFFICE O/P EST SF 10 MIN: CPT | Performed by: FAMILY MEDICINE

## 2021-04-05 PROCEDURE — G8417 CALC BMI ABV UP PARAM F/U: HCPCS | Performed by: FAMILY MEDICINE

## 2021-04-05 PROCEDURE — G8926 SPIRO NO PERF OR DOC: HCPCS | Performed by: FAMILY MEDICINE

## 2021-04-05 PROCEDURE — 3023F SPIROM DOC REV: CPT | Performed by: FAMILY MEDICINE

## 2021-04-05 PROCEDURE — G8427 DOCREV CUR MEDS BY ELIG CLIN: HCPCS | Performed by: FAMILY MEDICINE

## 2021-04-05 PROCEDURE — 3017F COLORECTAL CA SCREEN DOC REV: CPT | Performed by: FAMILY MEDICINE

## 2021-04-05 PROCEDURE — 4004F PT TOBACCO SCREEN RCVD TLK: CPT | Performed by: FAMILY MEDICINE

## 2021-04-05 ASSESSMENT — ENCOUNTER SYMPTOMS
VOMITING: 0
DIARRHEA: 0
NAUSEA: 0
SHORTNESS OF BREATH: 0
CONSTIPATION: 0
ABDOMINAL PAIN: 0
COUGH: 0

## 2021-04-05 NOTE — PROGRESS NOTES
2021     Bridget Llanes (:  1955) is a 63 y.o. male, here for evaluation of the following medical concerns:  Chief Complaint   Patient presents with    Procedure     clearance for knee replacement surgery    Finger Pain     on right hand     HPI   Requesting surgical clearance for left knee replacement 21. To be done by Dr. Nabor Stafford, [facility]  paronychia resolving, states drained. He is aware of medication discontinuation prior to surgery. Review of Systems   Constitutional: Negative for diaphoresis, fatigue and fever. HENT: Negative for congestion. Respiratory: Negative for cough and shortness of breath. Cardiovascular: Negative for chest pain, palpitations and leg swelling. Gastrointestinal: Negative for abdominal pain, constipation, diarrhea, nausea and vomiting. Genitourinary: Negative for dysuria. Musculoskeletal: Positive for arthralgias (left knee). Psychiatric/Behavioral: Negative for confusion and dysphoric mood. Prior to Visit Medications    Medication Sig Taking?  Authorizing Provider   cephALEXin (KEFLEX) 500 MG capsule take 1 capsule by mouth every 6 hours Yes Historical Provider, MD   sulfamethoxazole-trimethoprim (BACTRIM DS;SEPTRA DS) 800-160 MG per tablet take 1 tablet by mouth twice a day Yes Historical Provider, MD   budesonide-formoterol (SYMBICORT) 160-4.5 MCG/ACT AERO Inhale 2 puffs into the lungs 2 times daily Yes Norma Elliott,    albuterol sulfate HFA (VENTOLIN HFA) 108 (90 Base) MCG/ACT inhaler Inhale 2 puffs into the lungs 4 times daily as needed for Wheezing Yes Norma Elliott,    atorvastatin (LIPITOR) 80 MG tablet Take 1 tablet by mouth nightly Yes Norma Elliott DO   methylPREDNISolone (MEDROL) 4 MG tablet as directed Yes Historical Provider, MD   amLODIPine (NORVASC) 5 MG tablet take 1 tablet by mouth once daily Yes Cedrick Duarte MD   traZODone (DESYREL) 50 MG tablet Take 50 mg by mouth nightly Yes Historical Provider, MD   hydrOXYzine (ATARAX) 25 MG tablet Take 25 mg by mouth 2 times daily as needed for Anxiety Yes Historical Provider, MD   escitalopram (LEXAPRO) 10 MG tablet Take 10 mg by mouth daily Yes Historical Provider, MD   aspirin 81 MG chewable tablet Take 1 tablet by mouth daily Yes Rolan Babinski, DO   nicotine polacrilex (NICORETTE) 2 MG gum Take 1 each by mouth as needed for Smoking cessation  Patient not taking: Reported on 3/29/2021  Kathia Venegas MD   diclofenac sodium (VOLTAREN) 1 % GEL Apply 2 g topically 4 times daily as needed for Pain To the left hand  Patient not taking: Reported on 2/19/2021  Aj Arechiga DO   gabapentin (NEURONTIN) 100 MG capsule Take 1 capsule by mouth 2 times daily for 30 doses. Aj Arechiga DO   clopidogrel (PLAVIX) 75 MG tablet take 1 tablet by mouth once daily  Patient not taking: Reported on 2/19/2021  Aj Arechiga DO      Allergies   Allergen Reactions    Acetaminophen     Cyclobenzaprine Rash    Motrin [Ibuprofen] Nausea And Vomiting    Nsaids Nausea And Vomiting    Other Nausea And Vomiting     Pt states he cannot take any muscle relaxers but does not know what specific muscle relaxer caused the nausea.  Tramadol Nausea Only and Rash     Social History     Tobacco Use    Smoking status: Current Every Day Smoker     Packs/day: 0.50     Years: 30.00     Pack years: 15.00     Types: Cigarettes    Smokeless tobacco: Never Used    Tobacco comment: trying to quit   Substance Use Topics    Alcohol use:  Yes     Alcohol/week: 2.0 standard drinks     Types: 2 Cans of beer per week     Comment: SOCIALLY        Vitals:    04/05/21 1543   BP: 122/84   Site: Left Upper Arm   Position: Sitting   Cuff Size: Large Adult   Pulse: 91   Temp: 96.9 °F (36.1 °C)   TempSrc: Temporal   Weight: 233 lb 3.2 oz (105.8 kg)   Height: 5' 9\" (1.753 m)     Estimated body mass index is 34.44 kg/m² as calculated from the following:    Height as of this encounter: 5' 9\" (1.753 m).    Weight as of this encounter: 233 lb 3.2 oz (105.8 kg). Physical Exam  Vitals signs and nursing note reviewed. Constitutional:       Appearance: Normal appearance. Cardiovascular:      Rate and Rhythm: Normal rate and regular rhythm. Heart sounds: Normal heart sounds. Pulmonary:      Breath sounds: Normal breath sounds. Neurological:      Mental Status: He is alert and oriented to person, place, and time. Psychiatric:         Mood and Affect: Mood normal.         Behavior: Behavior normal.         ASSESSMENT/PLAN:     Diagnosis Orders   1. Primary osteoarthritis of left knee     2. Simple chronic bronchitis ([facility] [address]     3. Acute ischemic stroke ([facility] [address]     4. Essential hypertension       Return in about 3 months (around 7/5/2021), or if symptoms worsen or fail to improve and follow up after surgery. Risk of serious complication per NSQIP is average. Risk of cardiac complication is below average. Requested Prescriptions      No prescriptions requested or ordered in this encounter     An electronic signature was used to authenticate this note.     --Dwight Rosenberg,  on4/5/2021 at 4:23 PM

## 2021-04-14 DIAGNOSIS — M25.562 LEFT KNEE PAIN, UNSPECIFIED CHRONICITY: Primary | ICD-10-CM

## 2021-04-15 ENCOUNTER — OFFICE VISIT (OUTPATIENT)
Dept: ORTHOPEDIC SURGERY | Age: 66
End: 2021-04-15
Payer: MEDICARE

## 2021-04-15 VITALS
WEIGHT: 237 LBS | SYSTOLIC BLOOD PRESSURE: 138 MMHG | DIASTOLIC BLOOD PRESSURE: 84 MMHG | HEART RATE: 88 BPM | BODY MASS INDEX: 35.1 KG/M2 | HEIGHT: 69 IN

## 2021-04-15 DIAGNOSIS — M17.12 PRIMARY OSTEOARTHRITIS OF LEFT KNEE: Primary | ICD-10-CM

## 2021-04-15 PROCEDURE — 4004F PT TOBACCO SCREEN RCVD TLK: CPT | Performed by: ORTHOPAEDIC SURGERY

## 2021-04-15 PROCEDURE — G8417 CALC BMI ABV UP PARAM F/U: HCPCS | Performed by: ORTHOPAEDIC SURGERY

## 2021-04-15 PROCEDURE — G8427 DOCREV CUR MEDS BY ELIG CLIN: HCPCS | Performed by: ORTHOPAEDIC SURGERY

## 2021-04-15 PROCEDURE — 99213 OFFICE O/P EST LOW 20 MIN: CPT | Performed by: ORTHOPAEDIC SURGERY

## 2021-04-15 PROCEDURE — 3017F COLORECTAL CA SCREEN DOC REV: CPT | Performed by: ORTHOPAEDIC SURGERY

## 2021-04-15 ASSESSMENT — ENCOUNTER SYMPTOMS
DIARRHEA: 0
ABDOMINAL DISTENTION: 0
CONSTIPATION: 0
COLOR CHANGE: 0
COUGH: 0
VOMITING: 0
SHORTNESS OF BREATH: 0
APNEA: 0
CHEST TIGHTNESS: 0
ABDOMINAL PAIN: 0
NAUSEA: 0

## 2021-04-15 NOTE — PROGRESS NOTES
[place] [id number] [address] AND SPORTS MEDICINE  [address] [place] [id number]  Dept: [phone]  Dept Fax: [phone]          Left Knee - New Patient     Subjective:     Chief Complaint   Patient presents with    Knee Pain     Left knee pain, scope DOS- approx. 1992, Right knee TKA DOS- 2016     HPI:     Gamaliel Jeffries is disabled and he presents today for Left knee pain. The pain has been present for years. The patient recalls no specific injury. The patient has tried a cane and rest with no improvement. The pain is now described as Achy and Sharp. There is pain on weight bearing. The knee has swelled. There is painful popping and clicking. The knee has caught or locked up. The knee has not given out. It is stiff upon arising from sitting. It is painful to go up and down stairs and sit for a prolonged time. The patient has had a cortisone injection in 2016 with minimal pain relief. The patient has not tried a lubrication injection. The patient has not tried physical therapy. The patient has had an arthroscopic surgery in 1992. Patient states that he was supposed to get his knee replaced tomorrow by Dr. Jaclyn Prasad but he was unable to have his surgery due to his insurance giving him issues. ROS:   Review of Systems   Constitutional: Positive for activity change. Negative for appetite change. Respiratory: Negative for apnea, cough, chest tightness and shortness of breath. Cardiovascular: Negative for chest pain, palpitations and leg swelling. Gastrointestinal: Negative for abdominal distention, abdominal pain, constipation, diarrhea, nausea and vomiting. Genitourinary: Negative for difficulty urinating, dysuria and hematuria. Musculoskeletal: Positive for arthralgias and joint swelling. Negative for gait problem and myalgias. Skin: Negative for color change and rash. Neurological: Negative for dizziness, weakness, numbness and headaches. Psychiatric/Behavioral: Negative for sleep disturbance. Past Medical History:    Past Medical History:   Diagnosis Date    Aortic valve sclerosis     Back pain     COPD (chronic obstructive pulmonary disease) (HCC)     NEUBULIZER AS NEEDED    Dental bridge present     UPPER    Depression     DJD (degenerative joint disease)     Drug abuse ([facility] [address]     drug abuse includes smoking cocaine    High cholesterol     History of shingles     2013    Hypertension     PT NEVER FILLED  SCRIPT    Pain at rest     Knee-Left, back     Postlaminectomy syndrome 8/9/2017    Sleep apnea     does not have machine    Suicidal ideations     S/I's without attempt    TIA (transient ischemic attack) 10/2017    x 2    Toe contracture, left     5th    Toe pain, left     5th digit    Wears dentures     1 GOLD TOOTH ON FLIPPER UPPER    Wears glasses      Past Surgical History:    Past Surgical History:   Procedure Laterality Date    ARTHROPLASTY Left 03/13/2019    toe    CYST REMOVAL Bilateral     \"groin\"    FINGER TRIGGER RELEASE Left 03/04/2014    ring finger    FINGER TRIGGER RELEASE Right 01/05/2015    rt hand-RING FINGER    INSERTABLE CARDIAC MONITOR  09/19/2019    PT HAS [employer] [id number] REVEAL LOOP RECORDER.  MRI CONDITONAL 3T OK, SAFE IMMEDIATELY POST IMPLANT. (pt gets confused, he DOES NOT HAVE A DEFIBRILLATOR)    KNEE ARTHROSCOPY Bilateral     LUMBAR FUSION  06/15/2016    posterior lumbar fusion L4-L5    LUMBAR SPINE SURGERY  2005    LUMBAR    NERVE BLOCK  01/13/2017    caudal #1    celestone 9mg  25mcq fentanyl    NERVE BLOCK Left 03/30/2017    left hip bursa injection depomedrol 40 mg    NERVE BLOCK Bilateral 04/07/2017    bilateral si joints injections, depo 40    PATELLA SURGERY Right     x2    TOE ARTHROPLASTY Left 03/13/2019    5TH TOE TOTAL ARTHROPLASTY, TNC AT 5TH MTJ performed by Kristen Russo DPM at [address] Right 01/2016    KNEE     Current Medications: Current Outpatient Medications   Medication Sig Dispense Refill    cephALEXin (KEFLEX) 500 MG capsule take 1 capsule by mouth every 6 hours      sulfamethoxazole-trimethoprim (BACTRIM DS;SEPTRA DS) 800-160 MG per tablet take 1 tablet by mouth twice a day      budesonide-formoterol (SYMBICORT) 160-4.5 MCG/ACT AERO Inhale 2 puffs into the lungs 2 times daily 1 Inhaler 0    albuterol sulfate HFA (VENTOLIN HFA) 108 (90 Base) MCG/ACT inhaler Inhale 2 puffs into the lungs 4 times daily as needed for Wheezing 1 Inhaler 0    atorvastatin (LIPITOR) 80 MG tablet Take 1 tablet by mouth nightly 30 tablet 3    methylPREDNISolone (MEDROL) 4 MG tablet as directed      amLODIPine (NORVASC) 5 MG tablet take 1 tablet by mouth once daily 90 tablet 2    traZODone (DESYREL) 50 MG tablet Take 50 mg by mouth nightly      hydrOXYzine (ATARAX) 25 MG tablet Take 25 mg by mouth 2 times daily as needed for Anxiety      escitalopram (LEXAPRO) 10 MG tablet Take 10 mg by mouth daily      aspirin 81 MG chewable tablet Take 1 tablet by mouth daily 30 tablet 3    nicotine polacrilex (NICORETTE) 2 MG gum Take 1 each by mouth as needed for Smoking cessation (Patient not taking: Reported on 3/29/2021) 110 each 3    diclofenac sodium (VOLTAREN) 1 % GEL Apply 2 g topically 4 times daily as needed for Pain To the left hand (Patient not taking: Reported on 2/19/2021) 350 g 1    gabapentin (NEURONTIN) 100 MG capsule Take 1 capsule by mouth 2 times daily for 30 doses. 30 capsule 0    clopidogrel (PLAVIX) 75 MG tablet take 1 tablet by mouth once daily (Patient not taking: Reported on 2/19/2021) 30 tablet 3     No current facility-administered medications for this visit.       Allergies:    Acetaminophen, Cyclobenzaprine, Motrin [ibuprofen], Nsaids, Other, and Tramadol    Social History:   Social History     Socioeconomic History    Marital status: Single     Spouse name: None    Number of children: None    Years of education: None    Highest education level: None   Occupational History    None   Social Needs    Financial resource strain: None    Food insecurity     Worry: None     Inability: None    Transportation needs     Medical: None     Non-medical: None   Tobacco Use    Smoking status: Current Every Day Smoker     Packs/day: 0.50     Years: 30.00     Pack years: 15.00     Types: Cigarettes    Smokeless tobacco: Never Used    Tobacco comment: trying to quit   Substance and Sexual Activity    Alcohol use: Yes     Alcohol/week: 2.0 standard drinks     Types: 2 Cans of beer per week     Comment: SOCIALLY    Drug use: Yes     Types: Cocaine     Comment: drug abuse includes smoking cocaine;    Sexual activity: Yes     Partners: Female   Lifestyle    Physical activity     Days per week: None     Minutes per session: None    Stress: None   Relationships    Social connections     Talks on phone: None     Gets together: None     Attends Mu-ism service: None     Active member of club or organization: None     Attends meetings of clubs or organizations: None     Relationship status: None    Intimate partner violence     Fear of current or ex partner: None     Emotionally abused: None     Physically abused: None     Forced sexual activity: None   Other Topics Concern    None   Social History Narrative    None     Family History:  Family History   Problem Relation Age of Onset    Diabetes Mother     Diabetes Brother      Vitals:   /84   Pulse 88   Ht 5' 9\" (1.753 m)   Wt 237 lb (107.5 kg)   BMI 35.00 kg/m²  Body mass index is 35 kg/m². Physical Examination:     Orthopedics:    GENERAL: Alert and oriented X3 in no acute distress. SKIN: Intact without lesions or ulcerations. NEURO: Intact to sensory and motor testing. VASC: Capillary refill is less than 3 seconds. KNEE EXAM    LOCATION: Left Knee  GEN: Alert and oriented X 3, in no acute distress.   GAIT: The patient's gait was observed while entering the exam room and was noted to be non antalgic. The extremity is in varus alignment. SKIN: Intact without rashes, lesions, or ulcerations. No obvious deformity or swelling. NEURO: The patient responds to light touch throughout left LE. Patellar and Achilles reflexes are 2/4. VASC: The left LE is neurovascularly intact with 2/4 DP and 2/4 PT pulses. Brisk capillary refill. ROM: 5/95 degrees. There is no effusion. MUSC: decreased quad tone  LIGAMENT: There is No varus instability at 0 degrees and No varus instability at 30 degrees. There is No valgus instability at 0 degrees and No valgus instability at 30 degrees. PALP: There is medial joint line pain, swelling noted posteriorly. Assessment:     1. Primary osteoarthritis of left knee      Procedures:    Procedure: no  Radiology:   KNEE X-RAY    4 views of the left knee including AP, bilateral tunnel, and lateral in the upright position, and skyline views reveal varus alignment with no fracture or dislocation. Kellgren grade IV changes of osteoarthritis (joint space narrowing, osteophyte, subchondral sclerosis, bony deformity/cyst) of the tricompartment(s). No osseous loose bodies. No bony erosion or periosteal reaction. No soft tissue masses. Right TKA noted. Impression: Severe osteoarthritis of the left knee. Plan:   Treatment : I reviewed the X-ray with the patient and I informed him that the knee has reached a Kellgren grade III/IV which means he has moderate to severe osteoarthritis. We discussed the etiologies and natural histories of Primary osteoarthritis of the left knee. We discussed the various treatment alternatives including anti-inflammatory medications, physical therapy, injections, further imaging studies and as a last result surgery. During today's visit, I asked the patient if his knee is in enough pain today for us to replace his knee and he states that it is.  I then explained to him that I will replace his knee for him if he would like since he was unable to do it with Dr. Katlyn Morris. The patient then stated that he would like that. I then explained to the patient that we will need him to seek out clearance from his PCP, Dentist and any other specialty doctor that he sees for a pre-existing condition. The patient states that he understands and at this time, the patient has opted for and and has elected to proceed with a left total knee replacement surgery. The risks/benefits/alternatives and potential complications have been discussed with the patient. We also had a long discussion regarding the procedure itself and the expectation of the recovery. All questions and concerns were addressed. A consent was signed today. Patient was instructed to call our office with any question or concerns prior to the procedure occurs. Patient should return to the clinic 1 week after surgery to follow up with Belen Loyola PA-C. The patient will call the office immediately with any problems. No orders of the defined types were placed in this encounter. No orders of the defined types were placed in this encounter. LUIS ENRIQUEoesally Lund Day V, am scribing for and in the presence of Domenic Alejandra D.O. 4/18/2021  3:18 PM    I spent 7 minutes of face to face time with this patient. Senait Reyna DO, have personally seen this patient and I have reviewed the CC, PMH, FHX and Social History as provided by other clinical staff. I reassessed the HPI and ROS as scribed by Alberto Yaoibaston in my presence and it is both accurate and complete. Thereafter, I personally performed the PE, reviewed the imaging and established the DX and POC. I agree with the documentation provided by the Medical Scribe. I have reviewed all documentation in its entirety prior to providing my signature indicating agreement. Any areas of disagreement are noted on the chart.     Electronically signed by Nataliya Vergara DO on 4/18/2021 at 3:19 PM        Electronically signed by Nataliya Vergara DO, on 4/18/2021 at 3:18 PM

## 2021-04-19 DIAGNOSIS — I10 ESSENTIAL HYPERTENSION: ICD-10-CM

## 2021-04-19 RX ORDER — AMLODIPINE BESYLATE 5 MG/1
TABLET ORAL
Qty: 90 TABLET | Refills: 2 | Status: SHIPPED | OUTPATIENT
Start: 2021-04-19 | End: 2021-06-22 | Stop reason: SDUPTHER

## 2021-04-19 NOTE — TELEPHONE ENCOUNTER
Last visit: 04/05/21  Last Med refill:   Does patient have enough medication for 72 hours:     Next Visit Date:  Future Appointments   Date Time Provider Radha Sierra   4/22/2021 11:00 AM STA PAT RM 1 [facility] PAT [facility]   4/24/2021  3:30 PM SCHEDULE, STAZ COVID SCREENING [facility] COV [facility]   5/5/2021 11:45 AM Rossy Saab PA-C Sports Med [facility]   6/1/2021 10:00 AM STA MRI RM STAZ MRI STA Radiolog       Health Maintenance   Topic Date Due    Hepatitis C screen  Never done    HIV screen  Never done    Shingles Vaccine (1 of 2) Never done    Lipid screen  09/18/2020    Potassium monitoring  01/12/2021    Creatinine monitoring  01/12/2021    Annual Wellness Visit (AWV)  01/19/2022    A1C test (Diabetic or Prediabetic)  03/29/2022    Colon cancer screen colonoscopy  03/15/2026    DTaP/Tdap/Td vaccine (2 - Td) 11/06/2030    Flu vaccine  Completed    Pneumococcal 0-64 years Vaccine  Completed    COVID-19 Vaccine  Completed    Hepatitis A vaccine  Aged Out    Hepatitis B vaccine  Aged Out    Hib vaccine  Aged Out    Meningococcal (ACWY) vaccine  Aged Out       Hemoglobin A1C (%)   Date Value   03/29/2021 6.0   11/06/2020 5.9   09/17/2019 6.1 (H)             ( goal A1C is < 7)   No results found for: LABMICR  LDL Cholesterol (mg/dL)   Date Value   09/18/2019 120   05/03/2017 79       (goal LDL is <100)   AST (U/L)   Date Value   12/23/2018 13     ALT (U/L)   Date Value   12/23/2018 13     BUN (mg/dL)   Date Value   01/12/2020 29 (H)     BP Readings from Last 3 Encounters:   04/15/21 138/84   04/05/21 122/84   03/29/21 138/68          (goal 120/80)    All Future Testing planned in [facility]  Lab Frequency Next Occurrence   NM CARDIAC MUGA W STRESS Once 37/57/7150   Basic Metabolic Panel Once 50/16/5147   NM MYOCARDIAL SPECT REST EXERCISE OR RX Once 06/01/2021   XR HAND LEFT (MIN 3 VIEWS) Once 07/15/2021   XR KNEE LEFT (3 VIEWS) Once 09/22/2021   XR FOOT RIGHT (MIN 3 VIEWS) Once 09/22/2021   XR

## 2021-04-22 ENCOUNTER — HOSPITAL ENCOUNTER (OUTPATIENT)
Dept: GENERAL RADIOLOGY | Age: 66
Discharge: HOME | End: 2021-04-24
Payer: MEDICARE

## 2021-04-22 ENCOUNTER — HOSPITAL ENCOUNTER (OUTPATIENT)
Dept: PREADMISSION TESTING | Age: 66
Discharge: HOME | End: 2021-04-26
Payer: MEDICARE

## 2021-04-22 VITALS
TEMPERATURE: 96.5 F | HEIGHT: 69 IN | WEIGHT: 240.3 LBS | HEART RATE: 75 BPM | OXYGEN SATURATION: 99 % | RESPIRATION RATE: 16 BRPM | BODY MASS INDEX: 35.59 KG/M2 | SYSTOLIC BLOOD PRESSURE: 128 MMHG | DIASTOLIC BLOOD PRESSURE: 70 MMHG

## 2021-04-22 LAB
-: NORMAL
ABO/RH: NORMAL
AMORPHOUS: NORMAL
ANION GAP SERPL CALCULATED.3IONS-SCNC: 12 MMOL/L (ref 9–17)
ANTIBODY SCREEN: NEGATIVE
ARM BAND NUMBER: NORMAL
BACTERIA: NORMAL
BILIRUBIN URINE: NEGATIVE
BUN BLDV-MCNC: 18 MG/DL (ref 8–23)
BUN/CREAT BLD: 16 (ref 9–20)
CALCIUM SERPL-MCNC: 9.1 MG/DL (ref 8.6–10.4)
CASTS UA: NORMAL /LPF
CHLORIDE BLD-SCNC: 106 MMOL/L (ref 98–107)
CO2: 23 MMOL/L (ref 20–31)
COLOR: YELLOW
COMMENT UA: ABNORMAL
CREAT SERPL-MCNC: 1.13 MG/DL (ref 0.7–1.2)
CRYSTALS, UA: NORMAL /HPF
EPITHELIAL CELLS UA: NORMAL /HPF (ref 0–5)
ESTIMATED AVERAGE GLUCOSE: 123 MG/DL
EXPIRATION DATE: NORMAL
GFR AFRICAN AMERICAN: >60 ML/MIN
GFR NON-AFRICAN AMERICAN: >60 ML/MIN
GFR SERPL CREATININE-BSD FRML MDRD: ABNORMAL ML/MIN/{1.73_M2}
GFR SERPL CREATININE-BSD FRML MDRD: ABNORMAL ML/MIN/{1.73_M2}
GLUCOSE BLD-MCNC: 122 MG/DL (ref 70–99)
GLUCOSE URINE: NEGATIVE
HBA1C MFR BLD: 5.9 % (ref 4–6)
HCT VFR BLD CALC: 41.8 % (ref 40.7–50.3)
HEMOGLOBIN: 13.4 G/DL (ref 13–17)
INR BLD: 1
KETONES, URINE: NEGATIVE
LEUKOCYTE ESTERASE, URINE: NEGATIVE
MCH RBC QN AUTO: 28.6 PG (ref 25.2–33.5)
MCHC RBC AUTO-ENTMCNC: 32.1 G/DL (ref 28.4–34.8)
MCV RBC AUTO: 89.1 FL (ref 82.6–102.9)
MUCUS: NORMAL
NITRITE, URINE: NEGATIVE
NRBC AUTOMATED: 0 PER 100 WBC
OTHER OBSERVATIONS UA: NORMAL
PARTIAL THROMBOPLASTIN TIME: 29.7 SEC (ref 23.9–33.8)
PDW BLD-RTO: 14.6 % (ref 11.8–14.4)
PH UA: 6 (ref 5–8)
PLATELET # BLD: 189 K/UL (ref 138–453)
PMV BLD AUTO: 10.9 FL (ref 8.1–13.5)
POTASSIUM SERPL-SCNC: 4.3 MMOL/L (ref 3.7–5.3)
PREALBUMIN: 30.2 MG/DL (ref 20–40)
PROTEIN UA: ABNORMAL
PROTHROMBIN TIME: 12.7 SEC (ref 11.5–14.2)
RBC # BLD: 4.69 M/UL (ref 4.21–5.77)
RBC UA: NORMAL /HPF (ref 0–2)
RENAL EPITHELIAL, UA: NORMAL /HPF
SODIUM BLD-SCNC: 141 MMOL/L (ref 135–144)
SPECIFIC GRAVITY UA: 1.02 (ref 1–1.03)
TRICHOMONAS: NORMAL
TURBIDITY: CLEAR
URINE HGB: NEGATIVE
UROBILINOGEN, URINE: NORMAL
WBC # BLD: 7.1 K/UL (ref 3.5–11.3)
WBC UA: NORMAL /HPF (ref 0–5)
YEAST: NORMAL

## 2021-04-22 PROCEDURE — 93005 ELECTROCARDIOGRAM TRACING: CPT | Performed by: ORTHOPAEDIC SURGERY

## 2021-04-22 PROCEDURE — 86900 BLOOD TYPING SEROLOGIC ABO: CPT

## 2021-04-22 PROCEDURE — 83036 HEMOGLOBIN GLYCOSYLATED A1C: CPT

## 2021-04-22 PROCEDURE — 87641 MR-STAPH DNA AMP PROBE: CPT

## 2021-04-22 PROCEDURE — 85610 PROTHROMBIN TIME: CPT

## 2021-04-22 PROCEDURE — 77073 BONE LENGTH STUDIES: CPT

## 2021-04-22 PROCEDURE — 84134 ASSAY OF PREALBUMIN: CPT

## 2021-04-22 PROCEDURE — 36415 COLL VENOUS BLD VENIPUNCTURE: CPT

## 2021-04-22 PROCEDURE — 85730 THROMBOPLASTIN TIME PARTIAL: CPT

## 2021-04-22 PROCEDURE — 71046 X-RAY EXAM CHEST 2 VIEWS: CPT

## 2021-04-22 PROCEDURE — 85027 COMPLETE CBC AUTOMATED: CPT

## 2021-04-22 PROCEDURE — 81001 URINALYSIS AUTO W/SCOPE: CPT

## 2021-04-22 PROCEDURE — 86850 RBC ANTIBODY SCREEN: CPT

## 2021-04-22 PROCEDURE — 80048 BASIC METABOLIC PNL TOTAL CA: CPT

## 2021-04-22 PROCEDURE — 86901 BLOOD TYPING SEROLOGIC RH(D): CPT

## 2021-04-22 RX ORDER — CELECOXIB 200 MG/1
200 CAPSULE ORAL ONCE
Status: CANCELLED | OUTPATIENT
Start: 2021-04-28

## 2021-04-22 RX ORDER — GABAPENTIN 300 MG/1
300 CAPSULE ORAL ONCE
Status: CANCELLED | OUTPATIENT
Start: 2021-04-28

## 2021-04-22 ASSESSMENT — KOOS JR
STRAIGHTENING KNEE FULLY: 4
RISING FROM SITTING: 4
GOING UP OR DOWN STAIRS: 4
BENDING TO THE FLOOR TO PICK UP OBJECT: 4

## 2021-04-22 ASSESSMENT — PAIN DESCRIPTION - PAIN TYPE: TYPE: CHRONIC PAIN

## 2021-04-22 ASSESSMENT — PROMIS GLOBAL HEALTH SCALE
HOW IS THE PROMIS V1.1 BEING ADMINISTERED?: 0
IN GENERAL, HOW WOULD YOU RATE YOUR MENTAL HEALTH, INCLUDING YOUR MOOD AND YOUR ABILITY TO THINK [ON A SCALE OF 1 (POOR) TO 5 (EXCELLENT)]?: 2
IN THE PAST 7 DAYS, HOW WOULD YOU RATE YOUR FATIGUE ON AVERAGE [ON A SCALE FROM 1 (NONE) TO 5 (VERY SEVERE)]?: 3
IN GENERAL, WOULD YOU SAY YOUR HEALTH IS...[ON A SCALE OF 1 (POOR) TO 5 (EXCELLENT)]: 2
IN GENERAL, PLEASE RATE HOW WELL YOU CARRY OUT YOUR USUAL SOCIAL ACTIVITIES (INCLUDES ACTIVITIES AT HOME, AT WORK, AND IN YOUR COMMUNITY, AND RESPONSIBILITIES AS A PARENT, CHILD, SPOUSE, EMPLOYEE, FRIEND, ETC) [ON A SCALE OF 1 (POOR) TO 5 (EXCELLENT)]?: 3
SUM OF RESPONSES TO QUESTIONS 2, 4, 5, & 10: 11

## 2021-04-22 ASSESSMENT — PAIN SCALES - GENERAL: PAINLEVEL_OUTOF10: 9

## 2021-04-22 ASSESSMENT — PAIN - FUNCTIONAL ASSESSMENT: PAIN_FUNCTIONAL_ASSESSMENT: PREVENTS OR INTERFERES SOME ACTIVE ACTIVITIES AND ADLS

## 2021-04-22 ASSESSMENT — PAIN DESCRIPTION - FREQUENCY: FREQUENCY: CONTINUOUS

## 2021-04-22 ASSESSMENT — PAIN DESCRIPTION - LOCATION: LOCATION: KNEE

## 2021-04-22 ASSESSMENT — PAIN DESCRIPTION - DESCRIPTORS: DESCRIPTORS: ACHING

## 2021-04-22 ASSESSMENT — PAIN DESCRIPTION - PROGRESSION: CLINICAL_PROGRESSION: GRADUALLY WORSENING

## 2021-04-22 NOTE — PRE-PROCEDURE INSTRUCTIONS
[facility] ON Wednesday, April 28,2021  at 08:15AM    COVID TEST 4/24 3:30 on left side of hospital  Call number on the sign & go throught the entrance inside of building for your test    Once you enter the hospital lobby, take the elevators to the second floor. Check-In is at the surgery registration desk. Continue to take your home medications as you normally do up to and including the night before surgery with the exception of any blood thinning medications. Please stop any blood thinning medications as directed by your surgeon or prescribing physician. Failure to stop certain medications may interfere with your scheduled surgery. These may include:  Aspirin, Warfarin (Coumadin), Clopidogrel (Plavix), Ibuprofen (Motrin, Advil), Naproxen (Aleve), Meloxicam (Mobic), Celecoxib (Celebrex), Eliquis, Pradaxa, Xarelto, Effient, Fish Oil, Herbal supplements. Stop Plavix & Aspirin as directed by your MD before your surgery      Please take the following medication(s) the day of surgery with a small sip of water:  Amlodipine        PREPARING FOR YOUR SURGERY:     Before surgery, you can play an important role in your own health. Because skin is not sterile, we need to be sure that your skin is as free of germs as possible before surgery by carefully washing before surgery. Preparing or prepping skin before surgery can reduce the risk of a surgical site infection.   Do not shave the area of your body where your surgery will be performed unless you received specific permission from your physician. You will need to shower at home the night before surgery and the morning of surgery with a special soap called chlorhexidine gluconate (CHG*). *Not to be used by people allergic to Chlorhexidine Gluconate (CHG). Following these instructions will help you be sure that your skin is clean before surgery. Instructions on cleaning your skin before surgery:      The night before your surgery:  You will need to shower with warm water (not hot) and the CHG soap.  Use a clean wash cloth and a clean towel. Have clean clothes available to put on after the shower.   First wash your hair with regular shampoo. Rinse your hair and body thoroughly to remove the shampoo.  Wash your face and genital area (private parts) with your regular soap or water only. Thoroughly rinse your body with warm water from the neck down.  Turn water off to prevent rinsing the soap off too soon.  With a clean wet washcloth and half of the CHG soap in the bottle, lather your entire body from the neck down. Do not use CHG soap near your eyes or ears to avoid injury to those areas.  Wash thoroughly, paying special attention to the area where your surgery will be performed.  Wash your body gently for five (5) minutes. Avoid scrubbing your skin too hard.  Turn the water back on and rinse your body thoroughly.  Pat yourself dry with a clean, soft towel. Do not apply lotion, cream or powder.  Dress with clean freshly washed clothes. The morning of surgery:     Repeat shower following steps above - using remaining half of CHG soap in bottle. Patient Instructions:    Germain Addison If you are having any type of anesthesia you are to have nothing to eat or drink after midnight the night before your surgery. This includes gum, hard candy, mints, water or smoking or chewing tobacco.  The only exception to this is a small sip of water to take with any morning dose of heart, blood pressure, or seizure medications. No alcoholic beverages for 24 hours prior to surgery.  Brush your teeth but do not swallow water.  Bring your eyeglasses and case with you. No contacts are to be worn the day of surgery. You also may bring your hearing aids. Most surgical procedures involving anesthesia will require that you remove your dentures prior to surgery.     · Do not wear any jewelry or body piercings day of surgery. Also, NO lotion, perfume or deodorant to be used the day of surgery. No nail polish on the operative extremity (arm/leg surgeries)    · If you are staying overnight with us, please bring a small bag of necessary personal items.  Please wear loose, comfortable clothing. If you are potentially going to have a cast or brace bring clothing that will fit over them.  In case of illness - If you have cold or flu like symptoms (high fever, runny nose, sore throat, cough, etc.) rash, nausea, vomiting, loose stools, and/or recent contact with someone who has a contagious disease (chicken pox, measles, etc.) Please call your doctor before coming to the hospital.         Day of Surgery/Procedure:    As a patient at [facility] you can expect quality medical and nursing care that is centered on your individual needs. Our goal is to make your surgical experience as comfortable as possible    . Transportation After Your Surgery/Procedure: You will need a friend or family member to drive you home after your procedure. Your  must be 25years of age or older and able to sign off on your discharge instructions. A taxi cab or any other form of public transportation is not acceptable. Someone must remain with you for the first 24 hours after your surgery if you receive anesthesia or medication. If you do not have someone to stay with you, your procedure may be cancelled.       If you have any other questions regarding your procedure or the day of surgery, please call [phone]      _________________________  ____________________________  Signature (Patient)              Signature (Provider) & date

## 2021-04-23 LAB
EKG ATRIAL RATE: 78 BPM
EKG P AXIS: 59 DEGREES
EKG P-R INTERVAL: 202 MS
EKG Q-T INTERVAL: 392 MS
EKG QRS DURATION: 90 MS
EKG QTC CALCULATION (BAZETT): 446 MS
EKG R AXIS: 47 DEGREES
EKG T AXIS: 5 DEGREES
EKG VENTRICULAR RATE: 78 BPM
MRSA, DNA, NASAL: NORMAL
SPECIMEN DESCRIPTION: NORMAL

## 2021-04-23 PROCEDURE — 93010 ELECTROCARDIOGRAM REPORT: CPT | Performed by: INTERNAL MEDICINE

## 2021-04-23 NOTE — PROGRESS NOTES
[facility] ([facility]) Joint Replacement Pre-surgical Assessment    Scheduled Surgery Date: 04/28/21  Surgery Time: 3383    Surgeon: NICKY  Procedure: left Total Knee    Primary Insurance Coverage [employer] MEDICARE/2- [place] MEDICAID  Pre-op class attended YES 4/22/21    PCP: Jason Saleh DO  Clearance received by PCP: Yes    Anticipated Discharge Plan: home  Agency (if applicable): UNSURE    Significant PMH:   Surgical History    Procedure Laterality Date Comment Source   ARTHROPLASTY Left 03/13/2019 toe    CYST REMOVAL Bilateral  \"groin\"    FINGER TRIGGER RELEASE Left 03/04/2014 ring finger    FINGER TRIGGER RELEASE Right 01/05/2015 rt hand-RING FINGER    INSERTABLE CARDIAC MONITOR  09/19/2019 PT HAS [id number] REVEAL LOOP RECORDER.  MRI CONDITONAL 3T OK, SAFE IMMEDIATELY POST IMPLANT. (pt gets confused, he DOES NOT HAVE A DEFIBRILLATOR)    KNEE ARTHROSCOPY Bilateral      LUMBAR FUSION  06/15/2016 posterior lumbar fusion L4-L5    LUMBAR SPINE SURGERY  2005 LUMBAR    NERVE BLOCK  01/13/2017 caudal #1    celestone 9mg  25mcq fentanyl    NERVE BLOCK Left 03/30/2017 left hip bursa injection depomedrol 40 mg    NERVE BLOCK Bilateral 04/07/2017 bilateral si joints injections, depo 40    PATELLA SURGERY Right  x2    TOE ARTHROPLASTY Left 03/13/2019 5TH TOE TOTAL ARTHROPLASTY, TNC AT 5TH MT performed by Tyrone Chavis DPM at [address] [place] [address] Right 01/2016 KNEE    ED Notes    ED Notes    Medical History    Diagnosis Date Comment Source   Aortic valve sclerosis      Back pain      Cerebral artery occlusion with cerebral infarction [facility])  TIA 2017 x2    COPD (chronic obstructive pulmonary disease) ([facility] [address]  NEUBULIZER AS NEEDED    Dental bridge present  UPPER    Depression      DJD (degenerative joint disease)      Drug abuse ([address]  drug abuse includes smoking cocaine    High cholesterol      History of shingles  2013    Hypertension  PT NEVER FILLED  SCRIPT    Pain at rest  Knee-Left, back Postlaminectomy syndrome 8/9/2017     Sleep apnea  does not have machine    Suicidal ideations  S/I's without attempt    TIA (transient ischemic attack) 10/2017 x 2    Toe contracture, left  5th    Toe pain, left  5th digit    Wears dentures  1 GOLD TOOTH ON FLIPPER UPPER    Wears glasses               Smoking history: none    Alcohol history: Never drinks    Concerns prior to surgery: PT USES COCAINE. UNSURE IF NEEDS A FWW.     Electronically signed by: Shalom Ramirez RN on 4/23/2021 at 9:33 AM

## 2021-04-24 ENCOUNTER — HOSPITAL ENCOUNTER (OUTPATIENT)
Dept: LAB | Age: 66
Setting detail: SPECIMEN
Discharge: HOME | End: 2021-04-24
Payer: MEDICARE

## 2021-04-24 DIAGNOSIS — Z01.818 PREOP TESTING: Primary | ICD-10-CM

## 2021-04-27 ENCOUNTER — ANESTHESIA EVENT (OUTPATIENT)
Dept: OPERATING ROOM | Age: 66
End: 2021-04-27
Payer: MEDICARE

## 2021-04-28 ENCOUNTER — ANESTHESIA (OUTPATIENT)
Dept: OPERATING ROOM | Age: 66
End: 2021-04-28
Payer: MEDICARE

## 2021-04-28 ENCOUNTER — HOSPITAL ENCOUNTER (OUTPATIENT)
Age: 66
Discharge: HOME | End: 2021-04-29
Attending: ORTHOPAEDIC SURGERY | Admitting: ORTHOPAEDIC SURGERY
Payer: MEDICARE

## 2021-04-28 ENCOUNTER — APPOINTMENT (OUTPATIENT)
Dept: GENERAL RADIOLOGY | Age: 66
End: 2021-04-28
Attending: ORTHOPAEDIC SURGERY
Payer: MEDICARE

## 2021-04-28 VITALS — DIASTOLIC BLOOD PRESSURE: 63 MMHG | OXYGEN SATURATION: 97 % | SYSTOLIC BLOOD PRESSURE: 123 MMHG

## 2021-04-28 DIAGNOSIS — Z96.652 S/P TOTAL KNEE ARTHROPLASTY, LEFT: Primary | ICD-10-CM

## 2021-04-28 LAB
SARS-COV-2, RAPID: NOT DETECTED
SPECIMEN DESCRIPTION: NORMAL

## 2021-04-28 PROCEDURE — 7100000000 HC PACU RECOVERY - FIRST 15 MIN: Performed by: ORTHOPAEDIC SURGERY

## 2021-04-28 PROCEDURE — 87635 SARS-COV-2 COVID-19 AMP PRB: CPT

## 2021-04-28 PROCEDURE — 6360000002 HC RX W HCPCS: Performed by: NURSE ANESTHETIST, CERTIFIED REGISTERED

## 2021-04-28 PROCEDURE — 2500000003 HC RX 250 WO HCPCS: Performed by: NURSE ANESTHETIST, CERTIFIED REGISTERED

## 2021-04-28 PROCEDURE — 64447 NJX AA&/STRD FEMORAL NRV IMG: CPT | Performed by: ANESTHESIOLOGY

## 2021-04-28 PROCEDURE — 6360000002 HC RX W HCPCS: Performed by: ORTHOPAEDIC SURGERY

## 2021-04-28 PROCEDURE — 3600000015 HC SURGERY LEVEL 5 ADDTL 15MIN: Performed by: ORTHOPAEDIC SURGERY

## 2021-04-28 PROCEDURE — 6360000002 HC RX W HCPCS: Performed by: ANESTHESIOLOGY

## 2021-04-28 PROCEDURE — C9290 INJ, BUPIVACAINE LIPOSOME: HCPCS | Performed by: ANESTHESIOLOGY

## 2021-04-28 PROCEDURE — 2580000003 HC RX 258: Performed by: NURSE ANESTHETIST, CERTIFIED REGISTERED

## 2021-04-28 PROCEDURE — 73560 X-RAY EXAM OF KNEE 1 OR 2: CPT

## 2021-04-28 PROCEDURE — 2720000010 HC SURG SUPPLY STERILE: Performed by: ORTHOPAEDIC SURGERY

## 2021-04-28 PROCEDURE — C1713 ANCHOR/SCREW BN/BN,TIS/BN: HCPCS | Performed by: ORTHOPAEDIC SURGERY

## 2021-04-28 PROCEDURE — 94640 AIRWAY INHALATION TREATMENT: CPT

## 2021-04-28 PROCEDURE — 27447 TOTAL KNEE ARTHROPLASTY: CPT | Performed by: ORTHOPAEDIC SURGERY

## 2021-04-28 PROCEDURE — 6370000000 HC RX 637 (ALT 250 FOR IP): Performed by: STUDENT IN AN ORGANIZED HEALTH CARE EDUCATION/TRAINING PROGRAM

## 2021-04-28 PROCEDURE — 3700000001 HC ADD 15 MINUTES (ANESTHESIA): Performed by: ORTHOPAEDIC SURGERY

## 2021-04-28 PROCEDURE — 2580000003 HC RX 258: Performed by: ORTHOPAEDIC SURGERY

## 2021-04-28 PROCEDURE — 2500000003 HC RX 250 WO HCPCS: Performed by: ANESTHESIOLOGY

## 2021-04-28 PROCEDURE — C1776 JOINT DEVICE (IMPLANTABLE): HCPCS | Performed by: ORTHOPAEDIC SURGERY

## 2021-04-28 PROCEDURE — 2709999900 HC NON-CHARGEABLE SUPPLY: Performed by: ORTHOPAEDIC SURGERY

## 2021-04-28 PROCEDURE — 3600000005 HC SURGERY LEVEL 5 BASE: Performed by: ORTHOPAEDIC SURGERY

## 2021-04-28 PROCEDURE — 2580000003 HC RX 258: Performed by: STUDENT IN AN ORGANIZED HEALTH CARE EDUCATION/TRAINING PROGRAM

## 2021-04-28 PROCEDURE — 6370000000 HC RX 637 (ALT 250 FOR IP): Performed by: ANESTHESIOLOGY

## 2021-04-28 PROCEDURE — 2580000003 HC RX 258: Performed by: ANESTHESIOLOGY

## 2021-04-28 PROCEDURE — 3700000000 HC ANESTHESIA ATTENDED CARE: Performed by: ORTHOPAEDIC SURGERY

## 2021-04-28 PROCEDURE — 6360000002 HC RX W HCPCS: Performed by: STUDENT IN AN ORGANIZED HEALTH CARE EDUCATION/TRAINING PROGRAM

## 2021-04-28 PROCEDURE — 7100000001 HC PACU RECOVERY - ADDTL 15 MIN: Performed by: ORTHOPAEDIC SURGERY

## 2021-04-28 DEVICE — PSN FEM CR CMT CCR STD SZ11 L: Type: IMPLANTABLE DEVICE | Site: KNEE | Status: FUNCTIONAL

## 2021-04-28 DEVICE — EXTENSION STEM SZ G 5DEG L TIBL KNEE CEM NAT TIB: Type: IMPLANTABLE DEVICE | Site: KNEE | Status: FUNCTIONAL

## 2021-04-28 DEVICE — PSN MC VE ASF L 10MM 8-11 GH: Type: IMPLANTABLE DEVICE | Site: KNEE | Status: FUNCTIONAL

## 2021-04-28 DEVICE — CEMENT BNE 40GM HI VISC RADPQ FOR REV SURG: Type: IMPLANTABLE DEVICE | Site: KNEE | Status: FUNCTIONAL

## 2021-04-28 DEVICE — SCREW BNE L25MM DIA2.5MM KNEE FULL THRD HEX FEM PERSONA: Type: IMPLANTABLE DEVICE | Site: KNEE | Status: FUNCTIONAL

## 2021-04-28 DEVICE — COMPONENT PAT DIA35MM THK9MM STD KNEE VIVACIT-E CEM PERSONA: Type: IMPLANTABLE DEVICE | Site: KNEE | Status: FUNCTIONAL

## 2021-04-28 RX ORDER — CELECOXIB 200 MG/1
200 CAPSULE ORAL ONCE
Status: COMPLETED | OUTPATIENT
Start: 2021-04-28 | End: 2021-04-28

## 2021-04-28 RX ORDER — ALBUTEROL SULFATE 90 UG/1
2 AEROSOL, METERED RESPIRATORY (INHALATION) 4 TIMES DAILY PRN
Status: DISCONTINUED | OUTPATIENT
Start: 2021-04-28 | End: 2021-04-29 | Stop reason: HOSPADM

## 2021-04-28 RX ORDER — FENTANYL CITRATE 50 UG/ML
25 INJECTION, SOLUTION INTRAMUSCULAR; INTRAVENOUS EVERY 5 MIN PRN
Status: DISCONTINUED | OUTPATIENT
Start: 2021-04-28 | End: 2021-04-28 | Stop reason: HOSPADM

## 2021-04-28 RX ORDER — LIDOCAINE HYDROCHLORIDE 10 MG/ML
1 INJECTION, SOLUTION EPIDURAL; INFILTRATION; INTRACAUDAL; PERINEURAL
Status: DISCONTINUED | OUTPATIENT
Start: 2021-04-28 | End: 2021-04-28 | Stop reason: HOSPADM

## 2021-04-28 RX ORDER — AMLODIPINE BESYLATE 5 MG/1
5 TABLET ORAL DAILY
Status: DISCONTINUED | OUTPATIENT
Start: 2021-04-28 | End: 2021-04-29 | Stop reason: HOSPADM

## 2021-04-28 RX ORDER — BUDESONIDE AND FORMOTEROL FUMARATE DIHYDRATE 160; 4.5 UG/1; UG/1
2 AEROSOL RESPIRATORY (INHALATION) 2 TIMES DAILY
Status: DISCONTINUED | OUTPATIENT
Start: 2021-04-28 | End: 2021-04-29 | Stop reason: HOSPADM

## 2021-04-28 RX ORDER — OXYCODONE HYDROCHLORIDE AND ACETAMINOPHEN 5; 325 MG/1; MG/1
1 TABLET ORAL EVERY 4 HOURS PRN
Status: DISCONTINUED | OUTPATIENT
Start: 2021-04-28 | End: 2021-04-29 | Stop reason: HOSPADM

## 2021-04-28 RX ORDER — ONDANSETRON 4 MG/1
4 TABLET, ORALLY DISINTEGRATING ORAL EVERY 8 HOURS PRN
Status: DISCONTINUED | OUTPATIENT
Start: 2021-04-28 | End: 2021-04-29 | Stop reason: HOSPADM

## 2021-04-28 RX ORDER — SODIUM CHLORIDE 0.9 % (FLUSH) 0.9 %
10 SYRINGE (ML) INJECTION PRN
Status: DISCONTINUED | OUTPATIENT
Start: 2021-04-28 | End: 2021-04-28 | Stop reason: HOSPADM

## 2021-04-28 RX ORDER — OXYCODONE HYDROCHLORIDE AND ACETAMINOPHEN 5; 325 MG/1; MG/1
2 TABLET ORAL EVERY 6 HOURS PRN
Qty: 56 TABLET | Refills: 0 | Status: SHIPPED | OUTPATIENT
Start: 2021-04-28 | End: 2021-05-03 | Stop reason: SDUPTHER

## 2021-04-28 RX ORDER — SENNA AND DOCUSATE SODIUM 50; 8.6 MG/1; MG/1
1 TABLET, FILM COATED ORAL 2 TIMES DAILY
Status: DISCONTINUED | OUTPATIENT
Start: 2021-04-28 | End: 2021-04-29 | Stop reason: HOSPADM

## 2021-04-28 RX ORDER — FENTANYL CITRATE 50 UG/ML
25 INJECTION, SOLUTION INTRAMUSCULAR; INTRAVENOUS
Status: DISCONTINUED | OUTPATIENT
Start: 2021-04-28 | End: 2021-04-29 | Stop reason: HOSPADM

## 2021-04-28 RX ORDER — GABAPENTIN 100 MG/1
100 CAPSULE ORAL 2 TIMES DAILY
Status: DISCONTINUED | OUTPATIENT
Start: 2021-04-28 | End: 2021-04-29 | Stop reason: HOSPADM

## 2021-04-28 RX ORDER — BUPIVACAINE HYDROCHLORIDE 7.5 MG/ML
INJECTION, SOLUTION INTRASPINAL PRN
Status: DISCONTINUED | OUTPATIENT
Start: 2021-04-28 | End: 2021-04-28 | Stop reason: SDUPTHER

## 2021-04-28 RX ORDER — ONDANSETRON 2 MG/ML
4 INJECTION INTRAMUSCULAR; INTRAVENOUS
Status: DISCONTINUED | OUTPATIENT
Start: 2021-04-28 | End: 2021-04-28 | Stop reason: HOSPADM

## 2021-04-28 RX ORDER — SODIUM CHLORIDE 9 MG/ML
25 INJECTION, SOLUTION INTRAVENOUS PRN
Status: DISCONTINUED | OUTPATIENT
Start: 2021-04-28 | End: 2021-04-28 | Stop reason: HOSPADM

## 2021-04-28 RX ORDER — MIDAZOLAM HYDROCHLORIDE 1 MG/ML
2 INJECTION INTRAMUSCULAR; INTRAVENOUS ONCE
Status: DISCONTINUED | OUTPATIENT
Start: 2021-04-28 | End: 2021-04-28

## 2021-04-28 RX ORDER — VANCOMYCIN HYDROCHLORIDE 1 G/20ML
INJECTION, POWDER, LYOPHILIZED, FOR SOLUTION INTRAVENOUS
Status: DISPENSED
Start: 2021-04-28 | End: 2021-04-28

## 2021-04-28 RX ORDER — OXYCODONE HYDROCHLORIDE AND ACETAMINOPHEN 5; 325 MG/1; MG/1
2 TABLET ORAL EVERY 4 HOURS PRN
Status: DISCONTINUED | OUTPATIENT
Start: 2021-04-28 | End: 2021-04-29 | Stop reason: HOSPADM

## 2021-04-28 RX ORDER — PHENYLEPHRINE HCL IN 0.9% NACL 1 MG/10 ML
SYRINGE (ML) INTRAVENOUS PRN
Status: DISCONTINUED | OUTPATIENT
Start: 2021-04-28 | End: 2021-04-28 | Stop reason: SDUPTHER

## 2021-04-28 RX ORDER — SODIUM CHLORIDE, SODIUM LACTATE, POTASSIUM CHLORIDE, CALCIUM CHLORIDE 600; 310; 30; 20 MG/100ML; MG/100ML; MG/100ML; MG/100ML
INJECTION, SOLUTION INTRAVENOUS CONTINUOUS
Status: DISCONTINUED | OUTPATIENT
Start: 2021-04-28 | End: 2021-04-29 | Stop reason: HOSPADM

## 2021-04-28 RX ORDER — FENTANYL CITRATE 50 UG/ML
50 INJECTION, SOLUTION INTRAMUSCULAR; INTRAVENOUS
Status: DISCONTINUED | OUTPATIENT
Start: 2021-04-28 | End: 2021-04-29 | Stop reason: HOSPADM

## 2021-04-28 RX ORDER — SODIUM CHLORIDE 9 MG/ML
25 INJECTION, SOLUTION INTRAVENOUS PRN
Status: DISCONTINUED | OUTPATIENT
Start: 2021-04-28 | End: 2021-04-29 | Stop reason: HOSPADM

## 2021-04-28 RX ORDER — FENTANYL CITRATE 50 UG/ML
INJECTION, SOLUTION INTRAMUSCULAR; INTRAVENOUS PRN
Status: DISCONTINUED | OUTPATIENT
Start: 2021-04-28 | End: 2021-04-28 | Stop reason: SDUPTHER

## 2021-04-28 RX ORDER — HYDROMORPHONE HYDROCHLORIDE 1 MG/ML
0.5 INJECTION, SOLUTION INTRAMUSCULAR; INTRAVENOUS; SUBCUTANEOUS ONCE
Status: COMPLETED | OUTPATIENT
Start: 2021-04-28 | End: 2021-04-28

## 2021-04-28 RX ORDER — SODIUM CHLORIDE 9 MG/ML
INJECTION, SOLUTION INTRAVENOUS CONTINUOUS
Status: DISCONTINUED | OUTPATIENT
Start: 2021-04-28 | End: 2021-04-28

## 2021-04-28 RX ORDER — KETOROLAC TROMETHAMINE 30 MG/ML
30 INJECTION, SOLUTION INTRAMUSCULAR; INTRAVENOUS EVERY 6 HOURS PRN
Status: DISCONTINUED | OUTPATIENT
Start: 2021-04-28 | End: 2021-04-29 | Stop reason: HOSPADM

## 2021-04-28 RX ORDER — SODIUM CHLORIDE, SODIUM LACTATE, POTASSIUM CHLORIDE, CALCIUM CHLORIDE 600; 310; 30; 20 MG/100ML; MG/100ML; MG/100ML; MG/100ML
INJECTION, SOLUTION INTRAVENOUS CONTINUOUS
Status: DISCONTINUED | OUTPATIENT
Start: 2021-04-28 | End: 2021-04-28

## 2021-04-28 RX ORDER — ATORVASTATIN CALCIUM 80 MG/1
80 TABLET, FILM COATED ORAL NIGHTLY
Status: DISCONTINUED | OUTPATIENT
Start: 2021-04-28 | End: 2021-04-29 | Stop reason: HOSPADM

## 2021-04-28 RX ORDER — PROPOFOL 10 MG/ML
INJECTION, EMULSION INTRAVENOUS PRN
Status: DISCONTINUED | OUTPATIENT
Start: 2021-04-28 | End: 2021-04-28 | Stop reason: SDUPTHER

## 2021-04-28 RX ORDER — PROPOFOL 10 MG/ML
INJECTION, EMULSION INTRAVENOUS CONTINUOUS PRN
Status: DISCONTINUED | OUTPATIENT
Start: 2021-04-28 | End: 2021-04-28 | Stop reason: SDUPTHER

## 2021-04-28 RX ORDER — LIDOCAINE HYDROCHLORIDE 10 MG/ML
INJECTION, SOLUTION INFILTRATION; PERINEURAL
Status: COMPLETED | OUTPATIENT
Start: 2021-04-28 | End: 2021-04-28

## 2021-04-28 RX ORDER — VANCOMYCIN HYDROCHLORIDE 1 G/20ML
INJECTION, POWDER, LYOPHILIZED, FOR SOLUTION INTRAVENOUS PRN
Status: DISCONTINUED | OUTPATIENT
Start: 2021-04-28 | End: 2021-04-28 | Stop reason: ALTCHOICE

## 2021-04-28 RX ORDER — TRANEXAMIC ACID 100 MG/ML
INJECTION, SOLUTION INTRAVENOUS
Status: DISPENSED
Start: 2021-04-28 | End: 2021-04-28

## 2021-04-28 RX ORDER — MIDAZOLAM HYDROCHLORIDE 1 MG/ML
INJECTION INTRAMUSCULAR; INTRAVENOUS PRN
Status: DISCONTINUED | OUTPATIENT
Start: 2021-04-28 | End: 2021-04-28 | Stop reason: SDUPTHER

## 2021-04-28 RX ORDER — HEPARIN SODIUM 10000 [USP'U]/ML
INJECTION, SOLUTION INTRAVENOUS; SUBCUTANEOUS
Status: DISPENSED
Start: 2021-04-28 | End: 2021-04-28

## 2021-04-28 RX ORDER — BUPIVACAINE HYDROCHLORIDE 5 MG/ML
INJECTION, SOLUTION EPIDURAL; INTRACAUDAL
Status: COMPLETED | OUTPATIENT
Start: 2021-04-28 | End: 2021-04-28

## 2021-04-28 RX ORDER — FENTANYL CITRATE 50 UG/ML
50 INJECTION, SOLUTION INTRAMUSCULAR; INTRAVENOUS EVERY 5 MIN PRN
Status: COMPLETED | OUTPATIENT
Start: 2021-04-28 | End: 2021-04-28

## 2021-04-28 RX ORDER — SODIUM CHLORIDE 0.9 % (FLUSH) 0.9 %
5-40 SYRINGE (ML) INJECTION EVERY 12 HOURS SCHEDULED
Status: DISCONTINUED | OUTPATIENT
Start: 2021-04-28 | End: 2021-04-29 | Stop reason: HOSPADM

## 2021-04-28 RX ORDER — ONDANSETRON 2 MG/ML
4 INJECTION INTRAMUSCULAR; INTRAVENOUS EVERY 6 HOURS PRN
Status: DISCONTINUED | OUTPATIENT
Start: 2021-04-28 | End: 2021-04-29 | Stop reason: HOSPADM

## 2021-04-28 RX ORDER — SODIUM CHLORIDE 0.9 % (FLUSH) 0.9 %
10 SYRINGE (ML) INJECTION EVERY 12 HOURS SCHEDULED
Status: DISCONTINUED | OUTPATIENT
Start: 2021-04-28 | End: 2021-04-28 | Stop reason: HOSPADM

## 2021-04-28 RX ORDER — SODIUM CHLORIDE 0.9 % (FLUSH) 0.9 %
5-40 SYRINGE (ML) INJECTION PRN
Status: DISCONTINUED | OUTPATIENT
Start: 2021-04-28 | End: 2021-04-29 | Stop reason: HOSPADM

## 2021-04-28 RX ORDER — ROPIVACAINE HYDROCHLORIDE 5 MG/ML
INJECTION, SOLUTION EPIDURAL; INFILTRATION; PERINEURAL
Status: DISCONTINUED
Start: 2021-04-28 | End: 2021-04-28 | Stop reason: WASHOUT

## 2021-04-28 RX ORDER — GABAPENTIN 300 MG/1
300 CAPSULE ORAL ONCE
Status: DISCONTINUED | OUTPATIENT
Start: 2021-04-28 | End: 2021-04-28

## 2021-04-28 RX ORDER — HYDROXYZINE HYDROCHLORIDE 25 MG/1
25 TABLET, FILM COATED ORAL 2 TIMES DAILY PRN
Status: DISCONTINUED | OUTPATIENT
Start: 2021-04-28 | End: 2021-04-29 | Stop reason: HOSPADM

## 2021-04-28 RX ADMIN — PROPOFOL 150 MCG/KG/MIN: 10 INJECTION, EMULSION INTRAVENOUS at 10:39

## 2021-04-28 RX ADMIN — AMLODIPINE BESYLATE 5 MG: 5 TABLET ORAL at 18:32

## 2021-04-28 RX ADMIN — TRANEXAMIC ACID 1000 MG: 1 INJECTION, SOLUTION INTRAVENOUS at 10:46

## 2021-04-28 RX ADMIN — Medication 50 MCG: at 10:24

## 2021-04-28 RX ADMIN — FENTANYL CITRATE 50 MCG: 50 INJECTION, SOLUTION INTRAMUSCULAR; INTRAVENOUS at 23:03

## 2021-04-28 RX ADMIN — SODIUM CHLORIDE, POTASSIUM CHLORIDE, SODIUM LACTATE AND CALCIUM CHLORIDE: 600; 310; 30; 20 INJECTION, SOLUTION INTRAVENOUS at 10:15

## 2021-04-28 RX ADMIN — LIDOCAINE HYDROCHLORIDE 5 ML: 10 INJECTION, SOLUTION INFILTRATION; PERINEURAL at 07:39

## 2021-04-28 RX ADMIN — FENTANYL CITRATE 50 MCG: 50 INJECTION, SOLUTION INTRAMUSCULAR; INTRAVENOUS at 13:41

## 2021-04-28 RX ADMIN — BUPIVACAINE 15 ML: 13.3 INJECTION, SUSPENSION, LIPOSOMAL INFILTRATION at 07:39

## 2021-04-28 RX ADMIN — HYDROMORPHONE HYDROCHLORIDE 0.5 MG: 1 INJECTION, SOLUTION INTRAMUSCULAR; INTRAVENOUS; SUBCUTANEOUS at 15:57

## 2021-04-28 RX ADMIN — Medication 50 MCG: at 10:28

## 2021-04-28 RX ADMIN — BUPIVACAINE HYDROCHLORIDE 15 ML: 5 INJECTION, SOLUTION EPIDURAL; INTRACAUDAL; PERINEURAL at 07:39

## 2021-04-28 RX ADMIN — Medication 100 MCG: at 10:55

## 2021-04-28 RX ADMIN — Medication 100 MCG: at 11:01

## 2021-04-28 RX ADMIN — SODIUM CHLORIDE, POTASSIUM CHLORIDE, SODIUM LACTATE AND CALCIUM CHLORIDE: 600; 310; 30; 20 INJECTION, SOLUTION INTRAVENOUS at 12:12

## 2021-04-28 RX ADMIN — OXYCODONE AND ACETAMINOPHEN 2 TABLET: 5; 325 TABLET ORAL at 21:55

## 2021-04-28 RX ADMIN — BUDESONIDE AND FORMOTEROL FUMARATE DIHYDRATE 2 PUFF: 160; 4.5 AEROSOL RESPIRATORY (INHALATION) at 20:20

## 2021-04-28 RX ADMIN — MIDAZOLAM 2 MG: 1 INJECTION INTRAMUSCULAR; INTRAVENOUS at 10:19

## 2021-04-28 RX ADMIN — DOCUSATE SODIUM 50MG AND SENNOSIDES 8.6MG 1 TABLET: 8.6; 5 TABLET, FILM COATED ORAL at 21:55

## 2021-04-28 RX ADMIN — Medication 100 MCG: at 11:34

## 2021-04-28 RX ADMIN — OXYCODONE AND ACETAMINOPHEN 2 TABLET: 5; 325 TABLET ORAL at 14:57

## 2021-04-28 RX ADMIN — SODIUM CHLORIDE, POTASSIUM CHLORIDE, SODIUM LACTATE AND CALCIUM CHLORIDE: 600; 310; 30; 20 INJECTION, SOLUTION INTRAVENOUS at 07:10

## 2021-04-28 RX ADMIN — CEFAZOLIN SODIUM 2000 MG: 10 INJECTION, POWDER, FOR SOLUTION INTRAVENOUS at 21:55

## 2021-04-28 RX ADMIN — PROPOFOL 30 MG: 10 INJECTION, EMULSION INTRAVENOUS at 10:43

## 2021-04-28 RX ADMIN — CELECOXIB 200 MG: 200 CAPSULE ORAL at 07:16

## 2021-04-28 RX ADMIN — TRANEXAMIC ACID 1000 MG: 1 INJECTION, SOLUTION INTRAVENOUS at 12:09

## 2021-04-28 RX ADMIN — PROPOFOL 60 MG: 10 INJECTION, EMULSION INTRAVENOUS at 10:39

## 2021-04-28 RX ADMIN — FENTANYL CITRATE 50 MCG: 50 INJECTION, SOLUTION INTRAMUSCULAR; INTRAVENOUS at 14:12

## 2021-04-28 RX ADMIN — CEFAZOLIN 2000 MG: 10 INJECTION, POWDER, FOR SOLUTION INTRAVENOUS at 10:44

## 2021-04-28 RX ADMIN — FENTANYL CITRATE 50 MCG: 50 INJECTION, SOLUTION INTRAMUSCULAR; INTRAVENOUS at 14:29

## 2021-04-28 RX ADMIN — FENTANYL CITRATE 50 MCG: 50 INJECTION, SOLUTION INTRAMUSCULAR; INTRAVENOUS at 13:48

## 2021-04-28 RX ADMIN — KETOROLAC TROMETHAMINE 30 MG: 30 INJECTION, SOLUTION INTRAMUSCULAR; INTRAVENOUS at 18:27

## 2021-04-28 RX ADMIN — BUPIVACAINE HYDROCHLORIDE IN DEXTROSE 2 ML: 7.5 INJECTION, SOLUTION SUBARACHNOID at 10:37

## 2021-04-28 RX ADMIN — SODIUM CHLORIDE, POTASSIUM CHLORIDE, SODIUM LACTATE AND CALCIUM CHLORIDE: 600; 310; 30; 20 INJECTION, SOLUTION INTRAVENOUS at 18:32

## 2021-04-28 RX ADMIN — Medication 100 MCG: at 11:09

## 2021-04-28 ASSESSMENT — PULMONARY FUNCTION TESTS
PIF_VALUE: 1
PIF_VALUE: 0
PIF_VALUE: 1
PIF_VALUE: 0
PIF_VALUE: 1
PIF_VALUE: 0
PIF_VALUE: 1
PIF_VALUE: 0
PIF_VALUE: 1
PIF_VALUE: 0
PIF_VALUE: 0
PIF_VALUE: 1
PIF_VALUE: 0
PIF_VALUE: 0
PIF_VALUE: 1
PIF_VALUE: 0
PIF_VALUE: 0
PIF_VALUE: 1
PIF_VALUE: 0

## 2021-04-28 ASSESSMENT — PAIN SCALES - GENERAL
PAINLEVEL_OUTOF10: 9
PAINLEVEL_OUTOF10: 10
PAINLEVEL_OUTOF10: 10
PAINLEVEL_OUTOF10: 9
PAINLEVEL_OUTOF10: 0

## 2021-04-28 ASSESSMENT — PAIN DESCRIPTION - PROGRESSION
CLINICAL_PROGRESSION: GRADUALLY WORSENING
CLINICAL_PROGRESSION: NOT CHANGED

## 2021-04-28 ASSESSMENT — PAIN DESCRIPTION - PAIN TYPE
TYPE: SURGICAL PAIN
TYPE: SURGICAL PAIN

## 2021-04-28 ASSESSMENT — PAIN DESCRIPTION - DESCRIPTORS
DESCRIPTORS: DISCOMFORT;SORE
DESCRIPTORS: DISCOMFORT;SORE
DESCRIPTORS: ACHING
DESCRIPTORS: ACHING

## 2021-04-28 ASSESSMENT — PAIN DESCRIPTION - ONSET: ONSET: ON-GOING

## 2021-04-28 ASSESSMENT — PAIN DESCRIPTION - FREQUENCY: FREQUENCY: CONTINUOUS

## 2021-04-28 ASSESSMENT — LIFESTYLE VARIABLES: SMOKING_STATUS: 1

## 2021-04-28 ASSESSMENT — PAIN DESCRIPTION - ORIENTATION
ORIENTATION: LEFT
ORIENTATION: LEFT

## 2021-04-28 ASSESSMENT — PAIN DESCRIPTION - LOCATION
LOCATION: KNEE
LOCATION: KNEE

## 2021-04-28 ASSESSMENT — ENCOUNTER SYMPTOMS: SHORTNESS OF BREATH: 1

## 2021-04-28 NOTE — PROGRESS NOTES
Transported per bed to room. Wife at bedside. Pt dozing on and off. Pt with personal belongings.   Report to pancho joseph

## 2021-04-28 NOTE — ANESTHESIA PRE PROCEDURE
 Simple chronic bronchitis (HCC) J41.0    Neural foraminal stenosis of lumbar spine M99.73    Postlaminectomy syndrome M96.1    Idiopathic acute pancreatitis K85.00    Unstable gait R26.81    Major depressive disorder, recurrent episode, moderate (HCC) F33.1    Primary osteoarthritis of both knees M17.0    TIA (transient ischemic attack) G45.9    Paresthesias R20.2    Acute ischemic stroke ([facility] [address] I63.9    Cocaine abuse ([facility] [address] F14.10    Shortness of breath R06.02    Swelling of left hand M79.89    Chronic pain of left knee M25.562, G89.29    Left hand pain M79.642    Prediabetes R73.03    Pain, dental K08.89    Paronychia of right index finger L03.011    Obstructive sleep apnea syndrome G47.33       Past Medical History:        Diagnosis Date    Aortic valve sclerosis     Back pain     Cerebral artery occlusion with cerebral infarction [facility])     TIA 2017 x2    COPD (chronic obstructive pulmonary disease) ([facility] [address]     NEUBULIZER AS NEEDED    Dental bridge present     UPPER    Depression     DJD (degenerative joint disease)     Drug abuse ([facility] [address]     drug abuse includes smoking cocaine    High cholesterol     History of shingles     2013    Hypertension     PT NEVER FILLED  SCRIPT    Pain at rest     Knee-Left, back     Postlaminectomy syndrome 8/9/2017    Sleep apnea     does not have machine    Suicidal ideations     S/I's without attempt    TIA (transient ischemic attack) 10/2017    x 2    Toe contracture, left     5th    Toe pain, left     5th digit    Wears dentures     1 GOLD TOOTH ON FLIPPER UPPER    Wears glasses        Past Surgical History:        Procedure Laterality Date    ARTHROPLASTY Left 03/13/2019    toe    CYST REMOVAL Bilateral     \"groin\"    FINGER TRIGGER RELEASE Left 03/04/2014    ring finger    FINGER TRIGGER RELEASE Right 01/05/2015    rt hand-RING FINGER    INSERTABLE CARDIAC MONITOR  09/19/2019    PT HAS [employer] [id number] REVEAL LOOP RECORDER.  MRI CONDITONAL 3T OK, SAFE IMMEDIATELY POST IMPLANT. (pt gets confused, he DOES NOT HAVE A DEFIBRILLATOR)    KNEE ARTHROSCOPY Bilateral     LUMBAR FUSION  06/15/2016    posterior lumbar fusion L4-L5    LUMBAR SPINE SURGERY  2005    LUMBAR    NERVE BLOCK  01/13/2017    caudal #1    celestone 9mg  25mcq fentanyl    NERVE BLOCK Left 03/30/2017    left hip bursa injection depomedrol 40 mg    NERVE BLOCK Bilateral 04/07/2017    bilateral si joints injections, depo 40    PATELLA SURGERY Right     x2    TOE ARTHROPLASTY Left 03/13/2019    5TH TOE TOTAL ARTHROPLASTY, TNC AT 5TH [facility] performed by Radames Gonzales DPM at [address] Right 01/2016    KNEE       Social History:    Social History     Tobacco Use    Smoking status: Current Every Day Smoker     Packs/day: 0.25     Years: 30.00     Pack years: 7.50     Types: Cigarettes    Smokeless tobacco: Never Used    Tobacco comment: trying to quit   Substance Use Topics    Alcohol use:  Yes     Alcohol/week: 2.0 standard drinks     Types: 2 Cans of beer per week     Comment: SOCIALLY                                Ready to quit: Not Answered  Counseling given: Not Answered  Comment: trying to quit      Vital Signs (Current):   Vitals:    04/28/21 0626 04/28/21 0632   BP:  (!) 161/75   Pulse:  87   Resp:  20   Temp:  98 °F (36.7 °C)   TempSrc:  Temporal   SpO2:  99%   Weight: 240 lb 4.8 oz (109 kg)    Height: 5' 9\" (1.753 m)                                               BP Readings from Last 3 Encounters:   04/28/21 (!) 161/75   04/22/21 128/70   04/15/21 138/84       NPO Status: Time of last liquid consumption: 2000                        Time of last solid consumption: 2000                        Date of last liquid consumption: 04/27/21                        Date of last solid food consumption: 04/27/21    BMI:   Wt Readings from Last 3 Encounters:   04/28/21 240 lb 4.8 oz (109 kg)   04/22/21 240 lb 4.8 oz (109 kg)   04/15/21 237 lb (107.5 kg)     Body mass index is 35.49 kg/m². CBC:   Lab Results   Component Value Date    WBC 7.1 04/22/2021    RBC 4.69 04/22/2021    HGB 13.4 04/22/2021    HCT 41.8 04/22/2021    MCV 89.1 04/22/2021    RDW 14.6 04/22/2021     04/22/2021       CMP:   Lab Results   Component Value Date     04/22/2021    K 4.3 04/22/2021     04/22/2021    CO2 23 04/22/2021    BUN 18 04/22/2021    CREATININE 1.13 04/22/2021    GFRAA >60 04/22/2021    LABGLOM >60 04/22/2021    GLUCOSE 122 04/22/2021    GLUCOSE 130 10/26/2011    PROT 6.8 12/23/2018    CALCIUM 9.1 04/22/2021    BILITOT 0.38 12/23/2018    ALKPHOS 70 12/23/2018    AST 13 12/23/2018    ALT 13 12/23/2018       POC Tests: No results for input(s): POCGLU, POCNA, POCK, POCCL, POCBUN, POCHEMO, POCHCT in the last 72 hours.     Coags:   Lab Results   Component Value Date    PROTIME 12.7 04/22/2021    INR 1.0 04/22/2021    APTT 29.7 04/22/2021       HCG (If Applicable): No results found for: PREGTESTUR, PREGSERUM, HCG, HCGQUANT     ABGs: No results found for: PHART, PO2ART, JNN1KPF, APC7LXX, BEART, C2EMTPNC     Type & Screen (If Applicable):  No results found for: LABABO, LABRH    Drug/Infectious Status (If Applicable):  No results found for: HIV, HEPCAB    COVID-19 Screening (If Applicable):   Lab Results   Component Value Date    COVID19 Not Detected 04/28/2021           Anesthesia Evaluation    Airway: Mallampati: I  TM distance: >3 FB   Neck ROM: full  Mouth opening: > = 3 FB Dental:          Pulmonary:   (+) shortness of breath: chronic and no interval change,  sleep apnea:  current smoker                           Cardiovascular:    (+) hypertension:,     (-)  angina and murmur                Neuro/Psych:   (+) CVA: no interval change,             GI/Hepatic/Renal:             Endo/Other:                     Abdominal:           Vascular:                                        Anesthesia Plan      general     ASA 3             Anesthetic plan and risks discussed with patient.                       Bradford Capps MD   4/28/2021

## 2021-04-28 NOTE — PROGRESS NOTES
Occupational [facility]  Occupational Therapy Not Seen Note    Patient not available for Occupational Therapy due to:    [] Testing:    [] Hemodialysis    [] Cancelled by RN:    []Refusal by Patient:    [] Surgery:     [] Intubation:     [] Pain Medication:    [] Sedation:     [] Spine Precautions :    [] Medical Instability:    [x] Other: Pt sleeping as pt has had a lot of pain meds per RN as pain was not well controlled.  Will check back tomorrow morning      Ricardo Gonzalez OTR/L

## 2021-04-28 NOTE — BRIEF OP NOTE
Brief Postoperative Note      Patient: Lavaun Alpers  YOB: 1955  MRN: [id number]    Date of Procedure: 4/28/2021    Pre-Op Diagnosis: DX LEFT KNEE OA    Post-Op Diagnosis: Same       Procedure(s):  LEFT KNEE TOTAL ARTHROPLASTY- Todd Robb    Surgeon(s):  Kevon Hinton DO    Assistant:  Resident: Ashley Sanchez DO; Malvin Mas DO    Anesthesia: Monitored anesthesia care with spinal anesthesia as well as regional block    Estimated Blood Loss (mL): 225 mL's    Cell Saver Return: 125 mL's    Fluids: 1300 cc crystalloid    UOP: 300 mL's    Tourniquet Time: 35 minutes at 980 mmHg    Complications: None    Specimens:   * No specimens in log *    Implants:  Implant Name Type Inv. Item Serial No.  Lot No. LRB No. Used Action   CEMENT BNE 40GM HI VISC RADPQ FOR REV SURG  CEMENT BNE 40GM HI VISC RADPQ FOR REV SURG  [facility] [id number] Left 1 Implanted   CEMENT BNE 40GM HI VISC RADPQ FOR REV SURG  CEMENT BNE 40GM HI VISC RADPQ FOR REV SURG  [facility] [id number] Left 1 Implanted   SCREW BNE L25MM DIA2. 5MM KNEE FULL THRD HEX FEM PERSONA  SCREW BNE L25MM DIA2. 5MM KNEE FULL THRD HEX FEM PERSONA  [employer] [id number] Left 1 Implanted   PSN TIB STM 5 DEG SZ G L  PSN TIB STM 5 DEG SZ G L  [employer] [facility] [id number] Left 1 Implanted   COMPONENT PAT [id number] THK9MM STD KNEE VIVACIT-E ASHLEY PERSONA  COMPONENT PAT [id number] THK9MM STD KNEE VIVACIT-E ASHLEY PERSONA  [facility] [id number] Left 1 Implanted   PSN FEM CR CMT CCR STD SZ11 L  PSN FEM CR CMT CCR STD SZ11 L  [employer] [id number] Left 1 Implanted   PSN MC VE ASF L 10MM 8-11 GH  PSN MC VE ASF L 10MM 8-11 GH  [employer] [facility] [id number] Left 1 Implanted   CEMENT BNE 40GM HI VISC RADPQ FOR REV SURG  CEMENT BNE 40GM HI VISC RADPQ FOR REV SURG  [facility] [id number] Left 1 Implanted         Drains:   Urethral Catheter Non-latex;Straight-tip 16 fr (Active)       Findings: Left knee end-stage degenerative osteoarthritis. See op notes for full details.     Electronically signed by John Nicole DO on 4/28/2021 at 1:14 PM

## 2021-04-28 NOTE — ANESTHESIA PROCEDURE NOTES
Spinal Block    Patient location during procedure: OR  Start time: 4/28/2021 10:20 AM  Reason for block: primary anesthetic  Staffing  Performed: anesthesiologist   Anesthesiologist: Bobo Gilbert MD  Resident/CRNA: JUAN R Gonzales CRNA  Preanesthetic Checklist  Completed: patient identified, IV checked, site marked, risks and benefits discussed, surgical consent, monitors and equipment checked, pre-op evaluation, timeout performed, anesthesia consent given, oxygen available and patient being monitored  Spinal Block  Patient position: sitting  Prep: ChloraPrep  Patient monitoring: continuous pulse ox and frequent blood pressure checks  Approach: midline  Location: L3/L4  Provider prep: sterile gloves and mask  Local infiltration: lidocaine  Agent: bupivacaine  Dose: 2  Dose: 2  Needle  Needle type: Ras   Needle gauge: 22 G  Needle length: 3.5 in  Assessment  Sensory level: T6  Events: cerebrospinal fluid  Swirl obtained: Yes  CSF: clear  Attempts: 2  Additional Notes  Sterile technique maintained. Pt tolerated well.

## 2021-04-28 NOTE — H&P
History and Physical Update    Pt Name: Venkatesh Treviño  MRN: [id number]  YOB: 1955  Date of evaluation: 4/28/2021      [x] I have reviewed the Orthopedic Progress Note by Dr Yoli Sanchez dated 4/15/21 in epic which meets the criteria for an Interval History and Physical note and is attached below. [x] I have examined  Venkatesh Treviño  There are no changes to the patient who is scheduled for a left total knee arthroplasty by Dr Yoli Sanchez for left knee OA. The patient denies new health changes, fever, chills, wheezing, cough, increased SOB, chest pain, open sores or wounds. Hx COPD used albuterol and Symbicort today  Past Hx TIA's On blood thinning medication Last ASA 81mg 4/7/21 No longer takes Plavix. PMH, Surgical History, Social History, Psych, and Family History reviewed and updated in EPIC in appropriate section. Vital signs: BP (!) 161/75   Pulse 87   Temp 98 °F (36.7 °C) (Temporal)   Resp 20   Ht 5' 9\" (1.753 m)   Wt 240 lb 4.8 oz (109 kg)   SpO2 99%   BMI 35.49 kg/m²     Allergies:  Cyclobenzaprine, Other, and Tramadol    Medications:    Prior to Admission medications    Medication Sig Start Date End Date Taking?  Authorizing Provider   amLODIPine (NORVASC) 5 MG tablet take 1 tablet by mouth once daily 4/19/21  Yes Mark Srinivasan DO   budesonide-formoterol [facility]) 160-4.5 MCG/ACT AERO Inhale 2 puffs into the lungs 2 times daily 3/29/21  Yes Mark Srinivasan DO   albuterol sulfate HFA (VENTOLIN HFA) 108 (90 Base) MCG/ACT inhaler Inhale 2 puffs into the lungs 4 times daily as needed for Wheezing 3/29/21  Yes Mark Srinivasan DO   atorvastatin (LIPITOR) 80 MG tablet Take 1 tablet by mouth nightly 3/29/21   Mark Srinivasan DO   nicotine polacrilex (NICORETTE) 2 MG gum Take 1 each by mouth as needed for Smoking cessation  Patient not taking: Reported on 3/29/2021 2/26/21   Lisa March MD   diclofenac sodium (VOLTAREN) 1 % GEL Apply 2 g topically 4 times daily as needed for approx. 1992, Right knee TKA DOS- 2016      HPI:      Oracio Rosales is disabled and he presents today for Left knee pain. The pain has been present for years. The patient recalls no specific injury. The patient has tried a cane and rest with no improvement. The pain is now described as Achy and Sharp. There is pain on weight bearing. The knee has swelled. There is painful popping and clicking. The knee has caught or locked up. The knee has not given out. It is stiff upon arising from sitting. It is painful to go up and down stairs and sit for a prolonged time. The patient has had a cortisone injection in 2016 with minimal pain relief. The patient has not tried a lubrication injection. The patient has not tried physical therapy. The patient has had an arthroscopic surgery in 1992. Patient states that he was supposed to get his knee replaced tomorrow by Dr. Indigo Saba but he was unable to have his surgery due to his insurance giving him issues. ROS:   Review of Systems   Constitutional: Positive for activity change. Negative for appetite change. Respiratory: Negative for apnea, cough, chest tightness and shortness of breath. Cardiovascular: Negative for chest pain, palpitations and leg swelling. Gastrointestinal: Negative for abdominal distention, abdominal pain, constipation, diarrhea, nausea and vomiting. Genitourinary: Negative for difficulty urinating, dysuria and hematuria. Musculoskeletal: Positive for arthralgias and joint swelling. Negative for gait problem and myalgias. Skin: Negative for color change and rash. Neurological: Negative for dizziness, weakness, numbness and headaches. Psychiatric/Behavioral: Negative for sleep disturbance.       Past Medical History:    Past Medical History        Past Medical History:   Diagnosis Date    Aortic valve sclerosis      Back pain      COPD (chronic obstructive pulmonary disease) (HCC)       NEUBULIZER AS NEEDED    Dental bridge present UPPER    Depression      DJD (degenerative joint disease)      Drug abuse ([facility] [address]       drug abuse includes smoking cocaine    High cholesterol      History of shingles       2013    Hypertension       PT NEVER FILLED  SCRIPT    Pain at rest       Knee-Left, back     Postlaminectomy syndrome 8/9/2017    Sleep apnea       does not have machine    Suicidal ideations       S/I's without attempt    TIA (transient ischemic attack) 10/2017     x 2    Toe contracture, left       5th    Toe pain, left       5th digit    Wears dentures       1 GOLD TOOTH ON FLIPPER UPPER    Wears glasses           Past Surgical History:    Past Surgical History         Past Surgical History:   Procedure Laterality Date    ARTHROPLASTY Left 03/13/2019     toe    CYST REMOVAL Bilateral       \"groin\"    FINGER TRIGGER RELEASE Left 03/04/2014     ring finger    FINGER TRIGGER RELEASE Right 01/05/2015     rt hand-RING FINGER    INSERTABLE CARDIAC MONITOR   09/19/2019     PT HAS [employer] [id number] REVEAL LOOP RECORDER.  MRI CONDITONAL 3T OK, SAFE IMMEDIATELY POST IMPLANT. (pt gets confused, he DOES NOT HAVE A DEFIBRILLATOR)    KNEE ARTHROSCOPY Bilateral      LUMBAR FUSION   06/15/2016     posterior lumbar fusion L4-L5    LUMBAR SPINE SURGERY   2005     LUMBAR    NERVE BLOCK   01/13/2017     caudal #1    celestone 9mg  25mcq fentanyl    NERVE BLOCK Left 03/30/2017     left hip bursa injection depomedrol 40 mg    NERVE BLOCK Bilateral 04/07/2017     bilateral si joints injections, depo 40    PATELLA SURGERY Right       x2    TOE ARTHROPLASTY Left 03/13/2019     5TH TOE TOTAL ARTHROPLASTY, TNC AT 5TH [facility] performed by Lexy Duran DPM at [address] [place] [address] Right 01/2016     KNEE         Current Medications:   Current Facility-Administered Medications          Current Outpatient Medications   Medication Sig Dispense Refill    cephALEXin (KEFLEX) 500 MG capsule take 1 capsule by mouth every 6 hours        sulfamethoxazole-trimethoprim (BACTRIM DS;SEPTRA DS) 800-160 MG per tablet take 1 tablet by mouth twice a day        budesonide-formoterol (SYMBICORT) 160-4.5 MCG/ACT AERO Inhale 2 puffs into the lungs 2 times daily 1 Inhaler 0    albuterol sulfate HFA (VENTOLIN HFA) 108 (90 Base) MCG/ACT inhaler Inhale 2 puffs into the lungs 4 times daily as needed for Wheezing 1 Inhaler 0    atorvastatin (LIPITOR) 80 MG tablet Take 1 tablet by mouth nightly 30 tablet 3    methylPREDNISolone (MEDROL) 4 MG tablet as directed        amLODIPine (NORVASC) 5 MG tablet take 1 tablet by mouth once daily 90 tablet 2    traZODone (DESYREL) 50 MG tablet Take 50 mg by mouth nightly        hydrOXYzine (ATARAX) 25 MG tablet Take 25 mg by mouth 2 times daily as needed for Anxiety        escitalopram (LEXAPRO) 10 MG tablet Take 10 mg by mouth daily        aspirin 81 MG chewable tablet Take 1 tablet by mouth daily 30 tablet 3    nicotine polacrilex (NICORETTE) 2 MG gum Take 1 each by mouth as needed for Smoking cessation (Patient not taking: Reported on 3/29/2021) 110 each 3    diclofenac sodium (VOLTAREN) 1 % GEL Apply 2 g topically 4 times daily as needed for Pain To the left hand (Patient not taking: Reported on 2/19/2021) 350 g 1    gabapentin (NEURONTIN) 100 MG capsule Take 1 capsule by mouth 2 times daily for 30 doses. 30 capsule 0    clopidogrel (PLAVIX) 75 MG tablet take 1 tablet by mouth once daily (Patient not taking: Reported on 2/19/2021) 30 tablet 3      No current facility-administered medications for this visit.           Allergies:    Acetaminophen, Cyclobenzaprine, Motrin [ibuprofen], Nsaids, Other, and Tramadol     Social History:   Social History   Social History            Socioeconomic History    Marital status: Single       Spouse name: None    Number of children: None    Years of education: None    Highest education level: None   Occupational History    None   Social Needs    Financial resource strain: None  Food insecurity       Worry: None       Inability: None    Transportation needs       Medical: None       Non-medical: None   Tobacco Use    Smoking status: Current Every Day Smoker       Packs/day: 0.50       Years: 30.00       Pack years: 15.00       Types: Cigarettes    Smokeless tobacco: Never Used    Tobacco comment: trying to quit   Substance and Sexual Activity    Alcohol use: Yes       Alcohol/week: 2.0 standard drinks       Types: 2 Cans of beer per week       Comment: SOCIALLY    Drug use: Yes       Types: Cocaine       Comment: drug abuse includes smoking cocaine;    Sexual activity: Yes       Partners: Female   Lifestyle    Physical activity       Days per week: None       Minutes per session: None    Stress: None   Relationships    Social connections       Talks on phone: None       Gets together: None       Attends Holiness service: None       Active member of club or organization: None       Attends meetings of clubs or organizations: None       Relationship status: None    Intimate partner violence       Fear of current or ex partner: None       Emotionally abused: None       Physically abused: None       Forced sexual activity: None   Other Topics Concern    None   Social History Narrative    None         Family History:  Family History         Family History   Problem Relation Age of Onset    Diabetes Mother      Diabetes Brother           Vitals:   /84   Pulse 88   Ht 5' 9\" (1.753 m)   Wt 237 lb (107.5 kg)   BMI 35.00 kg/m²  Body mass index is 35 kg/m². Physical Examination:      Orthopedics:     GENERAL: Alert and oriented X3 in no acute distress. SKIN: Intact without lesions or ulcerations. NEURO: Intact to sensory and motor testing. VASC: Capillary refill is less than 3 seconds. KNEE EXAM     LOCATION: Left Knee  GEN: Alert and oriented X 3, in no acute distress.   GAIT: The patient's gait was observed while entering the exam room and was noted to be non antalgic. The extremity is in varus alignment. SKIN: Intact without rashes, lesions, or ulcerations. No obvious deformity or swelling. NEURO: The patient responds to light touch throughout left LE. Patellar and Achilles reflexes are 2/4. VASC: The left LE is neurovascularly intact with 2/4 DP and 2/4 PT pulses. Brisk capillary refill. ROM: 5/95 degrees. There is no effusion. MUSC: decreased quad tone  LIGAMENT: There is No varus instability at 0 degrees and No varus instability at 30 degrees. There is No valgus instability at 0 degrees and No valgus instability at 30 degrees. PALP: There is medial joint line pain, swelling noted posteriorly. Assessment:      1. Primary osteoarthritis of left knee       Procedures:    Procedure: no  Radiology:   KNEE X-RAY     4 views of the left knee including AP, bilateral tunnel, and lateral in the upright position, and skyline views reveal varus alignment with no fracture or dislocation. Kellgren grade IV changes of osteoarthritis (joint space narrowing, osteophyte, subchondral sclerosis, bony deformity/cyst) of the tricompartment(s). No osseous loose bodies. No bony erosion or periosteal reaction. No soft tissue masses. Right TKA noted. Impression: Severe osteoarthritis of the left knee. Plan:   Treatment : I reviewed the X-ray with the patient and I informed him that the knee has reached a Kellgren grade III/IV which means he has moderate to severe osteoarthritis. We discussed the etiologies and natural histories of Primary osteoarthritis of the left knee. We discussed the various treatment alternatives including anti-inflammatory medications, physical therapy, injections, further imaging studies and as a last result surgery. During today's visit, I asked the patient if his knee is in enough pain today for us to replace his knee and he states that it is.  I then explained to him that I will replace his knee for him if he would like since he was unable to do it with Dr. Paxton Huynh. The patient then stated that he would like that. I then explained to the patient that we will need him to seek out clearance from his PCP, Dentist and any other specialty doctor that he sees for a pre-existing condition. The patient states that he understands and at this time, the patient has opted for and and has elected to proceed with a left total knee replacement surgery. The risks/benefits/alternatives and potential complications have been discussed with the patient. We also had a long discussion regarding the procedure itself and the expectation of the recovery. All questions and concerns were addressed. A consent was signed today. Patient was instructed to call our office with any question or concerns prior to the procedure occurs. Patient should return to the clinic 1 week after surgery to follow up with Dre Salazar PA-C. The patient will call the office immediately with any problems. Encounter Medications    No orders of the defined types were placed in this encounter. No orders of the defined types were placed in this encounter. Leah Boland V, am scribing for and in the presence of Jackson Cabrera D.O. 4/18/2021  3:18 PM     I spent 7 minutes of face to face time with this patient. Wallene Duane DO, have personally seen this patient and I have reviewed the CC, PMH, FHX and Social History as provided by other clinical staff. I reassessed the HPI and ROS as scribed by Marilyn Buck [facility]rib[facility] in my presence and it is both accurate and complete. Thereafter, I personally performed the PE, reviewed the imaging and established the DX and POC. I agree with the documentation provided by the Medical Scribe. I have reviewed all documentation in its entirety prior to providing my signature indicating agreement. Any areas of disagreement are noted on the chart.      Electronically signed by Andressa Canales DO on 4/18/2021 at 3:19 PM           Electronically signed by Lilla Lesches Jase Roman DO, on 4/18/2021 at 3:18 PM            Revision History

## 2021-04-28 NOTE — ANESTHESIA POSTPROCEDURE EVALUATION
Department of Anesthesiology  Postprocedure Note    Patient: Erin Amado  MRN: [id number]  YOB: 1955  Date of evaluation: 4/28/2021  Time:  6:48 PM     Procedure Summary     Date: 04/28/21 Room / Location: [address] - INPATIENT    Anesthesia Start: 1020 Anesthesia Stop: 4547    Procedure: LEFT KNEE TOTAL ARTHROPLASTY- [facility] [place] (Left Knee) Diagnosis: (DX LEFT KNEE OA)    Surgeons: Tanisha Leal DO Responsible Provider: Juan Jose Stanley MD    Anesthesia Type: general ASA Status: 3          Anesthesia Type: general    Jennifer Phase I: Jennifer Score: 10    Jennifer Phase II:      Last vitals: Reviewed and per EMR flowsheets.        Anesthesia Post Evaluation    Complications: no

## 2021-04-28 NOTE — PROGRESS NOTES
Physical Therapy    DATE: 2021    NAME: Bridget Llanes  MRN: [id number]   : 1955      Patient not seen this date for Physical Therapy due to:    Pt. In PACU. Kike Chapa stating pt. Has had high pain level and multiple pain meds just recently. Has just fallen asleep. Would like therapy to hold at this time. Will wait until AM to evaluate pt.          Electronically signed by Kaci Wolf PT on 2021 at 4:24 PM

## 2021-04-29 VITALS
BODY MASS INDEX: 35.59 KG/M2 | WEIGHT: 240.3 LBS | SYSTOLIC BLOOD PRESSURE: 148 MMHG | HEART RATE: 88 BPM | OXYGEN SATURATION: 100 % | RESPIRATION RATE: 16 BRPM | TEMPERATURE: 98.4 F | HEIGHT: 69 IN | DIASTOLIC BLOOD PRESSURE: 73 MMHG

## 2021-04-29 LAB
ABSOLUTE EOS #: 0.23 K/UL (ref 0–0.44)
ABSOLUTE IMMATURE GRANULOCYTE: 0.04 K/UL (ref 0–0.3)
ABSOLUTE LYMPH #: 1.21 K/UL (ref 1.1–3.7)
ABSOLUTE MONO #: 0.79 K/UL (ref 0.1–1.2)
ANION GAP SERPL CALCULATED.3IONS-SCNC: 10 MMOL/L (ref 9–17)
BASOPHILS # BLD: 0 % (ref 0–2)
BASOPHILS ABSOLUTE: 0.04 K/UL (ref 0–0.2)
BUN BLDV-MCNC: 15 MG/DL (ref 8–23)
BUN/CREAT BLD: 14 (ref 9–20)
CALCIUM SERPL-MCNC: 8.3 MG/DL (ref 8.6–10.4)
CHLORIDE BLD-SCNC: 105 MMOL/L (ref 98–107)
CO2: 22 MMOL/L (ref 20–31)
CREAT SERPL-MCNC: 1.1 MG/DL (ref 0.7–1.2)
DIFFERENTIAL TYPE: ABNORMAL
EOSINOPHILS RELATIVE PERCENT: 3 % (ref 1–4)
GFR AFRICAN AMERICAN: >60 ML/MIN
GFR NON-AFRICAN AMERICAN: >60 ML/MIN
GFR SERPL CREATININE-BSD FRML MDRD: ABNORMAL ML/MIN/{1.73_M2}
GFR SERPL CREATININE-BSD FRML MDRD: ABNORMAL ML/MIN/{1.73_M2}
GLUCOSE BLD-MCNC: 134 MG/DL (ref 70–99)
HCT VFR BLD CALC: 36.2 % (ref 40.7–50.3)
HEMOGLOBIN: 11.6 G/DL (ref 13–17)
IMMATURE GRANULOCYTES: 0 %
LYMPHOCYTES # BLD: 14 % (ref 24–43)
MCH RBC QN AUTO: 28.5 PG (ref 25.2–33.5)
MCHC RBC AUTO-ENTMCNC: 32 G/DL (ref 28.4–34.8)
MCV RBC AUTO: 88.9 FL (ref 82.6–102.9)
MONOCYTES # BLD: 9 % (ref 3–12)
NRBC AUTOMATED: 0 PER 100 WBC
PDW BLD-RTO: 14.3 % (ref 11.8–14.4)
PLATELET # BLD: 151 K/UL (ref 138–453)
PLATELET ESTIMATE: ABNORMAL
PMV BLD AUTO: 10.6 FL (ref 8.1–13.5)
POTASSIUM SERPL-SCNC: 3.8 MMOL/L (ref 3.7–5.3)
RBC # BLD: 4.07 M/UL (ref 4.21–5.77)
RBC # BLD: ABNORMAL 10*6/UL
SEG NEUTROPHILS: 74 % (ref 36–65)
SEGMENTED NEUTROPHILS ABSOLUTE COUNT: 6.63 K/UL (ref 1.5–8.1)
SODIUM BLD-SCNC: 137 MMOL/L (ref 135–144)
WBC # BLD: 8.9 K/UL (ref 3.5–11.3)
WBC # BLD: ABNORMAL 10*3/UL

## 2021-04-29 PROCEDURE — 85025 COMPLETE CBC W/AUTO DIFF WBC: CPT

## 2021-04-29 PROCEDURE — 97166 OT EVAL MOD COMPLEX 45 MIN: CPT

## 2021-04-29 PROCEDURE — 94761 N-INVAS EAR/PLS OXIMETRY MLT: CPT

## 2021-04-29 PROCEDURE — 97110 THERAPEUTIC EXERCISES: CPT

## 2021-04-29 PROCEDURE — 97162 PT EVAL MOD COMPLEX 30 MIN: CPT

## 2021-04-29 PROCEDURE — 6360000002 HC RX W HCPCS: Performed by: ORTHOPAEDIC SURGERY

## 2021-04-29 PROCEDURE — 97116 GAIT TRAINING THERAPY: CPT

## 2021-04-29 PROCEDURE — 99024 POSTOP FOLLOW-UP VISIT: CPT | Performed by: PHYSICIAN ASSISTANT

## 2021-04-29 PROCEDURE — 6360000002 HC RX W HCPCS: Performed by: STUDENT IN AN ORGANIZED HEALTH CARE EDUCATION/TRAINING PROGRAM

## 2021-04-29 PROCEDURE — 94640 AIRWAY INHALATION TREATMENT: CPT

## 2021-04-29 PROCEDURE — 2580000003 HC RX 258: Performed by: STUDENT IN AN ORGANIZED HEALTH CARE EDUCATION/TRAINING PROGRAM

## 2021-04-29 PROCEDURE — 97535 SELF CARE MNGMENT TRAINING: CPT

## 2021-04-29 PROCEDURE — 36415 COLL VENOUS BLD VENIPUNCTURE: CPT

## 2021-04-29 PROCEDURE — 80048 BASIC METABOLIC PNL TOTAL CA: CPT

## 2021-04-29 PROCEDURE — 97530 THERAPEUTIC ACTIVITIES: CPT

## 2021-04-29 PROCEDURE — 6370000000 HC RX 637 (ALT 250 FOR IP): Performed by: STUDENT IN AN ORGANIZED HEALTH CARE EDUCATION/TRAINING PROGRAM

## 2021-04-29 RX ORDER — SENNA AND DOCUSATE SODIUM 50; 8.6 MG/1; MG/1
1 TABLET, FILM COATED ORAL 2 TIMES DAILY
Qty: 60 TABLET | Refills: 1 | Status: SHIPPED | OUTPATIENT
Start: 2021-04-29 | End: 2021-05-29

## 2021-04-29 RX ORDER — NICOTINE 21 MG/24HR
1 PATCH, TRANSDERMAL 24 HOURS TRANSDERMAL DAILY
Status: DISCONTINUED | OUTPATIENT
Start: 2021-04-29 | End: 2021-04-29 | Stop reason: HOSPADM

## 2021-04-29 RX ADMIN — DOCUSATE SODIUM 50MG AND SENNOSIDES 8.6MG 1 TABLET: 8.6; 5 TABLET, FILM COATED ORAL at 08:14

## 2021-04-29 RX ADMIN — OXYCODONE AND ACETAMINOPHEN 2 TABLET: 5; 325 TABLET ORAL at 02:00

## 2021-04-29 RX ADMIN — OXYCODONE AND ACETAMINOPHEN 2 TABLET: 5; 325 TABLET ORAL at 12:05

## 2021-04-29 RX ADMIN — BUDESONIDE AND FORMOTEROL FUMARATE DIHYDRATE 2 PUFF: 160; 4.5 AEROSOL RESPIRATORY (INHALATION) at 09:06

## 2021-04-29 RX ADMIN — AMLODIPINE BESYLATE 5 MG: 5 TABLET ORAL at 08:14

## 2021-04-29 RX ADMIN — APIXABAN 2.5 MG: 2.5 TABLET, FILM COATED ORAL at 08:14

## 2021-04-29 RX ADMIN — FENTANYL CITRATE 50 MCG: 50 INJECTION, SOLUTION INTRAMUSCULAR; INTRAVENOUS at 04:04

## 2021-04-29 RX ADMIN — GABAPENTIN 100 MG: 100 CAPSULE ORAL at 08:14

## 2021-04-29 RX ADMIN — FENTANYL CITRATE 50 MCG: 50 INJECTION, SOLUTION INTRAMUSCULAR; INTRAVENOUS at 05:52

## 2021-04-29 RX ADMIN — CEFAZOLIN SODIUM 2000 MG: 10 INJECTION, POWDER, FOR SOLUTION INTRAVENOUS at 04:08

## 2021-04-29 RX ADMIN — OXYCODONE AND ACETAMINOPHEN 2 TABLET: 5; 325 TABLET ORAL at 08:14

## 2021-04-29 RX ADMIN — KETOROLAC TROMETHAMINE 30 MG: 30 INJECTION, SOLUTION INTRAMUSCULAR; INTRAVENOUS at 05:52

## 2021-04-29 RX ADMIN — SODIUM CHLORIDE, POTASSIUM CHLORIDE, SODIUM LACTATE AND CALCIUM CHLORIDE: 600; 310; 30; 20 INJECTION, SOLUTION INTRAVENOUS at 05:53

## 2021-04-29 RX ADMIN — FENTANYL CITRATE 50 MCG: 50 INJECTION, SOLUTION INTRAMUSCULAR; INTRAVENOUS at 00:29

## 2021-04-29 ASSESSMENT — PAIN DESCRIPTION - FREQUENCY
FREQUENCY: CONTINUOUS
FREQUENCY: CONTINUOUS

## 2021-04-29 ASSESSMENT — PAIN DESCRIPTION - DESCRIPTORS: DESCRIPTORS: ACHING;DISCOMFORT

## 2021-04-29 ASSESSMENT — PAIN - FUNCTIONAL ASSESSMENT: PAIN_FUNCTIONAL_ASSESSMENT: PREVENTS OR INTERFERES SOME ACTIVE ACTIVITIES AND ADLS

## 2021-04-29 ASSESSMENT — PAIN DESCRIPTION - ONSET
ONSET: ON-GOING

## 2021-04-29 ASSESSMENT — PAIN DESCRIPTION - LOCATION
LOCATION: KNEE
LOCATION: KNEE

## 2021-04-29 ASSESSMENT — PAIN SCALES - GENERAL
PAINLEVEL_OUTOF10: 9
PAINLEVEL_OUTOF10: 10
PAINLEVEL_OUTOF10: 9

## 2021-04-29 ASSESSMENT — PAIN DESCRIPTION - PROGRESSION
CLINICAL_PROGRESSION: GRADUALLY IMPROVING
CLINICAL_PROGRESSION: GRADUALLY IMPROVING

## 2021-04-29 ASSESSMENT — PAIN DESCRIPTION - ORIENTATION: ORIENTATION: LEFT

## 2021-04-29 ASSESSMENT — PAIN DESCRIPTION - PAIN TYPE: TYPE: SURGICAL PAIN

## 2021-04-29 NOTE — PROGRESS NOTES
Toe contracture, left, Toe pain, left, Wears dentures, and Wears glasses. has a past surgical history that includes cyst removal (Bilateral); Patella surgery (Right); Knee arthroscopy (Bilateral); Finger trigger release (Left, 03/04/2014); Finger trigger release (Right, 01/05/2015); Nerve Block (01/13/2017); Nerve Block (Left, 03/30/2017); Nerve Block (Bilateral, 04/07/2017); Total knee arthroplasty (Right, 01/2016); Lumbar spine surgery (2005); lumbar fusion (06/15/2016); arthroplasty (Left, 03/13/2019); toe arthroplasty (Left, 03/13/2019); Insertable Cardiac Monitor (09/19/2019); and Total knee arthroplasty (Left, 4/28/2021). Restrictions  Restrictions/Precautions  Restrictions/Precautions: General Precautions, Surgical Protocols, Fall Risk  Required Braces or Orthoses?: No  Position Activity Restriction  Other position/activity restrictions: L TKA, FWBAT LLE  Subjective   General  Chart Reviewed:  Yes  Additional Pertinent Hx: back pain, TIA, sleep apnea, HTN, cocaine abuse  Response To Previous Treatment: Not applicable  Subjective  Subjective: Pt agreeable to PT  General Comment  Comments: RN okays PT  Pain Screening  Patient Currently in Pain: Yes  Pain Assessment  Pain Assessment: 0-10(8/10)  Pain Type: Surgical pain  Pain Location: Knee  Pain Orientation: Left  Clinical Progression: Gradually improving  Response to Pain Intervention: Patient Satisfied  Vital Signs  BP Location: Left Arm  Level of Consciousness: Alert (0)  Patient Currently in Pain: Yes  Oxygen Therapy  O2 Device: None (Room air)       Orientation  Orientation  Overall Orientation Status: Within Functional Limits  Cognition   Cognition  Overall Cognitive Status: WFL  Objective   Bed mobility  Supine to Sit: Supervision  Sit to Supine: Modified independent  Scooting: Supervision  Comment: cues for technique & keeping body in good alignment  Transfers  Sit to Stand: Stand by assistance  Stand to sit: Stand by assistance  Bed to Chair: Stand by assistance  Stand Pivot Transfers: Stand by assistance  Lateral Transfers: Stand by assistance  Comment: correct use of upper body for safe sit/stand  Ambulation  Ambulation?: Yes  Ambulation 1  Surface: level tile  Device: Rolling Walker  Assistance: Minimal assistance;Stand by assistance  Quality of Gait: step through pattern, improving stance of LLE & smoother mary  Gait Deviations: Slow Mary  Distance: 200ft  Stairs/Curb  Stairs?: Yes  Stairs  # Steps : 1  Stairs Height: 6\"  Rails: Bilateral  Device: No Device  Assistance: Stand by assistance     Balance  Sitting - Static: Good  Sitting - Dynamic: Good  Standing - Static: Good  Standing - Dynamic: Good;-  Exercises  Comments: Seated knee flexion & LAQ for 10 reps, Supine HS stretch with belt for 15 sec x 5 reps. Quad sets, HS sets, Add sets, Heelslides, Sup hip abd, SAQ, Glut sets & SLR for 12 reps, ankle pumps for 30 rep   AROM RLE (degrees)  RLE AROM: WFL  AROM LLE (degrees)  LLE General AROM: L knee 5-80 with overpressure  AROM RUE (degrees)  RUE General AROM: see OT assessment  AROM LUE (degrees)  LUE General AROM: see Ot assessment  Strength RLE  Strength RLE: WFL  Strength LLE  Comment: deferred knee, hip 4-/5  Strength RUE  Comment: see OT assessment  Strength LUE  Comment: see OT assessment                 G-Code     OutComes Score                                                     AM-PAC Score    AM-PAC Inpatient Mobility Raw Score : 23  AM-PAC Inpatient T-Scale Score : 56.93  Mobility Inpatient CMS 0-100% Score: 11.2  Mobility Inpatient CMS G-Code Modifier:CI                Goals  Short term goals  Time Frame for Short term goals: 4 visits  Short term goal 1: Inc bed-mobility & transfers to independent to enable pt to safely get in/OOB & return to PLOF & decrease risk for falls  Short term goal 2: inc gait to amb 200ft or > indep with R/walker;   Short term goal 3: Pt able to go up/down 1 step with supervision  Short term goal 4: Pt able to

## 2021-04-29 NOTE — PROGRESS NOTES
Department of Orthopedic Surgery  Progress Note    Subjective:       Systemic or Specific Complaints: Patient is resting comfortably in bed in no apparent distress. No nausea, vomiting, shortness of breath or chest pain. Pain is relatively controlled. Objective:     Patient Vitals for the past 24 hrs:   BP Temp Temp src Pulse Resp SpO2   04/29/21 0400 (!) 146/79 98.8 °F (37.1 °C) Oral 88 18 98 %   04/29/21 0000 (!) 142/80 97.7 °F (36.5 °C) Oral 83 18 98 %   04/28/21 2020 -- -- -- -- 18 98 %   04/28/21 1900 (!) 143/85 98.4 °F (36.9 °C) Oral 89 16 93 %   04/28/21 1756 (!) 143/75 -- -- 79 18 --   04/28/21 1731 (!) 143/75 97.3 °F (36.3 °C) Oral 79 18 94 %   04/28/21 1630 -- 97 °F (36.1 °C) Temporal -- -- --   04/28/21 1530 (!) 141/100 -- -- 84 17 99 %   04/28/21 1515 (!) 153/89 -- -- 84 16 99 %   04/28/21 1500 (!) 134/103 -- -- 80 18 98 %   04/28/21 1445 (!) 149/89 -- -- 86 16 97 %   04/28/21 1430 (!) 144/92 -- -- 78 11 98 %   04/28/21 1415 (!) 135/94 -- -- 83 14 97 %   04/28/21 1400 (!) 138/95 -- -- 76 9 95 %   04/28/21 1345 (!) 124/105 -- -- 60 13 97 %   04/28/21 1330 117/86 -- -- 77 12 97 %   04/28/21 1327 117/68 97.5 °F (36.4 °C) Temporal 83 16 96 %       General: alert, appears stated age and cooperative   Wound:  Dressing is clean and dry   Motion: Painful range of Motion in affected extremity   DVT Exam: No evidence of DVT seen on physical exam.     Additional exam: Neurocirculatory status is checked and intact    Data Review  CBC:   Lab Results   Component Value Date    WBC 8.9 04/29/2021    RBC 4.07 04/29/2021    HGB 11.6 04/29/2021    HCT 36.2 04/29/2021     04/29/2021           Assessment:      left  total knee arthoplasty postop day #1      Plan:     1. Continue Deep venous thrombosis prophylaxis  2. Continue Pain Control  3. TKA protocol  4.  Okay to discharge to home with outpatient PT when cleared by medicine    Electronically signed by Johnny Hill PA-C on 4/29/2021 at 7:35 AM

## 2021-04-29 NOTE — PROGRESS NOTES
Ortho Coordinator Daily Rounding Note    Admission Date:   4/28/2021 Shelby Cobian is a 63 y.o.  male    Post Op Day # [address]:         Recent Labs     04/29/21  0620   WBC 8.9   HGB 11.6*          Recent Labs     04/29/21  0620      K 3.8      CO2 22   BUN 15   CREATININE 1.10   GLUCOSE 134*         Met with:     Patient  Patient's LOC:   alert and oriented X3  Patient progress   GOOD  Patient's Pain:   Patient rates pain 4  Oral Intake:    good  Output:    adequate   Clement in:   no  ON-Q:    no    Walker/DME:  Walker/DME needed:  Yes  DME needed:    walker   DME Agency:    RX GIVEN TO David Kaur RN     Anticoagulation:  Anticoagulation:  2.5MG ELIQUIS BID  Prescription in chart:  yes     Continuity of Care: The [address] form is on the chart. The [address] form is not signed by the physician. NOT NEEDED    Discharge Planning:  Confirmed with patient that discharge plan is Home. Agency/Facility chosen: [place] [id number], [place]  Awaiting pre-certification no  Anticipated discharge date: 04/29/2021    Patient's questions and concerns were answered. Actively listened and provided reassurance.      Electronically signed by: Elias Mack RN on 4/29/2021 at 8:45 AM

## 2021-04-29 NOTE — PROGRESS NOTES
Orientation  Overall Orientation Status: Within Normal Limits  Observation/Palpation  Posture: Good  Balance  Sitting Balance: Independent  Standing Balance: Contact guard assistance(progressed to SBA. Pt doing well with support of walker)  Functional Mobility  Functional - Mobility Device: Rolling Walker  Assist Level: Contact guard assistance  Toilet Transfers  Equipment Used: Standard toilet  Toilet Transfer: Contact guard assistance;Stand by assistance  ADL  Feeding: Independent  Grooming: Independent;Setup  UE Bathing: Minimal assistance(to wash back, otherwise Independent)  LE Bathing: Minimal assistance(for feet)  UE Dressing: Independent  LE Dressing: Stand by assistance;Minimal assistance(min A for socks, SBA for donning underwear and shorts)  Toileting: Stand by assistance  Additional Comments: pt seen for full shower during second session; pt does fatigue easily and states he has COPD/SOB during all ADL completion. Pt ed on pacing techs, consolidating steps, DME/AE use at home (shower seat and long handled sponge), george hose wearing schedule, incision care/infection prevention, and TKA precautions  Tone RUE  RUE Tone: Normotonic  Tone LUE  LUE Tone: Normotonic  Coordination  Movements Are Fluid And Coordinated: Yes     Bed mobility  Supine to Sit: Stand by assistance  Sit to Supine: Stand by assistance  Scooting: Minimal assistance(for support of LLE to reposition in bed)  Transfers  Sit to stand: Contact guard assistance  Stand to sit: Contact guard assistance  Transfer Comments: cues for hand placement and RW safety.  Pt progressing to SBA throughout sessions     Cognition  Overall Cognitive Status: WFL  Perception  Overall Perceptual Status: WFL     Sensation  Overall Sensation Status: WFL        LUE AROM (degrees)  LUE AROM : WFL  RUE AROM (degrees)  RUE AROM : WFL  LUE Strength  Gross LUE Strength: WFL  LUE Strength Comment: NEGAR BROWN's 5/5  RUE Strength  Gross RUE Strength: WFL                   Plan Plan  Times per week: 5-6x/week, 1-2x/day  Current Treatment Recommendations: Strengthening, Balance Training, Functional Mobility Training, Endurance Training, Safety Education & Training, Self-Care / ADL, Patient/Caregiver Education & Training, Equipment Evaluation, Education, & procurement, Positioning      Goals  Short term goals  Time Frame for Short term goals: by discharge, pt will  Short term goal 1: demo SBA with ADL transfers/functional mob in room with good safety for ADL completion with DME/AD as approp  Short term goal 2: demo S/MI with toileting routine with good safety  Short term goal 3: demo I with UB ADLs and min A with LB ADLs with AE/DME with good safety/pacing  Short term goal 4: demo and verb good understanding of ed provided on fall prevention techs, TKA precautions, equip needs, and EC/WS techs  Patient Goals   Patient goals : to go home       Therapy Time   Individual Concurrent Group Co-treatment   Time In 0821         Time Out [phone], 85 min total (75 min treatment))         Minutes 85 min total, 75 min treatment              Pt educated on course of therapy at hospital, fall prevention techs, walker/equip safety, dressing, use of support hose, infection protection, and possible home needs inc. Possible equip needs for ADLs/IADLs. Verbal edu provided to pt regarding fall prevention in the areas of community safety, transportation, proper footwear and clothing, reducing risk of falls, environmental modifications, importance of exercise, consequences of falling, plan if a fall occurs (\"rest and wait\" vs \"up and about\").     Verbal edu provided to pt on safe ADL completion techniques and tips during bathing/showering, and toileting; EC/WS techniques of pacing, posturing, body mechanics, planning and organizing tasks, avoiding fatigue, and task simplification in regards to ADLs of eating, grooming, bathing/showering, dressing, and IADLs of cooking, meal cleanup, marketing and

## 2021-04-29 NOTE — CARE COORDINATION
Case Management Initial Discharge Plan  Shelby Cobian,             Met with:patient to discuss discharge plans. Information verified: address, contacts, phone number, , insurance Yes  PCP: Duane Wong DO  Date of last visit: 2021    Insurance Provider: [facility]    Discharge Planning    Living Arrangements:  Spouse/Significant Other   Support Systems:  Spouse/Significant Other, Family Members    Home has 1 stories  2 stairs to climb to get into front door, 0 stairs to climb to reach second floor  Location of bedroom/bathroom in home  - main floor    Patient able to perform ADL's:Independent    Current Services (outpatient & in home) none  DME equipment: toilet riser, shower chair  DME provider: N/A    Pharmacy: [facility]    Potential Assistance Needed:  Outpatient PT/OT, walker    Patient agreeable to home care: No  Advance of choice provided:  n/a    Prior SNF/Rehab Placement and Facility: [facility]  Agreeable to SNF/Rehab: No  Advance of choice provided: n/a   Evaluation: n/a    Expected Discharge date:  21  Patient expects to be discharged to:  home  Follow Up Appointment: Best Day/ Time: Monday AM    Transportation provider: [facility]  Transportation arrangements needed for discharge: No    Readmission Risk              Risk of Unplanned Readmission:        0             Does patient have a readmission risk score greater than 14?: No  If yes, follow-up appointment must be made within 7 days of discharge. Goal of Care:       Discharge Plan: Met with patient at bedside. Lives with girlfriend, independent and drives. POD #1 lt total knee replacement. Walker ordered from [facility] and will be delivered to room. Face sheet, script and op note faxed. Will D/C on Eliquis 2.5mg BID and coupon used in OP pharmacy. [address] [facility] and therapy.   Post op appt with Kristal VELASCO is 5-5 at 11: 45am.          Electronically signed by Fatuma Farrar RN on 4/29/21 at 11:05 AM EDT

## 2021-04-29 NOTE — PLAN OF CARE
Problem: Falls - Risk of:  Goal: Will remain free from falls  Description: Will remain free from falls  4/29/2021 1101 by An Borjas RN  Outcome: Ongoing     Problem: Falls - Risk of:  Goal: Absence of physical injury  Description: Absence of physical injury  4/29/2021 1101 by An Borjas RN  Outcome: Ongoing     Problem: Pain:  Goal: Pain level will decrease  Description: Pain level will decrease  4/29/2021 1101 by An Borjas RN  Outcome: Ongoing     Problem: Pain:  Goal: Control of acute pain  Description: Control of acute pain  4/29/2021 1101 by An Borjas RN  Outcome: Ongoing     Problem: Pain:  Goal: Control of chronic pain  Description: Control of chronic pain  4/29/2021 1101 by An Borjas RN  Outcome: Ongoing     Problem: Tobacco Use:  Goal: Inpatient tobacco use cessation counseling participation  Description: Inpatient tobacco use cessation counseling participation  Outcome: Ongoing

## 2021-04-29 NOTE — PROGRESS NOTES
Physical Therapy    Facility/Department: [facility]  Initial Assessment    NAME: Sara Birch  : 1955  MRN: [id number]    Date of Service: 2021    Discharge Recommendations:  Patient would benefit from continued therapy after discharge     Pt presented to surgery on 21 for Left TKA secondary OA    RN reports patient is medically stable for therapy treatment this date. Chart reviewed prior to treatment and patient is agreeable for therapy. Assessment   Body structures, Functions, Activity limitations: Decreased functional mobility ; Decreased ADL status; Decreased ROM; Decreased strength;Decreased safe awareness;Decreased balance;Decreased endurance  Assessment: Pt with deficits noted in ROM/strength L knee & LLE, bed mobility, transfers, ambulation, balance, and endurance this session. Pt requires continued skilled PT & is appropriate to D/C Home with assist &  OP PT to restore ROM/strength L knee & L LE,  independence with functional mobility, balance, safety awareness, & activity tolerance.   Prognosis: Good  Decision Making: Medium Complexity  Exam: ROM, MMT, functional mobility, activity tolerance, Balance, & [employer] AM-PAC 6 Clicks Basic Mobility  Clinical Presentation: evolving  PT Education: Goals;PT Role;Plan of Care;Home Exercise Program;Precautions;Transfer Training;Functional Mobility Training;Gait Training;General Safety;Weight-bearing Education  Patient Education: Ed functional mobility, prevention of sedentary complications, positioning of L LE in correct anatomical alignment, safety awareness & continued PT needs after D/C from hospital  REQUIRES PT FOLLOW UP: Yes  Activity Tolerance  Activity Tolerance: Patient limited by pain       Patient Diagnosis(es): The encounter diagnosis was S/P total knee arthroplasty, left.     has a past medical history of Aortic valve sclerosis, Back pain, Cerebral artery occlusion with cerebral infarction ([address] COPD (chronic obstructive pulmonary disease) ([facility] [address] Dental bridge present, Depression, DJD (degenerative joint disease), Drug abuse ([facility] [address] High cholesterol, History of shingles, Hypertension, Pain at rest, Postlaminectomy syndrome, Sleep apnea, Suicidal ideations, TIA (transient ischemic attack), Toe contracture, left, Toe pain, left, Wears dentures, and Wears glasses. has a past surgical history that includes cyst removal (Bilateral); Patella surgery (Right); Knee arthroscopy (Bilateral); Finger trigger release (Left, 03/04/2014); Finger trigger release (Right, 01/05/2015); Nerve Block (01/13/2017); Nerve Block (Left, 03/30/2017); Nerve Block (Bilateral, 04/07/2017); Total knee arthroplasty (Right, 01/2016); Lumbar spine surgery (2005); lumbar fusion (06/15/2016); arthroplasty (Left, 03/13/2019); toe arthroplasty (Left, 03/13/2019); Insertable Cardiac Monitor (09/19/2019); and Total knee arthroplasty (Left, 4/28/2021).     Restrictions  Restrictions/Precautions  Restrictions/Precautions: General Precautions, Surgical Protocols, Fall Risk  Required Braces or Orthoses?: No  Position Activity Restriction  Other position/activity restrictions: L TKA, FWBAT LLE  Vision/Hearing  Vision: Impaired  Vision Exceptions: Wears glasses at all times  Hearing: Within functional limits     Subjective  General  Chart Reviewed: Yes  Patient assessed for rehabilitation services?: Yes  Additional Pertinent Hx: back pain, TIA, sleep apnea, HTN, cocaine abuse  Response To Previous Treatment: Not applicable  General Comment  Comments: RN okays PT  Subjective  Subjective: Pt agreeable to PT  Pain Screening  Patient Currently in Pain: Yes  Pain Assessment  Pain Assessment: 0-10(8/10)  Pain Type: Surgical pain  Pain Location: Knee  Pain Orientation: Left  Vital Signs  Patient Currently in Pain: Yes       Orientation  Orientation  Overall Orientation Status: Within Functional Limits  Social/Functional History  Social/Functional History  Lives With: Significant other  Type of Home: House  Home Layout: One level  Home Access: Stairs to enter without rails  Entrance Stairs - Number of Steps: one small step  Bathroom Shower/Tub: Walk-in shower  Bathroom Toilet: Standard  Bathroom Equipment: Shower chair  ADL Assistance: Independent  Homemaking Assistance: Independent(He cooks & SO does cleaning)  Homemaking Responsibilities: Yes  Ambulation Assistance: Independent  Transfer Assistance: Independent  Active : Yes  Mode of Transportation: Car  Occupation: Retired, On disability  Type of occupation: factory  Leisure & Hobbies: pool, watch TV  Additional Comments: Pt denies falls  Cognition   Cognition  Overall Cognitive Status: WFL    Objective     Observation/Palpation  Observation: resting in bed, aquacel dressing at left knee, has wheezy respirations  Edema: LLE  Scar: previous R TKA scar    AROM RLE (degrees)  RLE AROM: WFL  AROM LLE (degrees)  LLE General AROM: L knee 10-60 with overpressure  AROM RUE (degrees)  RUE General AROM: see OT assessment  AROM LUE (degrees)  LUE General AROM: see Ot assessment  Strength RLE  Strength RLE: WFL  Strength LLE  Comment: deferred knee, hip 4-/5  Strength RUE  Comment: see OT assessment  Strength LUE  Comment: see OT assessment  Tone RLE  RLE Tone: Normotonic  Tone LLE  LLE Tone: Normotonic  Coordination  Movements Are Fluid And Coordinated: Yes  Motor Control  Gross Motor?: WFL  Sensation  Overall Sensation Status: WFL  Bed mobility  Supine to Sit: Stand by assistance  Sit to Supine: Stand by assistance  Scooting: Minimal assistance(for support of LLE to reposition in bed)  Comment: Cues/assist for UE hand placement on rail and proper technique + support for LLE in/ OOB.   Pt Ed on how to hook R foot behind L heel or use of leg  to assist L LE in/OOB  Transfers  Sit to Stand: Minimal Assistance  Stand to sit: Minimal Assistance  Bed to Chair: Minimal assistance  Stand Pivot Transfers: Minimal Assistance  Lateral Transfers: Minimal Assistance  Comment: Ed + tactile assist on correct use of upper body for safe sit/stand  Ambulation  Ambulation?: Yes  Ambulation 1  Surface: level tile  Device: Rolling Walker  Assistance: Minimal assistance  Quality of Gait: step to pattern, Ed to take short steps & to not move walker too far out in front. Ed correct mary/sequencing & to tighten quad to ensure stability of LLE in stance  Gait Deviations: Slow Mary  Distance: 30ft     Balance  Sitting - Static: Good  Sitting - Dynamic: Good  Standing - Static: Good  Standing - Dynamic: Good;-   All lines intact, call light within reach, and patient positioned comfortably at end of treatment. All patient needs addressed prior to ending therapy session. Plan   Plan  Times per week: 2x/D  Current Treatment Recommendations: Strengthening, ROM, Balance Training, Functional Mobility Training, Transfer Training, Endurance Training, Gait Training, Stair training, Home Exercise Program, Safety Education & Training, Patient/Caregiver Education & Training, Positioning  Safety Devices  Type of devices: Bed alarm in place, Call light within reach, Gait belt, Patient at risk for falls    G-Code       OutComes Score                                                  AM-PAC Score  -PAC Inpatient Mobility Raw Score : 18 (04/29/21 0901)  AM-PAC Inpatient T-Scale Score : 43.63 (04/29/21 0901)  Mobility Inpatient CMS 0-100% Score: 46.58 (04/29/21 0901)  Mobility Inpatient CMS G-Code Modifier : CK (04/29/21 0901)          Goals  Short term goals  Time Frame for Short term goals: 4 visits  Short term goal 1: Inc bed-mobility & transfers to independent to enable pt to safely get in/OOB & return to PLOF & decrease risk for falls  Short term goal 2: inc gait to amb 200ft or > indep with R/walker;   Short term goal 3: Pt able to go up/down 1 step with supervision  Short term goal 4: Pt able to tolerate 30 min of ex & gait to facilitate activity tolerance

## 2021-04-29 NOTE — PLAN OF CARE
Problem: Falls - Risk of:  Goal: Will remain free from falls  Description: Will remain free from falls  4/29/2021 0135 by Ravi Salinas RN  Outcome: Ongoing  4/28/2021 1815 by Mrako Betts RN  Outcome: Ongoing  Goal: Absence of physical injury  Description: Absence of physical injury  4/29/2021 0135 by Ravi Salinas RN  Outcome: Ongoing  4/28/2021 1815 by Marko Betts RN  Outcome: Ongoing     Problem: Pain:  Goal: Pain level will decrease  Description: Pain level will decrease  4/29/2021 0135 by Ravi Salinas RN  Outcome: Ongoing  4/28/2021 1815 by Marko Betts RN  Outcome: Ongoing  Goal: Control of acute pain  Description: Control of acute pain  4/29/2021 0135 by Ravi Salinas RN  Outcome: Ongoing  4/28/2021 1815 by Marko Betts RN  Outcome: Ongoing  Goal: Control of chronic pain  Description: Control of chronic pain  4/29/2021 0135 by Ravi Salinas RN  Outcome: Ongoing  4/28/2021 1815 by Marko Betts RN  Outcome: Ongoing

## 2021-04-29 NOTE — ACP (ADVANCE CARE PLANNING)
wishes  [] New Advance Directive completed  [] Portable Do Not Rescitate prepared for Provider review and signature  [] POLST/POST/MOLST/MOST prepared for Provider review and signature      Follow-up plan:    [] Schedule follow-up conversation to continue planning  [] Referred individual to Provider for additional questions/concerns   [] Advised patient/agent/surrogate to review completed ACP document and update if needed with changes in condition, patient preferences or care setting    [] This note routed to one or more involved healthcare providers

## 2021-04-29 NOTE — OP NOTE
[address]                                OPERATIVE REPORT    PATIENT NAME: Soy Flood                   :        1955  MED REC NO:   [id number]                             ROOM:       2015  ACCOUNT NO:   [de-identified]                           ADMIT DATE: 2021  PROVIDER:     Yolie Romero DO    DATE OF PROCEDURE:  2021    PREOPERATIVE DIAGNOSIS:  Left knee osteoarthritis. POSTOPERATIVE DIAGNOSIS:  Left knee osteoarthritis. OPERATION PERFORMED:  Left total knee arthroplasty. SURGEON:  Clarissa Tan DO    RESIDENTS:  Yolie Romero DO, PGY-4; and Ernesto Alfaro DO, PGY-2    ANESTHESIA:  Monitored anesthesia care with spinal and regional block. ESTIMATED BLOOD LOSS:  225 mL with cell saver return of 125 mL. FLUIDS:  1300 mL of crystalloid. URINE OUTPUT:  300 mL. TOURNIQUET TIME:  35 minutes at 300 mmHg. IMPLANTS:  A [employer] size 11 femur, [employer] size G  tibia, a size of 11 mm poly, and a 35 mm patella. FINDINGS:  Left knee end-stage degenerative osteoarthritis. HISTORY OF PRESENT ILLNESS:  The patient is a 79-year-old male who  presented to the office of [facility] complaining of chronic left knee  pain. He failed conservative management in the form of physical  therapy, antiinflammatory medications, corticosteroid injections, and  ultimately he elected to undergo left total knee arthroplasty. DESCRIPTION OF THE PROCEDURE:  On the day of surgery, the patient was  met in the preoperative area. Again, all risks, benefits, and  alternatives were discussed with the patient including, but not limited  to, infection, bleeding, blood loss, blood clots, damage to the  surrounding neurovascular structures, increased pain, increased  swelling, decreased function, need for further surgery, wound healing  complications, and anesthesia risks.   The patient agreed to and accepted  these risks and signed an informed consent willingly. The operative  site was marked with a surgical marking pen. Department of Anesthesia  then administered a preoperative block to the left lower extremity. The  patient was wheeled from the preoperative area to the operating room  table and placed in the supine position on the operating room table. All bony prominences were padded appropriately. Prior to this, the  Department of Anesthesia then placed a spinal anesthetic. The  Department of Anesthesia then began monitored anesthesia care and the  left lower extremity was prepped and draped in usual sterile fashion  with a nonsterile tourniquet placed in the proximal side. 2 gm of Ancef  was infused preoperatively. Prior to further intervention, a time-out  was held correctly identifying the patient, procedure to be performed,  and operative site. We mapped up our surgical incision for a standard  total knee approach. We placed the leg in a [facility] leg gonzales and  placed Ioban over the incisional site. Incision was then made with a  #10-blade through the skin and subcutaneous tissue. We dissected down  further through the subcutaneous tissue to identify the plane for our  arthrotomy. The vastus medialis was then identified as well. The  inside marking pen was used to identify our arthrotomy, this was made  with Bovie electrocautery. Hemostasis was obtained after the arthrotomy  was performed. We then performed our medial peel and released the  medial tissues to the mid coronal plane. Part of the medial meniscus  was then removed at this time. We turned our attention to the fat pad  and removed this in its entirety protecting the patellar tendon. The  patella was then everted and small osteophytes were eliminated with a  rongeur. We then placed our patellar sizer and found this to be a 35 mm  size patella.   The cutting guide for the patella was then placed in the addressed the tibia and sized this to be a G size tibia and  pinned this into place. We then completed our tibia preparation and  placed our #11 size femur and a 10 poly trial and trialed the knee. An  Esmarch was then used to exsanguinate the limb and the tourniquet was  raised to 300 mmHg. A curved osteotome and curette was then used to eliminate any remaining bony  osteophytes of the posterior condyles and clear out the remaining  posterior aspect of the knee. The knee was thoroughly irrigated with  normal saline. We were then confident with our total knee and began  mixing cement. The implants were open sterilely. Cement was applied to  the distal femur and implant was placed. We then trialed the remaining  polys and felt that the most stable total knee arthroplasty was with an  11 poly this was opened and placed. The knee was fully extended and all  remaining cement was removed. The patella was everted and irrigated and  cement was placed with the patella component cemented. Betadine  solution was then used to irrigate the wound and all components were  held in place in extension until the cement was hardened. This was  irrigated out. The tourniquet was then let down in order to obtain  hemostasis. Vancomycin powder was placed into the wound and the  arthrotomy was closed with interrupted #1 Vicryl sutures. The  subcutaneous tissue was approximated with 0 Vicryl suture and the skin  edges were approximated with 2-0 Vicryl suture and the skin was closed  with a running 3-0 Monocryl stitch and Dermabond. An Aquacel dressing  was applied with sterile dressing and Polar Care. The patient was then  safely transported from the operating table onto the hospital bed in  stable condition and transported to PACU where his care was assumed by  the PACU nurse.     POSTOPERATIVE COURSE:  The patient is weightbearing as tolerated to the  left lower extremity, who will be admitted to the floor for postoperative management and working with Physical Therapy. He will  follow up with Dr. Deb Matos in two weeks for repeat evaluation of his  wound. He will remain on Eliquis 2.5 mg p.o. b.i.d. for 14 days  postoperatively. Dr. Deb Matos was present and active for all portions of the case. Marcela Easley DO    D: 04/28/2021 17:57:42       T: 04/29/2021 2:38:30     [id number]  Job#: [id number]     Doc#: [id number]    CC:  Roseanne Edwards DO        I, Gabino Cooper DO, have personally seen this patient and I have reviewed the CC, PMH, FHX and Social History as provided by other clinical staff. I reassessed the HPI and ROS as scribed by Roseanne Edwards DO in my presence and it is both accurate and complete. Thereafter, I personally performed the PE, reviewed the imaging and established the DX and POC. I agree with the documentation provided by the Resident Physician. I have reviewed all documentation in its entirety prior to providing my signature indicating agreement. Any areas of disagreement are noted on the chart.     Electronically signed by Bhumika Cline DO on 4/29/2021 at 9:20 AM

## 2021-04-29 NOTE — DISCHARGE INSTR - COC
Continuity of Care Form    Patient Name: Ras Gonzales   :  1955  MRN:  [id number]    Admit date:  2021  Discharge date:  ***    Code Status Order: Full Code   Advance Directives:   Advance Care Flowsheet Documentation     Date/Time Healthcare Directive Type of Healthcare Directive Copy in [address] Agent's Name Healthcare Agent's Phone Number    [phone]  No, patient does not have an advance directive for healthcare treatment -- -- -- -- --          Admitting Physician:  Ruby Winn DO  PCP: Esther Gardiner DO    Discharging Nurse: [facility] Unit/Room#:   Discharging Unit Phone Number: ***    Emergency Contact:   Extended Emergency Contact Information  Primary Emergency Contact: Maycol Kaur  Address: [place] [address] Phone: [phone]  Work Phone: [phone]  Mobile Phone: [phone]  Relation: Brother/Sister  Secondary Emergency Contact: Danii Gan  Mobile Phone: [phone]  Relation: Brother/Sister    Past Surgical History:  Past Surgical History:   Procedure Laterality Date    ARTHROPLASTY Left 2019    toe    CYST REMOVAL Bilateral     \"groin\"    FINGER TRIGGER RELEASE Left 2014    ring finger    FINGER TRIGGER RELEASE Right 2015    rt hand-RING FINGER    INSERTABLE CARDIAC MONITOR  2019    PT HAS [employer] [id number] REVEAL LOOP RECORDER.  MRI CONDITONAL 3T OK, SAFE IMMEDIATELY POST IMPLANT. (pt gets confused, he DOES NOT HAVE A DEFIBRILLATOR)    KNEE ARTHROSCOPY Bilateral     LUMBAR FUSION  06/15/2016    posterior lumbar fusion L4-L5    LUMBAR SPINE SURGERY  2005    LUMBAR    NERVE BLOCK  2017    caudal #1    celestone 9mg  25mcq fentanyl    NERVE BLOCK Left 2017    left hip bursa injection depomedrol 40 mg    NERVE BLOCK Bilateral 2017    bilateral si joints injections, depo 40    PATELLA SURGERY Right     x2    TOE ARTHROPLASTY Left 2019 Update Admission H&P: {CHP DME Changes in OXJVY:[id number]}    PHYSICIAN SIGNATURE:  {Esignature:[id number]}

## 2021-04-29 NOTE — PROGRESS NOTES
Patient discharge instructions reviewed with patient. meds to bed medications delivered to  [facility]  sent home with Percocet 5-325mg and Eliquis2.5mg, all personal belongings sent with patient including walker and polarcare. Patient escorted to lobby via wheelchair by staff, transported home by car via family.

## 2021-05-03 ENCOUNTER — HOSPITAL ENCOUNTER (OUTPATIENT)
Dept: PHYSICAL THERAPY | Facility: CLINIC | Age: 66
Setting detail: THERAPIES SERIES
Discharge: HOME | End: 2021-05-03
Payer: MEDICARE

## 2021-05-03 ENCOUNTER — HOSPITAL ENCOUNTER (EMERGENCY)
Age: 66
Discharge: HOME | End: 2021-05-03
Attending: EMERGENCY MEDICINE
Payer: MEDICARE

## 2021-05-03 ENCOUNTER — OFFICE VISIT (OUTPATIENT)
Dept: ORTHOPEDIC SURGERY | Age: 66
End: 2021-05-03

## 2021-05-03 VITALS
SYSTOLIC BLOOD PRESSURE: 150 MMHG | OXYGEN SATURATION: 98 % | RESPIRATION RATE: 16 BRPM | DIASTOLIC BLOOD PRESSURE: 67 MMHG | HEART RATE: 103 BPM | TEMPERATURE: 98.5 F

## 2021-05-03 VITALS — HEIGHT: 69 IN | WEIGHT: 235 LBS | BODY MASS INDEX: 34.8 KG/M2

## 2021-05-03 DIAGNOSIS — Z96.652 STATUS POST TOTAL LEFT KNEE REPLACEMENT: Primary | ICD-10-CM

## 2021-05-03 DIAGNOSIS — Z96.652 S/P TOTAL KNEE ARTHROPLASTY, LEFT: ICD-10-CM

## 2021-05-03 DIAGNOSIS — M25.562 LEFT KNEE PAIN, UNSPECIFIED CHRONICITY: Primary | ICD-10-CM

## 2021-05-03 DIAGNOSIS — M25.562 ACUTE PAIN OF LEFT KNEE: Primary | ICD-10-CM

## 2021-05-03 PROCEDURE — 97161 PT EVAL LOW COMPLEX 20 MIN: CPT

## 2021-05-03 PROCEDURE — 93971 EXTREMITY STUDY: CPT

## 2021-05-03 PROCEDURE — 97110 THERAPEUTIC EXERCISES: CPT

## 2021-05-03 PROCEDURE — 6370000000 HC RX 637 (ALT 250 FOR IP): Performed by: EMERGENCY MEDICINE

## 2021-05-03 PROCEDURE — 99024 POSTOP FOLLOW-UP VISIT: CPT | Performed by: PHYSICIAN ASSISTANT

## 2021-05-03 PROCEDURE — 99283 EMERGENCY DEPT VISIT LOW MDM: CPT

## 2021-05-03 RX ORDER — ACETAMINOPHEN 500 MG
1000 TABLET ORAL ONCE
Status: COMPLETED | OUTPATIENT
Start: 2021-05-03 | End: 2021-05-03

## 2021-05-03 RX ORDER — CELECOXIB 200 MG/1
200 CAPSULE ORAL DAILY
Qty: 30 CAPSULE | Refills: 0 | Status: SHIPPED | OUTPATIENT
Start: 2021-05-03 | End: 2021-05-27 | Stop reason: SDUPTHER

## 2021-05-03 RX ORDER — OXYCODONE HYDROCHLORIDE AND ACETAMINOPHEN 5; 325 MG/1; MG/1
1-2 TABLET ORAL
Qty: 42 TABLET | Refills: 0 | Status: SHIPPED | OUTPATIENT
Start: 2021-05-03 | End: 2021-05-10 | Stop reason: SDUPTHER

## 2021-05-03 RX ADMIN — ACETAMINOPHEN 1000 MG: 500 TABLET ORAL at 08:15

## 2021-05-03 ASSESSMENT — ENCOUNTER SYMPTOMS
DIARRHEA: 0
CHEST TIGHTNESS: 0
EYE DISCHARGE: 0
COUGH: 0
COLOR CHANGE: 0
BACK PAIN: 0
SHORTNESS OF BREATH: 0
ABDOMINAL PAIN: 0
NAUSEA: 0
ABDOMINAL DISTENTION: 0
CHEST TIGHTNESS: 0
EYE PAIN: 0
VOMITING: 0
APNEA: 0
ABDOMINAL PAIN: 0
SHORTNESS OF BREATH: 0
ABDOMINAL DISTENTION: 0
CONSTIPATION: 0
RESPIRATORY NEGATIVE: 1
FACIAL SWELLING: 0

## 2021-05-03 ASSESSMENT — PAIN DESCRIPTION - PAIN TYPE: TYPE: ACUTE PAIN

## 2021-05-03 ASSESSMENT — PAIN DESCRIPTION - LOCATION: LOCATION: KNEE

## 2021-05-03 ASSESSMENT — PAIN SCALES - GENERAL
PAINLEVEL_OUTOF10: 10
PAINLEVEL_OUTOF10: 10

## 2021-05-03 ASSESSMENT — PAIN DESCRIPTION - ORIENTATION: ORIENTATION: LEFT

## 2021-05-03 NOTE — CARE COORDINATION
Medicare Cap   [] Physical Therapy  [] Speech Therapy  [] Occupational therapy  *PT and Speech caps combine      $2010 Cap limit < kx modifier needed < $2082 requires pre-cert        Patient Name: Bridget Llanes  YOB: 1955    Note:  This is an estimate of charges billed.                 Date of [address] Name # units/ charge $$$ charge Daily Total Charge Ongoing Total $$$   5-3-21 eval  therex 1  1 82.82  23.22 106.04 106.04

## 2021-05-03 NOTE — ED PROVIDER NOTES
EMERGENCY DEPARTMENT ENCOUNTER    Pt Name: Shannon Reynolds  MRN: [id number]  [place] 1955  Date of evaluation: 5/3/21  CHIEF COMPLAINT       Chief Complaint   Patient presents with    Knee Pain     HISTORY OF PRESENT ILLNESS   HPI  Patient is a 70-year-old male who presented to the emergency department secondary to left knee pain. Patient is 1 week status post left knee replacement performed by Dr. Leyla Bowman. States he has been taking generic Percocet but has had increased pain worsening over the weekend, he was seen at [facility] over the weekend, he stated that he had no signs of infection, he was told to either go to the clinic or come to the emergency department and that they called prior to arrival.  Patient said he drove to the clinic and was not sure which clinic and it was an open therefore he presented to the emerge from for further evaluation and treatment. Patient stated his had increased pain in his left knee 1810 on the pain scale increases with ambulation and increased redness and swelling. No new trauma or injury. No history of DVT or PE. Patient is not on any blood thinners. Patient denied chest pain, shortness of breath, nausea, vomiting, fevers or chills. REVIEW OF SYSTEMS     Review of Systems   Constitutional: Negative for chills, diaphoresis and fever. HENT: Negative for congestion, ear pain and facial swelling. Eyes: Negative for pain, discharge and visual disturbance. Respiratory: Negative for chest tightness and shortness of breath. Cardiovascular: Negative for chest pain and palpitations. Gastrointestinal: Negative for abdominal distention and abdominal pain. Genitourinary: Negative for difficulty urinating and flank pain. Musculoskeletal: Negative for back pain. Left knee pain   Skin: Negative for wound. Neurological: Negative for dizziness, light-headedness and headaches.      PASTMEDICAL HISTORY     Past Medical History:   Diagnosis Date    Aortic valve sclerosis     Back pain     Cerebral artery occlusion with cerebral infarction [facility])     TIA 2017 x2    COPD (chronic obstructive pulmonary disease) (HCC)     NEUBULIZER AS NEEDED    Dental bridge present     UPPER    Depression     DJD (degenerative joint disease)     Drug abuse ([facility] [address]     drug abuse includes smoking cocaine    High cholesterol     History of shingles     2013    Hypertension     PT NEVER FILLED  SCRIPT    Pain at rest     Knee-Left, back     Postlaminectomy syndrome 8/9/2017    Sleep apnea     does not have machine    Suicidal ideations     S/I's without attempt    TIA (transient ischemic attack) 10/2017    x 2    Toe contracture, left     5th    Toe pain, left     5th digit    Wears dentures     1 GOLD TOOTH ON FLIPPER UPPER    Wears glasses      SURGICAL HISTORY       Past Surgical History:   Procedure Laterality Date    ARTHROPLASTY Left 03/13/2019    toe    CYST REMOVAL Bilateral     \"groin\"    FINGER TRIGGER RELEASE Left 03/04/2014    ring finger    FINGER TRIGGER RELEASE Right 01/05/2015    rt hand-RING FINGER    INSERTABLE CARDIAC MONITOR  09/19/2019    PT HAS [email or web address] REVEAL LOOP RECORDER.  MRI CONDITONAL 3T OK, SAFE IMMEDIATELY POST IMPLANT. (pt gets confused, he DOES NOT HAVE A DEFIBRILLATOR)    KNEE ARTHROSCOPY Bilateral     LUMBAR FUSION  06/15/2016    posterior lumbar fusion L4-L5    LUMBAR SPINE SURGERY  2005    LUMBAR    NERVE BLOCK  01/13/2017    caudal #1    celestone 9mg  25mcq fentanyl    NERVE BLOCK Left 03/30/2017    left hip bursa injection depomedrol 40 mg    NERVE BLOCK Bilateral 04/07/2017    bilateral si joints injections, depo 40    PATELLA SURGERY Right     x2    TOE ARTHROPLASTY Left 03/13/2019    5TH TOE TOTAL ARTHROPLASTY, TNC AT 5TH [facility] performed by Jackson Dominique DPM at [facility] Right 01/2016    KNEE    TOTAL KNEE ARTHROPLASTY Left 4/28/2021    LEFT KNEE TOTAL ARTHROPLASTY- Onel Howard performed by Lalita Jose DO at [facility]       Previous Medications    ALBUTEROL SULFATE HFA (VENTOLIN HFA) 108 (90 BASE) MCG/ACT INHALER    Inhale 2 puffs into the lungs 4 times daily as needed for Wheezing    AMLODIPINE (NORVASC) 5 MG TABLET    take 1 tablet by mouth once daily    APIXABAN (ELIQUIS) 2.5 MG TABS TABLET    Take 1 tablet by mouth 2 times daily for 14 days    ATORVASTATIN (LIPITOR) 80 MG TABLET    Take 1 tablet by mouth nightly    BUDESONIDE-FORMOTEROL (SYMBICORT) 160-4.5 MCG/ACT AERO    Inhale 2 puffs into the lungs 2 times daily    GABAPENTIN (NEURONTIN) 100 MG CAPSULE    Take 1 capsule by mouth 2 times daily for 30 doses. HYDROXYZINE (ATARAX) 25 MG TABLET    Take 25 mg by mouth 2 times daily as needed for Anxiety    OXYCODONE-ACETAMINOPHEN (PERCOCET) 5-325 MG PER TABLET    Take 2 tablets by mouth every 6 hours as needed for Pain for up to 7 days. Intended supply: 7 days. Take lowest dose possible to manage pain    SENNOSIDES-DOCUSATE SODIUM (SENOKOT-S) 8.6-50 MG TABLET    Take 1 tablet by mouth 2 times daily     ALLERGIES     is allergic to cyclobenzaprine; other; and tramadol. FAMILY HISTORY     He indicated that his mother is . He indicated that his father is . He indicated that his brother is alive. He indicated that his maternal grandmother is . He indicated that his maternal grandfather is . He indicated that his paternal grandmother is . He indicated that his paternal grandfather is . SOCIAL HISTORY       Social History     Tobacco Use    Smoking status: Current Every Day Smoker     Packs/day: 0.25     Years: 30.00     Pack years: 7.50     Types: Cigarettes    Smokeless tobacco: Never Used    Tobacco comment: trying to quit   Substance Use Topics    Alcohol use:  Yes     Alcohol/week: 2.0 standard drinks     Types: 2 Cans of beer per week     Comment: SOCIALLY    Drug use: Yes     Types: Cocaine     Comment: drug abuse includes smoking cocaine; patient states  \" a few months ago\"      PHYSICAL EXAM     INITIAL VITALS: BP (!) 150/67   Pulse 103   Temp 98.5 °F (36.9 °C) (Oral)   Resp 16   SpO2 98%    Physical Exam  Vitals signs and nursing note reviewed. Constitutional:       General: He is not in acute distress. Appearance: He is well-developed. He is not diaphoretic. HENT:      Head: Normocephalic and atraumatic. Eyes:      Pupils: Pupils are equal, round, and reactive to light. Neck:      Musculoskeletal: Normal range of motion and neck supple. Cardiovascular:      Rate and Rhythm: Normal rate and regular rhythm. Pulmonary:      Effort: Pulmonary effort is normal.      Breath sounds: Normal breath sounds. Abdominal:      General: Bowel sounds are normal.      Palpations: Abdomen is soft. Musculoskeletal: Normal range of motion. Comments: Left lower extremity: Slight ecchymoses to the medial aspect of the knee and calf no warmth to touch no petechia no purpura, patient is able to flex at the knee joint no crepitus. Surgical incision well-healed no purulent drainage or bleeding. Skin:     General: Skin is warm. Capillary Refill: Capillary refill takes less than 2 seconds. Neurological:      Mental Status: He is alert and oriented to person, place, and time. MEDICAL DECISION MAKING:   The patient is a 53-year-old male who presented to the emergency department secondary to left knee pain status post knee replacement 1 week ago. Patient received Tylenol, Dr. Venkatesh Razo was contacted. Stated patient was supposed to come to his office at 7:00 AM, orders for an ultrasound to rule out DVT which was negative. Results discussed with patient, he is given instructions to follow-up with Dr. Jessica Sotelo office today after leaving the emergency department.       Wells DVT Criteria:    []YES  []NO :Active cancer treatment ongoing or within the previous six months or palliative (If yes 1 point)  []YES  []NO :Paralysis, paresis, or recent plaster immobilization of the lower extremities  (If yes 1 point)  [x]YES  []NO :Recently bedridden for more than three days, or major surgery within twelve weeks (If yes 1 point)  [x]YES  []NO :Localized tenderness along the distribution of the deep venous system (If yes 1 point)  [x]YES  []NO :Entire leg swollen (If yes 1 point)  [x]YES  []NO :Calf swelling by more than 3 cm when compared to the asymptomatic leg measured 10 cm below tibial tuberosity (If yes 1 point)  []YES  []NO :Pitting edema in the symptomatic leg (If yes 1 point)  []YES  []NO :Collateral superficial veins (nonvaricose) (If yes 1 point)  []YES  []NO :Previous deep vein thrombosis (If yes 1 point)  []YES  []NO :Alternative diagnosis as likely or more likely than that of deep venous thrombosis (-2 if yes)    Wells DVT Score Criteria Below TOTAL =  4  Low risk for DVT is a Wells score of less than 2, which is appropriate for d-dimer testing. All patient's question's and concerns were answered prior to disposition and patient and/or family expressed understanding and agreement of treatment plan. CRITICAL CARE:              NIH STROKE SCALE:            PROCEDURES:    Procedures    DIAGNOSTIC RESULTS   EKG:All EKG's are interpreted by the Emergency Department Physician who either signs or Co-signs this chart in the absence of a cardiologist.        RADIOLOGY:All plain film, CT, MRI, and formal ultrasound images (except ED bedside ultrasound) are read by the radiologist, see reports below, unless otherwisenoted in MDM or here. VL Lower Extremity Venous Left    (Results Pending)     LABS: All lab results were reviewed by myself, and all abnormals are listed below.   Labs Reviewed - No data to display    EMERGENCY DEPARTMENTCOURSE:         Vitals:    Vitals:    05/03/21 0758   BP: (!) 150/67   Pulse: 103   Resp: 16   Temp: 98.5 °F (36.9 °C)   TempSrc: Oral   SpO2: 98%       The patient was given the following medications while in the emergency department:  Orders Placed This Encounter   Medications    acetaminophen (TYLENOL) tablet 1,000 mg     CONSULTS:  None    FINAL IMPRESSION      1. Acute pain of left knee          DISPOSITION/PLAN   DISPOSITION Decision To Discharge 05/03/2021 09:04:56 AM      PATIENT REFERRED TO:  Duane Zimmer, [address] [facility]  [phone]      Go to this office immediately after leaving the emergency department    DISCHARGE MEDICATIONS:  New Prescriptions    No medications on file     Adrian Sales MD  Attending Emergency Physician    This note was created with the assistance of a speech-recognition program. While intending to generate a document that actually reflects the content of the visit, no guarantees can be provided that every mistake has been identified and corrected by editing.                     Adrian Sales MD  56/60/16 [id number]

## 2021-05-03 NOTE — CARE COORDINATION
[facility] Fall Risk Assessment    Patient Name:  Thao White  : 1955        Risk Factor Scale  Score   History of Falls [] Yes  [x] No 25  0 0   Secondary Diagnosis [] Yes  [x] No 15  0 0   Ambulatory Aid [] Furniture  [x] Crutches/cane/walker  [] None/bedrest/wheelchair/nurse 30  15  0 15   IV/Heparin Lock [] Yes  [x] No 20  0 0   Gait/Transferring [] Impaired  [x] Weak  [] Normal/bedrest/immobile 20  10  0 10   Mental Status [] Forgets limitations  [x] Oriented to own ability 15  0 0      Total:25     Based on the Assessment score: check the appropriate box.     []  No intervention needed   Low =   Score of 0-24    [x]  Use standard prevention interventions Moderate =  Score of 24-44   [] Give patient handout and discuss fall prevention strategies   [] Establish goal of education for patient/family RE: fall prevention strategies    []  Use high risk prevention interventions High = Score of 45 and higher   [] Give patient handout and discuss fall prevention strategies   [] Establish goal of education for patient/family Re: fall prevention strategies   [] Discuss lifeline / other resources    Electronically signed by:   Tori Reynolds PT  Date: 5/3/2021

## 2021-05-03 NOTE — PROGRESS NOTES
therapy, injections, further imaging studies and as a last resort surgery. During today's visit, I reviewed all his labs from [facility] that did no not show any issues. He also discussed that after his knee replacement on the other side he had to have a manipulation under anesthesia because he developed so much scar tissue from not moving his knee. It is vital that he gets to physical therapy and does the exercises on his own even if they hurt. I feel we can add an anti-inflammatory like Celebrex to his regimen to also help decrease his pain. I will also give him another prescription for Percocet. He will try to take 1 Percocet every 3 hours to see if that helps control his pain better. He can also put Tylenol along with it as long as he does not exceed between 3-4000 mg of Tylenol a day. The patient then stated that they understand the plan and at this time, the patient has opted starting physical therapy today. He will also make sure he is getting up moving around every hour that he is awake. He will stop putting a pillow under his knee keeping him in flexion. He understands that he needs to continue taking the anticoagulation medication until it is gone. But the best way to decrease his risk of a blood clot is to move every hour that he is awake. He also has not wearing his thigh-high compression socks and we discussed that though should go on every morning and off at night. If he is having an issue we want him to call the office before going to the ER if it has to do with his surgical leg. We will be happy to see him in the office anytime if he is having an issue. An Rx of Celebrex and Percocet was given . Patient should return to the office in 3 weeks to f/u with Victor Manuel Stacy D. O. and at that visit, patient should have his knee completely straight and at least 105 degrees of flexion. The patient will call the office immediately with any problems that may arise.      Orders Placed This Encounter Medications    celecoxib (CELEBREX) 200 MG capsule     Sig: Take 1 capsule by mouth daily     Dispense:  30 capsule     Refill:  0    oxyCODONE-acetaminophen (PERCOCET) 5-325 MG per tablet     Sig: Take 1-2 tablets by mouth every 4-6 hours as needed for Pain for up to 7 days. Dispense:  42 tablet     Refill:  0     Reduce doses taken as pain becomes manageable       No orders of the defined types were placed in this encounter. Danilo Alicea PA-C, have personally seen this patient, reviewed the chart including history, and imaging if done. I personally  performed the physical exam and obtained any needed additional history. I placed orders, performed or supervised procedures and developed the treatment plan.     Electronically signed by Gale Stapleton PA-C, on 5/3/2021 at 12:44 PM      Electronically signed by Gale Stapleton PA-C, on 5/3/2021 at 12:44 PM

## 2021-05-03 NOTE — CONSULTS
[] [facility]  Outpatient Rehabilitation &  Therapy  [facility]   [place]: [phone]  F: [phone]      Physical Therapy Lower Extremity Evaluation    Date:  5/3/2021  Patient: Nedra Enriquez  : 1955  MRN: [id number]  Physician: Dr Martínez Nest: [place] Medicare (VERIFICATION PENDING)  Medical Diagnosis: LLE TKA Rehab Codes: M62.81 (Muscle Weakness), M62.9 (Disorder of Muscle), M79.1 (Myalgia), Z73.6 (Limitation of ADLs)   Onset date: DOS 21  Next 's appt.:     Subjective:   CC/HPI: Pt with LLE knee TKA 21, [address] overnight. He has been at home with significant pain in the knee while trying to get around. Reports he has not begun any PT at this time.        PMHx: [] Unremarkable [] Diabetes [] HTN  [] Pacemaker   [] MI/Heart Problems [] Cancer [] Arthritis   [x] Other: RLE TKA 2016   Aortic valve sclerosis      Back pain      Cerebral artery occlusion with cerebral infarction [facility])       TIA 2017 x2    COPD (chronic obstructive pulmonary disease) ([address]       NEUBULIZER AS NEEDED    Dental bridge present       UPPER    Depression      DJD (degenerative joint disease)      Drug abuse ([address]       drug abuse includes smoking cocaine    High cholesterol      History of shingles           Hypertension       PT NEVER FILLED  SCRIPT    Pain at rest       Knee-Left, back     Postlaminectomy syndrome 2017    Sleep apnea       does not have machine    Suicidal ideations       S/I's without attempt    TIA (transient ischemic attack) 10/2017     x 2    Toe contracture, left       5th    Toe pain, left       5th digit    Wears dentures       1 GOLD TOOTH ON FLIPPER UPPER    Wears glasses       ARTHROPLASTY Left 2019     toe    CYST REMOVAL Bilateral       \"groin\"    FINGER TRIGGER RELEASE Left 2014     ring finger    FINGER TRIGGER RELEASE Right 2015     rt hand-RING FINGER    INSERTABLE CARDIAC MONITOR   2019   PT HAS CIQUAL7 REVEAL LOOP RECORDER.  MRI CONDITONAL 3T OK, SAFE IMMEDIATELY POST IMPLANT. (pt gets confused, he DOES NOT HAVE A DEFIBRILLATOR)    KNEE ARTHROSCOPY Bilateral      LUMBAR FUSION   06/15/2016     posterior lumbar fusion L4-L5    LUMBAR SPINE SURGERY   2005     LUMBAR    NERVE BLOCK   01/13/2017     caudal #1    celestone 9mg  25mcq fentanyl    NERVE BLOCK Left 03/30/2017     left hip bursa injection depomedrol 40 mg    NERVE BLOCK Bilateral 04/07/2017     bilateral si joints injections, depo 40    PATELLA SURGERY Right       x2    TOE ARTHROPLASTY Left 03/13/2019     5TH TOE TOTAL ARTHROPLASTY, TNC AT 5TH MTJ performed by Rabia Levy DPM at [facility] Right 01/2016     KNEE    TOTAL KNEE ARTHROPLASTY Left 4/28/2021     LEFT KNEE TOTAL ARTHROPLASTY- Ester Abreu performed by Majo Schroeder DO at [facility]                   [x] Refer to full medical chart  In EPIC     Tests: [] X-Ray:    [] MRI:    [] Other:     Comorbidities:   [x] Obesity [] Dialysis  [] N/A   [] Asthma/COPD [] Dementia [] Other:   [] Stroke [] Sleep apnea [] Other:   [] Vascular disease [] Rheumatic disease [] Other:       Medications:  [x] Refer to full medical record [] None [] Other:  Allergies:       [x] Refer to full medical record [] None [] Other:    ADL/IADL Previous level of function Current level of function Who currently assists the patient with task   Bathing  [x] Independent  [] Assist [x] Independent  [] Assist    Dress/grooming [x] Independent  [] Assist [x] Independent  [] Assist    Transfer/mobility [x] Independent  [] Assist [x] Independent  [] Assist    Feeding [x] Independent  [] Assist [x] Independent  [] Assist    Toileting [x] Independent  [] Assist [x] Independent  [] Assist    Driving [x] Independent  [] Assist [] Independent  [x] Assist    Housekeeping [] Independent  [] Assist [] Independent  [x] Assist    Grocery shop/meal prep [] Independent  [] Assist [] Independent  [x] Assist      Gait Prior level of function Current level of function    [x] Independent  [] Assist [] Independent  [] Assist   Device: [] Independent [] Independent    [] Straight Cane [] Quad cane [] Straight Cane [] Quad cane    [] Standard walker [] Rolling walker   [] 4 wheeled walker [] Standard walker [] Rolling walker   [x] 4 wheeled walker    [] Wheelchair [] Wheelchair       Marital Status Lives with girlfriend   Home type 1 story   Stairs from outside 1   Stairs inside -   Employement Retired        Pain present? yes   Location LLE knee    Pain Rating currently 10/10   Pain at worse 10/10   Pain at best 10/10   Description of pain Sharp, stabbing   Altered Sensation Tingling    What makes it worse Movement    What makes it better Meds, Ice    Symptom progression same   Sleep Not well              Objective:    ROM  ° A/P STRENGTH    Left Right Left Right   Knee Flex 55/71 WNL 3+ WNL   Ext 20/15  2+               OBSERVATION No Deficit Deficit Not Tested Comments   Posture       Forward Head [] [x] []    Rounded Shoulders [] [x] []    Kyphosis [] [x] []    Genu Valgus [] [x] []    Slumped Sitting [] [x] []    Palpation [] [x] [] Pain along with LLE knee throughout joint    Edema [] [] [] 49 cm  51 cm suprapatella    Patellar Mobility [] [x] [] Poor all directions due to swelling and pain LLE knee    Gait [] [] [] Analysis:  Pt with decreased heel/toe LLE, decreased LLE stance time as well.  Currently using rolling walker for gait, antalgic LLE stance time        FUNCTION Normal Difficult Unable   Ambulation [] [x] []   Groom/Dress [] [x] []   Lift/Carry [] [x] []   Stairs [] [x] []   Bending [] [x] []   Squat [] [x] []        Flexibility Normal Left tight Right tight   Hip flexor [] [x] []   quad [] [x] []   HS [] [x] []   piriformis [] [] []   ITB [] [] []   gastroc [] [x] []   Soleus  [] [] []    [] [] []    [] [] []          Functional Test: KOOS  Score: 68% functionally impaired Comments: Patient reports that he drove to the clinic today on his own. Assessment:    Patient would benefit from skilled physical therapy services in order to: improve mobility, decrease pain     Problems:    [x] ? Pain:  [x] ? ROM:  [x] ? Strength:  [x] ? Function:  [] Other:      STG: (to be met in 10 treatments)  1. ? Pain: Decrease pain levels 6/10  2. ? ROM: Increase flexibility and AROM limitations throughout to equal bilat to reduce difficulty with ADLs  3. ? Strength: Increase LE strength to 5/5 MMT   4. Independent with Home Exercise Programs      LTG: (to be met in 20 treatments)  1. Improve score on assessment tool from KOOS from 68% impairment to less than 40% impairment   2. Reduce pain levels to 4/10                   Patient goals: Decrease pain, improve mobility     Rehab Potential:  [] Good  [x] Fair  [] Poor   Suggested Professional Referral:  [x] No  [] Yes:  Barriers to Goal Achievement[de-identified]  [x] No  [] Yes:  Domestic Concerns:  [x] No  [] Yes:    Pt. Education:  [x] Plans/Goals, Risks/Benefits discussed  [x] Home exercise program    Method of Education: [x] Verbal  [x] Demo  [x] Written  Comprehension of Education:  [x] Verbalizes understanding. [x] Demonstrates understanding. [x] Needs Review. [] Demonstrates/verbalizes understanding of HEP/Ed previously given. Treatment Plan:  [x] Therapeutic Exercise    [] Aquatic Therapy   [x] Manual Therapy     [] Electrical Stimulation  [x] Instruction in HEP      [] Lumbar/Cervical Traction  [x] Neuromuscular Re-education [] Cold/hotpack  [] Iontophoresis: 4 mg/mL  [x] Vasocompression (GameReady)                    Dexamethasone Sodium  [] Gait Training             Phosphate 40-80 mAmin         []  Medication allergies reviewed for use of    Dexamethasone Sodium Phosphate 4mg/ml     with iontophoresis treatments. Pt is not allergic.     Frequency:  2 x/week for 20 visits    Todays Treatment:    Exercises:  Exercise    LLE Knee TKA   DOS

## 2021-05-05 ENCOUNTER — HOSPITAL ENCOUNTER (OUTPATIENT)
Dept: PHYSICAL THERAPY | Facility: CLINIC | Age: 66
Setting detail: THERAPIES SERIES
Discharge: HOME | End: 2021-05-05
Payer: MEDICARE

## 2021-05-05 PROCEDURE — 97016 VASOPNEUMATIC DEVICE THERAPY: CPT

## 2021-05-05 PROCEDURE — 97110 THERAPEUTIC EXERCISES: CPT

## 2021-05-05 NOTE — FLOWSHEET NOTE
[x] [facility]  Outpatient Rehabilitation &  Therapy  [facility]   [place]: [phone]  F: [phone]      Physical Therapy Daily Treatment Note    Date:  2021  Patient Name:  Sara Birch    :  1955  MRN: [id number]  Physician: Dr Mishra Persons: [facility] Medicare Vs.BMN [place] req'd after eval thru [employer] website  No pt liability due  Medical Diagnosis: LLE TKA           Rehab Codes: M62.81 (Muscle Weakness), M62.9 (Disorder of Muscle), M79.1 (Myalgia), Z73.6 (Limitation of ADLs)   Onset date: DOS 21                  Next Dr's appt. :     Visit# / total visits: ; Progress note for Medicare patient due at visit 10     Cancels/No Shows:     Subjective:    Pain:  [x] Yes  [] No Location: L knee Pain Rating: (0-10 scale) 9.5/10  Pain altered Tx:  [x] No  [] Yes  Action:  Comments: Patient arrived 1 hour late for appointment, able to accommodate. Patient is ambulating with RW noting high pain level. Objective:  Exercises: Game ready Low pressure   Exercise     LLE Knee TKA   DOS 21 Reps/ Time Weight/ Level Comments             Nustep  10'             SB S  3x30\"     HS S Stool  3x30\"               *Heel slides  2x10   With strap   *SAQ  2x10       *Quad/Glute sets  10x10\"       LAQ  2x10       SLR  2x10  AAROM     Supine heel prop  5' 2lb              TGym Squats  2x10  L20     TGym HR  2x10  L20               // bars:          Marches   2x10       Knee flexion   2x10       Hip Abd  2x10       Mini-lunge  2x10                           Other: Manual PROM LLE knee flex/ext      Treatment Charges: Mins Units   []  Modalities     [x]  Ther Exercise 30 2   []  Manual Therapy     []  Ther Activities     []  Aquatics     [x]  Vasocompression 15 1   []  Other     Total Treatment time 45 3       Assessment: [x] Progressing toward goals. Progressed strength and ROM program this date with fair tolerance.  Patient still very limited in flexion and quad control. Plan further strength and ROM progressions next visit and re measure ROM. [] No change. [] Other:  [x] Patient would continue to benefit from skilled physical therapy services in order to: decrease pain and increase ROM. STG: (to be met in 10 treatments)  1. ? Pain: Decrease pain levels 6/10  2. ? ROM: Increase flexibility and AROM limitations throughout to equal bilat to reduce difficulty with ADLs  3. ? Strength: Increase LE strength to 5/5 MMT   4. Independent with Home Exercise Programs      LTG: (to be met in 20 treatments)  1. Improve score on assessment tool from KOOS from 68% impairment to less than 40% impairment   2. Reduce pain levels to 4/10                  Patient goals: Decrease pain, improve mobility     Pt. Education:  [x] Yes  [] No  [x] Reviewed Prior HEP/Ed  Method of Education: [x] Verbal  [] Demo  [] Written  Comprehension of Education:  [x] Verbalizes understanding. [x] Demonstrates understanding. [x] Needs review. [] Demonstrates/verbalizes HEP/Ed previously given. Plan: [x] Continue current frequency toward long and short term goals.     [x] Specific Instructions for subsequent treatments: advance HEP, ROM, strength       Time In: 1500              Time Out: 1400    Electronically signed by:  Germain Ball PTA

## 2021-05-10 DIAGNOSIS — Z96.652 S/P TOTAL KNEE ARTHROPLASTY, LEFT: ICD-10-CM

## 2021-05-10 RX ORDER — OXYCODONE HYDROCHLORIDE AND ACETAMINOPHEN 5; 325 MG/1; MG/1
1-2 TABLET ORAL
Qty: 42 TABLET | Refills: 0 | Status: SHIPPED | OUTPATIENT
Start: 2021-05-10 | End: 2021-05-17 | Stop reason: SDUPTHER

## 2021-05-10 NOTE — TELEPHONE ENCOUNTER
oxyCODONE-acetaminophen (PERCOCET) 5-325 MG per tablet 42 tablet 0 5/3/2021 5/10/2021    Sig - Route: Take 1-2 tablets by mouth every 4-6 hours as needed for Pain for up to 7 days. - Oral    Sent to pharmacy as: oxyCODONE-Acetaminophen 5-325 MG Oral Tablet (Percocet)      NOV 05/27/21  LOV 05/03/21    DATE OF PROCEDURE:  04/28/2021    OPERATION PERFORMED:  Left total knee arthroplasty.

## 2021-05-11 ENCOUNTER — HOSPITAL ENCOUNTER (OUTPATIENT)
Dept: PHYSICAL THERAPY | Facility: CLINIC | Age: 66
Setting detail: THERAPIES SERIES
Discharge: HOME | End: 2021-05-11
Payer: MEDICARE

## 2021-05-11 PROCEDURE — 97110 THERAPEUTIC EXERCISES: CPT

## 2021-05-11 PROCEDURE — 97016 VASOPNEUMATIC DEVICE THERAPY: CPT

## 2021-05-11 NOTE — CARE COORDINATION
Medicare Cap   [] Physical Therapy  [] Speech Therapy  [] Occupational therapy  *PT and Speech caps combine      $2010 Cap limit < kx modifier needed < $1125 requires pre-cert        Patient Name: Erin Amado  YOB: 1955    Note:  This is an estimate of charges billed.                 Date of [address] Name # units/ charge $$$ charge Daily Total Charge Ongoing Total $$$   5-3-21 eval  therex 1  1 82.82  23.22 106.04 106.04    5-5-21 2ex+vaso  3  25.26+19.74+7.96  52.96      5-11-21 3ex+vaso  4  25.26+19.74+19.74++7.96  72.70  231.70

## 2021-05-11 NOTE — FLOWSHEET NOTE
[x] [facility]  Outpatient Rehabilitation &  Therapy  [facility]   [place]: [phone]  F: [phone]      Physical Therapy Daily Treatment Note    Date:  2021  Patient Name:  Chevy Louis    :  1955  MRN: [id number]  Physician: Dr Gucci Clancy: Kaur Patterson Medicare Vs.N Layla Dill req'd after eval thru [employer] website  No pt liability due  Medical Diagnosis: LLE TKA           Rehab Codes: M62.81 (Muscle Weakness), M62.9 (Disorder of Muscle), M79.1 (Myalgia), Z73.6 (Limitation of ADLs)   Onset date: DOS 21                  Next Dr's appt.: 21    Visit# / total visits: 3/20; Progress note for Medicare patient due at visit 10     Cancels/No Shows:     Subjective:    Pain:  [x] Yes  [] No Location: L knee Pain Rating: (0-10 scale) 10/10  Pain altered Tx:  [x] No  [] Yes  Action:  Comments: Patient arrived stating he is in a lot of pain and he is trying to get his exercises done as best as he can. Objective:  Exercises: Game ready Low pressure   Exercise     LLE Knee TKA   DOS 21 Reps/ Time Weight/ Level Comments             Nustep  10'             SB S  3x30\"     HS S Stool  3x30\"               *Heel slides  2x10   With strap   *SAQ  2x10       *Quad sets  10x10\"       LAQ  2x10       SLR  2x10  AAROM     *Supine heel prop  5' 5lb              TGym Squats  2x10  L20     TGym HR  2x10  L20               // bars:          Marches   2x10       HS curls    2x10       Hip Abd  2x10       Mini-lunge  2x10                           Other: Manual PROM LLE knee flex/ext     L knee ROM:  8-97 degrees     Specific Instructions for next treatment:      Treatment Charges: Mins Units   []  Modalities     [x]  Ther Exercise 40 3   []  Manual Therapy     []  Ther Activities     []  Aquatics     [x]  Vasocompression 10 1   []  Other     Total Treatment time 50 4       Assessment: [x] Progressing toward goals.  Pt able to tolerate all exercises, however, required cueing to maintain form throughout with good carryover. Pt with continued lack of knee ROM, therefore, educated pt on exercises he can complete at home to improve range, pt with good understanding. [] No change. [] Other:  [x] Patient would continue to benefit from skilled physical therapy services in order to: decrease pain and increase ROM. STG: (to be met in 10 treatments)  1. ? Pain: Decrease pain levels 6/10  2. ? ROM: Increase flexibility and AROM limitations throughout to equal bilat to reduce difficulty with ADLs  3. ? Strength: Increase LE strength to 5/5 MMT   4. Independent with Home Exercise Programs      LTG: (to be met in 20 treatments)  1. Improve score on assessment tool from KOOS from 68% impairment to less than 40% impairment   2. Reduce pain levels to 4/10                  Patient goals: Decrease pain, improve mobility     Pt. Education:  [x] Yes  [] No  [x] Reviewed Prior HEP/Ed  Method of Education: [x] Verbal  [] Demo  [] Written  Comprehension of Education:  [x] Verbalizes understanding. [x] Demonstrates understanding. [x] Needs review. [] Demonstrates/verbalizes HEP/Ed previously given. Plan: [x] Continue current frequency toward long and short term goals.     [x] Specific Instructions for subsequent treatments: advance HEP, ROM, strength       Time In: 1:00pm              Time Out: 2:00pm     Electronically signed by:  Esteban Yuen PTA

## 2021-05-13 ENCOUNTER — HOSPITAL ENCOUNTER (OUTPATIENT)
Dept: PHYSICAL THERAPY | Facility: CLINIC | Age: 66
Setting detail: THERAPIES SERIES
Discharge: HOME | End: 2021-05-13
Payer: MEDICARE

## 2021-05-13 PROCEDURE — 97016 VASOPNEUMATIC DEVICE THERAPY: CPT

## 2021-05-13 PROCEDURE — 97110 THERAPEUTIC EXERCISES: CPT

## 2021-05-13 NOTE — FLOWSHEET NOTE
[x] [facility]  Outpatient Rehabilitation &  Therapy  [facility]   [place]: [phone]  F: [phone]      Physical Therapy Daily Treatment Note    Date:  2021  Patient Name:  Erin Amado    :  1955  MRN: [id number]  Physician: Dr Monisha Brenner: Mayra Gage Medicare Vs.BMN Kristi Aguero req'd after eval thru [employer] website  No pt liability due  Medical Diagnosis: LLE TKA           Rehab Codes: M62.81 (Muscle Weakness), M62.9 (Disorder of Muscle), M79.1 (Myalgia), Z73.6 (Limitation of ADLs)   Onset date: DOS 21                  Next Dr's appt.: 21    Visit# / total visits: ; Progress note for Medicare patient due at visit 10     Cancels/No Shows:     Subjective:    Pain:  [x] Yes  [] No Location: L knee Pain Rating: (0-10 scale) 9/10  Pain altered Tx:  [x] No  [] Yes  Action:  Comments: Patient arrived stating decreased pain compared to last time but he is still hurting. Objective:  Exercises: Game ready Low pressure   Exercise     LLE Knee TKA   DOS 21 Reps/ Time Weight/ Level Comments             Nustep  10'             SB S  3x30\"     HS S Stool  3x30\"               *Heel slides  2x10   With strap   *SAQ  10x10\"       Gastroc S+QS   10x10\"   Heel prop   LAQ  2x10       SLR  2x10      *prop hang  3' Active               TGym Squats  2x10  L20     TGym HR  2x10  L20               // bars:          Marches   2x10       HS curls    2x10       Hip Abd  2x10       Mini-lunge  2x10       TKE  10x10\"  orange                Other:     Manual:       Specific Instructions for next treatment: measure      Treatment Charges: Mins Units   []  Modalities     [x]  Ther Exercise 40 3   []  Manual Therapy     []  Ther Activities     []  Aquatics     [x]  Vasocompression 15 1   []  Other     Total Treatment time 55 4       Assessment: [x] Progressing toward goals.  Pt with improved strength this date requiring no assist with SLR, however, demos quad lag. Pt with significant tightness in L gastroc/hamstring, applied manual followed by prone hang to decrease muscle tension, pt notes his muscle feel more limber. Administered HEP handout to begin prone hag 2-3x a day for 3 minutes each, pt with good understanding to perform independently. Ended with vaso to decrease pain and swelling and educated pt to continue icing and elevating at home. [] No change. [] Other:  [x] Patient would continue to benefit from skilled physical therapy services in order to: decrease pain and increase ROM. STG: (to be met in 10 treatments)  1. ? Pain: Decrease pain levels 6/10  2. ? ROM: Increase flexibility and AROM limitations throughout to equal bilat to reduce difficulty with ADLs  3. ? Strength: Increase LE strength to 5/5 MMT   4. Independent with Home Exercise Programs      LTG: (to be met in 20 treatments)  1. Improve score on assessment tool from KOOS from 68% impairment to less than 40% impairment   2. Reduce pain levels to 4/10                  Patient goals: Decrease pain, improve mobility     Pt. Education:  [x] Yes  [] No  [x] Reviewed Prior HEP/Ed  Method of Education: [x] Verbal  [] Demo  [] Written  Comprehension of Education:  [x] Verbalizes understanding. [x] Demonstrates understanding. [x] Needs review. [] Demonstrates/verbalizes HEP/Ed previously given. Plan: [x] Continue current frequency toward long and short term goals.     [x] Specific Instructions for subsequent treatments: advance HEP, ROM, strength       Time In: 1:00pm              Time Out: 2:10pm     Electronically signed by:  Pretty Shanks PTA

## 2021-05-13 NOTE — CARE COORDINATION
Medicare Cap   [] Physical Therapy  [] Speech Therapy  [] Occupational therapy  *PT and Speech caps combine      $2010 Cap limit < kx modifier needed < $0952 requires pre-cert        Patient Name: Quoc Broussard  YOB: 1955    Note:  This is an estimate of charges billed.                 Date of [address] Name # units/ charge $$$ charge Daily Total Charge Ongoing Total $$$   5-3-21 eval  therex 1  1 82.82  23.22 106.04 106.04    5-5-21 2ex+vaso  3  25.26+19.74+7.96  52.96      5-11-21 3ex+vaso  4  25.26+19.74+19.74++7.96  72.70  231.70    5-13-21 3ex+vaso  4  25.26+19.74+19.74++7.96  72.70  304.40

## 2021-05-17 DIAGNOSIS — Z96.652 S/P TOTAL KNEE ARTHROPLASTY, LEFT: ICD-10-CM

## 2021-05-17 RX ORDER — OXYCODONE HYDROCHLORIDE AND ACETAMINOPHEN 5; 325 MG/1; MG/1
1-2 TABLET ORAL EVERY 6 HOURS PRN
Qty: 36 TABLET | Refills: 0 | Status: SHIPPED | OUTPATIENT
Start: 2021-05-17 | End: 2021-05-25 | Stop reason: SDUPTHER

## 2021-05-17 NOTE — TELEPHONE ENCOUNTER
oxyCODONE-acetaminophen (PERCOCET) 5-325 MG per tablet 42 tablet 0 5/10/2021 5/17/2021    Sig - Route:  Take 1-2 tablets by mouth every 4-6 hours as needed for Pain for up to 7 days. - Oral    Sent to pharmacy as: oxyCODONE-Acetaminophen 5-325 MG Oral Tablet (Percocet)      DATE OF PROCEDURE:  04/28/2021     OPERATION PERFORMED:  Left total knee arthroplasty    LOV 05/03/2021   NOV 05/27/2021

## 2021-05-18 ENCOUNTER — HOSPITAL ENCOUNTER (OUTPATIENT)
Dept: PHYSICAL THERAPY | Facility: CLINIC | Age: 66
Setting detail: THERAPIES SERIES
Discharge: HOME | End: 2021-05-18
Payer: MEDICARE

## 2021-05-18 PROCEDURE — 97110 THERAPEUTIC EXERCISES: CPT

## 2021-05-18 PROCEDURE — 97016 VASOPNEUMATIC DEVICE THERAPY: CPT

## 2021-05-18 NOTE — CARE COORDINATION
Medicare Cap   [] Physical Therapy  [] Speech Therapy  [] Occupational therapy  *PT and Speech caps combine      $2010 Cap limit < kx modifier needed < $6601 requires pre-cert        Patient Name: Floresita Durant  YOB: 1955    Note:  This is an estimate of charges billed.                 Date of [address] Name # units/ charge $$$ charge Daily Total Charge Ongoing Total $$$   5-3-21 eval  therex 1  1 82.82  23.22 106.04 106.04    5-5-21 2ex+vaso  3  25.26+19.74+7.96  52.96      5-11-21 3ex+vaso  4  25.26+19.74+19.74++7.96  72.70  231.70    5-13-21 3ex+vaso  4  25.26+19.74+19.74++7.96  72.70  304.40    5-18-21 2ex+vaso 3  25.26+19.74+7.96  52.96 357.36

## 2021-05-18 NOTE — FLOWSHEET NOTE
[x] [facility]  Outpatient Rehabilitation &  Therapy  [facility]   [place]: [phone]  F: [phone]      Physical Therapy Daily Treatment Note    Date:  2021  Patient Name:  Floresita Durant    :  1955  MRN: [id number]  Physician: Dr Neo Davis: Ethan Dee Medicare Vs.BMN Amberitzel Stubbson req'd after eval thru [employer] website  No pt liability due  Medical Diagnosis: LLE TKA           Rehab Codes: M62.81 (Muscle Weakness), M62.9 (Disorder of Muscle), M79.1 (Myalgia), Z73.6 (Limitation of ADLs)   Onset date: DOS 21                  Next Dr's appt.: 21    Visit# / total visits: ; Progress note for Medicare patient due at visit 10     Cancels/No Shows:     Subjective:    Pain:  [x] Yes  [] No Location: L knee Pain Rating: (0-10 scale) 9/10  Pain altered Tx:  [x] No  [] Yes  Action:  Comments: Patient arrived 10 minutes late, able to accommodate. Pt states he always has pain in his knee and his exercises are going pretty good. Objective:  Exercises: Game ready Low pressure   Exercise     LLE Knee TKA   DOS 21 Reps/ Time Weight/ Level Comments             Nustep  10'             SB S  3x30\"     HS S Stool  3x30\"               *Heel slides  2x10      SLR  2x10      *Prone hang  5' Active               TGym Squats  2x10  L20     TGym HR  2x10  L20               // bars:          Step ups  2x10  4\"     Step downs   2x10  4\"     Mini-lunge  2x10       TKE  10x10\"  orange      4-way hip  x10  orange              Other:     Specific Instructions for next treatment: Measure ROM     Treatment Charges: Mins Units   []  Modalities     [x]  Ther Exercise 30 2   []  Manual Therapy     []  Ther Activities     []  Aquatics     [x]  Vasocompression 15 1   []  Other     Total Treatment time 45 3       Assessment: [x] Progressing toward goals.  Progressed standing program, pt with good tolerance demonstrating difficulty with knee extension exercises. Educated pt on importance of prone hang and stretching to improve knee ROM, pt understand to complete exercises 2-3x a day. Ended with vaso to decrease pain. [] No change. [] Other:  [x] Patient would continue to benefit from skilled physical therapy services in order to: decrease pain and increase ROM. STG: (to be met in 10 treatments)  1. ? Pain: Decrease pain levels 6/10  2. ? ROM: Increase flexibility and AROM limitations throughout to equal bilat to reduce difficulty with ADLs  3. ? Strength: Increase LE strength to 5/5 MMT   4. Independent with Home Exercise Programs      LTG: (to be met in 20 treatments)  1. Improve score on assessment tool from KOOS from 68% impairment to less than 40% impairment   2. Reduce pain levels to 4/10                  Patient goals: Decrease pain, improve mobility     Pt. Education:  [x] Yes  [] No  [x] Reviewed Prior HEP/Ed  Method of Education: [x] Verbal  [] Demo  [] Written  Comprehension of Education:  [x] Verbalizes understanding. [x] Demonstrates understanding. [x] Needs review. [] Demonstrates/verbalizes HEP/Ed previously given. Plan: [x] Continue current frequency toward long and short term goals.     [x] Specific Instructions for subsequent treatments: advance HEP, ROM, strength       Time In: 1:10pm              Time Out: 2:05pm      Electronically signed by:  Ricky Mares PTA

## 2021-05-20 ENCOUNTER — HOSPITAL ENCOUNTER (OUTPATIENT)
Dept: PHYSICAL THERAPY | Facility: CLINIC | Age: 66
Setting detail: THERAPIES SERIES
Discharge: HOME | End: 2021-05-20
Payer: MEDICARE

## 2021-05-20 PROCEDURE — 97110 THERAPEUTIC EXERCISES: CPT

## 2021-05-20 PROCEDURE — 97016 VASOPNEUMATIC DEVICE THERAPY: CPT

## 2021-05-20 NOTE — FLOWSHEET NOTE
[x] [facility]  Outpatient Rehabilitation &  Therapy  [facility]   [place]: [phone]  F: [phone]      Physical Therapy Daily Treatment Note    Date:  2021  Patient Name:  Vini Pereira    :  1955  MRN: [id number]  Physician: Dr Byron Chapman: [facility] Medicare Vs.BMN [employer] Estimable req'd after eval thru [employer] website  No pt liability due  Medical Diagnosis: LLE TKA           Rehab Codes: M62.81 (Muscle Weakness), M62.9 (Disorder of Muscle), M79.1 (Myalgia), Z73.6 (Limitation of ADLs)   Onset date: DOS 21                  Next Dr's appt.: 21    Visit# / total visits: ; Progress note for Medicare patient due at visit 10     Cancels/No Shows:     Subjective:    Pain:  [x] Yes  [] No Location: L knee Pain Rating: (0-10 scale) 9/10  Pain altered Tx:  [x] No  [] Yes  Action:  Comments: Patient arrives stating he had a bad night last night. Pt reports he always has pain but he is pushing himself at home to get better. Objective:  Exercises: Game ready Low pressure   Exercise     LLE Knee TKA   DOS 21 Reps/ Time Weight/ Level Comments             Nustep  10'            SB S  3x30\"     HS S Stool  3x30\"              *Heel slides  2x10      *Prone hang  5' 3#              TGym Squats  2x10  L20     TGym HR  2x10  L20               // bars:          Step ups  2x10  6\"     Step downs   2x10  4\"     Mini-lunge  2x10       *TKE  20x10\"  Green      *4-way hip  x15  Green     Balance board   3' L2              Other:     L knee ROM:  8-95 degrees       Specific Instructions for next treatment:      Treatment Charges: Mins Units   []  Modalities     [x]  Ther Exercise 30 2   []  Manual Therapy     []  Ther Activities     []  Aquatics     [x]  Vasocompression 15 1   []  Other     Total Treatment time 45 3       Assessment: [x] Progressing toward goals.  Pt requires cueing throughout to perform exercises correctly, educated pt to continue prone hang and heel slides at home to focus on knee ROM. Pt with quick fatigue with all exercises requiring seated rest break after standing program. Pt with poor knee ROM, therefore, educated pt on importance of HEP and to perform 2-3x a day. Ended with vaso to decrease pain and swelling in L knee. [] No change. [] Other:  [x] Patient would continue to benefit from skilled physical therapy services in order to: decrease pain and increase ROM. STG: (to be met in 10 treatments)  1. ? Pain: Decrease pain levels 6/10  2. ? ROM: Increase flexibility and AROM limitations throughout to equal bilat to reduce difficulty with ADLs  3. ? Strength: Increase LE strength to 5/5 MMT   4. Independent with Home Exercise Programs      LTG: (to be met in 20 treatments)  1. Improve score on assessment tool from KOOS from 68% impairment to less than 40% impairment   2. Reduce pain levels to 4/10                  Patient goals: Decrease pain, improve mobility     Pt. Education:  [x] Yes  [] No  [x] Reviewed Prior HEP/Ed  Method of Education: [x] Verbal  [] Demo  [] Written  Comprehension of Education:  [x] Verbalizes understanding. [x] Demonstrates understanding. [x] Needs review. [] Demonstrates/verbalizes HEP/Ed previously given. Plan: [x] Continue current frequency toward long and short term goals.     [x] Specific Instructions for subsequent treatments: advance HEP, ROM, strength       Time In: 1:00pm              Time Out: 1:55pm      Electronically signed by:  Christo Castaneda PTA

## 2021-05-20 NOTE — CARE COORDINATION
Medicare Cap   [] Physical Therapy  [] Speech Therapy  [] Occupational therapy  *PT and Speech caps combine      $2010 Cap limit < kx modifier needed < $1388 requires pre-cert        Patient Name: Elan Curtis  YOB: 1955    Note:  This is an estimate of charges billed.                 Date of [address] Name # units/ charge $$$ charge Daily Total Charge Ongoing Total $$$   5-3-21 eval  therex 1  1 82.82  23.22 106.04 106.04    5-5-21 2ex+vaso  3  25.26+19.74+7.96  52.96      5-11-21 3ex+vaso  4  25.26+19.74+19.74++7.96  72.70  231.70    5-13-21 3ex+vaso  4  25.26+19.74+19.74++7.96  72.70  304.40    5-18-21 2ex+vaso 3  25.26+19.74+7.96  52.96 357.36    5-29-0-21 2ex+vaso 3  25.26+19.74+7.96  52.96  410.32

## 2021-05-25 ENCOUNTER — HOSPITAL ENCOUNTER (OUTPATIENT)
Dept: PHYSICAL THERAPY | Facility: CLINIC | Age: 66
Setting detail: THERAPIES SERIES
Discharge: HOME | End: 2021-05-25
Payer: MEDICARE

## 2021-05-25 DIAGNOSIS — Z96.652 S/P TOTAL KNEE ARTHROPLASTY, LEFT: ICD-10-CM

## 2021-05-25 PROCEDURE — 97110 THERAPEUTIC EXERCISES: CPT

## 2021-05-25 RX ORDER — OXYCODONE HYDROCHLORIDE AND ACETAMINOPHEN 5; 325 MG/1; MG/1
1 TABLET ORAL EVERY 6 HOURS PRN
Qty: 28 TABLET | Refills: 0 | Status: SHIPPED | OUTPATIENT
Start: 2021-05-25 | End: 2021-06-01 | Stop reason: SDUPTHER

## 2021-05-25 NOTE — FLOWSHEET NOTE
[x] [facility]  Outpatient Rehabilitation &  Therapy  [facility]   [place]: [phone]  F: [phone]      Physical Therapy Daily Treatment Note    Date:  2021  Patient Name:  Martin Mills    :  1955  MRN: [id number]  Physician: Dr Nathen Fan: [facility] [place] Vs.BMN Atilio Aiken Dual   ZGYI#[id number] 20 visits 5/3/21-  Medical Diagnosis: LLE TKA            Rehab Codes: M62.81 (Muscle Weakness), M62.9 (Disorder of Muscle), M79.1 (Myalgia), Z73.6   Onset date: DOS 21                  Next 's appt.: 21  Visit# / total visits: ; Progress note for Medicare patient due at visit 10     Cancels/No Shows:     Subjective:    Pain:  [x] Yes  [] No Location: L knee Pain Rating: (0-10 scale) 9/10  Pain altered Tx:  [x] No  [] Yes  Action:  Comments: Patient arrives stating he had a bad night last night. Pt reports he always has pain but he is pushing himself at home to get better. Objective:  Exercises: Game ready Low pressure   Exercise     LLE Knee TKA   DOS 21 Reps/ Time Weight/ Level Comments             Bike   10'            SB Calf S  3x30\"     HS S Stool  3x30\"              *Heel slides  2x10      *Prone hang  5' 3#              TGym Squats  2x10  L20     TGym HR  2x10  L20               // bars:          Step ups  2x10  6\"     Step downs   2x10  4\"     Mini-lunge  2x10       *TKE  20x10\"  Green      *4-way hip  x15  Green     Balance board   3' L2              Other    Specific Instructions for next treatment: advance ex's      Treatment Charges: Mins Units   []  Modalities     [x]  Ther Exercise 40 3   []  Manual Therapy     []  Ther Activities     []  Aquatics     [x]  Vasocompression     []  Other     Total Treatment time 40 3       Assessment: [x] Progressing toward goals. Pt reports pain in the knee with flexion trying to ride on the bike but was able to control the motion.  Continues to lack knee flexion at end range. Discussed need to focus on HEP and knee flexion especially. [] No change. [] Other:  [x] Patient would continue to benefit from skilled physical therapy services in order to: decrease pain and increase ROM. STG: (to be met in 10 treatments)  1. ? Pain: Decrease pain levels 6/10  2. ? ROM: Increase flexibility and AROM limitations throughout to equal bilat to reduce difficulty with ADLs  3. ? Strength: Increase LE strength to 5/5 MMT   4. Independent with Home Exercise Programs      LTG: (to be met in 20 treatments)  1. Improve score on assessment tool from KOOS from 68% impairment to less than 40% impairment   2. Reduce pain levels to 4/10                  Patient goals: Decrease pain, improve mobility     Pt. Education:  [x] Yes  [] No  [x] Reviewed Prior HEP/Ed  Method of Education: [x] Verbal  [x] Demo  [] Written   Review of HEP, discussion of knee flex/ext   Comprehension of Education:  [x] Verbalizes understanding. [x] Demonstrates understanding. [x] Needs review. [] Demonstrates/verbalizes HEP/Ed previously given. Plan: [x] Continue current frequency toward long and short term goals.     [x] Specific Instructions for subsequent treatments: advance HEP, ROM, strength       Time In: 1400             Time Out: 1450    Electronically signed by:  Brittany Fritz PT

## 2021-05-27 ENCOUNTER — OFFICE VISIT (OUTPATIENT)
Dept: ORTHOPEDIC SURGERY | Age: 66
End: 2021-05-27

## 2021-05-27 ENCOUNTER — HOSPITAL ENCOUNTER (OUTPATIENT)
Dept: PHYSICAL THERAPY | Facility: CLINIC | Age: 66
Setting detail: THERAPIES SERIES
Discharge: HOME | End: 2021-05-27
Payer: MEDICARE

## 2021-05-27 VITALS
SYSTOLIC BLOOD PRESSURE: 136 MMHG | BODY MASS INDEX: 34.8 KG/M2 | WEIGHT: 235 LBS | DIASTOLIC BLOOD PRESSURE: 81 MMHG | HEIGHT: 69 IN | HEART RATE: 91 BPM | RESPIRATION RATE: 12 BRPM

## 2021-05-27 DIAGNOSIS — Z96.652 S/P TOTAL KNEE ARTHROPLASTY, LEFT: Primary | ICD-10-CM

## 2021-05-27 PROCEDURE — 99024 POSTOP FOLLOW-UP VISIT: CPT | Performed by: ORTHOPAEDIC SURGERY

## 2021-05-27 RX ORDER — CELECOXIB 200 MG/1
200 CAPSULE ORAL DAILY
Qty: 30 CAPSULE | Refills: 0 | Status: SHIPPED | OUTPATIENT
Start: 2021-05-27 | End: 2021-07-22

## 2021-05-27 ASSESSMENT — ENCOUNTER SYMPTOMS
COUGH: 0
APNEA: 0
CHEST TIGHTNESS: 0
NAUSEA: 0
SHORTNESS OF BREATH: 0
COLOR CHANGE: 0
VOMITING: 0
DIARRHEA: 0
CONSTIPATION: 0
ABDOMINAL DISTENTION: 0
ABDOMINAL PAIN: 0

## 2021-05-27 NOTE — FLOWSHEET NOTE
[] [facility]) - [facility] &  Therapy  [address]    [phone]  F: [phone]   [] [address]   [address]  P: [phone]  F: [phone]  [] [address] &  Therapy  [address] [facility] [address]  P: [phone]  F: [phone] [] [address]  P: [phone]  F: [phone]  [x] [address]  [address] [facility] [facility] [address]   [address]   [place]: [phone]  F: [phone]   [] [facility]  [address] [facility] [address]  [place]: [phone]   F: [phone]     Physical Therapy Cancel/No Show note    Date: 2021  Patient: Chevy Louis  : 1955  MRN: [id number]    Cancels/No Shows to date: 1    For today's appointment patient:    [x]  Cancelled    [] Rescheduled appointment    [] No-show     Reason given by patient:    []  Patient ill    []  Conflicting appointment    [] No transportation      [] Conflict with work    [x] No reason given    [] Weather related    [] [id number]    [] Other:      Comments:        [] Next appointment was confirmed    Electronically signed by: Renan Edward

## 2021-05-27 NOTE — PROGRESS NOTES
[facility]  [address] [place] [id number]  Dept: [phone]  Dept Fax: [phone]        Post Operative Follow Up    Subjective:     Chief Complaint   Patient presents with    Knee Pain     Left TKA DOS 4/28/21     Post Op Surgery:     The patient is here for a follow up after having a Left total knee Arthoplasty. The date of surgery was on 04/28/2021. Therefore we are 4 weeks post op. Currently the patient's pain is controlled with Percocet. Physical therapy was started. Patient presents today with cane that is in good repair. Patient states he is doing well and his knee feels better now than it did before surgery. But he states he has some sharp pain above the patella every once in a while based on what he is doing. Review of Systems   Constitutional: Positive for activity change. Negative for appetite change. Respiratory: Negative for apnea, cough, chest tightness and shortness of breath. Cardiovascular: Negative for chest pain, palpitations and leg swelling. Gastrointestinal: Negative for abdominal distention, abdominal pain, constipation, diarrhea, nausea and vomiting. Genitourinary: Negative for difficulty urinating, dysuria and hematuria. Musculoskeletal: Positive for arthralgias. Negative for gait problem, joint swelling and myalgias. Skin: Negative for color change and rash. Neurological: Negative for dizziness, weakness, numbness and headaches. Psychiatric/Behavioral: Negative for sleep disturbance. I have reviewed the CC, HPI, ROS, PMH, FHX, Social History, and if not present in this note, I have reviewed in the patient's chart. I agree with the documentation provided by other staff and have reviewed their documentation prior to providing my signature indicating agreement.   Vitals:   /81   Pulse 91   Resp 12   Ht 5' 9\" (1.753 m)   Wt 235 lb (106.6 kg)   BMI 34.70 kg/m²  Body mass index is 34.7 kg/m². Physical Examination:     Orthopedics:    GENERAL: Alert and oriented X3 in no acute distress. SKIN: Intact without lesions or ulcerations. Incision is well healed with no signs of infection. NEURO: Intact to sensory and motor testing. VASC: Capillary refill is less than 3 seconds. Post Op Exam:    LOCATION: Left knee  SITE: Distal neurocirculatory status is intact. EXAM: Sensation is intact to light touch, there is full motor function of the extremity. ROM: 10/94 degrees   Procedures:     Procedure:  No    Radiology:   X-ray was reviewed from 05/03/2021. Assessment:     1. S/P total knee arthroplasty, left      Plan:   Post Op Treatment : Patient had the treatment regimen reviewed today 4 weeks s/p Left total knee Arthoplasty. I reviewed the films with the patient. We discussed some of the etiologies and natural histories of s/p left TKA. We also discussed the various treatment alternatives including anti-inflammatory medications, physical therapy, injections, further imaging studies and as a last resort surgery. During today's visit, I explained to the patient that he needs to work on his straightening. I then told him that he should place a weight or an ice pack on his knee and he should allow the weight or ice pack to help get his knee straight with the help of gravity three times a day for ten minutes. I then asked the patient if he feels he needs another prescription for his percocet and he stated that he does. I then told him that he was given one a few days ago and I will not be able to give him another refill until next week. I then asked him how many he has left and he stated that he has 14 pills left. I then told him that he should take his percocet sparingly and he should try taking tylenol with it, so he may have enough to get through the remainder of the week. But he may call Tuesday for his refill.  I then asked the patient if he has tried taking the Celebrex and he stated that he has not because he did not pick it up. I then instructed him to pick it up because it will help reduce his inflammation. The patient then stated that he understands the plan and at this time, the patient has opted for a new prescription for Celebrex and he will continue attending physical therapy. Patient will also work on getting his knee completely straight and getting his to a goal of 0 degrees of extension and 105 degrees of flexion. Patient should return to the office in 4 weeks to f/u with Quentin Kenny PA-C and at that visit, patient should be at 0 degrees of extension and over 105 degrees of flexion. The patient will call the office immediately with any problems that may arise. Orders Placed This Encounter   Medications    celecoxib (CELEBREX) 200 MG capsule     Sig: Take 1 capsule by mouth daily     Dispense:  30 capsule     Refill:  0     No orders of the defined types were placed in this encounter. Nel Pacheco Day V, am scribing for and in the presence of Bridget Sherman D.O.. 5/31/2021  8:02 PM    I spent 10 minutes of face to face time with this patient. Meseret Ayala DO, have personally seen this patient and I have reviewed the CC, PMH, FHX and Social History as provided by other clinical staff. I reassessed the HPI and ROS as scribed by Alberto Horowitz Scribaston in my presence and it is both accurate and complete. Thereafter, I personally performed the PE, reviewed the imaging and established the DX and POC. I agree with the documentation provided by the Medical Scribe. I have reviewed all documentation in its entirety prior to providing my signature indicating agreement. Any areas of disagreement are noted on the chart.     Electronically signed by Nicolle Bruno DO on 5/31/2021 at 8:02 PM        Electronically signed by Nicolle Bruno DO, on 5/31/2021 at 8:02 PM

## 2021-06-01 DIAGNOSIS — Z96.652 S/P TOTAL KNEE ARTHROPLASTY, LEFT: ICD-10-CM

## 2021-06-01 RX ORDER — OXYCODONE HYDROCHLORIDE AND ACETAMINOPHEN 5; 325 MG/1; MG/1
1 TABLET ORAL EVERY 6 HOURS PRN
Qty: 21 TABLET | Refills: 0 | Status: SHIPPED | OUTPATIENT
Start: 2021-06-01 | End: 2021-06-07 | Stop reason: SDUPTHER

## 2021-06-01 NOTE — TELEPHONE ENCOUNTER
oxyCODONE-acetaminophen (PERCOCET) 5-325 MG per tablet 28 tablet 0 5/25/2021 6/1/2021    Sig - Route: Take 1 tablet by mouth every 6 hours as needed for Pain for up to 7 days. - Oral    Sent to pharmacy as: oxyCODONE-Acetaminophen 5-325 MG Oral Tablet (Percocet)      LOV 05/27/2021      DATE OF PROCEDURE:  04/28/2021  OPERATION PERFORMED:  Left total knee arthroplasty.

## 2021-06-03 ENCOUNTER — HOSPITAL ENCOUNTER (OUTPATIENT)
Dept: PHYSICAL THERAPY | Facility: CLINIC | Age: 66
Setting detail: THERAPIES SERIES
Discharge: HOME | End: 2021-06-03
Payer: MEDICARE

## 2021-06-03 PROCEDURE — 97110 THERAPEUTIC EXERCISES: CPT

## 2021-06-03 NOTE — FLOWSHEET NOTE
[x] [facility]  Outpatient Rehabilitation &  Therapy  [facility]   [place]: [phone]  F: [phone]      Physical Therapy Daily Treatment Note    Date:  6/3/2021  Patient Name:  Sara Birch    :  1955  MRN: [id number]  Physician: Dr Yamilet Champion: [facility] Medicare Vs.ADIN Eliud Him Dual   LHOT#[id number] 20 visits 5/3/21-  Medical Diagnosis: LLE TKA            Rehab Codes: M62.81 (Muscle Weakness), M62.9 (Disorder of Muscle), M79.1 (Myalgia), Z73.6   Onset date: DOS 21                  Next Dr's appt.: 21  Visit# / total visits: ; Progress note for Medicare patient due at visit 10     Cancels/No Shows:     Subjective:    Pain:  [x] Yes  [] No Location: L knee Pain Rating: (0-10 scale) 9/10  Pain altered Tx:  [x] No  [] Yes  Action:  Comments: Patient arrives today with mod pain in the knee, feels it is worse with activity and motion. Objective:    Exercise     LLE Knee TKA   DOS 21 Reps/ Time Weight/ Level Comments             Bike   10'            SB Calf S  3x30\"     HS S Stool  3x30\"              *Heel slides  2x10      *Prone hang 5' 3#              TGym Squats  2x10  L20     TGym HR  2x10  L20               // bars:          Step ups  2x10  6\"     Step downs   2x10  4\"     Mini-lunge  2x10       *TBand TKE  20x10\" Green      *TBand 4-way hip x20 Green     Balance board  5' L2              Other    LLE knee AAROM: 5-111    Specific Instructions for next treatment: advance ex's      Treatment Charges: Mins Units   []  Modalities     [x]  Ther Exercise 55 4   []  Manual Therapy     []  Ther Activities     []  Aquatics     [x]  Vasocompression     []  Other     Total Treatment time 55 4       Assessment: [x] Progressing toward goals. Pt has improved ROM and mobility with ex's today, needed rest periods throughout session. [] No change.      [] Other:  [x] Patient would continue to benefit from skilled physical therapy services in order to: decrease pain and increase ROM. STG: (to be met in 10 treatments)  1. ? Pain: Decrease pain levels 6/10  2. ? ROM: Increase flexibility and AROM limitations throughout to equal bilat to reduce difficulty with ADLs  3. ? Strength: Increase LE strength to 5/5 MMT   4. Independent with Home Exercise Programs      LTG: (to be met in 20 treatments)  1. Improve score on assessment tool from KOOS from 68% impairment to less than 40% impairment   2. Reduce pain levels to 4/10                  Patient goals: Decrease pain, improve mobility     Pt. Education:  [x] Yes  [] No  [x] Reviewed Prior HEP/Ed  Method of Education: [x] Verbal  [x] Demo  [] Written     Review of HEP, discussion of knee flex/ext focus at home   Comprehension of Education:  [x] Verbalizes understanding. [x] Demonstrates understanding. [x] Needs review. [] Demonstrates/verbalizes HEP/Ed previously given. Plan: [x] Continue current frequency toward long and short term goals.     [x] Specific Instructions for subsequent treatments: advance HEP, ROM, strength       Time In: 0800          Time Out: 9933    Electronically signed by:  Raji Gardiner, PT

## 2021-06-07 DIAGNOSIS — Z96.652 S/P TOTAL KNEE ARTHROPLASTY, LEFT: ICD-10-CM

## 2021-06-07 NOTE — TELEPHONE ENCOUNTER
oxyCODONE-acetaminophen (PERCOCET) 5-325 MG per tablet 21 tablet 0 6/1/2021 6/8/2021    Sig - Route: Take 1 tablet by mouth every 6 hours as needed for Pain for up to 7 days. - Oral    Sent to pharmacy as: oxyCODONE-Acetaminophen 5-325 MG Oral Tablet (Percocet)      LOV 05/27/2021    DATE OF PROCEDURE:  04/28/2021  OPERATION PERFORMED:  Left total knee arthroplasty.

## 2021-06-08 ENCOUNTER — HOSPITAL ENCOUNTER (OUTPATIENT)
Dept: PHYSICAL THERAPY | Facility: CLINIC | Age: 66
Setting detail: THERAPIES SERIES
End: 2021-06-08
Payer: MEDICARE

## 2021-06-08 RX ORDER — OXYCODONE HYDROCHLORIDE AND ACETAMINOPHEN 5; 325 MG/1; MG/1
1 TABLET ORAL EVERY 6 HOURS PRN
Qty: 21 TABLET | Refills: 0 | Status: SHIPPED | OUTPATIENT
Start: 2021-06-08 | End: 2021-06-15

## 2021-06-08 NOTE — TELEPHONE ENCOUNTER
Patient has attended 8 vs in PT an average of 2 times a week. Today was cancelled due to the auth expiring. There is an attempt to extend his PT visits. Would you like his appt moved up--Next appt 06/23/2021--to discuss this recovery.

## 2021-06-08 NOTE — TELEPHONE ENCOUNTER
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 He needs to be consistent with physical therapy so that he can have a successful recovery. I will refill his pain medication but he needs to start using it carefully because we have to give him less and less every week until he is weaned off completely.

## 2021-06-10 ENCOUNTER — APPOINTMENT (OUTPATIENT)
Dept: PHYSICAL THERAPY | Facility: CLINIC | Age: 66
End: 2021-06-10
Payer: MEDICARE

## 2021-06-14 DIAGNOSIS — Z96.652 S/P TOTAL KNEE ARTHROPLASTY, LEFT: ICD-10-CM

## 2021-06-14 NOTE — TELEPHONE ENCOUNTER
oxyCODONE-acetaminophen (PERCOCET) 5-325 MG per tablet 21 tablet 0 6/8/2021 6/15/2021    Sig - Route: Take 1 tablet by mouth every 6 hours as needed for Pain for up to 7 days. - Oral    Sent to pharmacy as: oxyCODONE-Acetaminophen 5-325 MG Oral Tablet (Percocet)      DATE OF PROCEDURE:  04/28/2021  OPERATION PERFORMED:  Left total knee arthroplasty  LOV 05/27/2021   NOV 06/23/2021  Next PT visits 06/16 and 06/18 this week.

## 2021-06-15 RX ORDER — OXYCODONE HYDROCHLORIDE AND ACETAMINOPHEN 5; 325 MG/1; MG/1
1 TABLET ORAL EVERY 6 HOURS PRN
Qty: 21 TABLET | Refills: 0 | OUTPATIENT
Start: 2021-06-15 | End: 2021-06-22

## 2021-06-16 ENCOUNTER — HOSPITAL ENCOUNTER (OUTPATIENT)
Dept: PHYSICAL THERAPY | Facility: CLINIC | Age: 66
Setting detail: THERAPIES SERIES
Discharge: HOME | End: 2021-06-16
Payer: MEDICARE

## 2021-06-16 PROCEDURE — 97110 THERAPEUTIC EXERCISES: CPT

## 2021-06-16 NOTE — CARE COORDINATION
Medicare Cap   [] Physical Therapy  [] Speech Therapy  [] Occupational therapy  *PT and Speech caps combine      $2010 Cap limit < kx modifier needed < $7945 requires pre-cert        Patient Name: Floresita Durant  YOB: 1955    Note:  This is an estimate of charges billed.                 Date of [address] Name # units/ charge $$$ charge Daily Total Charge Ongoing Total $$$   5-3-21 eval  therex 1  1 82.82  23.22 106.04 106.04    5-5-21 2ex+vaso  3  25.26+19.74+7.96  52.96      5-11-21 3ex+vaso  4  25.26+19.74+19.74++7.96  72.70  231.70    5-13-21 3ex+vaso  4  25.26+19.74+19.74++7.96  72.70  304.40    5-18-21 2ex+vaso 3  25.26+19.74+7.96  52.96 357.36    5-20-21 2ex+vaso 3  25.26+19.74+7.96  52.96  410.32    5/25 3 therex   29.72+23.22+23.22 76.16 486.48     6/3  4 therex    29.72+23.22+23.22+23.22 99.38 585.86    6/16 2ex   29.21+22.84  52.05 637.91

## 2021-06-16 NOTE — FLOWSHEET NOTE
[x] [facility] \[facility]\""  Outpatient Rehabilitation &  Therapy  [facility]   [place]: [phone]  F: [phone]      Physical Therapy Daily Treatment Note    Date:  2021  Patient Name:  Venkatesh Treviño    :  1955  MRN: [id number]  Physician: Dr Edwin Costa: [facility] [place] Vs.BM[place]   [id number] 20 visits/12 remain 6/10/21-21  Medical Diagnosis: LLE TKA            Rehab Codes: M62.81 (Muscle Weakness), M62.9 (Disorder of Muscle), M79.1 (Myalgia), Z73.6   Onset date: DOS 21                  Next Dr's appt.: 21  Visit# / total visits: ; Progress note for Medicare patient due at visit 10     Cancels/No Shows:     Subjective:    Pain:  [x] Yes  [] No Location: L knee Pain Rating: (0-10 scale) 8/10  Pain altered Tx:  [x] No  [] Yes  Action:  Comments: Patient arrives 18 minutes late, able to accommodate. Pt states pain in L knee stating he has been active so he has pain. Objective:    Exercise     LLE Knee TKA   DOS 21 Reps/ Time Weight/ Level Comments             Bike   10'            SB Calf S  3x30\"     HS S Stool  3x30\"              *Heel slides  2x10      *Prone hang 5' 3#              TGym Squats  2x10  L20     TGym HR  2x10  L20               // bars:          Step ups  2x10  6\"     Step downs   2x10  6\"     Mini-lunge  2x10       *TBand TKE  20x10\" Blue     *TBand 4-way hip x20 Blue    Balance board  5' L2              Other    Specific Instructions for next treatment: advance ex's      Treatment Charges: Mins Units   []  Modalities     [x]  Ther Exercise 27 2   []  Manual Therapy     []  Ther Activities     []  Aquatics       Vasocompression     []  Other     Total Treatment time 27 2       Assessment: [x] Progressing toward goals. Continued with standing program, pt required rest breaks throughout. Pt notes no pain, just fatigue. Will continue with strength and stability ex's. [] No change. [] Other:  [x] Patient would continue to benefit from skilled physical therapy services in order to: decrease pain and increase ROM. STG: (to be met in 10 treatments)  1. ? Pain: Decrease pain levels 6/10  2. ? ROM: Increase flexibility and AROM limitations throughout to equal bilat to reduce difficulty with ADLs  3. ? Strength: Increase LE strength to 5/5 MMT   4. Independent with Home Exercise Programs      LTG: (to be met in 20 treatments)  1. Improve score on assessment tool from KOOS from 68% impairment to less than 40% impairment   2. Reduce pain levels to 4/10                  Patient goals: Decrease pain, improve mobility     Pt. Education:  [x] Yes  [] No  [x] Reviewed Prior HEP/Ed  Method of Education: [x] Verbal  [x] Demo  [] Written     Review of HEP, discussion of knee flex/ext focus at home   Comprehension of Education:  [x] Verbalizes understanding. [x] Demonstrates understanding. [x] Needs review. [] Demonstrates/verbalizes HEP/Ed previously given. Plan: [x] Continue current frequency toward long and short term goals.     [x] Specific Instructions for subsequent treatments: advance HEP, ROM, strength       Time In: 3:18pm          Time Out: 3:55pm    Electronically signed by:  Marily Carey PTA

## 2021-06-18 ENCOUNTER — HOSPITAL ENCOUNTER (OUTPATIENT)
Dept: MRI IMAGING | Age: 66
Discharge: HOME | End: 2021-06-20
Payer: MEDICARE

## 2021-06-18 ENCOUNTER — HOSPITAL ENCOUNTER (OUTPATIENT)
Dept: PHYSICAL THERAPY | Facility: CLINIC | Age: 66
Setting detail: THERAPIES SERIES
Discharge: HOME | End: 2021-06-18
Payer: MEDICARE

## 2021-06-18 DIAGNOSIS — M47.817 LUMBAR AND SACRAL OSTEOARTHRITIS: ICD-10-CM

## 2021-06-18 PROCEDURE — 72148 MRI LUMBAR SPINE W/O DYE: CPT

## 2021-06-21 ASSESSMENT — ENCOUNTER SYMPTOMS
ABDOMINAL DISTENTION: 0
COLOR CHANGE: 0
ABDOMINAL PAIN: 0
SHORTNESS OF BREATH: 0
COUGH: 0

## 2021-06-21 NOTE — PROGRESS NOTES
[facility] [facility]  [address]  [facility] [address]  Dept: [phone]  Dept Fax: [phone]        Post Operative Follow Up    Subjective:     Chief Complaint   Patient presents with    Post-Op Check     post op left tka DOS: 4/28/21     Post Op Surgery:     The patient is here for a follow up after having a Left total knee Arthoplasty. The date of surgery was on 4/28/21 . Therefore we are 2 months post op. Currently the patient's pain is controlled with Tylenol ES. Physical therapy was started. Patient presents today with cane that is in good repair. Patient states they are popping in his knee. He states he had a break in his therapy due to an insurance issue. He states he does still have pain. Review of Systems   Constitutional: Positive for activity change. Negative for fatigue and fever. Respiratory: Negative for cough and shortness of breath. Cardiovascular: Negative for chest pain. Gastrointestinal: Negative for abdominal distention and abdominal pain. Genitourinary: Negative for difficulty urinating and dysuria. Musculoskeletal: Positive for arthralgias. Negative for gait problem, joint swelling and myalgias. Skin: Negative for color change and rash. Neurological: Negative for dizziness, weakness and numbness. Psychiatric/Behavioral: Negative for sleep disturbance. I have reviewed the CC, HPI, ROS, PMH, FHX, Social History, and if not present in this note, I have reviewed in the patient's chart. I agree with the documentation provided by other staff and have reviewed their documentation prior to providing my signature indicating agreement. Vitals:   BP (!) 144/88   Pulse 94   Temp 98.1 °F (36.7 °C)   Resp 12   Ht 5' 9\" (1.753 m)   Wt 233 lb 6.4 oz (105.9 kg)   BMI 34.47 kg/m²  Body mass index is 34.47 kg/m².   Physical Examination:     Orthopedics:    GENERAL: Alert and oriented X3 in no acute distress. SKIN: Intact without lesions or ulcerations. Incision is well healed with no signs of infection. NEURO: Intact to sensory and motor testing. VASC: Capillary refill is less than 3 seconds. Post Op Exam:    LOCATION: Left knee  SITE: Distal neurocirculatory status is intact. EXAM: Sensation is intact to light touch, there is full motor function of the extremity. ROM: 10/108 degrees   Procedures:     Procedure:  No    Radiology:   No results from today's visit  Assessment:     1. S/P total knee arthroplasty, left      Plan:   Post Op Treatment : Patient had the treatment regimen reviewed today 2 months s/p Left total knee Arthoplasty. I reviewed the films with the patient. We discussed some of the etiologies and natural histories of recovery after a total knee replacement. We also discussed the various treatment alternatives including anti-inflammatory medications, physical therapy, injections, further imaging studies and as a last resort surgery. During today's visit, we had a long discussion that he needs to be consistent with physical therapy. Even if his knee is hurting he needs to go so that they can help with his discomfort. I am concerned that he needs to get his knee straight and needs to work aggressively on extension. We looked up in extension board online and it is $158 but he can make one for approximately $20.  He states understanding and is going to work hard on getting his knee straight. I would like to give him a prescription for a Medrol Dosepak so he can work hard on getting his knee straight. I will also give him 1 more prescription and this is his final prescription of Percocet. The patient then stated that they understand the plan and at this time, the patient has opted working aggressively on his extension. An Rx refill of Percocet and a prescription for Medrol Dosepak was given . Patient should return to the office in 4 weeks to f/u with Yoli BEEBE and at that visit, patient should have his knee completely straight. He is not going to be happy with his knee replacement if he does not get his knee all the way straight and the only people I can do that are him and his physical therapist.  I did tell him to get on the extension board 3 times a day for at least 10 minutes. The patient will call the office immediately with any problems that may arise. No orders of the defined types were placed in this encounter. No orders of the defined types were placed in this encounter. Reema Simpson PA-C, have personally seen this patient, reviewed the chart including history, and imaging if done. I personally  performed the physical exam and obtained any needed additional history. I placed orders, performed or supervised procedures and developed the treatment plan.     Electronically signed by Howard Garduno PA-C, on 6/23/2021 at 10:21 AM      Electronically signed by Howard Garduno PA-C, on 6/23/2021 at 9:51 AM

## 2021-06-22 ENCOUNTER — OFFICE VISIT (OUTPATIENT)
Dept: FAMILY MEDICINE CLINIC | Age: 66
End: 2021-06-22
Payer: MEDICARE

## 2021-06-22 VITALS
BODY MASS INDEX: 34.78 KG/M2 | DIASTOLIC BLOOD PRESSURE: 90 MMHG | HEART RATE: 93 BPM | HEIGHT: 69 IN | SYSTOLIC BLOOD PRESSURE: 148 MMHG | WEIGHT: 234.8 LBS

## 2021-06-22 DIAGNOSIS — J41.0 SIMPLE CHRONIC BRONCHITIS (HCC): ICD-10-CM

## 2021-06-22 DIAGNOSIS — I10 ESSENTIAL HYPERTENSION: ICD-10-CM

## 2021-06-22 DIAGNOSIS — M54.40 BACK PAIN OF LUMBAR REGION WITH SCIATICA: ICD-10-CM

## 2021-06-22 DIAGNOSIS — G62.9 NEUROPATHY: ICD-10-CM

## 2021-06-22 DIAGNOSIS — J22 LOWER RESPIRATORY INFECTION: ICD-10-CM

## 2021-06-22 DIAGNOSIS — Z96.652 S/P TOTAL KNEE ARTHROPLASTY, LEFT: Primary | ICD-10-CM

## 2021-06-22 PROCEDURE — 4004F PT TOBACCO SCREEN RCVD TLK: CPT | Performed by: STUDENT IN AN ORGANIZED HEALTH CARE EDUCATION/TRAINING PROGRAM

## 2021-06-22 PROCEDURE — 3017F COLORECTAL CA SCREEN DOC REV: CPT | Performed by: STUDENT IN AN ORGANIZED HEALTH CARE EDUCATION/TRAINING PROGRAM

## 2021-06-22 PROCEDURE — 3023F SPIROM DOC REV: CPT | Performed by: STUDENT IN AN ORGANIZED HEALTH CARE EDUCATION/TRAINING PROGRAM

## 2021-06-22 PROCEDURE — G8926 SPIRO NO PERF OR DOC: HCPCS | Performed by: STUDENT IN AN ORGANIZED HEALTH CARE EDUCATION/TRAINING PROGRAM

## 2021-06-22 PROCEDURE — G8427 DOCREV CUR MEDS BY ELIG CLIN: HCPCS | Performed by: STUDENT IN AN ORGANIZED HEALTH CARE EDUCATION/TRAINING PROGRAM

## 2021-06-22 PROCEDURE — 99213 OFFICE O/P EST LOW 20 MIN: CPT | Performed by: STUDENT IN AN ORGANIZED HEALTH CARE EDUCATION/TRAINING PROGRAM

## 2021-06-22 PROCEDURE — G8417 CALC BMI ABV UP PARAM F/U: HCPCS | Performed by: STUDENT IN AN ORGANIZED HEALTH CARE EDUCATION/TRAINING PROGRAM

## 2021-06-22 RX ORDER — GABAPENTIN 100 MG/1
100 CAPSULE ORAL 2 TIMES DAILY
Qty: 30 CAPSULE | Refills: 0 | Status: SHIPPED | OUTPATIENT
Start: 2021-06-22 | End: 2021-07-02

## 2021-06-22 RX ORDER — ALBUTEROL SULFATE 90 UG/1
2 AEROSOL, METERED RESPIRATORY (INHALATION) 4 TIMES DAILY PRN
Qty: 1 INHALER | Refills: 0 | Status: SHIPPED | OUTPATIENT
Start: 2021-06-22 | End: 2021-08-19 | Stop reason: SDUPTHER

## 2021-06-22 RX ORDER — GUAIFENESIN 600 MG/1
600 TABLET, EXTENDED RELEASE ORAL 2 TIMES DAILY
Qty: 30 TABLET | Refills: 0 | Status: SHIPPED | OUTPATIENT
Start: 2021-06-22 | End: 2021-07-07

## 2021-06-22 RX ORDER — AMLODIPINE BESYLATE 5 MG/1
TABLET ORAL
Qty: 90 TABLET | Refills: 2 | Status: SHIPPED | OUTPATIENT
Start: 2021-06-22 | End: 2021-11-10 | Stop reason: SDUPTHER

## 2021-06-22 RX ORDER — AMLODIPINE BESYLATE 5 MG/1
TABLET ORAL
Qty: 90 TABLET | Refills: 2 | Status: CANCELLED | OUTPATIENT
Start: 2021-06-22

## 2021-06-22 RX ORDER — ATORVASTATIN CALCIUM 80 MG/1
80 TABLET, FILM COATED ORAL NIGHTLY
Qty: 30 TABLET | Refills: 3 | Status: CANCELLED | OUTPATIENT
Start: 2021-06-22

## 2021-06-22 RX ORDER — BUDESONIDE AND FORMOTEROL FUMARATE DIHYDRATE 160; 4.5 UG/1; UG/1
2 AEROSOL RESPIRATORY (INHALATION) 2 TIMES DAILY
Qty: 1 INHALER | Refills: 0 | Status: SHIPPED | OUTPATIENT
Start: 2021-06-22 | End: 2021-08-19 | Stop reason: SDUPTHER

## 2021-06-22 RX ORDER — AMOXICILLIN AND CLAVULANATE POTASSIUM 875; 125 MG/1; MG/1
1 TABLET, FILM COATED ORAL 2 TIMES DAILY
Qty: 14 TABLET | Refills: 0 | Status: SHIPPED | OUTPATIENT
Start: 2021-06-22 | End: 2021-06-29

## 2021-06-22 RX ORDER — LIDOCAINE 50 MG/G
1 PATCH TOPICAL DAILY
Qty: 30 PATCH | Refills: 0 | Status: SHIPPED | OUTPATIENT
Start: 2021-06-22 | End: 2021-07-22

## 2021-06-22 ASSESSMENT — ENCOUNTER SYMPTOMS
DIARRHEA: 0
NAUSEA: 0
ABDOMINAL PAIN: 0
COUGH: 1
WHEEZING: 1
CONSTIPATION: 0
BACK PAIN: 1
SHORTNESS OF BREATH: 0
VOMITING: 0

## 2021-06-22 NOTE — PROGRESS NOTES
Visit Information    Have you changed or started any medications since your last visit including any over-the-counter medicines, vitamins, or herbal medicines? no   Are you having any side effects from any of your medications? -  no  Have you stopped taking any of your medications? Is so, why? -  no    Have you seen any other physician or provider since your last visit? No  Have you had any other diagnostic tests since your last visit? No  Have you been seen in the emergency room and/or had an admission to a hospital since we last saw you? No  Have you had your routine dental cleaning in the past 6 months? no    Have you activated your [employer] account? If not, what are your barriers?  Yes     Patient Care Team:  Tiffanie Esparza DO as PCP - General (Family Medicine)  Tiffanie Esparza DO as PCP [facility]  Felipa Vines as Surgeon (Orthopedic Surgery)  [phone])    Medical History Review  Past Medical, Family, and Social History reviewed and does not contribute to the patient presenting condition    Health Maintenance   Topic Date Due    Hepatitis C screen  Never done    HIV screen  Never done    Shingles Vaccine (1 of 2) Never done    Lipid screen  09/18/2020    Annual Wellness Visit (AWV)  01/19/2022    A1C test (Diabetic or Prediabetic)  04/22/2022    Potassium monitoring  05/02/2022    Creatinine monitoring  05/02/2022    Pneumococcal 0-64 years Vaccine (2 of 2) 11/06/2025    Colon cancer screen colonoscopy  03/15/2026    DTaP/Tdap/Td vaccine (2 - Td or Tdap) 11/06/2030    Flu vaccine  Completed    COVID-19 Vaccine  Completed    Hepatitis A vaccine  Aged Out    Hepatitis B vaccine  Aged Out    Hib vaccine  Aged Out    Meningococcal (ACWY) vaccine  Aged Out

## 2021-06-22 NOTE — PROGRESS NOTES
Attending Physician Statement  I have discussed the care of Bettye Alvarado, 63 y.o. male,including pertinent history and exam findings,  with the resident Dr. Dev Dennis MD.  History:  Chief Complaint   Patient presents with    Follow-Up from [address] [place] [address] having left knee pain,     Back Pain     two previous back surgeries, MRI done 6/18/2021     Patient is here for follow up on left knee arthritis status post knee replacement. I have reviewed the key elements of the encounter with the resident. Examination was done by resident as documented in residents note. BP Readings from Last 3 Encounters:   06/22/21 (!) 148/90   05/27/21 136/81   05/03/21 (!) 150/67     BP (!) 148/90   Pulse 93   Ht 5' 9\" (1.753 m)   Wt 234 lb 12.8 oz (106.5 kg)   BMI 34.67 kg/m²   Lab Results   Component Value Date    WBC 9.60 05/02/2021    HGB 11.1 (L) 05/02/2021    HCT 33.5 (L) 05/02/2021     05/02/2021    CHOL 191 09/18/2019    TRIG 186 (H) 09/18/2019    HDL 34 (L) 09/18/2019    ALT 13 12/23/2018    AST 13 12/23/2018     05/02/2021    K 3.9 05/02/2021     05/02/2021    CREATININE 1.55 (H) 05/02/2021    BUN 23 05/02/2021    CO2 27 05/02/2021    TSH 1.77 12/06/2012    INR 1.0 04/22/2021    LABA1C 5.9 04/22/2021     Lab Results   Component Value Date    CALCIUM 9.5 05/02/2021     Lab Results   Component Value Date    LDLCHOLESTEROL 120 09/18/2019     I agree with the assessment, plan and diagnosis of    Diagnosis Orders   1. S/P total knee arthroplasty, left     2. Simple chronic bronchitis (HCC)  albuterol sulfate HFA (VENTOLIN HFA) 108 (90 Base) MCG/ACT inhaler    budesonide-formoterol (SYMBICORT) 160-4.5 MCG/ACT AERO   3. Essential hypertension  amLODIPine (NORVASC) 5 MG tablet   4. Back pain of lumbar region with sciatica  lidocaine (LIDODERM) 5 %   5. Neuropathy  gabapentin (NEURONTIN) 100 MG capsule   6.  Lower respiratory infection  amoxicillin-clavulanate (AUGMENTIN) 875-125 MG per tablet guaiFENesin (MUCINEX) 600 MG extended release tablet     I agree with  orders as documented by the resident. Recommendations:   Patient was counseled, advised to continue PT and NSAID. Other medical issues with hypertension, chronic back pain and peripheral neuropathy. Regimen refilled for hypertension and URI symptoms treated with supportive measures and oral Augmentin. Return in about 3 months (around 9/22/2021) for follow up .    (Eric Mclaughlin ) Dr. Mariann Leahy MD

## 2021-06-22 NOTE — PROGRESS NOTES
Subjective:    Zachary Alaniz is a 63 y.o. male with  has a past medical history of Aortic valve sclerosis, Back pain, Cerebral artery occlusion with cerebral infarction ([address] COPD (chronic obstructive pulmonary disease) ([address] Dental bridge present, Depression, DJD (degenerative joint disease), Drug abuse ([address] High cholesterol, History of shingles, Hypertension, Pain at rest, Postlaminectomy syndrome, Sleep apnea, Suicidal ideations, TIA (transient ischemic attack), Toe contracture, left, Toe pain, left, Wears dentures, and Wears glasses. Family History   Problem Relation Age of Onset    Diabetes Mother     Diabetes Brother        Presented tot office today for:  Chief Complaint   Patient presents with    Follow-Up from [address] [place] [address] having left knee pain,     Back Pain     two previous back surgeries, MRI done 6/18/2021       HPI  62 yo male presents for follow up     Left knee arthroplasty   4/28/21  Tolerated well  No complications   Pain controlled with percocet  Follows with Ortho   Ongoing physical therapy   Pain is persistent , controlled with analgesics   Ortho prescribing percocets   +swelling , no change from baseline     HTN  Uncontrolled   Controlled at last visit in May   Denies HA, blurry vision, sob, chest pain    OA of back  S/p laminectomy (2016)  Patient complaining of lower back pain with radiculopathy   Constant   Same , no change   Requesting analgesics   MRI of spine  6/18/21  Impression   Posterior fixation and laminectomy at L4-5 without evidence for complication.       Multilevel degenerative change with canal stenosis at L3-4.       Bilateral foraminal narrowing as described above. Cough  Hx of copd   Has chronic cough but has been worsening  Productive of brown/ yellow mucus  Denies fever/chills, sob, pleuritic chest pain     Review of Systems   Constitutional: Negative for chills, fatigue and fever. Eyes: Negative for visual disturbance.    Respiratory: Positive for cough and wheezing. Negative for shortness of breath. Cardiovascular: Negative for chest pain, palpitations and leg swelling. Gastrointestinal: Negative for abdominal pain, constipation, diarrhea, nausea and vomiting. Genitourinary: Negative for dysuria and frequency. Musculoskeletal: Positive for back pain. Negative for myalgias. Left knee pain/swelling - unchanged from baseline s/p surgery in April    Neurological: Negative for weakness, light-headedness and headaches. Psychiatric/Behavioral: Negative for dysphoric mood. Objective:    BP (!) 153/89 (Site: Right Lower Arm, Position: Sitting, Cuff Size: Medium Adult)   Pulse 93   Ht 5' 9\" (1.753 m)   Wt 234 lb 12.8 oz (106.5 kg)   BMI 34.67 kg/m²    BP Readings from Last 3 Encounters:   06/22/21 (!) 153/89   05/27/21 136/81   05/03/21 (!) 150/67       Physical Exam  Vitals reviewed. Constitutional:       General: He is not in acute distress. Eyes:      Conjunctiva/sclera: Conjunctivae normal.   Cardiovascular:      Rate and Rhythm: Normal rate and regular rhythm. Heart sounds: Normal heart sounds. Pulmonary:      Effort: Pulmonary effort is normal. No respiratory distress. Breath sounds: Wheezing (expiratory in all lung fields) present. No rhonchi. Abdominal:      General: Bowel sounds are normal.      Palpations: Abdomen is soft. Musculoskeletal:         General: No tenderness (none in lumbar back ). Cervical back: Neck supple. Right lower leg: No edema. Left lower leg: No edema. Comments: Left knee: mild generalized swelling, vertical surgical scar noted, tenderness to palpation on medial/lateral aspect, patient states chronic since surgery , no tenderness b/l calves    Skin:     General: Skin is warm. Neurological:      Mental Status: He is alert. Sensory: No sensory deficit. Motor: No weakness.          Lab Results   Component Value Date    WBC 9.60 05/02/2021    HGB 11.1 (L) 05/02/2021    HCT 33.5 (L) 05/02/2021     05/02/2021    CHOL 191 09/18/2019    TRIG 186 (H) 09/18/2019    HDL 34 (L) 09/18/2019    ALT 13 12/23/2018    AST 13 12/23/2018     05/02/2021    K 3.9 05/02/2021     05/02/2021    CREATININE 1.55 (H) 05/02/2021    BUN 23 05/02/2021    CO2 27 05/02/2021    TSH 1.77 12/06/2012    INR 1.0 04/22/2021    LABA1C 5.9 04/22/2021     Lab Results   Component Value Date    CALCIUM 9.5 05/02/2021     Lab Results   Component Value Date    LDLCHOLESTEROL 120 09/18/2019       Assessment and Plan:    1. S/P total knee arthroplasty, left  -healing well , follows with ortho   -in a physical therpay program  -percocets being prescribed by ortho     2. Simple chronic bronchitis (HCC)  - albuterol sulfate HFA (VENTOLIN HFA) 108 (90 Base) MCG/ACT inhaler; Inhale 2 puffs into the lungs 4 times daily as needed for Wheezing  Dispense: 1 Inhaler; Refill: 0  - budesonide-formoterol (SYMBICORT) 160-4.5 MCG/ACT AERO; Inhale 2 puffs into the lungs 2 times daily  Dispense: 1 Inhaler; Refill: 0    3. Essential hypertension  -uncontrolled , likely due to patinet arvin in pain   -counseled to go to ED if chest pain , blurry vision , sob, worse headache of life   - amLODIPine (NORVASC) 5 MG tablet; take 1 tablet by mouth once daily  Dispense: 90 tablet; Refill: 2    4. Back pain of lumbar region with sciatica  - lidocaine (LIDODERM) 5 %; Place 1 patch onto the skin daily 12 hours on, 12 hours off. Dispense: 30 patch; Refill: 0    5. Neuropathy  - gabapentin (NEURONTIN) 100 MG capsule; Take 1 capsule by mouth 2 times daily for 30 doses. Dispense: 30 capsule; Refill: 0    6. Lower respiratory infection  - amoxicillin-clavulanate (AUGMENTIN) 875-125 MG per tablet; Take 1 tablet by mouth 2 times daily for 7 days  Dispense: 14 tablet; Refill: 0  - guaiFENesin (MUCINEX) 600 MG extended release tablet; Take 1 tablet by mouth 2 times daily for 15 days  Dispense: 30 tablet;  Refill: 0          Requested Prescriptions     Signed Prescriptions Disp Refills    albuterol sulfate HFA (VENTOLIN HFA) 108 (90 Base) MCG/ACT inhaler 1 Inhaler 0     Sig: Inhale 2 puffs into the lungs 4 times daily as needed for Wheezing    gabapentin (NEURONTIN) 100 MG capsule 30 capsule 0     Sig: Take 1 capsule by mouth 2 times daily for 30 doses.  budesonide-formoterol (SYMBICORT) 160-4.5 MCG/ACT AERO 1 Inhaler 0     Sig: Inhale 2 puffs into the lungs 2 times daily    lidocaine (LIDODERM) 5 % 30 patch 0     Sig: Place 1 patch onto the skin daily 12 hours on, 12 hours off.  amLODIPine (NORVASC) 5 MG tablet 90 tablet 2     Sig: take 1 tablet by mouth once daily    amoxicillin-clavulanate (AUGMENTIN) 875-125 MG per tablet 14 tablet 0     Sig: Take 1 tablet by mouth 2 times daily for 7 days    guaiFENesin (MUCINEX) 600 MG extended release tablet 30 tablet 0     Sig: Take 1 tablet by mouth 2 times daily for 15 days       Medications Discontinued During This Encounter   Medication Reason    gabapentin (NEURONTIN) 100 MG capsule REORDER    budesonide-formoterol (SYMBICORT) 160-4.5 MCG/ACT AERO REORDER    albuterol sulfate HFA (VENTOLIN HFA) 108 (90 Base) MCG/ACT inhaler REORDER    amLODIPine (NORVASC) 5 MG tablet REORDER       Gurpreet received counseling on the following healthy behaviors:nutrition, exercise and medication adherence    Discussed use, benefit, and side effects of prescribed medications. Barriers to medication compliance addressed. All patient questions answered. Pt voicedunderstanding. Return in about 3 months (around 9/22/2021) for follow up .

## 2021-06-23 ENCOUNTER — HOSPITAL ENCOUNTER (OUTPATIENT)
Dept: PHYSICAL THERAPY | Facility: CLINIC | Age: 66
Setting detail: THERAPIES SERIES
Discharge: HOME | End: 2021-06-23
Payer: MEDICARE

## 2021-06-23 ENCOUNTER — OFFICE VISIT (OUTPATIENT)
Dept: ORTHOPEDIC SURGERY | Age: 66
End: 2021-06-23

## 2021-06-23 VITALS
HEIGHT: 69 IN | BODY MASS INDEX: 34.57 KG/M2 | RESPIRATION RATE: 12 BRPM | DIASTOLIC BLOOD PRESSURE: 88 MMHG | WEIGHT: 233.4 LBS | HEART RATE: 94 BPM | TEMPERATURE: 98.1 F | SYSTOLIC BLOOD PRESSURE: 144 MMHG

## 2021-06-23 DIAGNOSIS — Z96.652 S/P TOTAL KNEE ARTHROPLASTY, LEFT: Primary | ICD-10-CM

## 2021-06-23 PROCEDURE — 97140 MANUAL THERAPY 1/> REGIONS: CPT

## 2021-06-23 PROCEDURE — 97110 THERAPEUTIC EXERCISES: CPT

## 2021-06-23 PROCEDURE — 99024 POSTOP FOLLOW-UP VISIT: CPT | Performed by: PHYSICIAN ASSISTANT

## 2021-06-23 RX ORDER — OXYCODONE HYDROCHLORIDE AND ACETAMINOPHEN 5; 325 MG/1; MG/1
1 TABLET ORAL EVERY 6 HOURS PRN
Qty: 28 TABLET | Refills: 0 | Status: SHIPPED | OUTPATIENT
Start: 2021-06-23 | End: 2021-06-30

## 2021-06-23 RX ORDER — METHYLPREDNISOLONE 4 MG/1
TABLET ORAL
Qty: 1 KIT | Refills: 0 | Status: SHIPPED | OUTPATIENT
Start: 2021-06-23

## 2021-06-23 NOTE — FLOWSHEET NOTE
[x] [facility] \[facility]\""  Outpatient Rehabilitation &  Therapy  [facility]   [place]: [phone]  F: [phone]      Physical Therapy Daily Treatment Note    Date:  2021  Patient Name:  Ovi Douse    :  1955  MRN: [id number]  Physician: Dr Prakash Koroma: [facility] [place]s.[place]   [id number] 20 visits/12 remain 6/10/21-21  Medical Diagnosis: LLE TKA            Rehab Codes: M62.81 (Muscle Weakness), M62.9 (Disorder of Muscle), M79.1 (Myalgia), Z73.6   Onset date: DOS 21                  Next Dr's appt.: 21    Visit# / total visits: 10/20; Progress note for Medicare patient due at visit 10     Cancels/No Shows:     Subjective:    Pain:  [x] Yes  [] No Location: L knee Pain Rating: (0-10 scale) 3/10  Pain altered Tx:  [x] No  [] Yes  Action:  Comments: Patient arrived noting minimal pain in L knee. States that he saw the physician and was told to focus on knee extension. Objective:    Exercise     LLE Knee TKA   DOS 21 Reps/ Time Weight/ Level Comments             Bike   10'            SB Calf S  3x30\"     HS S Stool  3x30\"              *Heel slides  2x10      *Prone hang 5' 5#              TGym Squats  2x10  L20     TGym HR  2x10  L20               // bars:          Step ups  2x10  6\"     Step downs   2x10  6\"     Mini-lunge  2x10       *TBand TKE  20x10\" Blue     Reverse TKE 10x10\"     *TBand 4-way hip x20 Blue    Balance board  5' L2              Other: DI to popliteus    Specific Instructions for next treatment: advance ex's       Treatment Charges: Mins Units   []  Modalities     [x]  Ther Exercise 30 2   [x]  Manual Therapy 10 1   []  Ther Activities     []  Aquatics       Vasocompression     []  Other     Total Treatment time 40 3       Assessment: [x] Progressing toward goals. Progressed ROM program this date with focus on extension.  Patient notes discomfort with progressions but overall improvement in ROM noted. [] No change. [] Other:  [x] Patient would continue to benefit from skilled physical therapy services in order to: decrease pain and increase ROM. STG: (to be met in 10 treatments)  1. ? Pain: Decrease pain levels 6/10  2. ? ROM: Increase flexibility and AROM limitations throughout to equal bilat to reduce difficulty with ADLs  3. ? Strength: Increase LE strength to 5/5 MMT   4. Independent with Home Exercise Programs      LTG: (to be met in 20 treatments)  1. Improve score on assessment tool from KOOS from 68% impairment to less than 40% impairment   2. Reduce pain levels to 4/10                  Patient goals: Decrease pain, improve mobility     Pt. Education:  [x] Yes  [] No  [x] Reviewed Prior HEP/Ed  Method of Education: [x] Verbal  [] Demo  [] Written     Review of HEP, discussion of knee flex/ext focus at home   Comprehension of Education:  [x] Verbalizes understanding. [] Demonstrates understanding. [x] Needs review. [] Demonstrates/verbalizes HEP/Ed previously given. Plan: [x] Continue current frequency toward long and short term goals.     [x] Specific Instructions for subsequent treatments: advance HEP, ROM, strength       Time In: 1500           Time Out: 1550    Electronically signed by:  Brady Tubbs PTA

## 2021-06-25 ENCOUNTER — HOSPITAL ENCOUNTER (OUTPATIENT)
Dept: PHYSICAL THERAPY | Facility: CLINIC | Age: 66
Setting detail: THERAPIES SERIES
Discharge: HOME | End: 2021-06-25
Payer: MEDICARE

## 2021-06-25 PROCEDURE — 97110 THERAPEUTIC EXERCISES: CPT

## 2021-06-25 PROCEDURE — 97140 MANUAL THERAPY 1/> REGIONS: CPT

## 2021-06-25 NOTE — PROGRESS NOTES
[] [facility]) [facility] &  Therapy  [address]  [phone]  F: [phone] [] [address]  [place] [address]   [address]  P: [phone]  F: [phone] [] [address] &  Therapy  [address] [facility]  P: [phone]  F: [phone] [] [address]  P: [phone]  F: [phone] [x] [facility]  Outpatient Rehabilitation &  Therapy  [address] [facility]   [address]   [place]: [phone]  F: [phone]      Physical Therapy Progress Note    Date: 2021      Patient: Lisette Leventhal  : 1955  MRN: [id number]    Physician: Dr Kelly                           Insurance: [facility] Medicare Vs.[facility] Nelda Madina Dual   ZS#[id number] TRACKING#[id number]   20 visits/12 remain    6/10/21-21  Medical Diagnosis: LLE QYX            Rehab Codes: M62.81 (Muscle Weakness), M62.9 (Disorder of Muscle), M79.1 (Myalgia), Z73.6   Onset date: DOS 21                  Next Dr's appt.: 21  Cancels/No shows: 2  Date range of services: 5/3/21 to 21 visits       Subjective:  Pain:  [x]? Yes  []? No   Location: L knee         Pain Rating:Pain ratings average from 3-9/10 depending on the session  Comments: Patient arrived noting 9/10 pain this date. He reports that he had pain going from sit to stand earlier today due to the \"knee straightening\". Patient continues to stay active, but this increases his pain. Objective:  Test Measurements:    ROM  ° A/P STRENGTH     Left Right Left Right   Knee Flex 117 AROM WNL 4 WNL   Ext   WFL 4- WNL                  Patient with   Function: KOOS- Physical Function Shortform - 57% impairment    Assessment: Patient with mild strength deficits, with mobility and pain being the main limitations at this time.  Decreased pain after PT sessions. Continue to progress HEP. Goals Reassessed 6/25/21  STG: (to be met in 10 treatments)  1. ? Pain: Decrease pain levels 6/10 ONGOING (9/10 pain upon arrival 6/25/21)   2. ? ROM: Increase flexibility and AROM limitations throughout to equal bilat to reduce difficulty with ADLs ONGOING ( degrees L knee AROM)  3. ? Strength: Increase LE strength to 5/5 MMT ONGOING (see strength measurements above)   4. Independent with Home Exercise Programs MET current program      LTG: (to be met in 20 treatments)  1. Improve score on assessment tool from KOOS from 68% impairment to less than 40% impairment ONGOING (57% impairment 6/25/21)  2. Reduce pain levels to 4/10 ONGOING     Treatment Plan:  [x] Therapeutic Exercise   68219  [] Iontophoresis: 4 mg/mL Dexamethasone Sodium Phosphate  mAmin  09337   [] Therapeutic Activity  22447 [x] Vasopneumatic cold with compression  31443    [x] Gait Training   12217 [] Ultrasound   91512   [x] Neuromuscular Re-education  51409 [] Electrical Stimulation Unattended  35086   [x] Manual Therapy  64580 [] Electrical Stimulation Attended  83570   [x] Instruction in HEP  [] Lumbar/Cervical Traction  38721   [] Aquatic Therapy   96867 [] Cold/hotpack    [] Massage   61650      [] Dry Needling, 1 or 2 muscles  55899   [] Biofeedback, first 15 minutes   70765  [] Biofeedback, additional 15 minutes   05399 [] Dry Needling, 3 or more muscles  21955       Patient Status:     [x] Continue per initial plan of care. [] Additional visits necessary. [] Other:     Requested Frequency/Duration: 2 times per week for 20 treatments. Electronically signed by Prabha Parker PT on 6/25/2021 at 4:17 PM      If you have any questions or concerns, please don't hesitate to call.   Thank you for your referral.    Physician Signature:________________________________Date:__________________  By signing above or cosigning this note, I have reviewed this plan of care and certify a need for medically necessary rehabilitation services.      *PLEASE SIGN ABOVE AND FAX BACK ALL PAGES*

## 2021-06-25 NOTE — FLOWSHEET NOTE
[x] [facility]  Outpatient Rehabilitation &  Therapy  [facility]   [place]: [phone]  F: [phone]      Physical Therapy Daily Treatment Note    Date:  2021  Patient Name:  Ras Gonzales    :  1955  MRN: [id number]  Physician: Dr Amos Villalta: [facility] Medicare Vs.[employer]   AO#[id number] 20 visits/12 remain 6/10/21-21  Medical Diagnosis: LLE TKA            Rehab Codes: M62.81 (Muscle Weakness), M62.9 (Disorder of Muscle), M79.1 (Myalgia), Z73.6   Onset date: DOS 21                  Next Dr's appt.: 21  Visit# / total visits: ; Progress note for Medicare patient due at visit 21    Cancels/No Shows:     Subjective:    Pain:  [x] Yes  [] No Location: L knee Pain Rating: (0-10 scale) 9/10  Pain altered Tx:  [x] No  [] Yes  Action:  Comments: Patient arrived noting 9/10 pain this date.  He reports that he had pain going from sit to stand earlier today due to the [facility] straightening\"       Objective:  Exercise     LLE Knee TKA   DOS 21 Reps/ Time Weight/ Level Comments             Bike   10'            SB Calf S  3x30\"     HS S Stool  3x30\"              *Heel slides  2x10   Strap added 21   *Prone hang 5' 5#              TGym Squats   L20     TGym HR   L20               // bars:          Step ups   6\"     Step downs    6\"     Mini-lunge        *TBand TKE  20x10\" Blue     Reverse TKE 10x10\"     *TBand 4-way hip x20 Blue    Balance board  5' L2 Stopped after 4' due to L knee discomfort             Other: DI to L popliteus     ROM  ° A/P STRENGTH     Left Right Left Right   Knee Flex 117 AROM WNL 4 WNL   Ext   WFL 4- WNL                      Specific Instructions for next treatment: advance ex's       Treatment Charges: Mins Units   []  Modalities     [x]  Ther Exercise 45 3   [x]  Manual Therapy 8 1   []  Ther Activities     []  Aquatics       Vasocompression     []  Other Total Treatment time 53 4       Assessment: [x] Progressing toward goals. Progressed ROM program this date with focus on extension. Re-evaluation performed with patient improving L knee ROM to  degrees at the end of session after manual performed. Patient with only mild strength deficits, with mobility and pain being the main limitations at this time. Decreased pain after session. [] No change. [] Other:  [x] Patient would continue to benefit from skilled physical therapy services in order to: decrease pain and increase ROM. Goals Reassessed 6/25/21  STG: (to be met in 10 treatments)  1. ? Pain: Decrease pain levels 6/10 ONGOING (9/10 pain upon arrival 6/25/21)   2. ? ROM: Increase flexibility and AROM limitations throughout to equal bilat to reduce difficulty with ADLs ONGOING ( degrees L knee AROM)  3. ? Strength: Increase LE strength to 5/5 MMT ONGOING (see strength measurements above)   4. Independent with Home Exercise Programs MET current program      LTG: (to be met in 20 treatments)  1. Improve score on assessment tool from KOOS from 68% impairment to less than 40% impairment ONGOING (57% impairment 6/25/21)  2. Reduce pain levels to 4/10 ONGOING                  Patient goals: Decrease pain, improve mobility     Pt. Education:  [x] Yes  [] No  [x] Reviewed Prior HEP/Ed  Method of Education: [x] Verbal  [] Demo  [] Written     Review of HEP, verbal encouragement of prone extension and heel slides at home     Comprehension of Education:  [x] Verbalizes understanding. [x] Demonstrates understanding. [x] Needs review. [] Demonstrates/verbalizes HEP/Ed previously given. Plan: [x] Continue current frequency toward long and short term goals.     [x] Specific Instructions for subsequent treatments: advance HEP, ROM, strength       Time In: 1500           Time Out: 1558    Electronically signed by:  Racheal Burt, PT

## 2021-06-29 ENCOUNTER — TELEPHONE (OUTPATIENT)
Dept: ORTHOPEDIC SURGERY | Age: 66
End: 2021-06-29

## 2021-06-29 ENCOUNTER — HOSPITAL ENCOUNTER (OUTPATIENT)
Dept: PHYSICAL THERAPY | Facility: CLINIC | Age: 66
Setting detail: THERAPIES SERIES
Discharge: HOME | End: 2021-06-29
Payer: MEDICARE

## 2021-06-29 NOTE — TELEPHONE ENCOUNTER
Dr. Neda Zendejas would like to know if there is a time limiting factor following a TKA 04/28/2021 to perform a laminectomy. They are wanting to get him scheduled ASAP. I informed them that the health status of the patient would be the only limiting factor at this time post op. Any other restrictions.         Marcel Alaniz  [phone]

## 2021-06-29 NOTE — FLOWSHEET NOTE
[x] [facility]  Outpatient Rehabilitation &  Therapy  [facility]   [place]: [phone]  F: [phone]      Physical Therapy Cancel/No Show note    Date: 2021  Patient: Josh Ta  : 1955  MRN: [id number]    Cancels/No Shows to date:     For today's appointment patient:    []  Cancelled    [] Rescheduled appointment    [x] No-show     Reason given by patient:    []  Patient ill    []  Conflicting appointment    [] No transportation      [] Conflict with work    [x] No reason given    [] Weather related    [] [id number]    [] Other:      Comments:       [] Next appointment was confirmed    Electronically signed by: Wes Garcia PTA

## 2021-06-30 ENCOUNTER — HOSPITAL ENCOUNTER (OUTPATIENT)
Dept: PHYSICAL THERAPY | Facility: CLINIC | Age: 66
Setting detail: THERAPIES SERIES
Discharge: HOME | End: 2021-06-30
Payer: MEDICARE

## 2021-06-30 PROCEDURE — 97110 THERAPEUTIC EXERCISES: CPT

## 2021-06-30 NOTE — FLOWSHEET NOTE
[x] [facility]  Outpatient Rehabilitation &  Therapy  [facility]   [place]: [phone]  F: [phone]      Physical Therapy Daily Treatment Note    Date:  2021  Patient Name:  Suraj Abreu    :  1955  MRN: [id number]  Physician: Dr Jcarlos Reyes: [facility] [place].[employer]osie Aase   XLXZ#[id number] 20 visits/12 remain 6/10/21-21  Medical Diagnosis: LLE TKA            Rehab Codes: M62.81 (Muscle Weakness), M62.9 (Disorder of Muscle), M79.1 (Myalgia), Z73.6   Onset date: DOS 21                  Next Dr's appt.: 21  Visit# / total visits: ; Progress note for Medicare patient due at visit 21    Cancels/No Shows:     Subjective:    Pain:  [x] Yes  [] No Location: L knee Pain Rating: (0-10 scale) 2/10  Pain altered Tx:  [x] No  [] Yes  Action:  Comments: Patient arrived stating he is having back surgery on 21. Pt reports he rode his back about a half mile two days in a row with no pain in knee but it caused a lot of back pain so he stopped biking outside. Pt reports the only time he has knee pain is when he goes to straighten it when sitting and the pain is on the lateral side of knee.        Objective:  Exercise     LLE Knee TKA   DOS 21 Reps/ Time Weight/ Level Comments             Bike   10'            SB Calf S  3x30\"     HS S Stool  3x30\"              *Heel slides  2x10 Strap  NT   *Prone hang 5' 5# NT             TGym Squats 2x10  L20     TGym HR 2x10  L20               // bars:          Step ups   6\"     Step downs    6\"     Mini-lunge 2x10       *TBand TKE  20x10\" Blue    Reverse TKE 10x10\"     *TBand 4-way hip x15 Blue    Balance board  5' L2              Other:      Specific Instructions for next treatment: advance ex's/ knee ROM      Treatment Charges: Mins Units   []  Modalities     [x]  Ther Exercise 30 2   []  Manual Therapy     []  Ther Activities     []  Aquatics Vasocompression     []  Other     Total Treatment time 30 2       Assessment: [x] Progressing toward goals. Focused on strength program, pt with quick fatigue requiring rest breaks. Overall strength has improved, however, knee ROM is lacking. Pt plans to continue prone hang and ROM exercises at home. [] No change. [] Other:  [x] Patient would continue to benefit from skilled physical therapy services in order to: decrease pain and increase ROM. Goals Reassessed 6/25/21  STG: (to be met in 10 treatments)  1. ? Pain: Decrease pain levels 6/10 ONGOING (9/10 pain upon arrival 6/25/21)   2. ? ROM: Increase flexibility and AROM limitations throughout to equal bilat to reduce difficulty with ADLs ONGOING ( degrees L knee AROM)  3. ? Strength: Increase LE strength to 5/5 MMT ONGOING (see strength measurements above)   4. Independent with Home Exercise Programs MET current program      LTG: (to be met in 20 treatments)  1. Improve score on assessment tool from KOOS from 68% impairment to less than 40% impairment ONGOING (57% impairment 6/25/21)  2. Reduce pain levels to 4/10 ONGOING                  Patient goals: Decrease pain, improve mobility     Pt. Education:  [x] Yes  [] No  [x] Reviewed Prior HEP/Ed  Method of Education: [x] Verbal  [] Demo  [] Written   Comprehension of Education:  [x] Verbalizes understanding. [x] Demonstrates understanding. [x] Needs review. [] Demonstrates/verbalizes HEP/Ed previously given. Plan: [x] Continue current frequency toward long and short term goals.     [x] Specific Instructions for subsequent treatments: advance HEP, ROM, strength       Time In: 4:00pm            Time Out: 4:40pm     Electronically signed by:  Chana Wilkerson PTA

## 2021-07-01 ENCOUNTER — HOSPITAL ENCOUNTER (OUTPATIENT)
Dept: PHYSICAL THERAPY | Facility: CLINIC | Age: 66
Setting detail: THERAPIES SERIES
Discharge: HOME | End: 2021-07-01
Payer: COMMERCIAL

## 2021-07-01 PROCEDURE — 97110 THERAPEUTIC EXERCISES: CPT

## 2021-07-01 NOTE — FLOWSHEET NOTE
[x] [facility] [place] [facility]""  Outpatient Rehabilitation &  Therapy  [facility]   [place]: [phone]  F: [phone]      Physical Therapy Daily Treatment Note    Date:  2021  Patient Name:  Kalpesh Hernández    :  1955  MRN: [id number]  Physician: Dr Roque Jennings: [facility] Medicare Vs.BMN [place]   LNBK#[id number] 20 visits/12 remain 6/10/21-21  Medical Diagnosis: LLE TKA            Rehab Codes: M62.81 (Muscle Weakness), M62.9 (Disorder of Muscle), M79.1 (Myalgia), Z73.6   Onset date: DOS 21                  Next 's appt.: 21  Visit# / total visits: ; Progress note for Medicare patient due at visit 21    Cancels/No Shows:     Subjective:    Pain:  [x] Yes  [] No Location: L knee Pain Rating: (0-10 scale) 2/10  Pain altered Tx:  [x] No  [] Yes  Action:  Comments: Patient arrived stating no soreness from yesterdays therapy. Objective:  Exercise     LLE Knee TKA   DOS 21 Reps/ Time Weight/ Level Comments             Bike   10'            SB Calf S  3x30\"     HS S Stool  3x30\"              *Heel slides  2x10 Strap     *Prone hang 5' 5#              TGym Squats 2x10  L20     TGym HR 2x10  L20               // bars:          Step ups 2x10  6\"     Step downs  2x10  6\"     Mini-lunge 2x10       *TBand TKE  20x10\" Blue NT   Reverse TKE 10x10\" Blue NT   *TBand 4-way hip x20 Blue    Balance board  5' L3              Other:      Specific Instructions for next treatment: advance ex's/ knee ROM      Treatment Charges: Mins Units   []  Modalities     [x]  Ther Exercise 30 2   []  Manual Therapy     []  Ther Activities     []  Aquatics       Vasocompression     []  Other     Total Treatment time 30 2       Assessment: [x] Progressing toward goals. Continued with program, pt required seated rest breaks due to fatigue.  Pt continues to lack knee extension and has difficulty tolerating prone hang due to increased back pain.      [] No change. [] Other:  [x] Patient would continue to benefit from skilled physical therapy services in order to: decrease pain and increase ROM. Goals Reassessed 6/25/21  STG: (to be met in 10 treatments)  1. ? Pain: Decrease pain levels 6/10 ONGOING (9/10 pain upon arrival 6/25/21)   2. ? ROM: Increase flexibility and AROM limitations throughout to equal bilat to reduce difficulty with ADLs ONGOING ( degrees L knee AROM)  3. ? Strength: Increase LE strength to 5/5 MMT ONGOING (see strength measurements above)   4. Independent with Home Exercise Programs MET current program      LTG: (to be met in 20 treatments)  1. Improve score on assessment tool from KOOS from 68% impairment to less than 40% impairment ONGOING (57% impairment 6/25/21)  2. Reduce pain levels to 4/10 ONGOING                  Patient goals: Decrease pain, improve mobility     Pt. Education:  [x] Yes  [] No  [x] Reviewed Prior HEP/Ed  Method of Education: [x] Verbal  [] Demo  [] Written   Comprehension of Education:  [x] Verbalizes understanding. [x] Demonstrates understanding. [x] Needs review. [] Demonstrates/verbalizes HEP/Ed previously given. Plan: [x] Continue current frequency toward long and short term goals.     [x] Specific Instructions for subsequent treatments: advance HEP, ROM, strength       Time In: 2:05pm            Time Out: 2:45pm     Electronically signed by:  Merary Jean-Baptiste PTA

## 2021-07-02 DIAGNOSIS — G62.9 NEUROPATHY: ICD-10-CM

## 2021-07-02 RX ORDER — GABAPENTIN 100 MG/1
CAPSULE ORAL
Qty: 30 CAPSULE | Refills: 0 | Status: SHIPPED | OUTPATIENT
Start: 2021-07-02 | End: 2021-07-22

## 2021-07-02 NOTE — TELEPHONE ENCOUNTER
E-scribe request for gabapentin. Please review and e-scribe if applicable.      Last Visit Date: 6/22/2021  Next Visit Date:  Visit date not found    Hemoglobin A1C (%)   Date Value   04/22/2021 5.9   03/29/2021 6.0   11/06/2020 5.9             ( goal A1C is < 7)   No results found for: LABMICR  LDL Cholesterol (mg/dL)   Date Value   09/18/2019 120       (goal LDL is <100)   AST (U/L)   Date Value   12/23/2018 13     ALT (U/L)   Date Value   12/23/2018 13     BUN (mg/dL)   Date Value   05/02/2021 23     BP Readings from Last 3 Encounters:   06/23/21 (!) 144/88   06/22/21 (!) 148/90   05/27/21 136/81          (goal 120/80)        Patient Active Problem List:     Tobacco abuse     Back pain     Hypercholesteremia     Trigger finger of left hand     Trigger finger, right     Primary osteoarthritis of left knee     Right shoulder pain     Essential hypertension     Chronic obstructive pulmonary disease (HCC)     Right foot pain     Back pain of lumbar region with sciatica     Spondylolisthesis, lumbar region     Chronic low back pain without sciatica     Right hip pain     Acute ischemic right middle cerebral artery (MCA) stroke (HCC)     Vision changes     Stroke-like symptoms     Vitamin D deficiency     Elevated LDL cholesterol level     Thalamic infarct, acute (HCC)     Simple chronic bronchitis (HCC)     Neural foraminal stenosis of lumbar spine     Postlaminectomy syndrome     Idiopathic acute pancreatitis     Unstable gait     Major depressive disorder, recurrent episode, moderate (HCC)     Primary osteoarthritis of both knees     TIA (transient ischemic attack)     Paresthesias     Acute ischemic stroke (HCC)     Cocaine abuse (HCC)     Shortness of breath     Swelling of left hand     Chronic pain of left knee     Left hand pain     Prediabetes     Pain, dental     Paronychia of right index finger     Obstructive sleep apnea syndrome     S/P total knee arthroplasty, left     Neuropathy

## 2021-07-06 ENCOUNTER — TELEPHONE (OUTPATIENT)
Dept: ORTHOPEDIC SURGERY | Age: 66
End: 2021-07-06

## 2021-07-06 ENCOUNTER — HOSPITAL ENCOUNTER (OUTPATIENT)
Dept: PHYSICAL THERAPY | Facility: CLINIC | Age: 66
Setting detail: THERAPIES SERIES
Discharge: HOME | End: 2021-07-06
Payer: COMMERCIAL

## 2021-07-06 PROCEDURE — 97110 THERAPEUTIC EXERCISES: CPT

## 2021-07-06 NOTE — TELEPHONE ENCOUNTER
Patient LM requesting pain med refill. Per Joan's last note, the refill on 6/23 was the last refill. I explained in the message no more pain medication would be prescribed but he could try Tylenol, up to 3000mg in a 24 hr period, to help with pain. Callback number was given if he had any other questions.

## 2021-07-06 NOTE — FLOWSHEET NOTE
[x] [facility]  Outpatient Rehabilitation &  Therapy  [facility]   [place]: [phone]  F: [phone]      Physical Therapy Daily Treatment Note    Date:  2021  Patient Name:  Aileen Grey    :  1955  MRN: [id number]  Physician: Dr Frieda Ramos: [facility] [place] Vs.[employer]IN [employer]   OVIY#[id number]#[id number] 20 visits/12 remain 6/10/21-21  Medical Diagnosis: LLE TKA            Rehab Codes: M62.81 (Muscle Weakness), M62.9 (Disorder of Muscle), M79.1 (Myalgia), Z73.6   Onset date: DOS 21                  Next Dr's appt.: 21  Visit# / total visits: ; Progress note for Medicare patient due at visit 21    Cancels/No Shows:     Subjective:    Pain:  [x] Yes  [] No Location: L knee Pain Rating: (0-10 scale) 8/10  Pain altered Tx:  [x] No  [] Yes  Action:  Comments: Patient arrived stating his knee is painful today. Pt states he has a lot of pain when he is sitting for a while with his knee bent and when he tries to straighten his knee to stand his knee pain increasing a lot.          Objective:  Exercise     LLE Knee TKA   DOS 21 Reps/ Time Weight/ Level Comments             Bike   10'            SB Calf S  3x30\"     HS S Stool  3x30\"              *Heel slides  2x10 Strap     *Prone hang 5' 5#              TGym Squats 2x10  L20     TGym HR 2x10  L20               // bars:          Step ups 2x10  6\"     Step downs  2x10  6\"     Mini-lunge 2x10       *TBand TKE  10x10\" Blue    Reverse TKE 10x10\" Blue    *TBand 4-way hip x20 Blue    Balance board  5' L3              Other:    AROM L knee  0-107 degrees    Specific Instructions for next treatment: advance ex's/ knee ROM      Treatment Charges: Mins Units   []  Modalities     [x]  Ther Exercise 35 2   []  Manual Therapy     []  Ther Activities     []  Aquatics       Vasocompression     []  Other     Total Treatment time 35 2       Assessment: [x] Progressing toward goals. Continued with program, pt required rest breaks with standing program and able to complete 3 minutes of the balance board noting \"it feels like my knee is going to pop. \" Measurements taken this visit, pt with full extension, lacking full flexion. [] No change. [] Other:  [x] Patient would continue to benefit from skilled physical therapy services in order to: decrease pain and increase ROM. Goals Reassessed 6/25/21  STG: (to be met in 10 treatments)  1. ? Pain: Decrease pain levels 6/10 ONGOING (9/10 pain upon arrival 6/25/21)   2. ? ROM: Increase flexibility and AROM limitations throughout to equal bilat to reduce difficulty with ADLs ONGOING ( degrees L knee AROM)  3. ? Strength: Increase LE strength to 5/5 MMT ONGOING (see strength measurements above)   4. Independent with Home Exercise Programs MET current program      LTG: (to be met in 20 treatments)  1. Improve score on assessment tool from KOOS from 68% impairment to less than 40% impairment ONGOING (57% impairment 6/25/21)  2. Reduce pain levels to 4/10 ONGOING                  Patient goals: Decrease pain, improve mobility     Pt. Education:  [] Yes  [x] No  [] Reviewed Prior HEP/Ed  Method of Education: [] Verbal  [] Demo  [] Written   Comprehension of Education:  [] Verbalizes understanding. [] Demonstrates understanding. [] Needs review. [] Demonstrates/verbalizes HEP/Ed previously given. Plan: [x] Continue current frequency toward long and short term goals.     [x] Specific Instructions for subsequent treatments: advance HEP, ROM, strength       Time In: 2:00pm            Time Out: 2:45pm     Electronically signed by:  Shaun Maria PTA

## 2021-07-06 NOTE — TELEPHONE ENCOUNTER
Writer called Chrystal Gleason to make sure she had been advised that it was ok for patient to have surgery.

## 2021-07-08 ENCOUNTER — HOSPITAL ENCOUNTER (OUTPATIENT)
Dept: PHYSICAL THERAPY | Facility: CLINIC | Age: 66
Setting detail: THERAPIES SERIES
Discharge: HOME | End: 2021-07-08
Payer: COMMERCIAL

## 2021-07-08 NOTE — FLOWSHEET NOTE
[x] [facility]  Outpatient Rehabilitation &  Therapy  [facility]   [place]: [phone]  F: [phone]      Physical Therapy Cancel/No Show note    Date: 2021  Patient: Mary Flores  : 1955  MRN: [id number]    Cancels/No Shows to date:     For today's appointment patient:    []  Cancelled    [] Rescheduled appointment    [x] No-show     Reason given by patient:    []  Patient ill    []  Conflicting appointment    [] No transportation      [] Conflict with work    [x] No reason given    [] Weather related    [] [id number]    [] Other:      Comments:       [] Next appointment was confirmed    Electronically signed by: Donald Berry

## 2021-07-14 ENCOUNTER — HOSPITAL ENCOUNTER (OUTPATIENT)
Dept: PHYSICAL THERAPY | Facility: CLINIC | Age: 66
Setting detail: THERAPIES SERIES
Discharge: HOME | End: 2021-07-14
Payer: COMMERCIAL

## 2021-07-14 PROCEDURE — 97110 THERAPEUTIC EXERCISES: CPT

## 2021-07-14 NOTE — FLOWSHEET NOTE
[x] [facility]  Outpatient Rehabilitation &  Therapy  [facility]   [place]: [phone]  F: [phone]      Physical Therapy Daily Treatment Note    Date:  2021  Patient Name:  Kalpesh Hernández    :  1955  MRN: [id number]  Physician: Dr Roque Jennings: [facility] [place] Shena Escort Dual   DTRF#[id number] [id number] 20 visits/5 remain 6/10/21-21  Medical Diagnosis: LLE TKA            Rehab Codes: M62.81 (Muscle Weakness), M62.9 (Disorder of Muscle), M79.1 (Myalgia), Z73.6   Onset date: DOS 21                  Next Dr's appt.: 21  Visit# / total visits: 15/20; Progress note for Medicare patient due at visit 21    Cancels/No Shows:     Subjective:    Pain:  [x] Yes  [] No Location: L knee Pain Rating: (0-10 scale) 8/10  Pain altered Tx:  [x] No  [] Yes  Action:  Comments: Patient arrived noting increased pain in low back, notes he is having back surgery next week. Objective:  Exercise     LLE Knee TKA   DOS 21 Reps/ Time Weight/ Level Comments             Bike   10'            SB Calf S  3x30\"     HS S Stool  3x30\"              *Heel slides  2x10 Strap     *Prone hang 5' 5#              TGym Squats 2x10  L20     TGym HR 2x10  L20               // bars:          Step ups 2x10  6\"     Step downs  2x10  6\"     Mini-lunge 2x10       *TBand TKE  10x10\"  Blue    Reverse TKE 10x10\"  Blue    *TBand 4-way hip x20  Blue    Balance board  5'  L3              Other:    AROM L knee  0-107 degrees     Specific Instructions for next treatment: advance ex's/ knee ROM      Treatment Charges: Mins Units   []  Modalities     [x]  Ther Exercise 35 2   []  Manual Therapy     []  Ther Activities     []  Aquatics       Vasocompression     []  Other     Total Treatment time 35 2       Assessment: [x] Progressing toward goals. Continued with exercises per log with decreased reps per patient due to increased back pain.  No progressions made this date as back pain was hindering current program.     [] No change. [] Other:  [x] Patient would continue to benefit from skilled physical therapy services in order to: decrease pain and increase ROM. Goals Reassessed 6/25/21  STG: (to be met in 10 treatments)  1. ? Pain: Decrease pain levels 6/10 ONGOING (9/10 pain upon arrival 6/25/21)   2. ? ROM: Increase flexibility and AROM limitations throughout to equal bilat to reduce difficulty with ADLs ONGOING ( degrees L knee AROM)  3. ? Strength: Increase LE strength to 5/5 MMT ONGOING (see strength measurements above)   4. Independent with Home Exercise Programs MET current program      LTG: (to be met in 20 treatments)  1. Improve score on assessment tool from KOOS from 68% impairment to less than 40% impairment ONGOING (57% impairment 6/25/21)  2. Reduce pain levels to 4/10 ONGOING                  Patient goals: Decrease pain, improve mobility     Pt. Education:  [] Yes  [x] No  [] Reviewed Prior HEP/Ed  Method of Education: [] Verbal  [] Demo  [] Written   Comprehension of Education:  [] Verbalizes understanding. [] Demonstrates understanding. [] Needs review. [] Demonstrates/verbalizes HEP/Ed previously given. Plan: [x] Continue current frequency toward long and short term goals.     [x] Specific Instructions for subsequent treatments: advance HEP, ROM, strength       Time In: 1545            Time Out: 1630    Electronically signed by:  Drew Franklin PTA

## 2021-07-16 ENCOUNTER — HOSPITAL ENCOUNTER (OUTPATIENT)
Dept: PHYSICAL THERAPY | Facility: CLINIC | Age: 66
Setting detail: THERAPIES SERIES
Discharge: HOME | End: 2021-07-16
Payer: COMMERCIAL

## 2021-07-16 PROCEDURE — 97110 THERAPEUTIC EXERCISES: CPT

## 2021-07-16 NOTE — FLOWSHEET NOTE
[x] [facility]  Outpatient Rehabilitation &  Therapy  [facility]   [place]: [phone]  F: [phone]      Physical Therapy Daily Treatment Note    Date:  2021  Patient Name:  Aileen Grey    :  1955  MRN: [id number]  Physician: [place] Frieda Ramos[place] [facility] [place]   QWIN#[id number] 20 visits/5 remain 6/10/21-21  Medical Diagnosis: LLE TKA            Rehab Codes: M62.81 (Muscle Weakness), M62.9 (Disorder of Muscle), M79.1 (Myalgia), Z73.6   Onset date: DOS 21                  Next Dr's appt.: 21  Visit# / total visits: ; Progress note for Medicare patient due at visit 21    Cancels/No Shows:     Subjective:    Pain:  [x] Yes  [] No Location: L knee Pain Rating: (0-10 scale) 8/10 (back)  Pain altered Tx:  [x] No  [] Yes  Action:  Comments: Patient arrived stating \"my knee is doing good, but my back is in a lot of pain today. \" Pt states he took pain medication before coming to therapy. Pt reports he has back therapy next Thursday.           Objective:  Exercise     LLE Knee TKA   DOS 21 Reps/ Time Weight/ Level Comments             Nustep  10'            SB Calf S  3x30\"     HS S Stool  3x30\"              *Heel slides  2x10 Strap     *Prone hang 5' 5#              TGym Squats 2x10  L20     TGym HR 2x10  L20               // bars:          Step ups 2x10  6\"     Step downs  2x10  6\"     Mini-lunge 2x10       *TBand TKE  10x10\"  Blue    Reverse TKE 10x10\"  Blue    *TBand 4-way hip x15  Blue    Balance board  5'  L3              Other:    AROM L knee  0-107 degrees     Specific Instructions for next treatment: advance ex's/ knee ROM      Treatment Charges: Mins Units   []  Modalities     [x]  Ther Exercise 25 2   []  Manual Therapy     []  Ther Activities     []  Aquatics       Vasocompression     []  Other     Total Treatment time 25 2       Assessment: [x] Progressing toward goals. Continued with program with pt requiring increased rest breaks due to back pain. Pt plans to attend therapy once more before having is back surgery. [] No change. [] Other:  [x] Patient would continue to benefit from skilled physical therapy services in order to: decrease pain and increase ROM. Goals Reassessed 6/25/21  STG: (to be met in 10 treatments)  1. ? Pain: Decrease pain levels 6/10 ONGOING (9/10 pain upon arrival 6/25/21)   2. ? ROM: Increase flexibility and AROM limitations throughout to equal bilat to reduce difficulty with ADLs ONGOING ( degrees L knee AROM)  3. ? Strength: Increase LE strength to 5/5 MMT ONGOING (see strength measurements above)   4. Independent with Home Exercise Programs MET current program      LTG: (to be met in 20 treatments)  1. Improve score on assessment tool from KOOS from 68% impairment to less than 40% impairment ONGOING (57% impairment 6/25/21)  2. Reduce pain levels to 4/10 ONGOING                  Patient goals: Decrease pain, improve mobility     Pt. Education:  [] Yes  [x] No  [] Reviewed Prior HEP/Ed  Method of Education: [] Verbal  [] Demo  [] Written   Comprehension of Education:  [] Verbalizes understanding. [] Demonstrates understanding. [] Needs review. [] Demonstrates/verbalizes HEP/Ed previously given. Plan: [x] Continue current frequency toward long and short term goals.     [] Specific Instructions for subsequent treatments:      Time In: 4:00pm            Time Out: 4:35pm    Electronically signed by:  Winston Mcdonald PTA

## 2021-07-16 NOTE — FLOWSHEET NOTE
[x] [facility]  Outpatient Rehabilitation &  Therapy  [facility]   [place]: [phone]  F: [phone]      Physical Therapy Cancel/No Show note    Date: 2021  Patient: Shannon Reynolds  : 1955  MRN: [id number]    Cancels/No Shows to date: 2/3    For today's appointment patient:    []  Cancelled    [] Rescheduled appointment    [x] No-show     Reason given by patient:    []  Patient ill    []  Conflicting appointment    [] No transportation      [] Conflict with work    [x] No reason given    [] Weather related    [] [id number]    [] Other:      Comments:       [] Next appointment was confirmed    Electronically signed by: Gee Gipson PTA

## 2021-07-21 ENCOUNTER — HOSPITAL ENCOUNTER (OUTPATIENT)
Dept: PHYSICAL THERAPY | Facility: CLINIC | Age: 66
Setting detail: THERAPIES SERIES
Discharge: HOME | End: 2021-07-21
Payer: COMMERCIAL

## 2021-07-21 PROCEDURE — 97110 THERAPEUTIC EXERCISES: CPT

## 2021-07-21 NOTE — FLOWSHEET NOTE
[x] [facility]  Outpatient Rehabilitation &  Therapy  [facility]   [place]: [phone]  F: [phone]      Physical Therapy Daily Treatment Note    Date:  2021  Patient Name:  Trevor Clinton    :  1955  MRN: [id number]  Physician: Dr Edward De La Rosa: [facility] [place] Vs.BMN Beverley Portillo Dual   FEVE#[id number] 20 visits/3 remain 6/10/21-21  Medical Diagnosis: LLE TKA            Rehab Codes: M62.81 (Muscle Weakness), M62.9 (Disorder of Muscle), M79.1 (Myalgia), Z73.6   Onset date: DOS 21                  Next Dr's appt.: 21    Visit# / total visits: ; Progress note for Medicare patient due at visit 21    Cancels/No Shows:     Subjective:    Pain:  [x] Yes  [] No Location: L knee Pain Rating: (0-10 scale) 8/10 (back)  Pain altered Tx:  [x] No  [] Yes  Action:  Comments: Patient arrived noting continued low back pain. Notes that they pushed the surgery back and that he has to attend therapy for low back prior to therapy. Patient notes that he is going to start attending therapy for low back at the [facility] clinic because it's closer to home.        Objective:  Exercise     LLE Knee TKA   DOS 21 Reps/ Time Weight/ Level Comments             Nustep  10'            SB Calf S  3x30\"     HS S Stool  3x30\"              *Heel slides  2x10 Strap     *Prone hang 5' 5#              TGym Squats 2x10  L20     TGym HR 2x10  L20               // bars:          Step ups 2x10  6\"     Step downs  2x10  6\"     Mini-lunge 2x10       *TBand TKE  10x10\"  Blue    Reverse TKE 10x10\"  Blue    *TBand 4-way hip x15  Blue    Balance board  5'  L3              Other:    AROM L knee  0-110 degrees     Specific Instructions for next treatment: advance ex's/ knee ROM       Treatment Charges: Mins Units   []  Modalities     [x]  Ther Exercise 25 2   []  Manual Therapy     []  Ther Activities     []  Aquatics       Vasocompression     [] Other     Total Treatment time 25 2       Assessment: [x] Progressing toward goals. Discussed with patient transferring care to [facility] due to the fact that he wont be able to attend 2 separate clinics. Patient wishes to attend [facility] clinic because it is closer to home. Patient is to inquire about this when he has his initial visit at [facility]. [] No change. [] Other:  [x] Patient would continue to benefit from skilled physical therapy services in order to: decrease pain and increase ROM. Goals Reassessed 6/25/21  STG: (to be met in 10 treatments)  1. ? Pain: Decrease pain levels 6/10 ONGOING (9/10 pain upon arrival 6/25/21)   2. ? ROM: Increase flexibility and AROM limitations throughout to equal bilat to reduce difficulty with ADLs ONGOING ( degrees L knee AROM)  3. ? Strength: Increase LE strength to 5/5 MMT ONGOING (see strength measurements above)   4. Independent with Home Exercise Programs MET current program      LTG: (to be met in 20 treatments)  1. Improve score on assessment tool from KOOS from 68% impairment to less than 40% impairment ONGOING (57% impairment 6/25/21)  2. Reduce pain levels to 4/10 ONGOING                  Patient goals: Decrease pain, improve mobility     Pt. Education:  [x] Yes  [] No  [x] Reviewed Prior HEP with patient with good understanding. Method of Education: [x] Verbal  [] Demo  [] Written   Comprehension of Education:  [x] Verbalizes understanding. [] Demonstrates understanding. [] Needs review. [x] Demonstrates/verbalizes HEP/Ed previously given. Plan: [x] Continue current frequency toward long and short term goals.     [] Specific Instructions for subsequent treatments:      Time In: 1500            Time Out: 1540    Electronically signed by:  Kathy Callahan PTA

## 2021-07-22 ENCOUNTER — OFFICE VISIT (OUTPATIENT)
Dept: ORTHOPEDIC SURGERY | Age: 66
End: 2021-07-22

## 2021-07-22 VITALS
SYSTOLIC BLOOD PRESSURE: 145 MMHG | HEART RATE: 99 BPM | HEIGHT: 69 IN | RESPIRATION RATE: 12 BRPM | TEMPERATURE: 98 F | DIASTOLIC BLOOD PRESSURE: 89 MMHG | WEIGHT: 233 LBS | BODY MASS INDEX: 34.51 KG/M2

## 2021-07-22 DIAGNOSIS — Z96.652 S/P TOTAL KNEE ARTHROPLASTY, LEFT: Primary | ICD-10-CM

## 2021-07-22 PROCEDURE — 99024 POSTOP FOLLOW-UP VISIT: CPT | Performed by: PHYSICIAN ASSISTANT

## 2021-07-22 RX ORDER — CELECOXIB 200 MG/1
200 CAPSULE ORAL DAILY
Qty: 60 CAPSULE | Refills: 0 | Status: SHIPPED | OUTPATIENT
Start: 2021-07-22

## 2021-07-22 RX ORDER — TIZANIDINE 4 MG/1
4 TABLET ORAL 3 TIMES DAILY
Qty: 30 TABLET | Refills: 0 | Status: SHIPPED | OUTPATIENT
Start: 2021-07-22 | End: 2021-08-01

## 2021-07-22 RX ORDER — CELECOXIB 200 MG/1
200 CAPSULE ORAL DAILY
Qty: 60 CAPSULE | Refills: 3 | Status: SHIPPED | OUTPATIENT
Start: 2021-07-22 | End: 2021-07-22

## 2021-07-22 RX ORDER — GABAPENTIN 100 MG/1
CAPSULE ORAL
Qty: 62 CAPSULE | Refills: 0 | Status: SHIPPED | OUTPATIENT
Start: 2021-07-22 | End: 2021-08-02

## 2021-07-22 ASSESSMENT — ENCOUNTER SYMPTOMS
CONSTIPATION: 0
SHORTNESS OF BREATH: 0
ABDOMINAL DISTENTION: 0
DIARRHEA: 0
COLOR CHANGE: 0
VOMITING: 0
CHEST TIGHTNESS: 0
ABDOMINAL PAIN: 0
COUGH: 0
RESPIRATORY NEGATIVE: 1
NAUSEA: 0
APNEA: 0

## 2021-07-22 NOTE — PROGRESS NOTES
[facility]  [address]  [facility] [address]  Dept: [phone]  Dept Fax: [phone]        Post Operative Follow Up    Subjective:     Chief Complaint   Patient presents with    Post-Op Check     left TKA dos:4/28/2021     Post Op Surgery:     The patient is here for a follow up after having a Left total knee Arthoplasty. The date of surgery was on 4/28/21 . Therefore we are 3 months post op. Currently the patient's pain is controlled with Tylenol ES. Physical therapy was started and he goes 2x/week. Today with no assisting devices. He still has pain especially going downstairs. He thinks he has more pain after this knee replacement than after his other knee replacement. He is taking Neurontin 100 mg twice daily but it does not seem to be helping. He states he has pain on the lateral side of his knee. He is also been struggling with some back issues and he has had 2 back surgeries and thinks he needs to have a third. He is supposed to start physical therapy at [facility] next week for his back. He does state that his knee does feel better than it did before surgery. Review of Systems   Constitutional: Positive for activity change. Negative for appetite change, fatigue and fever. Respiratory: Negative. Negative for apnea, cough, chest tightness and shortness of breath. Cardiovascular: Negative. Negative for chest pain, palpitations and leg swelling. Gastrointestinal: Negative for abdominal distention, abdominal pain, constipation, diarrhea, nausea and vomiting. Genitourinary: Negative for difficulty urinating, dysuria and hematuria. Musculoskeletal: Positive for arthralgias and gait problem. Negative for joint swelling and myalgias. Skin: Negative for color change and rash. Neurological: Negative for dizziness, weakness, numbness and headaches. Psychiatric/Behavioral: Negative for sleep disturbance. I have reviewed the CC, HPI, ROS, PMH, FHX, Social History, and if not present in this note, I have reviewed in the patient's chart. I agree with the documentation provided by other staff and have reviewed their documentation prior to providing my signature indicating agreement. Vitals:   BP (!) 145/89   Pulse 99   Temp 98 °F (36.7 °C)   Resp 12   Ht 5' 9\" (1.753 m)   Wt 233 lb (105.7 kg)   BMI 34.41 kg/m²  Body mass index is 34.41 kg/m². Physical Examination:     Orthopedics:    GENERAL: Alert and oriented X3 in no acute distress. SKIN: Intact without lesions or ulcerations. Incision is well healed with no signs of infection. NEURO: Intact to sensory and motor testing. VASC: Capillary refill is less than 3 seconds. Post Op Exam:    LOCATION: Left knee  SITE: Distal neurocirculatory status is intact. EXAM: Sensation is intact to light touch, there is full motor function of the extremity. ROM: 3/115 degrees. Patient seems to have nerve pain on the lateral side of his knee. Procedures:     Procedure:  No    Radiology:   No results found. Assessment:     1. S/P total knee arthroplasty, left      Plan:   Post Op Treatment : Patient had the treatment regimen reviewed today 2 months s/p Left total knee Arthoplasty. I reviewed the films with the patient. We discussed some of the etiologies and natural histories of recovery after a total knee replacement. We also discussed the various treatment alternatives including anti-inflammatory medications, physical therapy, injections, further imaging studies and as a last resort surgery. During today's visit, we discussed that I think he is having some nerve pain. I also stated he is much better than he was at his last appointment and I feel he is done a very good job. I am not concerned about an arthrofibrosis of the knee any longer.   I am willing to give him another prescription of Celebrex for short-term, a few Zanaflex, and I want him to do her Neurontin slow increase. I believe most of his pain is soft tissue at this point. If he still having severe pain then I am going to refer him to pain management. He is going to begin therapy for his back and I think he continue his exercises on his own for his knee. Patient should return to the office in 6 weeks to f/u with Dr. Adolph العلي if he is unhappy. If he is happy we need to see him back for a yearly check with PCXR. The patient will call the office immediately with any problems that may arise. Orders Placed This Encounter   Medications    DISCONTD: celecoxib (CELEBREX) 200 MG capsule     Sig: Take 1 capsule by mouth daily     Dispense:  60 capsule     Refill:  3    tiZANidine (ZANAFLEX) 4 MG tablet     Sig: Take 1 tablet by mouth 3 times daily for 10 days     Dispense:  30 tablet     Refill:  0    gabapentin (NEURONTIN) 100 MG capsule     Sig: Slow increase. 1 tablet 100mg TID-3days, 2 tablets 100mg TID-3days, Day 7: 3 tablets 100mg TID continue until pills finished. Dispense:  62 capsule     Refill:  0    celecoxib (CELEBREX) 200 MG capsule     Sig: Take 1 capsule by mouth daily     Dispense:  60 capsule     Refill:  0       No orders of the defined types were placed in this encounter.       Electronically signed by Ying Howard PA-C, on 7/22/2021 at 11:09 AM

## 2021-07-30 ENCOUNTER — NURSE TRIAGE (OUTPATIENT)
Dept: OTHER | Facility: CLINIC | Age: 66
End: 2021-07-30

## 2021-07-30 NOTE — TELEPHONE ENCOUNTER
Reason for Disposition   Pus (yellow or green) seen in skin around fingernail (cuticle area) OR under fingernail    Answer Assessment - Initial Assessment Questions  1. LOCATION: \"Which finger? \"       Middle finger, ring finger and pinkie on the right hand    2. APPEARANCE: \"What does it look like? \"       Swollen and painful    3. ONSET: \"When did it start? \"       A couple of days ago, when I bit the hangnail off    4. PAIN: \"Is there any pain? \" If so, ask: \"How bad is the pain? \"   (Mild, Moderate, Severe)      9/10    5. REDNESS: \"Is there any redness of the skin? \" If so, ask: \"How much of the finger is red? \"      Not yet    6. PUS: \"Is there a pocket of pus? \" If so, ask: \"How big is it? \"      Yes, yellow    Protocols used: FINGERNAIL INFECTION-PEDIATRIC-OH    Received call from [address] at [facility] with [employer] Complaint. Brief description of triage: infected fingers with yellow pus. Middle, ring and pinkie fingers on right hand    Triage indicates for patient to be seen today or tomorrow    Care advice provided, patient verbalizes understanding; denies any other questions or concerns; instructed to call back for any new or worsening symptoms. Writer provided warm transfer to [facility] at [facility] for appointment scheduling. Attention Provider: Thank you for allowing me to participate in the care of your patient. The patient was connected to triage in response to information provided to the [facility]. Please do not respond through this encounter as the response is not directed to a shared pool.

## 2021-07-31 DIAGNOSIS — Z96.652 S/P TOTAL KNEE ARTHROPLASTY, LEFT: ICD-10-CM

## 2021-08-02 RX ORDER — GABAPENTIN 300 MG/1
300 CAPSULE ORAL 3 TIMES DAILY
Qty: 90 CAPSULE | Refills: 0 | Status: SHIPPED | OUTPATIENT
Start: 2021-08-02 | End: 2021-11-10

## 2021-08-09 ENCOUNTER — HOSPITAL ENCOUNTER (OUTPATIENT)
Dept: PHYSICAL THERAPY | Facility: CLINIC | Age: 66
Setting detail: THERAPIES SERIES
Discharge: HOME | End: 2021-08-09
Payer: COMMERCIAL

## 2021-08-19 ENCOUNTER — OFFICE VISIT (OUTPATIENT)
Dept: FAMILY MEDICINE CLINIC | Age: 66
End: 2021-08-19
Payer: COMMERCIAL

## 2021-08-19 VITALS — SYSTOLIC BLOOD PRESSURE: 135 MMHG | WEIGHT: 234.4 LBS | BODY MASS INDEX: 34.61 KG/M2 | DIASTOLIC BLOOD PRESSURE: 85 MMHG

## 2021-08-19 DIAGNOSIS — J41.0 SIMPLE CHRONIC BRONCHITIS (HCC): ICD-10-CM

## 2021-08-19 DIAGNOSIS — K21.9 GASTROESOPHAGEAL REFLUX DISEASE WITHOUT ESOPHAGITIS: Primary | ICD-10-CM

## 2021-08-19 PROCEDURE — G8417 CALC BMI ABV UP PARAM F/U: HCPCS | Performed by: STUDENT IN AN ORGANIZED HEALTH CARE EDUCATION/TRAINING PROGRAM

## 2021-08-19 PROCEDURE — 99213 OFFICE O/P EST LOW 20 MIN: CPT | Performed by: STUDENT IN AN ORGANIZED HEALTH CARE EDUCATION/TRAINING PROGRAM

## 2021-08-19 PROCEDURE — 3017F COLORECTAL CA SCREEN DOC REV: CPT | Performed by: STUDENT IN AN ORGANIZED HEALTH CARE EDUCATION/TRAINING PROGRAM

## 2021-08-19 PROCEDURE — 4004F PT TOBACCO SCREEN RCVD TLK: CPT | Performed by: STUDENT IN AN ORGANIZED HEALTH CARE EDUCATION/TRAINING PROGRAM

## 2021-08-19 PROCEDURE — G8427 DOCREV CUR MEDS BY ELIG CLIN: HCPCS | Performed by: STUDENT IN AN ORGANIZED HEALTH CARE EDUCATION/TRAINING PROGRAM

## 2021-08-19 PROCEDURE — 3023F SPIROM DOC REV: CPT | Performed by: STUDENT IN AN ORGANIZED HEALTH CARE EDUCATION/TRAINING PROGRAM

## 2021-08-19 PROCEDURE — G8926 SPIRO NO PERF OR DOC: HCPCS | Performed by: STUDENT IN AN ORGANIZED HEALTH CARE EDUCATION/TRAINING PROGRAM

## 2021-08-19 RX ORDER — ALBUTEROL SULFATE 90 UG/1
2 AEROSOL, METERED RESPIRATORY (INHALATION) 4 TIMES DAILY PRN
Qty: 1 INHALER | Refills: 0 | Status: SHIPPED | OUTPATIENT
Start: 2021-08-19 | End: 2021-11-10 | Stop reason: SDUPTHER

## 2021-08-19 RX ORDER — BUDESONIDE AND FORMOTEROL FUMARATE DIHYDRATE 160; 4.5 UG/1; UG/1
2 AEROSOL RESPIRATORY (INHALATION) 2 TIMES DAILY
Qty: 1 INHALER | Refills: 0 | Status: SHIPPED | OUTPATIENT
Start: 2021-08-19 | End: 2021-09-02 | Stop reason: SDUPTHER

## 2021-08-19 RX ORDER — PANTOPRAZOLE SODIUM 20 MG/1
20 TABLET, DELAYED RELEASE ORAL DAILY
Qty: 30 TABLET | Refills: 3 | Status: SHIPPED | OUTPATIENT
Start: 2021-08-19

## 2021-08-19 ASSESSMENT — ENCOUNTER SYMPTOMS
COLOR CHANGE: 0
COUGH: 0
VOMITING: 0
BACK PAIN: 1
SINUS PAIN: 0
CONSTIPATION: 0
DIARRHEA: 0
BLOOD IN STOOL: 0
WHEEZING: 0
SHORTNESS OF BREATH: 0
ABDOMINAL PAIN: 0
NAUSEA: 0
SINUS PRESSURE: 0

## 2021-08-19 NOTE — PROGRESS NOTES
Subjective:    Greg Hannah is a 63 y.o. male with  has a past medical history of Aortic valve sclerosis, Back pain, Cerebral artery occlusion with cerebral infarction ([address] COPD (chronic obstructive pulmonary disease) ([facility] [address] Dental bridge present, Depression, DJD (degenerative joint disease), Drug abuse ([facility] [address] High cholesterol, History of shingles, Hypertension, Pain at rest, Postlaminectomy syndrome, Sleep apnea, Suicidal ideations, TIA (transient ischemic attack), Toe contracture, left, Toe pain, left, Wears dentures, and Wears glasses. Presented to the office today for:  Chief Complaint   Patient presents with    Follow-up     coughing up black/brown mucus    Back Pain     concerns       HPI  This is a 62-year-old gentleman with a history of hypertension, COPD polyneuropathy came in with complaints of cough. Cc: Cough  Per patient lately he is having some shortness of breath, wheeze and productive cough. Patient at this time is just endorsing cough with phlegm but states for the last couple of days it is better. Patient been out of the COPD medication since 1 week, patient is also endorsing some gastroesophageal reflux symptoms, no bad taste in mouth, some acidity especially when he eats spicy food. Cc: Back pain  Patient with chronic back pain, that is going on almost any year, patient rates pain 4/10, nonradiating, aggravated with movement and no relieving factor was mentioned. Per patient he is seeing orthopedic and neurosurgery. Patient has arthropathy, patient was looking for stronger pain medications, patient was told that he should ask the orthopedic as he has a procedure planned. Review of Systems   Constitutional: Negative for chills and fever. HENT: Negative for congestion, sinus pressure and sinus pain. Respiratory: Negative for cough, shortness of breath and wheezing. Cardiovascular: Negative for chest pain, palpitations and leg swelling.    Gastrointestinal: Negative for abdominal pain, blood in stool, constipation, diarrhea, nausea and vomiting. Genitourinary: Negative for difficulty urinating, flank pain and hematuria. Musculoskeletal: Positive for back pain. Negative for arthralgias and myalgias. Skin: Negative for color change and wound. Neurological: Negative for dizziness, light-headedness and headaches. Psychiatric/Behavioral: Negative for agitation and confusion. The patient has a   Family History   Problem Relation Age of Onset    Diabetes Mother     Diabetes Brother        Objective:    /85   Wt 234 lb 6.4 oz (106.3 kg)   BMI 34.61 kg/m²    BP Readings from Last 3 Encounters:   08/19/21 135/85   07/22/21 (!) 145/89   06/23/21 (!) 144/88       Physical Exam  Vitals and nursing note reviewed. Constitutional:       Appearance: Normal appearance. HENT:      Head: Normocephalic and atraumatic. Mouth/Throat:      Mouth: Mucous membranes are moist.   Eyes:      Conjunctiva/sclera: Conjunctivae normal.   Cardiovascular:      Rate and Rhythm: Normal rate and regular rhythm. Pulmonary:      Effort: Pulmonary effort is normal.      Breath sounds: Normal breath sounds. No wheezing. Abdominal:      General: Bowel sounds are normal. There is no distension. Palpations: Abdomen is soft. There is no mass. Tenderness: There is no abdominal tenderness. There is no guarding. Musculoskeletal:      Right lower leg: No edema. Left lower leg: No edema. Neurological:      General: No focal deficit present. Mental Status: He is alert and oriented to person, place, and time.    Psychiatric:         Mood and Affect: Mood normal.         Behavior: Behavior normal.         Lab Results   Component Value Date    WBC 9.60 05/02/2021    HGB 11.1 (L) 05/02/2021    HCT 33.5 (L) 05/02/2021     05/02/2021    CHOL 191 09/18/2019    TRIG 186 (H) 09/18/2019    HDL 34 (L) 09/18/2019    ALT 13 12/23/2018    AST 13 12/23/2018    NA 141 05/02/2021    K 3.9 05/02/2021     05/02/2021    CREATININE 1.55 (H) 05/02/2021    BUN 23 05/02/2021    CO2 27 05/02/2021    TSH 1.77 12/06/2012    INR 1.0 04/22/2021    LABA1C 5.9 04/22/2021     Lab Results   Component Value Date    CALCIUM 9.5 05/02/2021     Lab Results   Component Value Date    LDLCHOLESTEROL 120 09/18/2019       Assessment and Plan:    1. Cough likely secondary to GERD versus smoker cough:  - Patient is still smoking, patient was counseled to try to wean her down, patient has nicotine patches patient was counseled how to use the patches. Patient was counseled that if he ran out of medication he should be calling the  for refill.  - albuterol sulfate HFA (VENTOLIN HFA) 108 (90 Base) MCG/ACT inhaler; Inhale 2 puffs into the lungs 4 times daily as needed for Wheezing  Dispense: 1 Inhaler; Refill: 0  - budesonide-formoterol (SYMBICORT) 160-4.5 MCG/ACT AERO; Inhale 2 puffs into the lungs 2 times daily  Dispense: 1 Inhaler; Refill: 0  - pantoprazole (PROTONIX) 20 MG tablet; Take 1 tablet by mouth daily  Dispense: 30 tablet; Refill: 3      Patient was counseled about importance of taking medication which were prescribed at today visit. Patient was also counseled that lifestyle changes including diet, smoking and use of less alcohol will benefit their health in long term. All the question asked by the patient were answered in non-medical terms. Patient understands and agree to the plan. Teach back method was used while having the conversation.        Requested Prescriptions     Signed Prescriptions Disp Refills    albuterol sulfate HFA (VENTOLIN HFA) 108 (90 Base) MCG/ACT inhaler 1 Inhaler 0     Sig: Inhale 2 puffs into the lungs 4 times daily as needed for Wheezing    budesonide-formoterol (SYMBICORT) 160-4.5 MCG/ACT AERO 1 Inhaler 0     Sig: Inhale 2 puffs into the lungs 2 times daily    pantoprazole (PROTONIX) 20 MG tablet 30 tablet 3     Sig: Take 1 tablet by mouth daily Medications Discontinued During This Encounter   Medication Reason    albuterol sulfate HFA (VENTOLIN HFA) 108 (90 Base) MCG/ACT inhaler REORDER    budesonide-formoterol (SYMBICORT) 160-4.5 MCG/ACT AERO REORDER       Gurpreet received counseling on the following healthy behaviors: nutrition, exercise and medication adherence    Discussed use,benefit, and side effects of prescribed medications. Barriers to medication compliance addressed. All patient questions answered. Pt voiced understanding. Return in about 3 months (around 11/19/2021), or if symptoms worsen or fail to improve. Disclaimer: Some orall of this note was transcribed using voice-recognition software. This may cause typographical errors occasionally. Although all effort is made to fix these errors, please do not hesitate to contact our office if there Shanna Howard concern with the understanding of this note.

## 2021-08-19 NOTE — PROGRESS NOTES
Attending Physician Statement  I  have discussed the care of Lloyd Vasques including pertinent history and exam findings with the resident. I agree with the assessment, plan and orders as documented by the resident. /85   Wt 234 lb 6.4 oz (106.3 kg)   BMI 34.61 kg/m²    BP Readings from Last 3 Encounters:   08/19/21 135/85   07/22/21 (!) 145/89   06/23/21 (!) 144/88     Wt Readings from Last 3 Encounters:   08/19/21 234 lb 6.4 oz (106.3 kg)   07/22/21 233 lb (105.7 kg)   06/23/21 233 lb 6.4 oz (105.9 kg)          Diagnosis Orders   1. Gastroesophageal reflux disease without esophagitis  pantoprazole (PROTONIX) 20 MG tablet   2. Simple chronic bronchitis (HCC)  albuterol sulfate HFA (VENTOLIN HFA) 108 (90 Base) MCG/ACT inhaler    budesonide-formoterol (SYMBICORT) 160-4.5 MCG/ACT AERO       See orders. RTO as noted, discussed care plan with patient and Resident Physician. Questions answered. Call results - if indicated to patient.     Tan Addison DO 8/19/2021 11:05 AM

## 2021-09-02 ENCOUNTER — TELEPHONE (OUTPATIENT)
Dept: FAMILY MEDICINE CLINIC | Age: 66
End: 2021-09-02

## 2021-09-02 RX ORDER — BUDESONIDE AND FORMOTEROL FUMARATE DIHYDRATE 160; 4.5 UG/1; UG/1
2 AEROSOL RESPIRATORY (INHALATION) 2 TIMES DAILY
Qty: 10.2 G | Refills: 3 | Status: SHIPPED | OUTPATIENT
Start: 2021-09-02 | End: 2021-11-10 | Stop reason: SDUPTHER

## 2021-10-18 ENCOUNTER — TELEPHONE (OUTPATIENT)
Dept: FAMILY MEDICINE CLINIC | Age: 66
End: 2021-10-18

## 2021-10-18 DIAGNOSIS — M54.40 BACK PAIN OF LUMBAR REGION WITH SCIATICA: Primary | ICD-10-CM

## 2021-10-18 NOTE — TELEPHONE ENCOUNTER
Patient calling in because he would like a referral for pain management. States he would like to try them for his back pain instead of surgery, please advise.

## 2021-10-28 ENCOUNTER — TELEPHONE (OUTPATIENT)
Dept: PAIN MANAGEMENT | Age: 66
End: 2021-10-28

## 2021-11-03 DIAGNOSIS — M25.562 LEFT KNEE PAIN, UNSPECIFIED CHRONICITY: Primary | ICD-10-CM

## 2021-11-03 DIAGNOSIS — Z96.652 S/P TOTAL KNEE ARTHROPLASTY, LEFT: ICD-10-CM

## 2021-11-10 ENCOUNTER — TELEPHONE (OUTPATIENT)
Dept: FAMILY MEDICINE CLINIC | Age: 66
End: 2021-11-10

## 2021-11-10 ENCOUNTER — VIRTUAL VISIT (OUTPATIENT)
Dept: FAMILY MEDICINE CLINIC | Age: 66
End: 2021-11-10
Payer: COMMERCIAL

## 2021-11-10 DIAGNOSIS — J41.0 SIMPLE CHRONIC BRONCHITIS (HCC): ICD-10-CM

## 2021-11-10 DIAGNOSIS — I10 ESSENTIAL HYPERTENSION: ICD-10-CM

## 2021-11-10 DIAGNOSIS — G89.29 CHRONIC PAIN OF LEFT KNEE: Primary | ICD-10-CM

## 2021-11-10 DIAGNOSIS — M54.40 BACK PAIN OF LUMBAR REGION WITH SCIATICA: ICD-10-CM

## 2021-11-10 DIAGNOSIS — M25.562 CHRONIC PAIN OF LEFT KNEE: Primary | ICD-10-CM

## 2021-11-10 PROCEDURE — 3017F COLORECTAL CA SCREEN DOC REV: CPT | Performed by: STUDENT IN AN ORGANIZED HEALTH CARE EDUCATION/TRAINING PROGRAM

## 2021-11-10 PROCEDURE — G8427 DOCREV CUR MEDS BY ELIG CLIN: HCPCS | Performed by: STUDENT IN AN ORGANIZED HEALTH CARE EDUCATION/TRAINING PROGRAM

## 2021-11-10 PROCEDURE — G8417 CALC BMI ABV UP PARAM F/U: HCPCS | Performed by: STUDENT IN AN ORGANIZED HEALTH CARE EDUCATION/TRAINING PROGRAM

## 2021-11-10 PROCEDURE — 4004F PT TOBACCO SCREEN RCVD TLK: CPT | Performed by: STUDENT IN AN ORGANIZED HEALTH CARE EDUCATION/TRAINING PROGRAM

## 2021-11-10 PROCEDURE — 3023F SPIROM DOC REV: CPT | Performed by: STUDENT IN AN ORGANIZED HEALTH CARE EDUCATION/TRAINING PROGRAM

## 2021-11-10 PROCEDURE — 99213 OFFICE O/P EST LOW 20 MIN: CPT | Performed by: STUDENT IN AN ORGANIZED HEALTH CARE EDUCATION/TRAINING PROGRAM

## 2021-11-10 PROCEDURE — G8926 SPIRO NO PERF OR DOC: HCPCS | Performed by: STUDENT IN AN ORGANIZED HEALTH CARE EDUCATION/TRAINING PROGRAM

## 2021-11-10 PROCEDURE — G8484 FLU IMMUNIZE NO ADMIN: HCPCS | Performed by: STUDENT IN AN ORGANIZED HEALTH CARE EDUCATION/TRAINING PROGRAM

## 2021-11-10 RX ORDER — ALBUTEROL SULFATE 90 UG/1
2 AEROSOL, METERED RESPIRATORY (INHALATION) 4 TIMES DAILY PRN
Qty: 1 EACH | Refills: 0 | Status: SHIPPED | OUTPATIENT
Start: 2021-11-10 | End: 2022-10-12 | Stop reason: SDUPTHER

## 2021-11-10 RX ORDER — BUDESONIDE AND FORMOTEROL FUMARATE DIHYDRATE 160; 4.5 UG/1; UG/1
2 AEROSOL RESPIRATORY (INHALATION) 2 TIMES DAILY
Qty: 10.2 G | Refills: 3 | Status: SHIPPED | OUTPATIENT
Start: 2021-11-10 | End: 2022-10-12 | Stop reason: SDUPTHER

## 2021-11-10 RX ORDER — AMLODIPINE BESYLATE 5 MG/1
TABLET ORAL
Qty: 90 TABLET | Refills: 2 | Status: SHIPPED | OUTPATIENT
Start: 2021-11-10 | End: 2022-08-02

## 2021-11-10 NOTE — PROGRESS NOTES
Attending Physician Statement    Wt Readings from Last 3 Encounters:   08/19/21 234 lb 6.4 oz (106.3 kg)   07/22/21 233 lb (105.7 kg)   06/23/21 233 lb 6.4 oz (105.9 kg)     Temp Readings from Last 3 Encounters:   07/22/21 98 °F (36.7 °C)   06/23/21 98.1 °F (36.7 °C)   05/03/21 98.5 °F (36.9 °C) (Oral)     BP Readings from Last 3 Encounters:   08/19/21 135/85   07/22/21 (!) 145/89   06/23/21 (!) 144/88     Pulse Readings from Last 3 Encounters:   07/22/21 99   06/23/21 94   06/22/21 93         I have discussed the care of Charles Schmid, including pertinent history and exam findings with the resident. I have reviewed the key elements of all parts of the encounter with the resident. I agree with the assessment, plan and orders as documented by the resident.   (GE Modifier)

## 2022-02-15 ENCOUNTER — TELEPHONE (OUTPATIENT)
Dept: FAMILY MEDICINE CLINIC | Age: 67
End: 2022-02-15

## 2022-02-15 NOTE — TELEPHONE ENCOUNTER
Patient calling stating that he needs a referral to Dr Stacy Mckeon office         He would like a referral and last 2 office notes faxed to [phone] if/when completed

## 2022-02-16 DIAGNOSIS — M54.50 CHRONIC MIDLINE LOW BACK PAIN WITHOUT SCIATICA: Primary | ICD-10-CM

## 2022-02-16 DIAGNOSIS — G89.29 CHRONIC MIDLINE LOW BACK PAIN WITHOUT SCIATICA: Primary | ICD-10-CM

## 2022-08-02 DIAGNOSIS — I10 ESSENTIAL HYPERTENSION: ICD-10-CM

## 2022-08-02 RX ORDER — AMLODIPINE BESYLATE 5 MG/1
TABLET ORAL
Qty: 90 TABLET | Refills: 0 | Status: SHIPPED | OUTPATIENT
Start: 2022-08-02 | End: 2022-10-12 | Stop reason: SDUPTHER

## 2022-08-02 NOTE — TELEPHONE ENCOUNTER
Last visit: 11/10/21  Last Med refill: 6/10/22  Does patient have enough medication for 72 hours: No: . Next Visit Date:  No future appointments.     Health Maintenance   Topic Date Due    HIV screen  Never done    Hepatitis C screen  Never done    Prostate Specific Antigen (PSA) Screening or Monitoring  Never done    Shingles vaccine (1 of 2) Never done    Lipids  09/18/2020    COVID-19 Vaccine (3 - Booster for Pfizer series) 08/16/2021    Pneumococcal 0-64 years Vaccine (2 - PCV) 11/06/2021    Depression Monitoring  02/26/2022    A1C test (Diabetic or Prediabetic)  04/22/2022    Flu vaccine (1) 09/01/2022    Colorectal Cancer Screen  03/15/2026    DTaP/Tdap/Td vaccine (2 - Td or Tdap) 11/06/2030    Hepatitis A vaccine  Aged Out    Hepatitis B vaccine  Aged Out    Hib vaccine  Aged Out    Meningococcal (ACWY) vaccine  Aged Out       Hemoglobin A1C (%)   Date Value   04/22/2021 5.9   03/29/2021 6.0   11/06/2020 5.9             ( goal A1C is < 7)   No results found for: LABMICR  LDL Cholesterol (mg/dL)   Date Value   09/18/2019 120   05/03/2017 79       (goal LDL is <100)   AST (U/L)   Date Value   12/23/2018 13     ALT (U/L)   Date Value   12/23/2018 13     BUN (mg/dL)   Date Value   05/02/2021 23     BP Readings from Last 3 Encounters:   08/19/21 135/85   07/22/21 (!) 145/89   06/23/21 (!) 144/88          (goal 120/80)    All Future Testing planned in [facility]  Lab Frequency Next Occurrence               Patient Active Problem List:     Tobacco abuse     Back pain     Hypercholesteremia     Trigger finger of left hand     Trigger finger, right     Primary osteoarthritis of left knee     Right shoulder pain     Essential hypertension     Chronic obstructive pulmonary disease (HCC)     Right foot pain     Back pain of lumbar region with sciatica     Spondylolisthesis, lumbar region     Chronic low back pain without sciatica     Right hip pain     Acute ischemic right middle cerebral artery (MCA) stroke ([facility] [address] Vision changes     Stroke-like symptoms     Vitamin D deficiency     Elevated LDL cholesterol level     Thalamic infarct, acute (HCC)     Simple chronic bronchitis (HCC)     Neural foraminal stenosis of lumbar spine     Postlaminectomy syndrome     Idiopathic acute pancreatitis     Unstable gait     Major depressive disorder, recurrent episode, moderate (HCC)     Primary osteoarthritis of both knees     TIA (transient ischemic attack)     Paresthesias     Acute ischemic stroke (HCC)     Cocaine abuse ([facility] [address]     Shortness of breath     Swelling of left hand     Chronic pain of left knee     Left hand pain     Prediabetes     Pain, dental     Paronychia of right index finger     Obstructive sleep apnea syndrome     S/P total knee arthroplasty, left     Neuropathy     Gastroesophageal reflux disease without esophagitis

## 2022-10-08 DIAGNOSIS — J41.0 SIMPLE CHRONIC BRONCHITIS (HCC): ICD-10-CM

## 2022-10-10 RX ORDER — ALBUTEROL SULFATE 90 UG/1
AEROSOL, METERED RESPIRATORY (INHALATION)
Qty: 8.5 G | OUTPATIENT
Start: 2022-10-10

## 2022-10-10 NOTE — TELEPHONE ENCOUNTER
Writer left vm for patient  to contact office to [facility] appt. Please address the medication refill and close the encounter. If I can be of assistance, please route to the applicable pool. Thank you. Last visit: 11/10/21  Last Med refill: 11/10/2021  Does patient have enough medication for 72 hours: No:     Next Visit Date:  No future appointments.     Health Maintenance   Topic Date Due    HIV screen  Never done    Hepatitis C screen  Never done    Shingles vaccine (1 of 2) Never done    Lipids  09/18/2020    COVID-19 Vaccine (3 - Booster for Pfizer series) 08/16/2021    Depression Monitoring  02/26/2022    A1C test (Diabetic or Prediabetic)  04/22/2022    Flu vaccine (1) 08/01/2022    AAA screen  08/30/2022    Colorectal Cancer Screen  03/15/2026    DTaP/Tdap/Td vaccine (2 - Td or Tdap) 11/06/2030    Pneumococcal 65+ years Vaccine  Completed    Hepatitis A vaccine  Aged Out    Hib vaccine  Aged Out    Meningococcal (ACWY) vaccine  Aged Out       Hemoglobin A1C (%)   Date Value   04/22/2021 5.9   03/29/2021 6.0   11/06/2020 5.9             ( goal A1C is < 7)   No results found for: LABMICR  LDL Cholesterol (mg/dL)   Date Value   09/18/2019 120   05/03/2017 79       (goal LDL is <100)   AST (U/L)   Date Value   12/23/2018 13     ALT (U/L)   Date Value   12/23/2018 13     BUN (mg/dL)   Date Value   05/02/2021 23     BP Readings from Last 3 Encounters:   08/19/21 135/85   07/22/21 (!) 145/89   06/23/21 (!) 144/88          (goal 120/80)    All Future Testing planned in [facility]  Lab Frequency Next Occurrence               Patient Active Problem List:     Tobacco abuse     Back pain     Hypercholesteremia     Trigger finger of left hand     Trigger finger, right     Primary osteoarthritis of left knee     Right shoulder pain     Essential hypertension     Chronic obstructive pulmonary disease (HCC)     Right foot pain     Back pain of lumbar region with sciatica     Spondylolisthesis, lumbar region Chronic low back pain without sciatica     Right hip pain     Acute ischemic right middle cerebral artery (MCA) stroke (HCC)     Vision changes     Stroke-like symptoms     Vitamin D deficiency     Elevated LDL cholesterol level     Thalamic infarct, acute (HCC)     Simple chronic bronchitis (HCC)     Neural foraminal stenosis of lumbar spine     Postlaminectomy syndrome     Idiopathic acute pancreatitis     Unstable gait     Major depressive disorder, recurrent episode, moderate (HCC)     Primary osteoarthritis of both knees     TIA (transient ischemic attack)     Paresthesias     Acute ischemic stroke (HCC)     Cocaine abuse ([facility] [address]     Shortness of breath     Swelling of left hand     Chronic pain of left knee     Left hand pain     Prediabetes     Pain, dental     Paronychia of right index finger     Obstructive sleep apnea syndrome     S/P total knee arthroplasty, left     Neuropathy     Gastroesophageal reflux disease without esophagitis

## 2022-10-12 ENCOUNTER — OFFICE VISIT (OUTPATIENT)
Dept: FAMILY MEDICINE CLINIC | Age: 67
End: 2022-10-12
Payer: COMMERCIAL

## 2022-10-12 VITALS
DIASTOLIC BLOOD PRESSURE: 86 MMHG | WEIGHT: 235.6 LBS | HEART RATE: 85 BPM | TEMPERATURE: 98.4 F | HEIGHT: 69 IN | SYSTOLIC BLOOD PRESSURE: 144 MMHG | BODY MASS INDEX: 34.9 KG/M2

## 2022-10-12 DIAGNOSIS — I10 ESSENTIAL HYPERTENSION: Primary | ICD-10-CM

## 2022-10-12 DIAGNOSIS — J41.0 SIMPLE CHRONIC BRONCHITIS (HCC): ICD-10-CM

## 2022-10-12 DIAGNOSIS — J06.9 VIRAL URI: ICD-10-CM

## 2022-10-12 PROCEDURE — G8427 DOCREV CUR MEDS BY ELIG CLIN: HCPCS

## 2022-10-12 PROCEDURE — 4004F PT TOBACCO SCREEN RCVD TLK: CPT

## 2022-10-12 PROCEDURE — G8417 CALC BMI ABV UP PARAM F/U: HCPCS

## 2022-10-12 PROCEDURE — 3023F SPIROM DOC REV: CPT

## 2022-10-12 PROCEDURE — 99213 OFFICE O/P EST LOW 20 MIN: CPT

## 2022-10-12 PROCEDURE — G8484 FLU IMMUNIZE NO ADMIN: HCPCS

## 2022-10-12 PROCEDURE — 3017F COLORECTAL CA SCREEN DOC REV: CPT

## 2022-10-12 PROCEDURE — 1123F ACP DISCUSS/DSCN MKR DOCD: CPT

## 2022-10-12 RX ORDER — BUDESONIDE AND FORMOTEROL FUMARATE DIHYDRATE 160; 4.5 UG/1; UG/1
2 AEROSOL RESPIRATORY (INHALATION) 2 TIMES DAILY
Qty: 10.2 G | Refills: 3 | Status: SHIPPED | OUTPATIENT
Start: 2022-10-12

## 2022-10-12 RX ORDER — GUAIFENESIN 600 MG/1
600 TABLET, EXTENDED RELEASE ORAL 2 TIMES DAILY
Qty: 30 TABLET | Refills: 0 | Status: SHIPPED | OUTPATIENT
Start: 2022-10-12 | End: 2022-10-27

## 2022-10-12 RX ORDER — AMLODIPINE BESYLATE 10 MG/1
TABLET ORAL
Qty: 30 TABLET | Refills: 3 | Status: SHIPPED | OUTPATIENT
Start: 2022-10-12

## 2022-10-12 RX ORDER — ALBUTEROL SULFATE 90 UG/1
2 AEROSOL, METERED RESPIRATORY (INHALATION) 4 TIMES DAILY PRN
Qty: 1 EACH | Refills: 0 | Status: SHIPPED | OUTPATIENT
Start: 2022-10-12

## 2022-10-12 RX ORDER — ATORVASTATIN CALCIUM 80 MG/1
80 TABLET, FILM COATED ORAL NIGHTLY
Qty: 30 TABLET | Refills: 3 | Status: SHIPPED | OUTPATIENT
Start: 2022-10-12

## 2022-10-12 SDOH — ECONOMIC STABILITY: FOOD INSECURITY: WITHIN THE PAST 12 MONTHS, YOU WORRIED THAT YOUR FOOD WOULD RUN OUT BEFORE YOU GOT MONEY TO BUY MORE.: NEVER TRUE

## 2022-10-12 SDOH — ECONOMIC STABILITY: FOOD INSECURITY: WITHIN THE PAST 12 MONTHS, THE FOOD YOU BOUGHT JUST DIDN'T LAST AND YOU DIDN'T HAVE MONEY TO GET MORE.: NEVER TRUE

## 2022-10-12 ASSESSMENT — SOCIAL DETERMINANTS OF HEALTH (SDOH): HOW HARD IS IT FOR YOU TO PAY FOR THE VERY BASICS LIKE FOOD, HOUSING, MEDICAL CARE, AND HEATING?: NOT HARD AT ALL

## 2022-10-12 ASSESSMENT — ENCOUNTER SYMPTOMS
CONSTIPATION: 0
DIARRHEA: 0

## 2022-10-12 NOTE — PROGRESS NOTES
HYPERTENSION visit     BP Readings from Last 3 Encounters:   08/19/21 135/85   07/22/21 (!) 145/89   06/23/21 (!) 144/88       LDL Cholesterol (mg/dL)   Date Value   09/18/2019 120     HDL (mg/dL)   Date Value   09/18/2019 34 (L)     BUN (mg/dL)   Date Value   05/02/2021 23     Creatinine (mg/dL)   Date Value   05/02/2021 1.55 (H)     Glucose (mg/dL)   Date Value   05/02/2021 112 (H)              Have you changed or started any medications since your last visit including any over-the-counter medicines, vitamins, or herbal medicines? no   Have you stopped taking any of your medications? Is so, why? -  no  Are you having any side effects from any of your medications? - no  How often do you miss doses of your medication? rare      Have you seen any other physician or provider since your last visit?  no   Have you had any other diagnostic tests since your last visit?  no   Have you been seen in the emergency room and/or had an admission in a hospital since we last saw you?  no   Have you had your routine dental cleaning in the past 6 months?  no     Do you have an active MyChart account? If no, what is the barrier?   Yes    Patient Care Team:  Brodie Molina DO as PCP - General (Family Medicine)  Brodie Molina [facility] - [facility]  Isak Malik as Surgeon (Orthopedic Surgery)  [address])    Medical History Review  Past Medical, Family, and Social History reviewed and does contribute to the patient presenting condition    Health Maintenance   Topic Date Due    HIV screen  Never done    Hepatitis C screen  Never done    Shingles vaccine (1 of 2) Never done    Lipids  09/18/2020    Depression Monitoring  02/26/2022    COVID-19 Vaccine (4 - Booster for Pfizer series) 03/19/2022    A1C test (Diabetic or Prediabetic)  04/22/2022    Flu vaccine (1) 08/01/2022    AAA screen  08/30/2022    Colorectal Cancer Screen  03/15/2026    DTaP/Tdap/Td vaccine (2 - Td or Tdap) 11/06/2030    Pneumococcal 65+ years Vaccine  Completed    Hepatitis A vaccine  Aged Out    Hib vaccine  Aged Out    Meningococcal (ACWY) vaccine  Aged Out

## 2022-10-12 NOTE — PROGRESS NOTES
[facility]    Family Medicine Residency Program - Outpatient Note      Subjective:    Danial Ordoñez is a 74 y.o. male with  has a past medical history of Aortic valve sclerosis, Back pain, Cerebral artery occlusion with cerebral infarction ([facility] [address] COPD (chronic obstructive pulmonary disease) ([facility] [address] Dental bridge present, Depression, DJD (degenerative joint disease), Drug abuse ([facility] [address] High cholesterol, History of shingles, Hypertension, Pain at rest, Postlaminectomy syndrome, Sleep apnea, Suicidal ideations, TIA (transient ischemic attack), Toe contracture, left, Toe pain, left, Wears dentures, and Wears glasses. Presented to the office today for:  Chief Complaint   Patient presents with    Hypertension     Follow up and needs med refills     Nasal Congestion     Has had a cold        HPI    Patient is a 57-year-old male with a past medical history of hypertension, history of ischemic stroke here for hypertension medication refill    Patient is currently taking Norvasc 5 mg. Denies chest pain, headaches, pulmonary urinary issues    Patient is also complaining of cough, denies fever, chills. Patient additionally complaining of SOB, patient taking albuterol inhaler and symbicort and is out of the medication  Patient is not sure if he taking symbicort and albuterol or not. Does state that he is wheezy at night but denies any nighttime awakening. Patient did state that he only got short of breath on walking down to the clinic. Patient is a smoker and smokes about a pack every 2 days      Review of Systems   Constitutional:  Negative for activity change and appetite change. Cardiovascular:  Negative for chest pain and palpitations. Gastrointestinal:  Negative for constipation and diarrhea. Genitourinary: Negative. Musculoskeletal:  Negative for arthralgias and myalgias. Neurological:  Negative for weakness. Psychiatric/Behavioral:  Negative for agitation and behavioral problems. The patient has a   Family History   Problem Relation Age of Onset    Diabetes Mother     Diabetes Brother        Objective:    BP (!) 144/86 (Site: Right Upper Arm, Position: Sitting, Cuff Size: Large Adult)   Pulse 85   Temp 98.4 °F (36.9 °C) (Temporal)   Ht 5' 9.02\" (1.753 m)   Wt 235 lb 9.6 oz (106.9 kg)   BMI 34.78 kg/m²    BP Readings from Last 3 Encounters:   10/12/22 (!) 144/86   08/19/21 135/85   07/22/21 (!) 145/89       Physical Exam  HENT:      Nose: Congestion present. Cardiovascular:      Rate and Rhythm: Normal rate and regular rhythm. Pulses: Normal pulses. Heart sounds: Normal heart sounds. Musculoskeletal:         General: Normal range of motion. Skin:     General: Skin is warm. Neurological:      General: No focal deficit present. Mental Status: He is alert and oriented to person, place, and time. Psychiatric:         Mood and Affect: Mood normal.         Behavior: Behavior normal.       Lab Results   Component Value Date    WBC 9.60 05/02/2021    HGB 11.1 (L) 05/02/2021    HCT 33.5 (L) 05/02/2021     05/02/2021    CHOL 191 09/18/2019    TRIG 186 (H) 09/18/2019    HDL 34 (L) 09/18/2019    ALT 13 12/23/2018    AST 13 12/23/2018     05/02/2021    K 3.9 05/02/2021     05/02/2021    CREATININE 1.55 (H) 05/02/2021    BUN 23 05/02/2021    CO2 27 05/02/2021    TSH 1.77 12/06/2012    INR 1.0 04/22/2021    LABA1C 5.9 04/22/2021     Lab Results   Component Value Date    CALCIUM 9.5 05/02/2021     Lab Results   Component Value Date    LDLCHOLESTEROL 120 09/18/2019       Assessment and Plan:    1. Simple chronic bronchitis ([address]  -Patient has not had PFT studies done  Will get PFTs for definitive diagnosis  - albuterol sulfate HFA (VENTOLIN HFA) 108 (90 Base) MCG/ACT inhaler; Inhale 2 puffs into the lungs 4 times daily as needed for Wheezing  Dispense: 1 each; Refill: 0  - budesonide-formoterol (SYMBICORT) 160-4.5 MCG/ACT AERO;  Inhale 2 puffs into the lungs 2 times daily  Dispense: 10.2 g; Refill: 3  - Full PFT Study With Bronchodilator; Future    2. Essential hypertension  -Blood pressure is elevated 144/86  Will switch to Norvasc 10 mg and 5 mg and will have patient follow-up in  - amLODIPine (NORVASC) 10 MG tablet; take 1 tablet by mouth once daily  Dispense: 30 tablet; Refill: 3    3. Viral URI  -Likely viral infection with congestion and cough  - guaiFENesin (MUCINEX) 600 MG extended release tablet; Take 1 tablet by mouth 2 times daily for 15 days  Dispense: 30 tablet; Refill: 0          Requested Prescriptions     Signed Prescriptions Disp Refills    albuterol sulfate HFA (VENTOLIN HFA) 108 (90 Base) MCG/ACT inhaler 1 each 0     Sig: Inhale 2 puffs into the lungs 4 times daily as needed for Wheezing    budesonide-formoterol (SYMBICORT) 160-4.5 MCG/ACT AERO 10.2 g 3     Sig: Inhale 2 puffs into the lungs 2 times daily    atorvastatin (LIPITOR) 80 MG tablet 30 tablet 3     Sig: Take 1 tablet by mouth nightly    amLODIPine (NORVASC) 10 MG tablet 30 tablet 3     Sig: take 1 tablet by mouth once daily    guaiFENesin (MUCINEX) 600 MG extended release tablet 30 tablet 0     Sig: Take 1 tablet by mouth 2 times daily for 15 days       Medications Discontinued During This Encounter   Medication Reason    atorvastatin (LIPITOR) 80 MG tablet REORDER    albuterol sulfate HFA (VENTOLIN HFA) 108 (90 Base) MCG/ACT inhaler REORDER    budesonide-formoterol (SYMBICORT) 160-4.5 MCG/ACT AERO REORDER    amLODIPine (NORVASC) 5 MG tablet REORDER       Gurpreet received counseling on the following healthy behaviors: nutrition, exercise and medication adherence    Discussed use,benefit, and side effects of prescribed medications. Barriers to medication compliance addressed. All patient questions answered. Pt voiced understanding. Return in about 4 weeks (around 11/9/2022) for HTN.         Disclaimer: Some orall of this note was transcribed using voice-recognition software. This may cause typographical errors occasionally. Although all effort is made to fix these errors, please do not hesitate to contact our office if there Rush Karolyn concern with the understanding of this note.

## 2022-10-12 NOTE — PATIENT INSTRUCTIONS
Thank you for letting us take care of you today. We hope all your questions were addressed. If a question was overlooked or something else comes to mind after you return home, please contact a member of your Care Team listed below. Your Care Team at [place] [address] is Team #5  Keyur Shanks MD (Faculty)  Casper Campbell MD (Resident)  Saleem Zayas MD (Resident)  Dillon Smyth MD (Resident)  Cristina Bain MD, (Resident)  Nicolasa Rodriguez., RAINER Martinez., RMJOE Niño.,  FRAN Gonzalez., Alexandria Sims., [facility] office)  Eze Martinez, [address] (Clinical Practice Manager)  Darcy Patten [facility] (Clinical Pharmacist)       Office phone number: [phone]    If you need to get in right away due to illness, please be advised we have \"Same Day\" appointments available Monday-Friday. Please call us at [phone] option #3 to schedule your \"Same Day\" appointment.

## 2022-10-13 NOTE — TELEPHONE ENCOUNTER
Left vm, pt is scheduled 11-11-22 explain on vm that albuterol inhaler not able to fill medication until seen. If want sooner appt could schedule.

## 2022-10-13 NOTE — PROGRESS NOTES
Attending Physician Statement  I have discussed the care of Armando Yeboah, 74 y.o. male,including pertinent history and exam findings,  with the resident Dr. Latoya Liu MD.  History:  Chief Complaint   Patient presents with    Hypertension     Follow up and needs med refills     Nasal Congestion     Has had a cold      I have reviewed the key elements of the encounter with the resident. Examination was done by resident as documented in residents note. BP Readings from Last 3 Encounters:   10/12/22 (!) 144/86   08/19/21 135/85   07/22/21 (!) 145/89     BP (!) 144/86 (Site: Right Upper Arm, Position: Sitting, Cuff Size: Large Adult)   Pulse 85   Temp 98.4 °F (36.9 °C) (Temporal)   Ht 5' 9.02\" (1.753 m)   Wt 235 lb 9.6 oz (106.9 kg)   BMI 34.78 kg/m²   Lab Results   Component Value Date    WBC 9.60 05/02/2021    HGB 11.1 (L) 05/02/2021    HCT 33.5 (L) 05/02/2021     05/02/2021    CHOL 191 09/18/2019    TRIG 186 (H) 09/18/2019    HDL 34 (L) 09/18/2019    ALT 13 12/23/2018    AST 13 12/23/2018     05/02/2021    K 3.9 05/02/2021     05/02/2021    CREATININE 1.55 (H) 05/02/2021    BUN 23 05/02/2021    CO2 27 05/02/2021    TSH 1.77 12/06/2012    INR 1.0 04/22/2021    LABA1C 5.9 04/22/2021     Lab Results   Component Value Date    CALCIUM 9.5 05/02/2021     Lab Results   Component Value Date    LDLCHOLESTEROL 120 09/18/2019     I agree with the assessment, plan and diagnosis of    Diagnosis Orders   1. Essential hypertension  amLODIPine (NORVASC) 10 MG tablet      2. Simple chronic bronchitis (HCC)  albuterol sulfate HFA (VENTOLIN HFA) 108 (90 Base) MCG/ACT inhaler    budesonide-formoterol (SYMBICORT) 160-4.5 MCG/ACT AERO    Full PFT Study With Bronchodilator      3. Viral URI  guaiFENesin (MUCINEX) 600 MG extended release tablet        I agree with  orders as documented by the resident. Recommendations:   Return in about 4 weeks (around 11/9/2022) for HTN.    (Negra Brown )  Bethel Gitelman, MD

## 2022-10-14 NOTE — TELEPHONE ENCOUNTER
Lvm regarding albuterol inhaler not being to be filled until pt is seen, do understand [facility] 11-11-22 just if needed medication sooner would  need to [facility] sooner appt.

## 2022-11-11 ENCOUNTER — OFFICE VISIT (OUTPATIENT)
Dept: FAMILY MEDICINE CLINIC | Age: 67
End: 2022-11-11
Payer: COMMERCIAL

## 2022-11-11 VITALS
SYSTOLIC BLOOD PRESSURE: 126 MMHG | BODY MASS INDEX: 34.9 KG/M2 | HEART RATE: 60 BPM | HEIGHT: 69 IN | DIASTOLIC BLOOD PRESSURE: 69 MMHG | WEIGHT: 235.6 LBS

## 2022-11-11 DIAGNOSIS — I10 ESSENTIAL HYPERTENSION: Primary | ICD-10-CM

## 2022-11-11 DIAGNOSIS — Z00.00 HEALTH CARE MAINTENANCE: ICD-10-CM

## 2022-11-11 DIAGNOSIS — G47.33 OBSTRUCTIVE SLEEP APNEA SYNDROME: ICD-10-CM

## 2022-11-11 LAB — HBA1C MFR BLD: 6.4 %

## 2022-11-11 PROCEDURE — 83036 HEMOGLOBIN GLYCOSYLATED A1C: CPT | Performed by: FAMILY MEDICINE

## 2022-11-11 PROCEDURE — G8417 CALC BMI ABV UP PARAM F/U: HCPCS | Performed by: FAMILY MEDICINE

## 2022-11-11 PROCEDURE — G8427 DOCREV CUR MEDS BY ELIG CLIN: HCPCS | Performed by: FAMILY MEDICINE

## 2022-11-11 PROCEDURE — G8484 FLU IMMUNIZE NO ADMIN: HCPCS | Performed by: FAMILY MEDICINE

## 2022-11-11 PROCEDURE — 3078F DIAST BP <80 MM HG: CPT | Performed by: FAMILY MEDICINE

## 2022-11-11 PROCEDURE — 99213 OFFICE O/P EST LOW 20 MIN: CPT | Performed by: FAMILY MEDICINE

## 2022-11-11 PROCEDURE — 1123F ACP DISCUSS/DSCN MKR DOCD: CPT | Performed by: FAMILY MEDICINE

## 2022-11-11 PROCEDURE — 3017F COLORECTAL CA SCREEN DOC REV: CPT | Performed by: FAMILY MEDICINE

## 2022-11-11 PROCEDURE — 3074F SYST BP LT 130 MM HG: CPT | Performed by: FAMILY MEDICINE

## 2022-11-11 PROCEDURE — 4004F PT TOBACCO SCREEN RCVD TLK: CPT | Performed by: FAMILY MEDICINE

## 2022-11-11 RX ORDER — CEPHALEXIN 500 MG/1
500 CAPSULE ORAL 2 TIMES DAILY
Qty: 14 CAPSULE | Refills: 0 | Status: SHIPPED | OUTPATIENT
Start: 2022-11-11 | End: 2022-11-18

## 2022-11-11 RX ORDER — NICOTINE 21 MG/24HR
1 PATCH, TRANSDERMAL 24 HOURS TRANSDERMAL DAILY
Qty: 30 PATCH | Refills: 2 | Status: SHIPPED | OUTPATIENT
Start: 2022-11-11 | End: 2023-02-09

## 2022-11-11 ASSESSMENT — PATIENT HEALTH QUESTIONNAIRE - PHQ9
1. LITTLE INTEREST OR PLEASURE IN DOING THINGS: 0
10. IF YOU CHECKED OFF ANY PROBLEMS, HOW DIFFICULT HAVE THESE PROBLEMS MADE IT FOR YOU TO DO YOUR WORK, TAKE CARE OF THINGS AT HOME, OR GET ALONG WITH OTHER PEOPLE: 0
2. FEELING DOWN, DEPRESSED OR HOPELESS: 0
SUM OF ALL RESPONSES TO PHQ QUESTIONS 1-9: 0
3. TROUBLE FALLING OR STAYING ASLEEP: 0
4. FEELING TIRED OR HAVING LITTLE ENERGY: 0
SUM OF ALL RESPONSES TO PHQ QUESTIONS 1-9: 0
SUM OF ALL RESPONSES TO PHQ9 QUESTIONS 1 & 2: 0
SUM OF ALL RESPONSES TO PHQ QUESTIONS 1-9: 0
SUM OF ALL RESPONSES TO PHQ QUESTIONS 1-9: 0
9. THOUGHTS THAT YOU WOULD BE BETTER OFF DEAD, OR OF HURTING YOURSELF: 0
7. TROUBLE CONCENTRATING ON THINGS, SUCH AS READING THE NEWSPAPER OR WATCHING TELEVISION: 0
6. FEELING BAD ABOUT YOURSELF - OR THAT YOU ARE A FAILURE OR HAVE LET YOURSELF OR YOUR FAMILY DOWN: 0
5. POOR APPETITE OR OVEREATING: 0
8. MOVING OR SPEAKING SO SLOWLY THAT OTHER PEOPLE COULD HAVE NOTICED. OR THE OPPOSITE, BEING SO FIGETY OR RESTLESS THAT YOU HAVE BEEN MOVING AROUND A LOT MORE THAN USUAL: 0
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
## 2022-11-11 NOTE — PROGRESS NOTES
Visit Information    Have you changed or started any medications since your last visit including any over-the-counter medicines, vitamins, or herbal medicines? no   Are you having any side effects from any of your medications? -  no  Have you stopped taking any of your medications? Is so, why? -  no    Have you seen any other physician or provider since your last visit? No  Have you had any other diagnostic tests since your last visit? Yes - Records Obtained  Have you been seen in the emergency room and/or had an admission to a hospital since we last saw you? Yes - Records Obtained  Have you had your routine dental cleaning in the past 6 months? no    Have you activated your [employer] account? If not, what are your barriers?  Yes     Patient Care Team:  Yordy Johnson DO as PCP - General (Family Medicine)  Yordy Johnson DO as PCP - [facility] Provider  Pepper Goodell as Surgeon (Orthopedic Surgery)  [address])    Medical History Review  Past Medical, Family, and Social History reviewed and does not contribute to the patient presenting condition    Health Maintenance   Topic Date Due    HIV screen  Never done    Hepatitis C screen  Never done    Shingles vaccine (1 of 2) Never done    Lipids  09/18/2020    COVID-19 Vaccine (4 - Booster for Pfizer series) 01/14/2022    Depression Monitoring  02/26/2022    A1C test (Diabetic or Prediabetic)  04/22/2022    Flu vaccine (1) 08/01/2022    AAA screen  08/30/2022    Annual Wellness Visit (AWV)  11/07/2022    Colorectal Cancer Screen  03/15/2026    DTaP/Tdap/Td vaccine (2 - Td or Tdap) 11/06/2030    Pneumococcal 65+ years Vaccine  Completed    Hepatitis A vaccine  Aged Out    Hib vaccine  Aged Out    Meningococcal (ACWY) vaccine  Aged Out

## 2022-11-11 NOTE — PATIENT INSTRUCTIONS
Thank you for letting us take care of you today. We hope all your questions were addressed. If a question was overlooked or something else comes to mind after you return home, please contact a member of your Care Team listed below. Your Care Team at [place] [address] is Team #5  Long Denson MD (Faculty)  Noa Ron MD (Faculty)  Caleb Coon MD (Resident)  Flaquita Perez MD (Resident)  Delfin Buchanan MD (Resident)  Kody Giron MD, (Resident)  Lazaro Cruz., MARQUISE Conte.,  FRAN Gardiner., Shantel Kathleen., [facility] office)  Lauren Lowe, [address] (Clinical Practice Manager)  Efrain Avalos [facility] (Clinical Pharmacist)       Office phone number: [phone]    If you need to get in right away due to illness, please be advised we have \"Same Day\" appointments available Monday-Friday. Please call us at [phone] option #3 to schedule your \"Same Day\" appointment.

## 2022-11-15 ASSESSMENT — ENCOUNTER SYMPTOMS
COUGH: 0
SHORTNESS OF BREATH: 1

## 2022-12-07 ENCOUNTER — TELEPHONE (OUTPATIENT)
Dept: FAMILY MEDICINE CLINIC | Age: 67
End: 2022-12-07

## 2022-12-07 RX ORDER — ATORVASTATIN CALCIUM 80 MG/1
80 TABLET, FILM COATED ORAL NIGHTLY
Qty: 30 TABLET | Refills: 3 | Status: SHIPPED | OUTPATIENT
Start: 2022-12-07

## 2022-12-07 NOTE — TELEPHONE ENCOUNTER
Patient called office with complaints of black tarry stools x 3 weeks; patient encouraged to seek treatment at ED to see if there is internal bleeding, patient verbalizes understanding and will follow up with office.

## 2022-12-07 NOTE — TELEPHONE ENCOUNTER
Last visit:   Last Med refill:   Does patient have enough medication for 72 hours: No:     Next Visit Date:  No future appointments.     Health Maintenance   Topic Date Due    HIV screen  Never done    Hepatitis C screen  Never done    Shingles vaccine (1 of 2) Never done    Lipids  09/18/2020    COVID-19 Vaccine (4 - Booster for Pfizer series) 01/14/2022    Flu vaccine (1) 08/01/2022    AAA screen  08/30/2022    Annual Wellness Visit (AWV)  11/07/2022    A1C test (Diabetic or Prediabetic)  11/11/2023    Depression Monitoring  11/11/2023    Colorectal Cancer Screen  03/15/2026    DTaP/Tdap/Td vaccine (2 - Td or Tdap) 11/06/2030    Pneumococcal 65+ years Vaccine  Completed    Hepatitis A vaccine  Aged Out    Hib vaccine  Aged Out    Meningococcal (ACWY) vaccine  Aged Out       Hemoglobin A1C (%)   Date Value   11/11/2022 6.4   04/22/2021 5.9   03/29/2021 6.0             ( goal A1C is < 7)   No results found for: LABMICR  LDL Cholesterol (mg/dL)   Date Value   09/18/2019 120   05/03/2017 79       (goal LDL is <100)   AST (U/L)   Date Value   12/23/2018 13     ALT (U/L)   Date Value   12/23/2018 13     BUN (mg/dL)   Date Value   05/02/2021 23     BP Readings from Last 3 Encounters:   11/11/22 126/69   10/12/22 (!) 144/86   08/19/21 135/85          (goal 120/80)    All Future Testing planned in [facility]  Lab Frequency Next Occurrence   Full PFT Study With Bronchodilator Once 10/12/2022   Baseline Diagnostic Sleep Study Once 12/11/2022               Patient Active Problem List:     Tobacco abuse     Back pain     Hypercholesteremia     Trigger finger of left hand     Trigger finger, right     Primary osteoarthritis of left knee     Right shoulder pain     Essential hypertension     Chronic obstructive pulmonary disease (HCC)     Right foot pain     Back pain of lumbar region with sciatica     Spondylolisthesis, lumbar region     Chronic low back pain without sciatica     Right hip pain     Acute ischemic right middle cerebral artery (MCA) stroke (HCC)     Vision changes     Stroke-like symptoms     Vitamin D deficiency     Elevated LDL cholesterol level     Thalamic infarct, acute (HCC)     Simple chronic bronchitis (HCC)     Neural foraminal stenosis of lumbar spine     Postlaminectomy syndrome     Idiopathic acute pancreatitis     Unstable gait     Major depressive disorder, recurrent episode, moderate (HCC)     Primary osteoarthritis of both knees     TIA (transient ischemic attack)     Paresthesias     Acute ischemic stroke (HCC)     Cocaine abuse ([address]     Shortness of breath     Swelling of left hand     Chronic pain of left knee     Left hand pain     Prediabetes     Pain, dental     Paronychia of right index finger     Obstructive sleep apnea syndrome     S/P total knee arthroplasty, left     Neuropathy     Gastroesophageal reflux disease without esophagitis           Please address the medication refill and close the encounter. If I can be of assistance, please route to the applicable pool. Thank you.

## 2022-12-28 ENCOUNTER — TELEPHONE (OUTPATIENT)
Dept: FAMILY MEDICINE CLINIC | Age: 67
End: 2022-12-28

## 2023-01-30 ENCOUNTER — OFFICE VISIT (OUTPATIENT)
Dept: FAMILY MEDICINE CLINIC | Age: 68
End: 2023-01-30
Payer: COMMERCIAL

## 2023-01-30 VITALS
SYSTOLIC BLOOD PRESSURE: 156 MMHG | DIASTOLIC BLOOD PRESSURE: 85 MMHG | HEIGHT: 69 IN | BODY MASS INDEX: 35.84 KG/M2 | WEIGHT: 242 LBS | HEART RATE: 84 BPM

## 2023-01-30 DIAGNOSIS — Z23 ENCOUNTER FOR IMMUNIZATION: ICD-10-CM

## 2023-01-30 DIAGNOSIS — Z00.00 HEALTH CARE MAINTENANCE: Primary | ICD-10-CM

## 2023-01-30 DIAGNOSIS — Z00.00 MEDICARE ANNUAL WELLNESS VISIT, SUBSEQUENT: ICD-10-CM

## 2023-01-30 PROCEDURE — G0439 PPPS, SUBSEQ VISIT: HCPCS | Performed by: FAMILY MEDICINE

## 2023-01-30 PROCEDURE — 3078F DIAST BP <80 MM HG: CPT | Performed by: FAMILY MEDICINE

## 2023-01-30 PROCEDURE — 3017F COLORECTAL CA SCREEN DOC REV: CPT | Performed by: FAMILY MEDICINE

## 2023-01-30 PROCEDURE — G0008 ADMIN INFLUENZA VIRUS VAC: HCPCS | Performed by: FAMILY MEDICINE

## 2023-01-30 PROCEDURE — G8482 FLU IMMUNIZE ORDER/ADMIN: HCPCS | Performed by: FAMILY MEDICINE

## 2023-01-30 PROCEDURE — 1123F ACP DISCUSS/DSCN MKR DOCD: CPT | Performed by: FAMILY MEDICINE

## 2023-01-30 PROCEDURE — 3074F SYST BP LT 130 MM HG: CPT | Performed by: FAMILY MEDICINE

## 2023-01-30 ASSESSMENT — PATIENT HEALTH QUESTIONNAIRE - PHQ9
SUM OF ALL RESPONSES TO PHQ QUESTIONS 1-9: 0
SUM OF ALL RESPONSES TO PHQ QUESTIONS 1-9: 0
SUM OF ALL RESPONSES TO PHQ9 QUESTIONS 1 & 2: 0
2. FEELING DOWN, DEPRESSED OR HOPELESS: 0
9. THOUGHTS THAT YOU WOULD BE BETTER OFF DEAD, OR OF HURTING YOURSELF: 0
1. LITTLE INTEREST OR PLEASURE IN DOING THINGS: 0
7. TROUBLE CONCENTRATING ON THINGS, SUCH AS READING THE NEWSPAPER OR WATCHING TELEVISION: 0
SUM OF ALL RESPONSES TO PHQ QUESTIONS 1-9: 0
10. IF YOU CHECKED OFF ANY PROBLEMS, HOW DIFFICULT HAVE THESE PROBLEMS MADE IT FOR YOU TO DO YOUR WORK, TAKE CARE OF THINGS AT HOME, OR GET ALONG WITH OTHER PEOPLE: 0
3. TROUBLE FALLING OR STAYING ASLEEP: 0
SUM OF ALL RESPONSES TO PHQ QUESTIONS 1-9: 0
6. FEELING BAD ABOUT YOURSELF - OR THAT YOU ARE A FAILURE OR HAVE LET YOURSELF OR YOUR FAMILY DOWN: 0
5. POOR APPETITE OR OVEREATING: 0
4. FEELING TIRED OR HAVING LITTLE ENERGY: 0
8. MOVING OR SPEAKING SO SLOWLY THAT OTHER PEOPLE COULD HAVE NOTICED. OR THE OPPOSITE, BEING SO FIGETY OR RESTLESS THAT YOU HAVE BEEN MOVING AROUND A LOT MORE THAN USUAL: 0

## 2023-01-30 ASSESSMENT — LIFESTYLE VARIABLES
HOW OFTEN DO YOU HAVE A DRINK CONTAINING ALCOHOL: 2-3 TIMES A WEEK
HOW MANY STANDARD DRINKS CONTAINING ALCOHOL DO YOU HAVE ON A TYPICAL DAY: 1 OR 2

## 2023-02-06 NOTE — PATIENT INSTRUCTIONS
Personalized Preventive Plan for Nicky Alan - 1/30/2023  Medicare offers a range of preventive health benefits. Some of the tests and screenings are paid in full while other may be subject to a deductible, co-insurance, and/or copay. Some of these benefits include a comprehensive review of your medical history including lifestyle, illnesses that may run in your family, and various assessments and screenings as appropriate. After reviewing your medical record and screening and assessments performed today your provider may have ordered immunizations, labs, imaging, and/or referrals for you. A list of these orders (if applicable) as well as your Preventive Care list are included within your After Visit Summary for your review. Other Preventive Recommendations:    A preventive eye exam performed by an eye specialist is recommended every 1-2 years to screen for glaucoma; cataracts, macular degeneration, and other eye disorders. A preventive dental visit is recommended every 6 months. Try to get at least 150 minutes of exercise per week or 10,000 steps per day on a pedometer . Order or download the FREE \"Exercise & Physical Activity: Your Everyday Guide\" from [employer]. Call 6-382.213.6509 or search [employer] online. You need 1110-2418 mg of calcium and 5510-6535 IU of vitamin D per day. It is possible to meet your calcium requirement with diet alone, but a vitamin D supplement is usually necessary to meet this goal.  When exposed to the sun, use a sunscreen that protects against both UVA and UVB radiation with an SPF of 30 or greater. Reapply every 2 to 3 hours or after sweating, drying off with a towel, or swimming. Always wear a seat belt when traveling in a car. Always wear a helmet when riding a bicycle or motorcycle.

## 2023-02-06 NOTE — PROGRESS NOTES
Medicare Annual Wellness Visit    Alexandre Calvillo is here for Medicare AWV (Annual)    [address] [facility] care maintenance  -     Influenza, AFLURIA, (age 1 y+), IM, Preservative Free, 0.5 mL  Encounter for immunization   -     Influenza, AFLURIA, (age 1 y+), IM, Preservative Free, 0.5 mL  Medicare annual wellness visit, subsequent      Recommendations for Preventive Services Due: see orders and patient instructions/AVS.  Recommended screening schedule for the next 5-10 years is provided to the patient in written form: see Patient Instructions/AVS.     No follow-ups on file. Subjective   The following acute and/or chronic problems were also addressed today:  Essential Hypertension    Patient's complete Health Risk Assessment and screening values have been reviewed and are found in Flowsheets. The following problems were reviewed today and where indicated follow up appointments were made and/or referrals ordered. Positive Risk Factor Screenings with Interventions:       Cognitive:       Clock Drawing Test (CDT): Normal       Total Score Interpretation: Abnormal Mini-Cog      Interventions:  Patient declines any further evaluation or treatment             Weight and Activity:  Physical Activity: Insufficiently Active    Days of Exercise per Week: 3 days    Minutes of Exercise per Session: 10 min     On average, how many days per week do you engage in moderate to strenuous exercise (like a brisk walk)?: 3 days  Have you lost any weight without trying in the past 3 months?: No  Body mass index: (!) 35.73    Obesity Interventions:  Patient declines any further evaluation or treatment            Vision Screen:  Do you have difficulty driving, watching TV, or doing any of your daily activities because of your eyesight?: No  Have you had an eye exam within the past year?: (!) No  No results found.     Interventions:   Patient declines any further evaluation or treatment    Safety:  Do you have either shower bars, grab bars, non-slip mats or non-slip surfaces in your shower or bathtub?: (!) No  Interventions:  Patient declined any further interventions or treatment     Advanced Directives:  Do you have a Living Will?: (!) No    Intervention:          Tobacco Use:  Tobacco Use: High Risk    Smoking Tobacco Use: Every Day    Smokeless Tobacco Use: Never    Passive Exposure: Not on file     E-cigarette/Vaping       Questions Responses    E-cigarette/Vaping Use Never User    Start Date     Passive Exposure     Quit Date     Counseling Given     Comments           Interventions:  Patient declined any further intervention or treatment                        Objective   Vitals:    01/30/23 0942 01/30/23 1012   BP: (!) 151/84 (!) 156/85   Site: Right Upper Arm Left Upper Arm   Position: Sitting Sitting   Cuff Size: Large Adult Large Adult   Pulse: 84 84   Weight: 242 lb (109.8 kg)    Height: 5' 9\" (1.753 m)       Body mass index is 35.74 kg/m². Allergies   Allergen Reactions    Cyclobenzaprine Rash    Other Nausea And Vomiting     Pt states he cannot take any muscle relaxers but does not know what specific muscle relaxer caused the nausea. Tramadol Nausea Only and Rash     Prior to Visit Medications    Medication Sig Taking?  Authorizing Provider   atorvastatin (LIPITOR) 80 MG tablet Take 1 tablet by mouth nightly Yes Lorrin Medici, DO   nicotine (NICODERM CQ) 14 MG/24HR Place 1 patch onto the skin daily Yes Lorrin Medici, DO   albuterol sulfate HFA (VENTOLIN HFA) 108 (90 Base) MCG/ACT inhaler Inhale 2 puffs into the lungs 4 times daily as needed for Wheezing Yes Arcadio Pham MD   budesonide-formoterol [facility]) 160-4.5 MCG/ACT AERO Inhale 2 puffs into the lungs 2 times daily Yes Yung Schmid MD   amLODIPine (NORVASC) 10 MG tablet take 1 tablet by mouth once daily Yes Yung Schmid MD   gabapentin (NEURONTIN) 300 MG capsule Take 1 capsule by mouth 3 times daily for 30 days. Harland Collet, PA-C   celecoxib (CELEBREX) 200 MG capsule Take 1 capsule by mouth daily  Harland Collet, PA-C       CareTeam (Including outside providers/suppliers regularly involved in providing care):   Patient Care Team:  Sugar Samson DO as PCP - General (Family Medicine)  Sugar Samson DO as PCP - Empaneled Provider  Governor Maker as Surgeon (Orthopedic Surgery)  [address])     Reviewed and updated this visit:  Tobacco  Allergies  Meds  Med Hx  Surg Hx  Soc Hx  Fam Hx        I, Tez Goetz LPN, 4/50/4239, performed the documented evaluation under the direct supervision of the attending physician.       Tez Goetz LPN

## 2023-02-08 NOTE — PROGRESS NOTES
Visit Information    Have you changed or started any medications since your last visit including any over-the-counter medicines, vitamins, or herbal medicines? no   Are you having any side effects from any of your medications? -  no  Have you stopped taking any of your medications? Is so, why? -  no    Have you seen any other physician or provider since your last visit? No  Have you had any other diagnostic tests since your last visit? No  Have you been seen in the emergency room and/or had an admission to a hospital since we last saw you? No  Have you had your routine dental cleaning in the past 6 months? no    Have you activated your [employer] account? If not, what are your barriers?  Yes     Patient Care Team:  Aurelia Hernandez DO as PCP - General (Family Medicine)  Aurelia Hernandez DO as PCP - [facility]  Jordana Sorensen as Surgeon (Orthopedic Surgery)  [address])    Medical History Review  Past Medical, Family, and Social History reviewed and does not contribute to the patient presenting condition    Health Maintenance   Topic Date Due    Hepatitis C screen  Never done    HIV screen  Never done    Shingles Vaccine (1 of 2) Never done    Lipid screen  09/18/2020    Flu vaccine (1) 09/01/2021    Annual Wellness Visit (AWV)  01/19/2022    A1C test (Diabetic or Prediabetic)  04/22/2022    Potassium monitoring  05/02/2022    Creatinine monitoring  05/02/2022    Pneumococcal 0-64 years Vaccine (2 of 2 - PPSV23) 11/06/2025    Colon cancer screen colonoscopy  03/15/2026    DTaP/Tdap/Td vaccine (2 - Td or Tdap) 11/06/2030    COVID-19 Vaccine  Completed    Hepatitis A vaccine  Aged Out    Hepatitis B vaccine  Aged Out    Hib vaccine  Aged Out    Meningococcal (ACWY) vaccine  Aged Out Left Periorbital Units: 2

## 2023-02-20 ENCOUNTER — OFFICE VISIT (OUTPATIENT)
Dept: FAMILY MEDICINE CLINIC | Age: 68
End: 2023-02-20
Payer: COMMERCIAL

## 2023-02-20 VITALS
WEIGHT: 238.6 LBS | TEMPERATURE: 98.2 F | BODY MASS INDEX: 35.34 KG/M2 | HEIGHT: 69 IN | SYSTOLIC BLOOD PRESSURE: 130 MMHG | HEART RATE: 91 BPM | OXYGEN SATURATION: 95 % | DIASTOLIC BLOOD PRESSURE: 74 MMHG

## 2023-02-20 DIAGNOSIS — G47.33 OBSTRUCTIVE SLEEP APNEA: Primary | ICD-10-CM

## 2023-02-20 DIAGNOSIS — Z96.652 S/P TOTAL KNEE ARTHROPLASTY, LEFT: ICD-10-CM

## 2023-02-20 DIAGNOSIS — Z72.0 TOBACCO ABUSE: Chronic | ICD-10-CM

## 2023-02-20 DIAGNOSIS — I10 ESSENTIAL HYPERTENSION: ICD-10-CM

## 2023-02-20 DIAGNOSIS — J41.0 SIMPLE CHRONIC BRONCHITIS (HCC): ICD-10-CM

## 2023-02-20 DIAGNOSIS — M65.322 TRIGGER FINGER, LEFT INDEX FINGER: ICD-10-CM

## 2023-02-20 PROCEDURE — 3078F DIAST BP <80 MM HG: CPT | Performed by: FAMILY MEDICINE

## 2023-02-20 PROCEDURE — 1123F ACP DISCUSS/DSCN MKR DOCD: CPT | Performed by: FAMILY MEDICINE

## 2023-02-20 PROCEDURE — 3023F SPIROM DOC REV: CPT | Performed by: FAMILY MEDICINE

## 2023-02-20 PROCEDURE — 3075F SYST BP GE 130 - 139MM HG: CPT | Performed by: FAMILY MEDICINE

## 2023-02-20 PROCEDURE — G8417 CALC BMI ABV UP PARAM F/U: HCPCS | Performed by: FAMILY MEDICINE

## 2023-02-20 PROCEDURE — G8427 DOCREV CUR MEDS BY ELIG CLIN: HCPCS | Performed by: FAMILY MEDICINE

## 2023-02-20 PROCEDURE — G8482 FLU IMMUNIZE ORDER/ADMIN: HCPCS | Performed by: FAMILY MEDICINE

## 2023-02-20 PROCEDURE — 3017F COLORECTAL CA SCREEN DOC REV: CPT | Performed by: FAMILY MEDICINE

## 2023-02-20 PROCEDURE — 4004F PT TOBACCO SCREEN RCVD TLK: CPT | Performed by: FAMILY MEDICINE

## 2023-02-20 PROCEDURE — 99214 OFFICE O/P EST MOD 30 MIN: CPT | Performed by: FAMILY MEDICINE

## 2023-02-20 RX ORDER — AMLODIPINE BESYLATE 10 MG/1
TABLET ORAL
Qty: 30 TABLET | Refills: 3 | Status: SHIPPED | OUTPATIENT
Start: 2023-02-20

## 2023-02-20 RX ORDER — ALBUTEROL SULFATE 90 UG/1
2 AEROSOL, METERED RESPIRATORY (INHALATION) 4 TIMES DAILY PRN
Qty: 1 EACH | Refills: 0 | Status: SHIPPED | OUTPATIENT
Start: 2023-02-20

## 2023-02-20 RX ORDER — CELECOXIB 200 MG/1
200 CAPSULE ORAL DAILY
Qty: 60 CAPSULE | Refills: 0 | Status: SHIPPED | OUTPATIENT
Start: 2023-02-20 | End: 2023-02-20 | Stop reason: ALTCHOICE

## 2023-02-20 RX ORDER — GABAPENTIN 300 MG/1
300 CAPSULE ORAL 3 TIMES DAILY
Qty: 90 CAPSULE | Refills: 0 | Status: CANCELLED | OUTPATIENT
Start: 2023-02-20 | End: 2023-03-22

## 2023-02-20 RX ORDER — ATORVASTATIN CALCIUM 80 MG/1
80 TABLET, FILM COATED ORAL NIGHTLY
Qty: 30 TABLET | Refills: 3 | Status: SHIPPED | OUTPATIENT
Start: 2023-02-20

## 2023-02-20 RX ORDER — OXYCODONE AND ACETAMINOPHEN 10; 325 MG/1; MG/1
TABLET ORAL
COMMUNITY
Start: 2023-01-29

## 2023-02-20 RX ORDER — BUDESONIDE AND FORMOTEROL FUMARATE DIHYDRATE 160; 4.5 UG/1; UG/1
2 AEROSOL RESPIRATORY (INHALATION) 2 TIMES DAILY
Qty: 10.2 G | Refills: 3 | Status: SHIPPED | OUTPATIENT
Start: 2023-02-20

## 2023-02-20 ASSESSMENT — ENCOUNTER SYMPTOMS
WHEEZING: 0
SHORTNESS OF BREATH: 1
BACK PAIN: 1

## 2023-02-20 NOTE — PROGRESS NOTES
HYPERTENSION visit     BP Readings from Last 3 Encounters:   01/30/23 (!) 156/85   11/11/22 126/69   10/12/22 (!) 144/86       LDL Cholesterol (mg/dL)   Date Value   09/18/2019 120     HDL (mg/dL)   Date Value   09/18/2019 34 (L)     BUN (mg/dL)   Date Value   05/02/2021 23     Creatinine (mg/dL)   Date Value   05/02/2021 1.55 (H)     Glucose (mg/dL)   Date Value   05/02/2021 112 (H)              Have you changed or started any medications since your last visit including any over-the-counter medicines, vitamins, or herbal medicines? no   Have you stopped taking any of your medications? Is so, why? -  no  Are you having any side effects from any of your medications? - no  How often do you miss doses of your medication? no      Have you seen any other physician or provider since your last visit?  no   Have you had any other diagnostic tests since your last visit?  no   Have you been seen in the emergency room and/or had an admission in a hospital since we last saw you?  no   Have you had your routine dental cleaning in the past 6 months?  no     Do you have an active MyChart account? If no, what is the barrier?   Yes    Patient Care Team:  Sherita Lake DO as PCP - General (Family Medicine)  Sherita Lake DO as PCP - Empaneled Provider  Keri Nance as Surgeon (Orthopedic Surgery)  [place]ete [address] [place] [address])    Medical History Review  Past Medical, Family, and Social History reviewed and does not contribute to the patient presenting condition    Health Maintenance   Topic Date Due    HIV screen  Never done    Hepatitis C screen  Never done    Shingles vaccine (1 of 2) Never done    Lipids  09/18/2020    COVID-19 Vaccine (4 - Booster for Andrade Peter series) 01/14/2022    GFR test (Diabetes, CKD 3-4, OR last GFR 15-59)  05/02/2022    AAA screen  08/30/2022    A1C test (Diabetic or Prediabetic)  11/11/2023    Depression Monitoring  01/30/2024    Annual Wellness Visit (AWV)  01/31/2024 Colorectal Cancer Screen  03/15/2026    DTaP/Tdap/Td vaccine (2 - Td or Tdap) 11/06/2030    Flu vaccine  Completed    Pneumococcal 65+ years Vaccine  Completed    Hepatitis A vaccine  Aged Out    Hib vaccine  Aged Out    Meningococcal (ACWY) vaccine  Aged Out

## 2023-02-20 NOTE — PROGRESS NOTES
2023     Florencio Thomas (:  1955) is a 74 y.o. male, here for evaluation of the following medical concerns:  Chief Complaint   Patient presents with    Referral - General     Patient would like to have referral for a Sleep study and ortho - trigger finger     Medication Refill     Patient states he is in need of refills of all his medications      HPI  Index finger rt had getting stuck, had trigger finger surgery done in the past.. would like referral  Goes to pain management and gets percocet. Didn't get new sleep study, lost his machine a while back, will have pulmonology see and evaluate for that and COPD      Review of Systems   Constitutional:  Negative for fatigue and fever. Respiratory:  Positive for shortness of breath (chronic). Negative for wheezing. Cardiovascular:  Negative for chest pain. Musculoskeletal:  Positive for back pain. Prior to Visit Medications    Medication Sig Taking? Authorizing Provider   albuterol sulfate HFA (VENTOLIN HFA) 108 (90 Base) MCG/ACT inhaler Inhale 2 puffs into the lungs 4 times daily as needed for Wheezing Yes Bettejane Im, DO   amLODIPine (NORVASC) 10 MG tablet take 1 tablet by mouth once daily Yes Bettejane Im, DO   atorvastatin (LIPITOR) 80 MG tablet Take 1 tablet by mouth nightly Yes Bettejane Im, DO   budesonide-formoterol (SYMBICORT) 160-4.5 MCG/ACT AERO Inhale 2 puffs into the lungs 2 times daily Yes Bettejane Im, DO   oxyCODONE-acetaminophen (PERCOCET)  MG per tablet take 1 tablet by mouth every 3 to 4 hours if needed for pain maximum daily dose of 6 tablets  Historical Provider, MD      Allergies   Allergen Reactions    Cyclobenzaprine Rash    Other Nausea And Vomiting     Pt states he cannot take any muscle relaxers but does not know what specific muscle relaxer caused the nausea.     Tramadol Nausea Only and Rash     Social History     Tobacco Use    Smoking status: Every Day     Packs/day: 0.25     Years: 30.00 Pack years: 7.50     Types: Cigarettes    Smokeless tobacco: Never    Tobacco comments:     trying to quit   Substance Use Topics    Alcohol use: Yes     Alcohol/week: 2.0 standard drinks     Types: 2 Cans of beer per week     Comment: SOCIALLY        Vitals:    02/20/23 1107   BP: 130/74  Comment: manual   Site: Left Upper Arm   Position: Sitting   Cuff Size: Large Adult   Pulse: 91   Temp: 98.2 °F (36.8 °C)   SpO2: 95%   Weight: 238 lb 9.6 oz (108.2 kg)   Height: 5' 9\" (1.753 m)     Estimated body mass index is 35.24 kg/m² as calculated from the following:    Height as of this encounter: 5' 9\" (1.753 m). Weight as of this encounter: 238 lb 9.6 oz (108.2 kg). Physical Exam  Constitutional:       Appearance: Normal appearance. Cardiovascular:      Rate and Rhythm: Normal rate and regular rhythm. Heart sounds: Normal heart sounds. Pulmonary:      Effort: Pulmonary effort is normal.      Breath sounds: Decreased breath sounds present. Neurological:      Mental Status: He is alert. ASSESSMENT/PLAN:     Diagnosis Orders   1. Obstructive sleep apnea  [facility], Pulmonology, [place]    XR CHEST STANDARD (2 VW)      2. Simple chronic bronchitis (HCC)  albuterol sulfate HFA (VENTOLIN HFA) 108 (90 Base) MCG/ACT inhaler    budesonide-formoterol (SYMBICORT) 160-4.5 MCG/ACT AERO    XR CHEST STANDARD (2 VW)      3. Essential hypertension  amLODIPine (NORVASC) 10 MG tablet    Lipid Panel      4. S/P total knee arthroplasty, left  DISCONTINUED: celecoxib (CELEBREX) 200 MG capsule      5. Trigger finger, left index finger  [address], Orthopedic Surgery (Hand), [facility]      6. Tobacco abuse          Return in 3 months (on 5/20/2023) for routine recheck. Discussed quitting smoking again.    Requested Prescriptions     Signed Prescriptions Disp Refills    albuterol sulfate HFA (VENTOLIN HFA) 108 (90 Base) MCG/ACT inhaler 1 each 0     Sig: Inhale 2 puffs into the lungs 4 times daily as needed for Wheezing    amLODIPine (NORVASC) 10 MG tablet 30 tablet 3     Sig: take 1 tablet by mouth once daily    atorvastatin (LIPITOR) 80 MG tablet 30 tablet 3     Sig: Take 1 tablet by mouth nightly    budesonide-formoterol (SYMBICORT) 160-4.5 MCG/ACT AERO 10.2 g 3     Sig: Inhale 2 puffs into the lungs 2 times daily     An electronic signature was used to authenticate this note.     --Arthea Kanner, DO on2/20/2023 at 11:58 AM

## 2023-03-15 ENCOUNTER — OFFICE VISIT (OUTPATIENT)
Dept: ORTHOPEDIC SURGERY | Age: 68
End: 2023-03-15

## 2023-03-15 VITALS — WEIGHT: 238 LBS | BODY MASS INDEX: 35.15 KG/M2

## 2023-03-15 DIAGNOSIS — M65.30 TRIGGER FINGER OF LEFT HAND, UNSPECIFIED FINGER: Primary | ICD-10-CM

## 2023-03-15 NOTE — PROGRESS NOTES
[address]  [address] [facility] [address]  Dept: [phone]  Dept Fax: [phone]        Orthopaedic Clinic Follow Up      Subjective:       Milla Parker is a 74y.o. year old male who presents to the clinic today for of left index finger clicking and locking. He denies any trauma or accident. He states that over the past month he started developing clicking of the middle joint on his finger. He states that this has happened with other fingers that he had surgery for in the past.  He states that that was roughly 10 years ago and does not remember the surgeon. Patient has also had 3 lumbar back surgeries with fusions. He has also had bilateral total knees with the left being done by Dr. Chapin Looney on 4/28/2021. He sees Dr. Bevin Ahumada for right shoulder pain and has been recommended for total shoulder replacement. He has had 2 previous strokes and has a loop recorder. He has COPD and smokes roughly 1/2 pack a day. He has prediabetes but does not take any medication for it. He has high blood pressure. He denies any numbness or tingling to the left upper extremity. He does see pain management for his back and has a pain contract. Review of Systems  Gen: No fever, chills, malaise  CV: No chest pain or palpitations  Resp: No cough or shortness of breath  GI: No nausea, vomiting, diarrhea, or constipation  Neuro: No seizures, vertigo, or headache  Msk: Left index finger pain, clicking and locking  10 remaining systems reviewed and negative    Objective : There were no vitals filed for this visit. Body mass index is 35.15 kg/m². General: No acute distress, resting comfortably in the clinic  Neuro: Alert. oriented  Eyes: Extra-ocular muscles intact  Pulm: Respirations unlabored and regular. Skin: Warm, well perfused  Psych:   Patient has good fund of knowledge and displays understanding of exam, diagnosis, and plan.   MSK: left Upper extremity: Skin intact. No laceration, abrasion, or sign deformity. Significant tenderness to palpation over the index finger A1 pulley. Palpable clicking and triggering with index finger flexion extension. Nontender palpation at the PIP and DIP joints. Sensation to the index finger is intact. Compartments soft. Med/Rad/Ulnar/AIN/PIN motor intact. Axil/MSC/Med/Rad/Ulnar n sensation intact to light touch. Radial pulse 2+. Radiology:  None taken in office today. Assessment:   74y.o. year old male with left index finger trigger finger  Plan:   -Lengthy discussion took place with the patient regarding the etiology of his trigger finger. We discussed different treatment options including continued observation, and cortisone injections. He states that he has had multiple trigger fingers release in the past with good outcomes and multiple injections in other joints with little relief. He would prefer to have surgery and its denying injections.  -Patient was consented for surgery and risks and benefits of the surgery were discussed in detail. All questions were answered.  -Plan for left index trigger finger release at his convenience.  -Follow-up 2 weeks postoperatively for wound check    Electronically signed by Marek Boss DO     This note is created with the assistance of a speech recognition program.  While intending to generate a document that actually reflects the content of the visit, the document can still have some errors including those of syntax and sound a like substitutions which may escape proof reading.   In such instances, actual meaning can be extrapolated by contextual diversion

## 2023-03-21 DIAGNOSIS — Z01.818 PRE-OP TESTING: Primary | ICD-10-CM

## 2023-03-22 ENCOUNTER — HOSPITAL ENCOUNTER (OUTPATIENT)
Dept: PREADMISSION TESTING | Age: 68
Discharge: HOME | End: 2023-03-22
Payer: MEDICARE

## 2023-03-22 ENCOUNTER — TELEPHONE (OUTPATIENT)
Dept: FAMILY MEDICINE CLINIC | Age: 68
End: 2023-03-22

## 2023-03-22 VITALS
SYSTOLIC BLOOD PRESSURE: 122 MMHG | RESPIRATION RATE: 16 BRPM | BODY MASS INDEX: 34.66 KG/M2 | DIASTOLIC BLOOD PRESSURE: 69 MMHG | OXYGEN SATURATION: 98 % | WEIGHT: 234 LBS | HEIGHT: 69 IN | HEART RATE: 61 BPM | TEMPERATURE: 98.2 F

## 2023-03-22 DIAGNOSIS — Z01.818 PRE-OP TESTING: ICD-10-CM

## 2023-03-22 DIAGNOSIS — Z01.818 PREOPERATIVE CLEARANCE: Primary | ICD-10-CM

## 2023-03-22 LAB
ALBUMIN SERPL-MCNC: 4.2 G/DL (ref 3.5–5.2)
ALBUMIN/GLOBULIN RATIO: 1.4 (ref 1–2.5)
ALP SERPL-CCNC: 97 U/L (ref 40–129)
ALT SERPL-CCNC: 18 U/L (ref 5–41)
ANION GAP SERPL CALCULATED.3IONS-SCNC: 11 MMOL/L (ref 9–17)
AST SERPL-CCNC: 16 U/L
BILIRUB SERPL-MCNC: 0.3 MG/DL (ref 0.3–1.2)
BILIRUBIN URINE: NEGATIVE
BUN SERPL-MCNC: 21 MG/DL (ref 8–23)
CALCIUM SERPL-MCNC: 9.4 MG/DL (ref 8.6–10.4)
CASTS UA: NORMAL /LPF (ref 0–8)
CHLORIDE SERPL-SCNC: 107 MMOL/L (ref 98–107)
CO2 SERPL-SCNC: 24 MMOL/L (ref 20–31)
COLOR: YELLOW
CREAT SERPL-MCNC: 1.29 MG/DL (ref 0.7–1.2)
EPITHELIAL CELLS UA: NORMAL /HPF (ref 0–5)
GFR SERPL CREATININE-BSD FRML MDRD: >60 ML/MIN/1.73M2
GLUCOSE SERPL-MCNC: 110 MG/DL (ref 70–99)
GLUCOSE UR STRIP.AUTO-MCNC: NEGATIVE MG/DL
HCT VFR BLD AUTO: 44.8 % (ref 40.7–50.3)
HGB BLD-MCNC: 14.7 G/DL (ref 13–17)
KETONES UR STRIP.AUTO-MCNC: NEGATIVE MG/DL
LEUKOCYTE ESTERASE UR QL STRIP.AUTO: NEGATIVE
MCH RBC QN AUTO: 28.3 PG (ref 25.2–33.5)
MCHC RBC AUTO-ENTMCNC: 32.8 G/DL (ref 28.4–34.8)
MCV RBC AUTO: 86.3 FL (ref 82.6–102.9)
NITRITE UR QL STRIP.AUTO: NEGATIVE
NRBC AUTOMATED: 0 PER 100 WBC
PDW BLD-RTO: 13.8 % (ref 11.8–14.4)
PLATELET # BLD AUTO: 209 K/UL (ref 138–453)
PMV BLD AUTO: 10.9 FL (ref 8.1–13.5)
POTASSIUM SERPL-SCNC: 4.1 MMOL/L (ref 3.7–5.3)
PROT SERPL-MCNC: 7.3 G/DL (ref 6.4–8.3)
PROT UR STRIP.AUTO-MCNC: 5.5 MG/DL (ref 5–8)
PROT UR STRIP.AUTO-MCNC: ABNORMAL MG/DL
RBC # BLD: 5.19 M/UL (ref 4.21–5.77)
RBC CLUMPS #/AREA URNS AUTO: NORMAL /HPF (ref 0–4)
SODIUM SERPL-SCNC: 142 MMOL/L (ref 135–144)
SPECIFIC GRAVITY UA: 1.02 (ref 1–1.03)
TURBIDITY: CLEAR
URINE HGB: NEGATIVE
UROBILINOGEN, URINE: NORMAL
WBC # BLD AUTO: 6.8 K/UL (ref 3.5–11.3)
WBC UA: NORMAL /HPF (ref 0–5)

## 2023-03-22 PROCEDURE — 93005 ELECTROCARDIOGRAM TRACING: CPT

## 2023-03-22 PROCEDURE — 85027 COMPLETE CBC AUTOMATED: CPT

## 2023-03-22 PROCEDURE — 81001 URINALYSIS AUTO W/SCOPE: CPT

## 2023-03-22 PROCEDURE — 36415 COLL VENOUS BLD VENIPUNCTURE: CPT

## 2023-03-22 PROCEDURE — 80053 COMPREHEN METABOLIC PANEL: CPT

## 2023-03-22 RX ORDER — GABAPENTIN 300 MG/1
CAPSULE ORAL
COMMUNITY
Start: 2023-03-07

## 2023-03-22 RX ORDER — IBUPROFEN 800 MG/1
TABLET ORAL
COMMUNITY
Start: 2023-02-06

## 2023-03-22 RX ORDER — SODIUM CHLORIDE, SODIUM LACTATE, POTASSIUM CHLORIDE, CALCIUM CHLORIDE 600; 310; 30; 20 MG/100ML; MG/100ML; MG/100ML; MG/100ML
1000 INJECTION, SOLUTION INTRAVENOUS CONTINUOUS
OUTPATIENT
Start: 2023-03-22

## 2023-03-22 RX ORDER — LIDOCAINE 4 G/G
1 PATCH TOPICAL DAILY
COMMUNITY

## 2023-03-22 ASSESSMENT — PAIN DESCRIPTION - PAIN TYPE: TYPE: CHRONIC PAIN

## 2023-03-22 ASSESSMENT — PAIN SCALES - GENERAL: PAINLEVEL_OUTOF10: 10

## 2023-03-22 ASSESSMENT — PAIN DESCRIPTION - FREQUENCY: FREQUENCY: CONTINUOUS

## 2023-03-22 ASSESSMENT — PAIN DESCRIPTION - LOCATION: LOCATION: SHOULDER;KNEE;BACK

## 2023-03-22 NOTE — PROGRESS NOTES
Anesthesia Focused Assessment    Hx of anesthesia complications:  no  Family hx of anesthesia complications:  no      Tested positive for Covid-19 in last 8 weeks: no      STOP-BANG Sleep Apnea Questionnaire    SNORE loudly (heard through closed doors)? Yes  TIRED, fatigued, sleepy during daytime? No  OBSERVED stopping breathing during sleep? Yes  High blood PRESSURE or being treated? Yes    BMI over 35? No  AGE over 48? Yes  NECK circumference over 16\"? No  GENDER (male)? Yes             Total 5  High risk 5-8  Intermediate risk 3-4  Low risk 0-2    ----------------------------------------------------------------------------------------------------------------------  NANCY                              Yes  If yes, machine? No    DM1                                            No  DM2                   prediabetes    Coronary Artery Disease      No  HTN         Yes, on meds  Defib/AICD/Pacemaker               No, has LOOP recorder, placed 2019             Renal Failure                   No  If yes, on dialysis           Active smoker? Yes, 1/2 ppd  Drinks alcohol? Yes, occasional  Illicit drugs? No  Dentition? Partial upper dentures      Past Medical History:   Diagnosis Date    Aortic valve sclerosis     has not seen a Cardiologist in 2-3 years    Back pain     pain management monthly Dr. Sandrine Salas    Cerebral artery occlusion with cerebral infarction [facility])     TIA 2017 x2    [facility]     NEUBULIZER AS NEEDED.  PCP    Dental bridge present     UPPER    Depression     DJD (degenerative joint disease)     Drug abuse ([facility]     drug abuse includes smoking cocaine    High cholesterol     History of shingles     2013    Hypertension     PT NEVER FILLED  SCRIPT    Left trigger finger     Pain at rest     Knee-Left, back     Postlaminectomy syndrome 08/09/2017    Prediabetes     Sleep apnea     does not

## 2023-03-22 NOTE — DISCHARGE INSTRUCTIONS
Pre-operative Instructions    Please arrive at the surgery center by 5:45 AM on 4/4/2023  (or as directed by your surgeon's office). See Directons to Surgery Center below. FASTING    NOTHING TO EAT OR DRINK AFTER MIDNIGHT the night prior to surgery (This includes gum, candy, mints, chewing tobacco,                MEDICATIONS    What to STOP: ANY BLOOD THINNING MEDICATION(S) as directed by your surgeon or prescribing physician. FAILURE TO STOP CERTAIN MEDICATIONS MAY INTERFERE WITH YOUR SCHEDULED SURGERY. According to the medication list you provided today, PLEASE STOP: ibuprofen (Motrin)    2. What to CONTINUE leading up to your surgery:   Please take all your other daily medications except the medications listed above that you were instructed to hold. 3. What to [place] with SMALL SIP OF WATER: amlodipine (Norvasc), gabapentin (Neurontin), and if needed: percocet                       IF APPLICABLE:  -If you have been given a blood band, you must bring it with you the day of surgery, unclasped.  -Use routine inhalers and bring inhalers the day of surgery.   -Bring C-Pap/Bi-pap with you morning of surgery if planning on staying in the hospital overnight.  -Do not take diabetic medications on the day of surgery. OTHER IMPORTANT REMINDERS    1) You may be required to provide a urine sample upon your arrival to the pre-op area, so please take this into consideration. 2) If  NOT planning on staying in the hospital overnight : A. You will need an adult family member /friend to drive you home after your procedure. Taxi cabs or any form of public transportation ALONE is not acceptable.   -Your  must be 25years of age or older and able to sign off on your discharge instructions.     -It is preferable that the friend or family member stay at the hospital throughout your procedure.   Crawford Kehr must remain with you once you have arrived home for the first 24 hours after your surgery if you receive anesthesia or medication. If you do not have someone to stay with you, your procedure may be cancelled. 4) Do not wear any jewelry or body piercings day of surgery. 5) In case of illness - If you have cold or flu like symptoms (high fever, runny nose, sore throat, cough, etc.) rash, nausea, vomiting, loose stools, and/or recent contact with someone who has a contagious disease (Covid-19, chicken pox, measles, etc.) PLEASE notify your surgeon as soon as possible. 3/22/23  1:41 PM      ___________________  _______________________  Signature (Provider)              Signature (Patient)     Day of Surgery/Procedure    As a patient at [facility] you can expect quality medical and nursing care that is centered on your individual needs. Our goal is to make your surgical experience as comfortable as possible  . Directions to the [address] [place] [address] is located at [address] [facility] [place], [address] [facility]. Please pull into the Emergency/Surgery Center parking lot or there is additional parking across the street. You will enter the facility under through the glass doors and proceed to registration check-in which is right inside the door. Thereafter you will be directed to the [facility]. Patient Instructions    ·Please shower the night before and the morning of surgery with an antibacterial soap. Please use the cleaning solution (bottle) given to you the night before your surgery after your shower. Unless otherwise told by your physician, please do not shave legs or any part of your body below your neck the night before or day of your surgery. You may shave your face or neck. ·Please wear loose, comfortable clothing. If you are potentially going to have a cast or brace bring clothing that will fit over them.       ·Bring a list of all medications you take, along with the dose

## 2023-03-22 NOTE — H&P
Discussed results with pt.   Understanding expressed History and Physical    Pt Name: Bharat Shanks  MRN: [id number]  YOB: 1955  Date of evaluation: 3/22/2023  Primary Care Physician: Payam Burgos DO    SUBJECTIVE:   History of Chief Complaint:    Bharat Shanks is a 74 y.o. male who presents for PAT appointment. Patient complains of left index finger locking up intermittently over the last month. Patient states the finger will become painful when this happens. He denies any numbness or tingling. Patient is right hand dominant. Patient denies history of physical therapy or injections. He has a history of trigger finger release on both hands years ago. Patient has been scheduled for INDEX FINGER TRIGGER RELEASE  (3080 BED W/HAND TABLE, SUPINE) - Left. Allergies  is allergic to cyclobenzaprine, other, and tramadol. Medications  Prior to Admission medications    Medication Sig Start Date End Date Taking?  Authorizing Provider   lidocaine (HM LIDOCAINE PATCH) 4 % external patch Place 1 patch onto the skin daily   Yes Historical Provider, MD   gabapentin (NEURONTIN) 300 MG capsule  3/7/23   Historical Provider, MD   ibuprofen (ADVIL;MOTRIN) 800 MG tablet take 1 tablet by mouth every 6 to 8 hours if needed for pain 2/6/23   Historical Provider, MD   albuterol sulfate HFA (VENTOLIN HFA) 108 (90 Base) MCG/ACT inhaler Inhale 2 puffs into the lungs 4 times daily as needed for Wheezing 2/20/23   Payam Burgos DO   amLODIPine (NORVASC) 10 MG tablet take 1 tablet by mouth once daily 2/20/23   Payam Burgos DO   atorvastatin (LIPITOR) 80 MG tablet Take 1 tablet by mouth nightly 2/20/23   Payam Burgos DO   budesonide-formoterol [facility]) 160-4.5 MCG/ACT AERO Inhale 2 puffs into the lungs 2 times daily 2/20/23   Payam Burgos DO   oxyCODONE-acetaminophen (PERCOCET)  MG per tablet take 1 tablet by mouth every 3 to 4 hours if needed for pain maximum daily dose of 6 tablets 1/29/23   Historical Provider, MD     Past Medical History    has a past epistaxis and sore throat     RESPIRATORY:   negative for cough, shortness of breath, wheezing     CARDIOVASCULAR:   negative for chest pain, palpitations, syncope, edema     GASTROINTESTINAL:   negative for nausea, vomiting     GENITOURINARY:   negative for incontinence     MUSCULOSKELETAL:   negative for neck or back pain left index finger locks up, painful   NEUROLOGICAL:   Negative for weakness and tingling  negative for headaches and dizziness     PSYCHIATRIC:   negative for anxiety       OBJECTIVE:   VITALS:  height is 5' 9\" (1.753 m) and weight is 234 lb (106.1 kg). His infrared temperature is 98.2 °F (36.8 °C). His blood pressure is 122/69 and his pulse is 61. His respiration is 16 and oxygen saturation is 98%. CONSTITUTIONAL:alert & oriented x 3, no acute distress. Calm and pleasant. SKIN:  Warm and dry, no rashes to exposed areas of skin. HEAD:  Normocephalic, atraumatic. EYES: PERRL. EOMs intact. Wearing glasses. EARS:  Intact and equal bilaterally. Hearing grossly WNL. NOSE:  Nares patent. No rhinorrhea   MOUTH/THROAT:  Mucous membranes pink and moist, teeth appear to be intact, wearing partial upper dentures. NECK:supple, good ROM. LUNGS: Respirations even and non-labored. Clear to auscultation bilaterally, mild inspiratory wheeze to bilateral upper lobes. CARDIOVASCULAR: Regular rate and rhythm, no murmurs. ABDOMEN: soft, non-tender, non-distended, bowel sounds active x 4   EXTREMITIES: No edema to bilateral lower extremities. No varicosities to bilateral lower extremities. Bilateral upper extremity  strength is equal.   NEUROLOGIC: CN II-XII are grossly intact. Gait is smooth. Testing:   EKG: 3/22/2023  Labs pending: drawn 3/22/2023   IMPRESSIONS:   Left index trigger finger. PLANS:   INDEX FINGER TRIGGER RELEASE  (3080 BED W/HAND TABLE, SUPINE) - Left.     JUAN R Gracia CNP  Electronically signed 3/22/2023 at 2:22 PM

## 2023-03-22 NOTE — TELEPHONE ENCOUNTER
Referral done. Please advise patient. He had  preop testing done today and they recommended it. It was not the pharmacy calling.

## 2023-03-22 NOTE — TELEPHONE ENCOUNTER
[facility] pharmacy called asking if you would give patient a referral for Cardiology .  Patient is trying to have surgery sheila Please advise

## 2023-03-23 LAB
EKG ATRIAL RATE: 77 BPM
EKG P AXIS: 60 DEGREES
EKG P-R INTERVAL: 196 MS
EKG Q-T INTERVAL: 398 MS
EKG QRS DURATION: 78 MS
EKG QTC CALCULATION (BAZETT): 450 MS
EKG R AXIS: 42 DEGREES
EKG T AXIS: 12 DEGREES
EKG VENTRICULAR RATE: 77 BPM

## 2023-03-23 PROCEDURE — 93010 ELECTROCARDIOGRAM REPORT: CPT | Performed by: INTERNAL MEDICINE

## 2023-03-23 NOTE — TELEPHONE ENCOUNTER
Left voice mail informing patient referral was done. If any questions he can call the office .  Thank you

## 2023-03-24 ENCOUNTER — TELEPHONE (OUTPATIENT)
Dept: NEUROSURGERY | Age: 68
End: 2023-03-24

## 2023-05-03 DIAGNOSIS — I10 ESSENTIAL HYPERTENSION: ICD-10-CM

## 2023-05-03 NOTE — TELEPHONE ENCOUNTER
E-scribe request for med refills. Please review and e-scribe if applicable.      Last Visit Date:  2/20/23  Next Visit Date:  5/19/2023    Hemoglobin A1C (%)   Date Value   11/11/2022 6.4   04/22/2021 5.9   03/29/2021 6.0             ( goal A1C is < 7)   No results found for: LABMICR  LDL Cholesterol (mg/dL)   Date Value   09/18/2019 120       (goal LDL is <100)   AST (U/L)   Date Value   03/22/2023 16     ALT (U/L)   Date Value   03/22/2023 18     BUN (mg/dL)   Date Value   03/22/2023 21     BP Readings from Last 3 Encounters:   03/22/23 122/69   02/20/23 130/74   01/30/23 (!) 156/85          (goal 120/80)        Patient Active Problem List:     Tobacco abuse     Back pain     Hypercholesteremia     Trigger finger of left hand     Trigger finger, right     Primary osteoarthritis of left knee     Right shoulder pain     Essential hypertension     Chronic obstructive pulmonary disease (HCC)     Right foot pain     Back pain of lumbar region with sciatica     Spondylolisthesis, lumbar region     Chronic low back pain without sciatica     Right hip pain     Acute ischemic right middle cerebral artery (MCA) stroke (HCC)     Vision changes     Stroke-like symptoms     Vitamin D deficiency     Elevated LDL cholesterol level     Thalamic infarct, acute (HCC)     Simple chronic bronchitis (HCC)     Neural foraminal stenosis of lumbar spine     Postlaminectomy syndrome     Idiopathic acute pancreatitis     Unstable gait     Major depressive disorder, recurrent episode, moderate (HCC)     Primary osteoarthritis of both knees     TIA (transient ischemic attack)     Paresthesias     Acute ischemic stroke (HCC)     Cocaine abuse ([facility]     Shortness of breath     Swelling of left hand     Chronic pain of left knee     Left hand pain     Prediabetes     Pain, dental     Paronychia of right index finger     Obstructive sleep apnea syndrome     S/P total knee arthroplasty, left     Neuropathy     Gastroesophageal reflux disease

## 2023-05-04 RX ORDER — AMLODIPINE BESYLATE 10 MG/1
TABLET ORAL
Qty: 30 TABLET | Refills: 3 | Status: SHIPPED | OUTPATIENT
Start: 2023-05-04

## 2023-05-19 ENCOUNTER — OFFICE VISIT (OUTPATIENT)
Dept: FAMILY MEDICINE CLINIC | Age: 68
End: 2023-05-19
Payer: MEDICARE

## 2023-05-19 VITALS
SYSTOLIC BLOOD PRESSURE: 134 MMHG | DIASTOLIC BLOOD PRESSURE: 83 MMHG | HEART RATE: 91 BPM | BODY MASS INDEX: 34.11 KG/M2 | WEIGHT: 231 LBS

## 2023-05-19 DIAGNOSIS — J41.0 SIMPLE CHRONIC BRONCHITIS (HCC): ICD-10-CM

## 2023-05-19 DIAGNOSIS — I10 ESSENTIAL HYPERTENSION: Primary | ICD-10-CM

## 2023-05-19 DIAGNOSIS — Z23 NEED FOR SHINGLES VACCINE: ICD-10-CM

## 2023-05-19 DIAGNOSIS — Z13.6 SCREENING FOR AAA (ABDOMINAL AORTIC ANEURYSM): ICD-10-CM

## 2023-05-19 DIAGNOSIS — Z00.00 HEALTH CARE MAINTENANCE: ICD-10-CM

## 2023-05-19 PROCEDURE — 3017F COLORECTAL CA SCREEN DOC REV: CPT | Performed by: STUDENT IN AN ORGANIZED HEALTH CARE EDUCATION/TRAINING PROGRAM

## 2023-05-19 PROCEDURE — 3079F DIAST BP 80-89 MM HG: CPT | Performed by: STUDENT IN AN ORGANIZED HEALTH CARE EDUCATION/TRAINING PROGRAM

## 2023-05-19 PROCEDURE — 3023F SPIROM DOC REV: CPT | Performed by: STUDENT IN AN ORGANIZED HEALTH CARE EDUCATION/TRAINING PROGRAM

## 2023-05-19 PROCEDURE — 4004F PT TOBACCO SCREEN RCVD TLK: CPT | Performed by: STUDENT IN AN ORGANIZED HEALTH CARE EDUCATION/TRAINING PROGRAM

## 2023-05-19 PROCEDURE — 99213 OFFICE O/P EST LOW 20 MIN: CPT | Performed by: STUDENT IN AN ORGANIZED HEALTH CARE EDUCATION/TRAINING PROGRAM

## 2023-05-19 PROCEDURE — G8417 CALC BMI ABV UP PARAM F/U: HCPCS | Performed by: STUDENT IN AN ORGANIZED HEALTH CARE EDUCATION/TRAINING PROGRAM

## 2023-05-19 PROCEDURE — G8427 DOCREV CUR MEDS BY ELIG CLIN: HCPCS | Performed by: STUDENT IN AN ORGANIZED HEALTH CARE EDUCATION/TRAINING PROGRAM

## 2023-05-19 PROCEDURE — 1123F ACP DISCUSS/DSCN MKR DOCD: CPT | Performed by: STUDENT IN AN ORGANIZED HEALTH CARE EDUCATION/TRAINING PROGRAM

## 2023-05-19 PROCEDURE — 3075F SYST BP GE 130 - 139MM HG: CPT | Performed by: STUDENT IN AN ORGANIZED HEALTH CARE EDUCATION/TRAINING PROGRAM

## 2023-05-19 RX ORDER — BUDESONIDE AND FORMOTEROL FUMARATE DIHYDRATE 160; 4.5 UG/1; UG/1
2 AEROSOL RESPIRATORY (INHALATION) 2 TIMES DAILY
Qty: 10.2 G | Refills: 3 | Status: SHIPPED | OUTPATIENT
Start: 2023-05-19

## 2023-05-19 RX ORDER — ALBUTEROL SULFATE 90 UG/1
2 AEROSOL, METERED RESPIRATORY (INHALATION) 4 TIMES DAILY PRN
Qty: 1 EACH | Refills: 1 | Status: SHIPPED | OUTPATIENT
Start: 2023-05-19

## 2023-05-19 RX ORDER — ZOSTER VACCINE RECOMBINANT, ADJUVANTED 50 MCG/0.5
0.5 KIT INTRAMUSCULAR SEE ADMIN INSTRUCTIONS
Qty: 0.5 ML | Refills: 0 | Status: SHIPPED | OUTPATIENT
Start: 2023-05-19 | End: 2023-11-15

## 2023-05-19 SDOH — ECONOMIC STABILITY: FOOD INSECURITY: WITHIN THE PAST 12 MONTHS, YOU WORRIED THAT YOUR FOOD WOULD RUN OUT BEFORE YOU GOT MONEY TO BUY MORE.: NEVER TRUE

## 2023-05-19 SDOH — ECONOMIC STABILITY: FOOD INSECURITY: WITHIN THE PAST 12 MONTHS, THE FOOD YOU BOUGHT JUST DIDN'T LAST AND YOU DIDN'T HAVE MONEY TO GET MORE.: NEVER TRUE

## 2023-05-19 SDOH — ECONOMIC STABILITY: HOUSING INSECURITY
IN THE LAST 12 MONTHS, WAS THERE A TIME WHEN YOU DID NOT HAVE A STEADY PLACE TO SLEEP OR SLEPT IN A SHELTER (INCLUDING NOW)?: NO

## 2023-05-19 SDOH — ECONOMIC STABILITY: INCOME INSECURITY: HOW HARD IS IT FOR YOU TO PAY FOR THE VERY BASICS LIKE FOOD, HOUSING, MEDICAL CARE, AND HEATING?: NOT HARD AT ALL

## 2023-05-19 ASSESSMENT — ENCOUNTER SYMPTOMS
PHOTOPHOBIA: 0
NAUSEA: 0
SHORTNESS OF BREATH: 0
ABDOMINAL PAIN: 0
COUGH: 0
WHEEZING: 0
DIARRHEA: 0
VOMITING: 0
CONSTIPATION: 0
ABDOMINAL DISTENTION: 0
APNEA: 0
COLOR CHANGE: 0
BACK PAIN: 0

## 2023-05-19 NOTE — PROGRESS NOTES
Attending Physician Statement  I have discussed the care of [facility] pertinent history and exam findings,  with the resident. I have reviewed the key elements of all parts of the encounter with the resident. I agree with the assessment, plan and orders as documented by the resident.   (GE Modifier)    COPD- controlled- continue with Symbicort  HTN- well controlled
amlodipine 10 mg once daily    2. Simple chronic bronchitis (HCC)  -Stable  - albuterol sulfate HFA (VENTOLIN HFA) 108 (90 Base) MCG/ACT inhaler; Inhale 2 puffs into the lungs 4 times daily as needed for Wheezing  Dispense: 1 each; Refill: 1  - budesonide-formoterol (SYMBICORT) 160-4.5 MCG/ACT AERO; Inhale 2 puffs into the lungs 2 times daily  Dispense: 10.2 g; Refill: 3    3. Screening for AAA (abdominal aortic aneurysm)    - US SCREENING FOR AAA; Future    4. Health care maintenance    - HIV Screen; Future  - Hepatitis C Antibody; Future    5. Need for shingles vaccine    - zoster recombinant adjuvanted vaccine [facility]) 50 MCG/0.5ML SUSR injection; Inject 0.5 mLs into the muscle See Admin Instructions 1 dose now and repeat in 2-6 months  Dispense: 0.5 mL; Refill: 0          Requested Prescriptions     Signed Prescriptions Disp Refills    albuterol sulfate HFA (VENTOLIN HFA) 108 (90 Base) MCG/ACT inhaler 1 each 1     Sig: Inhale 2 puffs into the lungs 4 times daily as needed for Wheezing    budesonide-formoterol (SYMBICORT) 160-4.5 MCG/ACT AERO 10.2 g 3     Sig: Inhale 2 puffs into the lungs 2 times daily    zoster recombinant adjuvanted vaccine (SHINGRIX) 50 MCG/0.5ML SUSR injection 0.5 mL 0     Sig: Inject 0.5 mLs into the muscle See Admin Instructions 1 dose now and repeat in 2-6 months       Medications Discontinued During This Encounter   Medication Reason    albuterol sulfate HFA (VENTOLIN HFA) 108 (90 Base) MCG/ACT inhaler REORDER    budesonide-formoterol (SYMBICORT) 160-4.5 MCG/ACT AERO REORDER       Return in about 3 months (around 8/19/2023). Nahomi Hu received counseling on the following healthy behaviors:   Reviewed prior labs and health maintenance  Continue current medications, diet and exercise. Discussed use, benefit, and side effects of prescribed medications. Barriers to medication compliance addressed.    Patient given educational materials - see patient instructions  Was a self-tracking

## 2023-05-23 ENCOUNTER — ANESTHESIA EVENT (OUTPATIENT)
Dept: OPERATING ROOM | Age: 68
End: 2023-05-23
Payer: MEDICARE

## 2023-05-23 ENCOUNTER — HOSPITAL ENCOUNTER (OUTPATIENT)
Age: 68
Setting detail: OUTPATIENT SURGERY
Discharge: HOME | End: 2023-05-23
Attending: STUDENT IN AN ORGANIZED HEALTH CARE EDUCATION/TRAINING PROGRAM | Admitting: STUDENT IN AN ORGANIZED HEALTH CARE EDUCATION/TRAINING PROGRAM
Payer: MEDICARE

## 2023-05-23 ENCOUNTER — ANESTHESIA (OUTPATIENT)
Dept: OPERATING ROOM | Age: 68
End: 2023-05-23
Payer: MEDICARE

## 2023-05-23 VITALS
SYSTOLIC BLOOD PRESSURE: 143 MMHG | OXYGEN SATURATION: 93 % | TEMPERATURE: 98 F | RESPIRATION RATE: 14 BRPM | HEART RATE: 74 BPM | DIASTOLIC BLOOD PRESSURE: 82 MMHG | BODY MASS INDEX: 34.96 KG/M2 | HEIGHT: 69 IN | WEIGHT: 236 LBS

## 2023-05-23 DIAGNOSIS — G89.18 POST-OP PAIN: Primary | ICD-10-CM

## 2023-05-23 LAB
EGFR, POC: >60 ML/MIN/1.73M2
GLUCOSE BLD-MCNC: 119 MG/DL (ref 74–100)
POC BUN: 22 MG/DL (ref 8–26)
POC CREATININE: 1.1 MG/DL (ref 0.51–1.19)

## 2023-05-23 PROCEDURE — 7100000010 HC PHASE II RECOVERY - FIRST 15 MIN: Performed by: STUDENT IN AN ORGANIZED HEALTH CARE EDUCATION/TRAINING PROGRAM

## 2023-05-23 PROCEDURE — 3600000005 HC SURGERY LEVEL 5 BASE: Performed by: STUDENT IN AN ORGANIZED HEALTH CARE EDUCATION/TRAINING PROGRAM

## 2023-05-23 PROCEDURE — 3600000015 HC SURGERY LEVEL 5 ADDTL 15MIN: Performed by: STUDENT IN AN ORGANIZED HEALTH CARE EDUCATION/TRAINING PROGRAM

## 2023-05-23 PROCEDURE — 2709999900 HC NON-CHARGEABLE SUPPLY: Performed by: STUDENT IN AN ORGANIZED HEALTH CARE EDUCATION/TRAINING PROGRAM

## 2023-05-23 PROCEDURE — 84520 ASSAY OF UREA NITROGEN: CPT

## 2023-05-23 PROCEDURE — 2580000003 HC RX 258: Performed by: STUDENT IN AN ORGANIZED HEALTH CARE EDUCATION/TRAINING PROGRAM

## 2023-05-23 PROCEDURE — 3700000000 HC ANESTHESIA ATTENDED CARE: Performed by: STUDENT IN AN ORGANIZED HEALTH CARE EDUCATION/TRAINING PROGRAM

## 2023-05-23 PROCEDURE — 6360000002 HC RX W HCPCS

## 2023-05-23 PROCEDURE — 82565 ASSAY OF CREATININE: CPT

## 2023-05-23 PROCEDURE — 26055 INCISE FINGER TENDON SHEATH: CPT | Performed by: STUDENT IN AN ORGANIZED HEALTH CARE EDUCATION/TRAINING PROGRAM

## 2023-05-23 PROCEDURE — 3700000001 HC ADD 15 MINUTES (ANESTHESIA): Performed by: STUDENT IN AN ORGANIZED HEALTH CARE EDUCATION/TRAINING PROGRAM

## 2023-05-23 PROCEDURE — 2500000003 HC RX 250 WO HCPCS

## 2023-05-23 PROCEDURE — 2500000003 HC RX 250 WO HCPCS: Performed by: STUDENT IN AN ORGANIZED HEALTH CARE EDUCATION/TRAINING PROGRAM

## 2023-05-23 PROCEDURE — 82947 ASSAY GLUCOSE BLOOD QUANT: CPT

## 2023-05-23 PROCEDURE — 7100000011 HC PHASE II RECOVERY - ADDTL 15 MIN: Performed by: STUDENT IN AN ORGANIZED HEALTH CARE EDUCATION/TRAINING PROGRAM

## 2023-05-23 RX ORDER — LIDOCAINE HYDROCHLORIDE 10 MG/ML
INJECTION, SOLUTION EPIDURAL; INFILTRATION; INTRACAUDAL; PERINEURAL PRN
Status: DISCONTINUED | OUTPATIENT
Start: 2023-05-23 | End: 2023-05-23 | Stop reason: SDUPTHER

## 2023-05-23 RX ORDER — FENTANYL CITRATE 50 UG/ML
INJECTION, SOLUTION INTRAMUSCULAR; INTRAVENOUS PRN
Status: DISCONTINUED | OUTPATIENT
Start: 2023-05-23 | End: 2023-05-23 | Stop reason: SDUPTHER

## 2023-05-23 RX ORDER — SODIUM CHLORIDE 0.9 % (FLUSH) 0.9 %
5-40 SYRINGE (ML) INJECTION PRN
Status: ACTIVE | OUTPATIENT
Start: 2023-05-23

## 2023-05-23 RX ORDER — SODIUM CHLORIDE 0.9 % (FLUSH) 0.9 %
5-40 SYRINGE (ML) INJECTION EVERY 12 HOURS SCHEDULED
Status: DISCONTINUED | OUTPATIENT
Start: 2023-05-23 | End: 2023-05-23 | Stop reason: HOSPADM

## 2023-05-23 RX ORDER — SODIUM CHLORIDE 9 MG/ML
INJECTION, SOLUTION INTRAVENOUS PRN
Status: ACTIVE | OUTPATIENT
Start: 2023-05-23

## 2023-05-23 RX ORDER — SODIUM CHLORIDE 0.9 % (FLUSH) 0.9 %
5-40 SYRINGE (ML) INJECTION PRN
Status: DISCONTINUED | OUTPATIENT
Start: 2023-05-23 | End: 2023-05-23 | Stop reason: HOSPADM

## 2023-05-23 RX ORDER — ONDANSETRON 2 MG/ML
4 INJECTION INTRAMUSCULAR; INTRAVENOUS
Status: ACTIVE | OUTPATIENT
Start: 2023-05-23 | End: 2023-05-24

## 2023-05-23 RX ORDER — LIDOCAINE HYDROCHLORIDE 10 MG/ML
INJECTION, SOLUTION EPIDURAL; INFILTRATION; INTRACAUDAL; PERINEURAL PRN
Status: DISCONTINUED | OUTPATIENT
Start: 2023-05-23 | End: 2023-05-23 | Stop reason: ALTCHOICE

## 2023-05-23 RX ORDER — BUPIVACAINE HYDROCHLORIDE 5 MG/ML
INJECTION, SOLUTION PERINEURAL PRN
Status: DISCONTINUED | OUTPATIENT
Start: 2023-05-23 | End: 2023-05-23 | Stop reason: ALTCHOICE

## 2023-05-23 RX ORDER — SODIUM CHLORIDE, SODIUM LACTATE, POTASSIUM CHLORIDE, CALCIUM CHLORIDE 600; 310; 30; 20 MG/100ML; MG/100ML; MG/100ML; MG/100ML
1000 INJECTION, SOLUTION INTRAVENOUS CONTINUOUS
Status: DISCONTINUED | OUTPATIENT
Start: 2023-05-23 | End: 2023-05-23 | Stop reason: HOSPADM

## 2023-05-23 RX ORDER — MEPERIDINE HYDROCHLORIDE 50 MG/ML
12.5 INJECTION INTRAMUSCULAR; INTRAVENOUS; SUBCUTANEOUS EVERY 5 MIN PRN
Status: ACTIVE | OUTPATIENT
Start: 2023-05-23

## 2023-05-23 RX ORDER — PROPOFOL 10 MG/ML
INJECTION, EMULSION INTRAVENOUS CONTINUOUS PRN
Status: DISCONTINUED | OUTPATIENT
Start: 2023-05-23 | End: 2023-05-23 | Stop reason: SDUPTHER

## 2023-05-23 RX ORDER — SODIUM CHLORIDE 9 MG/ML
INJECTION, SOLUTION INTRAVENOUS PRN
Status: DISCONTINUED | OUTPATIENT
Start: 2023-05-23 | End: 2023-05-23 | Stop reason: HOSPADM

## 2023-05-23 RX ORDER — SODIUM CHLORIDE 0.9 % (FLUSH) 0.9 %
5-40 SYRINGE (ML) INJECTION EVERY 12 HOURS SCHEDULED
Status: ACTIVE | OUTPATIENT
Start: 2023-05-23

## 2023-05-23 RX ORDER — HYDROCODONE BITARTRATE AND ACETAMINOPHEN 5; 325 MG/1; MG/1
1 TABLET ORAL EVERY 4 HOURS PRN
Qty: 18 TABLET | Refills: 0 | Status: SHIPPED | OUTPATIENT
Start: 2023-05-23 | End: 2023-05-26

## 2023-05-23 RX ORDER — FENTANYL CITRATE 50 UG/ML
25 INJECTION, SOLUTION INTRAMUSCULAR; INTRAVENOUS EVERY 5 MIN PRN
Status: ACTIVE | OUTPATIENT
Start: 2023-05-23

## 2023-05-23 RX ORDER — MAGNESIUM HYDROXIDE 1200 MG/15ML
LIQUID ORAL CONTINUOUS PRN
Status: COMPLETED | OUTPATIENT
Start: 2023-05-23 | End: 2023-05-23

## 2023-05-23 RX ORDER — MIDAZOLAM HYDROCHLORIDE 2 MG/2ML
1 INJECTION, SOLUTION INTRAMUSCULAR; INTRAVENOUS EVERY 10 MIN PRN
Status: DISCONTINUED | OUTPATIENT
Start: 2023-05-23 | End: 2023-05-23 | Stop reason: HOSPADM

## 2023-05-23 RX ORDER — LIDOCAINE HYDROCHLORIDE 10 MG/ML
1 INJECTION, SOLUTION INFILTRATION; PERINEURAL
Status: DISCONTINUED | OUTPATIENT
Start: 2023-05-23 | End: 2023-05-23 | Stop reason: HOSPADM

## 2023-05-23 RX ORDER — MIDAZOLAM HYDROCHLORIDE 1 MG/ML
INJECTION INTRAMUSCULAR; INTRAVENOUS PRN
Status: DISCONTINUED | OUTPATIENT
Start: 2023-05-23 | End: 2023-05-23 | Stop reason: SDUPTHER

## 2023-05-23 RX ADMIN — MIDAZOLAM 1 MG: 1 INJECTION INTRAMUSCULAR; INTRAVENOUS at 07:27

## 2023-05-23 RX ADMIN — FENTANYL CITRATE 50 MCG: 50 INJECTION, SOLUTION INTRAMUSCULAR; INTRAVENOUS at 07:31

## 2023-05-23 RX ADMIN — LIDOCAINE HYDROCHLORIDE 50 MG: 10 INJECTION, SOLUTION EPIDURAL; INFILTRATION; INTRACAUDAL; PERINEURAL at 07:31

## 2023-05-23 RX ADMIN — PROPOFOL 150 MCG/KG/MIN: 10 INJECTION, EMULSION INTRAVENOUS at 07:31

## 2023-05-23 RX ADMIN — MIDAZOLAM 1 MG: 1 INJECTION INTRAMUSCULAR; INTRAVENOUS at 07:35

## 2023-05-23 RX ADMIN — SODIUM CHLORIDE, POTASSIUM CHLORIDE, SODIUM LACTATE AND CALCIUM CHLORIDE 1000 ML: 600; 310; 30; 20 INJECTION, SOLUTION INTRAVENOUS at 06:43

## 2023-05-23 ASSESSMENT — PAIN - FUNCTIONAL ASSESSMENT
PAIN_FUNCTIONAL_ASSESSMENT: 0-10
PAIN_FUNCTIONAL_ASSESSMENT: PREVENTS OR INTERFERES SOME ACTIVE ACTIVITIES AND ADLS

## 2023-05-23 ASSESSMENT — PAIN DESCRIPTION - DESCRIPTORS: DESCRIPTORS: ACHING

## 2023-05-23 ASSESSMENT — ENCOUNTER SYMPTOMS: SHORTNESS OF BREATH: 1

## 2023-05-23 ASSESSMENT — COPD QUESTIONNAIRES: CAT_SEVERITY: NO INTERVAL CHANGE

## 2023-05-23 NOTE — H&P
History and Physical    Pt Name: Shareen Bernheim  MRN: [id number]  YOB: 1955  Date of evaluation: 5/23/2023  Primary Care Physician: Laci Briscoe MD    SUBJECTIVE:   History of Chief Complaint:    Shareen Bernheim is a 74 y.o. male who complains of left index finger locking up intermittently over the last month. Patient states the finger will become painful when this happens. He denies any numbness or tingling. Patient is right hand dominant. Patient denies history of physical therapy or injections. He has a history of trigger finger release on both hands years ago. Patient has been scheduled for INDEX FINGER TRIGGER RELEASE  (3080 BED W/HAND TABLE, SUPINE) - Left. Patient states he went through PAT in march 2023, required cardiac clearance and he has been granted cardiac clearance on 5/15/23, notes in epic. Allergies  has No Known Allergies. Medications  Prior to Admission medications    Medication Sig Start Date End Date Taking?  Authorizing Provider   albuterol sulfate HFA (VENTOLIN HFA) 108 (90 Base) MCG/ACT inhaler Inhale 2 puffs into the lungs 4 times daily as needed for Wheezing 5/19/23   Kenneth Flowers MD   budesonide-formoterol [facility]) 160-4.5 MCG/ACT AERO Inhale 2 puffs into the lungs 2 times daily 5/19/23   Kenneth Flowers MD   zoster recombinant adjuvanted vaccine [facility]) 50 MCG/0.5ML SUSR injection Inject 0.5 mLs into the muscle See Admin Instructions 1 dose now and repeat in 2-6 months 5/19/23 11/15/23  Kenneth Flowers MD   amLODIPine (NORVASC) 10 MG tablet take 1 tablet by mouth once daily 5/4/23   Joan Nails MD   gabapentin (NEURONTIN) 300 MG capsule  3/7/23   Historical Provider, MD   ibuprofen (ADVIL;MOTRIN) 800 MG tablet take 1 tablet by mouth every 6 to 8 hours if needed for pain 2/6/23   Historical Provider, MD   lidocaine 4 % external patch Place 1 patch onto the skin daily    Historical Provider, MD   atorvastatin (LIPITOR) 80 MG tablet Take 1 tablet by mouth nightly

## 2023-05-23 NOTE — DISCHARGE INSTRUCTIONS
Orthopedic Instructions:  -Weight bearing status: Non-weightbearing to left upper extremity   -Keep dressing clean and dry.  -Starting 3 days after surgery, Ok to remove dressing and replace with Band-aids daily until wound is dry. Then leave open to air.  -Ice (20 minutes on and off 1 hour) and elevate above the level of the heart  to reduce swelling and throbbing pain. -Drink plenty of fluids.   -Should urinate within 8 hours of surgery.  -Call the office or come to Emergency Room if signs of infection appear (hot, swollen, red, draining pus, fever)  -Take medications as prescribed. -Follow up with  Dr. Tyler Meléndez  in their office 10-14 days after surgery. [facility] [phone] to schedule/confirm.

## 2023-05-23 NOTE — ANESTHESIA POSTPROCEDURE EVALUATION
Department of Anesthesiology  Postprocedure Note    Patient: Geovanny Chavira  MRN: [id number]  YOB: 1955  Date of evaluation: 5/23/2023      Procedure Summary     Date: 05/23/23 Room / Location: [address]    Anesthesia Start: 0615 Anesthesia Stop: 9892    Procedure: INDEX FINGER TRIGGER RELEASE (Left) Diagnosis:       Trigger index finger of left hand      (LEFT TRIGGER FINGER)    Surgeons: Bandar King DO Responsible Provider: George Gonsales MD    Anesthesia Type: MAC ASA Status: 3          Anesthesia Type: No value filed.     Jennifer Phase I:      Jennifer Phase II:        Anesthesia Post Evaluation    Patient location during evaluation: PACU  Patient participation: complete - patient participated  Level of consciousness: awake and alert  Pain score: 0  Airway patency: patent  Nausea & Vomiting: no vomiting and no nausea  Complications: no  Cardiovascular status: hemodynamically stable  Respiratory status: acceptable  Hydration status: stable

## 2023-05-23 NOTE — OP NOTE
Operative Note      Patient: Mara Sims  YOB: 1955  MRN: [id number]    Date of Procedure: 5/23/2023    Pre op diagnosis: Left index trigger finger    Post-Op Diagnosis: Left index trigger finger       Procedure(s):  Left index A1 pulley release    Surgeon(s):  Ellen Matias DO    Assistant:  First Assistant: Flaco Linda  Resident: Grace Hampton DO    Anesthesia: Monitor Anesthesia Care    Estimated Blood Loss (mL): 1 cc    Complications: None    Specimens:   * No specimens in log *    Implants:  * No implants in log *      Drains: * No LDAs found *    Surgical Indications:  Mara Sims is a 74 y.o. old male  who presented with persistent left index trigger finger. Following a discussion with the patient regarding both non-operative and operative treatment options, she consented to proceed with an open left index trigger finger (A1 pulley) release. he came to this decision after demonstrating an understanding of our discussion regarding details of the procedure, risks and benefits, expected outcome, and postoperative course. Operative technique: Following appropriate identification of the patient and his operative extremity, consent was reviewed with the patient and his operative extremity was signed. The anesthesia service administered a conscious sedation without complication. All bony prominences were appropriately padded and the patient was secured to the operative table in a supine position. A well-padded pneumatic tourniquet was applied to the patient's surgical arm. The patient's operative extremity was prepped and draped in a standard sterile fashion and a time out was performed during which the correct patient, operative extremity, and procedure were verified. We then injected 5 cc of a mixutre of 1% lidocaine and 0.25% marcaine into the planned incision site and tendon sheath of the left index finger.     The patient'sleft hand and wrist was exsanguinated using an gravity

## 2023-05-23 NOTE — ANESTHESIA PRE PROCEDURE
Department of Anesthesiology  Preprocedure Note       Name:  Dave Valentine   Age:  74 y.o.  :  1955                                          MRN:  [id number]         Date:  2023      Surgeon: Hanh Miller):  Gabby Roberts DO    Procedure: Procedure(s):  INDEX FINGER TRIGGER RELEASE  (3080 BED W/HAND TABLE, SUPINE)    Medications prior to admission:   Prior to Admission medications    Medication Sig Start Date End Date Taking? Authorizing Provider   albuterol sulfate HFA (VENTOLIN HFA) 108 (90 Base) MCG/ACT inhaler Inhale 2 puffs into the lungs 4 times daily as needed for Wheezing 23   Jessica Castellanos MD   budesonide-formoterol [facility]) 160-4.5 MCG/ACT AERO Inhale 2 puffs into the lungs 2 times daily 23   Jessica Castellanos MD   zoster recombinant adjuvanted vaccine [facility]) 50 MCG/0.5ML SUSR injection Inject 0.5 mLs into the muscle See Admin Instructions 1 dose now and repeat in 2-6 months 5/19/23 11/15/23  Jessica Castellanos MD   amLODIPine (NORVASC) 10 MG tablet take 1 tablet by mouth once daily 23   Angel Clinton MD   gabapentin (NEURONTIN) 300 MG capsule  3/7/23   Historical Provider, MD   ibuprofen (ADVIL;MOTRIN) 800 MG tablet take 1 tablet by mouth every 6 to 8 hours if needed for pain 23   Historical Provider, MD   lidocaine 4 % external patch Place 1 patch onto the skin daily    Historical Provider, MD   atorvastatin (LIPITOR) 80 MG tablet Take 1 tablet by mouth nightly 23   Leti Chairez DO   oxyCODONE-acetaminophen (PERCOCET)  MG per tablet take 1 tablet by mouth every 3 to 4 hours if needed for pain maximum daily dose of 6 tablets 23   Historical Provider, MD       Current medications:    No current facility-administered medications for this encounter. Allergies:     Allergies   Allergen Reactions    Cyclobenzaprine Rash    Other Nausea And Vomiting     Pt states he cannot take any muscle relaxers but does not know what specific muscle relaxer caused

## 2023-06-07 ENCOUNTER — OFFICE VISIT (OUTPATIENT)
Dept: ORTHOPEDIC SURGERY | Age: 68
End: 2023-06-07

## 2023-06-07 VITALS — BODY MASS INDEX: 34.96 KG/M2 | HEIGHT: 69 IN | WEIGHT: 236 LBS

## 2023-06-07 DIAGNOSIS — M65.30 TRIGGER FINGER OF LEFT HAND, UNSPECIFIED FINGER: Primary | ICD-10-CM

## 2023-06-07 PROCEDURE — 99024 POSTOP FOLLOW-UP VISIT: CPT | Performed by: STUDENT IN AN ORGANIZED HEALTH CARE EDUCATION/TRAINING PROGRAM

## 2023-06-07 NOTE — PROGRESS NOTES
the day if no drainage from wound to soften any future scars. Patient expressed understanding. I will plan on seeing patient back as needed. All questions answered. Patient is amenable to this plan. Electronically signed by Nelson Christie DO on 6/7/2023 at 5:20 PM    This note is created with the assistance of a speech recognition program.  While intending to generate a document that actually reflects the content of the visit, the document can still have some errors including those of syntax and sound a like substitutions which may escape proof reading.   In such instances, actual meaning can be extrapolated by contextual diversion

## 2023-06-29 RX ORDER — ATORVASTATIN CALCIUM 80 MG/1
80 TABLET, FILM COATED ORAL NIGHTLY
Qty: 30 TABLET | Refills: 5 | Status: SHIPPED | OUTPATIENT
Start: 2023-06-29

## 2023-09-06 DIAGNOSIS — I10 ESSENTIAL HYPERTENSION: ICD-10-CM

## 2023-09-06 RX ORDER — AMLODIPINE BESYLATE 10 MG/1
TABLET ORAL
Qty: 30 TABLET | Refills: 3 | Status: SHIPPED | OUTPATIENT
Start: 2023-09-06

## 2023-09-06 NOTE — TELEPHONE ENCOUNTER
shoulder pain     Essential hypertension     Chronic obstructive pulmonary disease (HCC)     Right foot pain     Back pain of lumbar region with sciatica     Spondylolisthesis, lumbar region     Chronic low back pain without sciatica     Right hip pain     Acute ischemic right middle cerebral artery (MCA) stroke ([facility])     Vision changes     Stroke-like symptoms     Vitamin D deficiency     Elevated LDL cholesterol level     Thalamic infarct, acute (HCC)     Simple chronic bronchitis (HCC)     Neural foraminal stenosis of lumbar spine     Postlaminectomy syndrome     Idiopathic acute pancreatitis     Unstable gait     Major depressive disorder, recurrent episode, moderate (HCC)     Primary osteoarthritis of both knees     TIA (transient ischemic attack)     Paresthesias     Acute ischemic stroke ([facility])     Cocaine abuse ([facility])     Shortness of breath     Swelling of left hand     Chronic pain of left knee     Left hand pain     Prediabetes     Pain, dental     Paronychia of right index finger     Obstructive sleep apnea syndrome     S/P total knee arthroplasty, left     Neuropathy     Gastroesophageal reflux disease without esophagitis           Please address the medication refill and close the encounter. If I can be of assistance, please route to the applicable pool. Thank you.

## 2023-09-18 ENCOUNTER — OFFICE VISIT (OUTPATIENT)
Dept: FAMILY MEDICINE CLINIC | Age: 68
End: 2023-09-18
Payer: MEDICARE

## 2023-09-18 VITALS
HEIGHT: 69 IN | TEMPERATURE: 98.1 F | HEART RATE: 99 BPM | BODY MASS INDEX: 34.21 KG/M2 | OXYGEN SATURATION: 98 % | SYSTOLIC BLOOD PRESSURE: 148 MMHG | WEIGHT: 231 LBS | DIASTOLIC BLOOD PRESSURE: 92 MMHG

## 2023-09-18 DIAGNOSIS — J06.9 VIRAL URI: Primary | ICD-10-CM

## 2023-09-18 DIAGNOSIS — M54.40 BACK PAIN OF LUMBAR REGION WITH SCIATICA: ICD-10-CM

## 2023-09-18 PROCEDURE — 3017F COLORECTAL CA SCREEN DOC REV: CPT

## 2023-09-18 PROCEDURE — 4004F PT TOBACCO SCREEN RCVD TLK: CPT

## 2023-09-18 PROCEDURE — 1123F ACP DISCUSS/DSCN MKR DOCD: CPT

## 2023-09-18 PROCEDURE — 99213 OFFICE O/P EST LOW 20 MIN: CPT

## 2023-09-18 PROCEDURE — G8427 DOCREV CUR MEDS BY ELIG CLIN: HCPCS

## 2023-09-18 PROCEDURE — 3078F DIAST BP <80 MM HG: CPT

## 2023-09-18 PROCEDURE — G8417 CALC BMI ABV UP PARAM F/U: HCPCS

## 2023-09-18 PROCEDURE — 3074F SYST BP LT 130 MM HG: CPT

## 2023-09-18 RX ORDER — IPRATROPIUM BROMIDE 42 UG/1
2 SPRAY, METERED NASAL 4 TIMES DAILY
Qty: 15 ML | Refills: 3 | Status: SHIPPED | OUTPATIENT
Start: 2023-09-18

## 2023-09-18 RX ORDER — BENZONATATE 100 MG/1
100-200 CAPSULE ORAL 3 TIMES DAILY PRN
Qty: 60 CAPSULE | Refills: 0 | Status: SHIPPED | OUTPATIENT
Start: 2023-09-18 | End: 2023-09-28

## 2023-09-18 RX ORDER — GUAIFENESIN 600 MG/1
600 TABLET, EXTENDED RELEASE ORAL 2 TIMES DAILY
Qty: 30 TABLET | Refills: 0 | Status: SHIPPED | OUTPATIENT
Start: 2023-09-18 | End: 2023-10-03

## 2023-09-18 RX ORDER — LIDOCAINE 4 G/G
1 PATCH TOPICAL DAILY
Qty: 10 EACH | Refills: 5 | Status: SHIPPED | OUTPATIENT
Start: 2023-09-18

## 2023-09-18 ASSESSMENT — ENCOUNTER SYMPTOMS
RHINORRHEA: 0
BACK PAIN: 0
SHORTNESS OF BREATH: 0
DIARRHEA: 1
SORE THROAT: 1
ABDOMINAL PAIN: 0
NAUSEA: 1
COUGH: 1

## 2023-09-18 NOTE — PROGRESS NOTES
Visit Information    Have you changed or started any medications since your last visit including any over-the-counter medicines, vitamins, or herbal medicines? no   Have you stopped taking any of your medications? Is so, why? -  no  Are you having any side effects from any of your medications? - no    Have you seen any other physician or provider since your last visit?  no   Have you had any other diagnostic tests since your last visit?  no   Have you been seen in the emergency room and/or had an admission in a hospital since we last saw you?  yes - [facility]   Have you had your routine dental cleaning in the past 6 months?  no     Do you have an active MyChart account? If no, what is the barrier?   No: Declined    Patient Care Team:  Sunita Lee MD as PCP - General (Family Medicine)  Quentin Menjivar as Surgeon (Orthopedic Surgery)  [address] [place] [address])    Medical History Review  Past Medical, Family, and Social History reviewed and does not contribute to the patient presenting condition    Health Maintenance   Topic Date Due    Hepatitis C screen  Never done    Shingles vaccine (1 of 2) Never done    Lipids  09/18/2020    Pneumococcal 65+ years Vaccine (2 - PCV) 11/06/2021    COVID-19 Vaccine (4 - Pfizer series) 01/14/2022    AAA screen  08/30/2022    Flu vaccine (1) 08/01/2023    A1C test (Diabetic or Prediabetic)  11/11/2023    Depression Monitoring  01/30/2024    Annual Wellness Visit (AWV)  01/31/2024    Colorectal Cancer Screen  03/15/2026    DTaP/Tdap/Td vaccine (2 - Td or Tdap) 11/06/2030    Hepatitis A vaccine  Aged Out    Hepatitis B vaccine  Aged Out    Hib vaccine  Aged Out    Meningococcal (ACWY) vaccine  Aged Out    GFR test (Diabetes, CKD 3-4, OR last GFR 15-59)  Discontinued    Pneumococcal 0-64 years Vaccine  Discontinued

## 2023-09-18 NOTE — PROGRESS NOTES
[facility] - Outpatient Note      Subjective:    Saloni Pineda is a 79 y.o. male with  has a past medical history of Aortic valve sclerosis, Back pain, Cerebral artery occlusion with cerebral infarction ([facility]), COPD (chronic obstructive pulmonary disease) ([facility]), COVID-19 vaccine series completed, Depression, DJD (degenerative joint disease), Drug abuse ([facility]), High cholesterol, History of loop recorder, History of shingles, Hypertension, Left trigger finger, Prediabetes, Sleep apnea, Suicidal ideations, TIA (transient ischemic attack), Toe contracture, left, Under care of team, Wears dentures, Wears glasses, and Wellness examination. Presented to the office today for:  Chief Complaint   Patient presents with    Cough     Dry cough - yellow mucus - 1 week       HPI        62-year-old male with past medical history of COPD, hyperlipidemia, prediabetes presenting today with productive cough, subjective fever and chills, myalgia and weakness, nausea and some diarrhea for the last 2 weeks. Patient mostly is endorsing his primary complaint as productive cough, sputum being green at times. [place] any shortness of breath, congestion, chest pain at this time. Review of Systems   Constitutional:  Positive for chills and fever. HENT:  Positive for sore throat. Negative for rhinorrhea. Eyes:  Negative for visual disturbance. Respiratory:  Positive for cough. Negative for shortness of breath. Cardiovascular:  Negative for chest pain and leg swelling. Gastrointestinal:  Positive for diarrhea and nausea. Negative for abdominal pain. Genitourinary:  Negative for dysuria and hematuria. Musculoskeletal:  Positive for myalgias. Negative for back pain. Skin:  Negative for rash. Neurological:  Negative for numbness and headaches. Psychiatric/Behavioral:  Negative for agitation.                   The patient has a   Family History   Problem Relation Age

## 2023-10-19 ENCOUNTER — OFFICE VISIT (OUTPATIENT)
Dept: FAMILY MEDICINE CLINIC | Age: 68
End: 2023-10-19
Payer: MEDICARE

## 2023-10-19 VITALS
BODY MASS INDEX: 35.1 KG/M2 | HEART RATE: 92 BPM | DIASTOLIC BLOOD PRESSURE: 80 MMHG | HEIGHT: 69 IN | SYSTOLIC BLOOD PRESSURE: 129 MMHG | WEIGHT: 237 LBS

## 2023-10-19 DIAGNOSIS — J41.0 SIMPLE CHRONIC BRONCHITIS (HCC): ICD-10-CM

## 2023-10-19 DIAGNOSIS — G89.29 CHRONIC PAIN OF BOTH SHOULDERS: ICD-10-CM

## 2023-10-19 DIAGNOSIS — R73.03 PREDIABETES: ICD-10-CM

## 2023-10-19 DIAGNOSIS — Z13.220 SCREENING FOR HYPERLIPIDEMIA: ICD-10-CM

## 2023-10-19 DIAGNOSIS — M25.511 CHRONIC PAIN OF BOTH SHOULDERS: ICD-10-CM

## 2023-10-19 DIAGNOSIS — M25.512 CHRONIC PAIN OF BOTH SHOULDERS: ICD-10-CM

## 2023-10-19 DIAGNOSIS — Z72.0 CURRENTLY ATTEMPTING TO QUIT SMOKING: ICD-10-CM

## 2023-10-19 DIAGNOSIS — I10 ESSENTIAL HYPERTENSION: Primary | ICD-10-CM

## 2023-10-19 PROCEDURE — 3023F SPIROM DOC REV: CPT

## 2023-10-19 PROCEDURE — 99213 OFFICE O/P EST LOW 20 MIN: CPT

## 2023-10-19 PROCEDURE — 3017F COLORECTAL CA SCREEN DOC REV: CPT

## 2023-10-19 PROCEDURE — G8417 CALC BMI ABV UP PARAM F/U: HCPCS

## 2023-10-19 PROCEDURE — G8484 FLU IMMUNIZE NO ADMIN: HCPCS

## 2023-10-19 PROCEDURE — 3074F SYST BP LT 130 MM HG: CPT

## 2023-10-19 PROCEDURE — 4004F PT TOBACCO SCREEN RCVD TLK: CPT

## 2023-10-19 PROCEDURE — 1123F ACP DISCUSS/DSCN MKR DOCD: CPT

## 2023-10-19 PROCEDURE — G8427 DOCREV CUR MEDS BY ELIG CLIN: HCPCS

## 2023-10-19 PROCEDURE — 3079F DIAST BP 80-89 MM HG: CPT

## 2023-10-19 RX ORDER — AMLODIPINE BESYLATE 10 MG/1
TABLET ORAL
Qty: 30 TABLET | Refills: 3 | Status: SHIPPED | OUTPATIENT
Start: 2023-10-19

## 2023-10-19 RX ORDER — BUDESONIDE AND FORMOTEROL FUMARATE DIHYDRATE 160; 4.5 UG/1; UG/1
2 AEROSOL RESPIRATORY (INHALATION) 2 TIMES DAILY
Qty: 10.2 G | Refills: 3 | Status: SHIPPED | OUTPATIENT
Start: 2023-10-19

## 2023-10-19 RX ORDER — VARENICLINE TARTRATE 25 MG
0.5 KIT ORAL DAILY
Qty: 53 EACH | Refills: 0 | Status: SHIPPED | OUTPATIENT
Start: 2023-10-19

## 2023-10-19 RX ORDER — ALBUTEROL SULFATE 90 UG/1
2 AEROSOL, METERED RESPIRATORY (INHALATION) 4 TIMES DAILY PRN
Qty: 1 EACH | Refills: 1 | Status: SHIPPED | OUTPATIENT
Start: 2023-10-19

## 2023-10-19 ASSESSMENT — ENCOUNTER SYMPTOMS
WHEEZING: 0
SHORTNESS OF BREATH: 0
COUGH: 1
BACK PAIN: 1
GASTROINTESTINAL NEGATIVE: 1
EYES NEGATIVE: 1

## 2023-10-19 NOTE — PATIENT INSTRUCTIONS
Thank you for letting us take care of you today. We hope all your questions were addressed. If a question was overlooked or something else comes to mind after you return home, please contact a member of your Care Team listed below. Your Care Team at [address] [place] [address] is Team #2  Marivel Mccrary M.D. (Faculty)  Arnulfo Holman, (Resident)  Chad Hernandez, (Resident)  Tristen Colmenares, (Resident)  Adrienne Claude, (Resident)  Martine Lombardi, (Resident)  [facility], [address] [place] [address], [facility]  Kira [place],  [facility]  Tyrone Jeong, [facility]  Morgan Bernabe, [facility]  Ana Vega, [facility]  Kristi [place], [place] ([address])  Andrew Houser, [facility] (Clinical Pharmacist)     Office phone number: [phone]    If you need to get in right away due to illness, please be advised we have \"Same Day\" appointments available Monday-Friday. Please call us at [phone] option #3 to schedule your \"Same Day\" appointment.

## 2023-10-19 NOTE — PROGRESS NOTES
Attending Physician Statement  I have discussed the care of Yaniv Del Rosario, including pertinent history and exam findings,  with the resident. I have reviewed the key elements of all parts of the encounter with the resident. I agree with the assessment, plan and orders as documented by the resident.   (Rochelle Thomas)    Yevgeniy Fernandes MD
Visit Information    Have you changed or started any medications since your last visit including any over-the-counter medicines, vitamins, or herbal medicines? no   Have you stopped taking any of your medications? Is so, why? -  no  Are you having any side effects from any of your medications? - no    Have you seen any other physician or provider since your last visit?  no   Have you had any other diagnostic tests since your last visit?  no   Have you been seen in the emergency room and/or had an admission in a hospital since we last saw you?  yes - [facility]   Have you had your routine dental cleaning in the past 6 months?  no     Do you have an active MyChart account? If no, what is the barrier?   Yes    Patient Care Team:  Lydia Mohan MD as PCP - General (Family Medicine)  Cuate Jalloh as Surgeon (Orthopedic Surgery)  Group, [facility])    Medical History Review  Past Medical, Family, and Social History reviewed and does not contribute to the patient presenting condition    Health Maintenance   Topic Date Due    Hepatitis C screen  Never done    Shingles vaccine (1 of 2) Never done    Lipids  09/18/2020    Pneumococcal 65+ years Vaccine (2 - PCV) 11/06/2021    COVID-19 Vaccine (4 - Pfizer series) 01/14/2022    Flu vaccine (1) 08/01/2023    A1C test (Diabetic or Prediabetic)  11/11/2023    Depression Monitoring  01/30/2024    Annual Wellness Visit (AWV)  01/31/2024    Colorectal Cancer Screen  03/15/2026    DTaP/Tdap/Td vaccine (2 - Td or Tdap) 11/06/2030    AAA screen  Completed    Hepatitis A vaccine  Aged Out    Hepatitis B vaccine  Aged Out    Hib vaccine  Aged Out    Meningococcal (ACWY) vaccine  Aged Out    GFR test (Diabetes, CKD 3-4, OR last GFR 15-59)  Discontinued    Pneumococcal 0-64 years Vaccine  Discontinued
Dispense: 30 tablet; Refill: 3  - Basic Metabolic Panel; Future    2. Simple chronic bronchitis (HCC)  Refilled inhalers  Information on rinsing his mouth and throat every time after using Symbicort daily  - budesonide-formoterol (SYMBICORT) 160-4.5 MCG/ACT AERO; Inhale 2 puffs into the lungs 2 times daily  Dispense: 10.2 g; Refill: 3  - albuterol sulfate HFA (VENTOLIN HFA) 108 (90 Base) MCG/ACT inhaler; Inhale 2 puffs into the lungs 4 times daily as needed for Wheezing  Dispense: 1 each; Refill: 1    3. Prediabetes    - Hemoglobin A1C; Future    4. Screening for hyperlipidemia    - Lipid Panel; Future    5. Chronic pain of both shoulders    - XR SHOULDER RIGHT (MIN 2 VIEWS); Future  - XR SHOULDER LEFT (MIN 2 VIEWS); Future    6. Currently attempting to quit smoking  And has tried nicotine gum, patch, lozenges with no success  We will start Chantix today, patient to quit on the eighth day of starting Chantix. Risk-benefit and side effects of the medication explained  - Varenicline Tartrate, Starter, 0.5 MG X 11 & 1 MG X 42 TBPK; Take 0.5 mg by mouth daily start varenicline, then quit on day 8   Days 1 to 3: 0.5 mg once daily. Days 4 to 7: 0.5 mg twice daily  Maintenance (day 8 and later): 1 mg twice daily  Dispense: 53 each; Refill: 0          Requested Prescriptions     Signed Prescriptions Disp Refills    amLODIPine (NORVASC) 10 MG tablet 30 tablet 3     Sig: take 1 tablet by mouth once daily    budesonide-formoterol (SYMBICORT) 160-4.5 MCG/ACT AERO 10.2 g 3     Sig: Inhale 2 puffs into the lungs 2 times daily    albuterol sulfate HFA (VENTOLIN HFA) 108 (90 Base) MCG/ACT inhaler 1 each 1     Sig: Inhale 2 puffs into the lungs 4 times daily as needed for Wheezing    Varenicline Tartrate, Starter, 0.5 MG X 11 & 1 MG X 42 TBPK 53 each 0     Sig: Take 0.5 mg by mouth daily start varenicline, then quit on day 8   Days 1 to 3: 0.5 mg once daily.   Days 4 to 7: 0.5 mg twice daily  Maintenance (day 8 and later): 1 mg

## 2023-10-23 ENCOUNTER — TELEPHONE (OUTPATIENT)
Dept: FAMILY MEDICINE CLINIC | Age: 68
End: 2023-10-23

## 2023-10-23 NOTE — TELEPHONE ENCOUNTER
Received fax from [employer] stating that they will no longer cover budesonide-formoterol [facility])  For patient, a temporary supply has been given but patient will need alternative medication sent to pharmacy for next fill.

## 2023-10-27 DIAGNOSIS — J41.0 SIMPLE CHRONIC BRONCHITIS (HCC): Primary | Chronic | ICD-10-CM

## 2023-10-27 NOTE — PROGRESS NOTES
Received msg  from pharmacy that Symbicort is no longer covered by insurance. Will order [facility] instead and notify patients.

## 2023-10-30 ENCOUNTER — TELEPHONE (OUTPATIENT)
Dept: FAMILY MEDICINE CLINIC | Age: 68
End: 2023-10-30

## 2023-10-30 NOTE — TELEPHONE ENCOUNTER
LVM for return call to reschedule appointment for 11/16/2023 due to provider not in clinicthat day .

## 2023-11-17 ENCOUNTER — OFFICE VISIT (OUTPATIENT)
Dept: FAMILY MEDICINE CLINIC | Age: 68
End: 2023-11-17

## 2023-11-17 VITALS
HEART RATE: 91 BPM | WEIGHT: 235 LBS | SYSTOLIC BLOOD PRESSURE: 132 MMHG | DIASTOLIC BLOOD PRESSURE: 80 MMHG | BODY MASS INDEX: 34.8 KG/M2 | HEIGHT: 69 IN

## 2023-11-17 DIAGNOSIS — L30.8 OTHER ECZEMA: ICD-10-CM

## 2023-11-17 DIAGNOSIS — R21 RASH: Primary | ICD-10-CM

## 2023-11-17 RX ORDER — TRIAMCINOLONE ACETONIDE 1 MG/G
OINTMENT TOPICAL 2 TIMES DAILY
Qty: 80 EACH | Refills: 1 | Status: SHIPPED | OUTPATIENT
Start: 2023-11-17 | End: 2023-11-24

## 2023-11-17 ASSESSMENT — ENCOUNTER SYMPTOMS
BACK PAIN: 0
DIARRHEA: 0
CONSTIPATION: 0
ABDOMINAL PAIN: 0

## 2023-11-17 NOTE — PATIENT INSTRUCTIONS
Thank you for letting us take care of you today. We hope all your questions were addressed. If a question was overlooked or something else comes to mind after you return home, please contact a member of your Care Team listed below. Your Care Team at [address] [place] [address] is Team #  Ceferino Emmanuel, (Faculty)  Georgia Renner (Resident)  Sammie Diaz (Resident)  Yamilet Grissom (Resident)   Cedrick Bhatia (Resident)  Dorothy Evangelista (Resident)  Lazara Alexis., [facility]  Tiffanie Dickerson., [facility], [facility]  Shadi Dupont, [facility]  Marifer Choudhary, [facility]  Eileen James, [facility]  Kristi Russo) [place], [place] (Clinical Practice Manager)  [facility] (Clinical Pharmacist)     Office phone number: [phone]    If you need to get in right away due to illness, please be advised we have \"Same Day\" appointments available Monday-Friday. Please call us at [phone] option #3 to schedule your \"Same Day\" appointment.

## 2023-11-17 NOTE — PROGRESS NOTES
Visit Information    Have you changed or started any medications since your last visit including any over-the-counter medicines, vitamins, or herbal medicines? no   Have you stopped taking any of your medications? Is so, why? -  no  Are you having any side effects from any of your medications? - no    Have you seen any other physician or provider since your last visit?  no   Have you had any other diagnostic tests since your last visit?  no   Have you been seen in the emergency room and/or had an admission in a hospital since we last saw you?  no   Have you had your routine dental cleaning in the past 6 months?  no     Do you have an active MyChart account? If no, what is the barrier?   Yes    Patient Care Team:  Pepito Arshad MD as PCP - General (Family Medicine)  Arpit Pittman as Surgeon (Orthopedic Surgery)  Group, [facility])    Medical History Review  Past Medical, Family, and Social History reviewed and does not contribute to the patient presenting condition    Health Maintenance   Topic Date Due    Hepatitis C screen  Never done    Shingles vaccine (1 of 2) Never done    Lipids  09/18/2020    Pneumococcal 65+ years Vaccine (2 - PCV) 11/06/2021    Flu vaccine (1) 08/01/2023    COVID-19 Vaccine (4 - 2023-24 season) 09/01/2023    A1C test (Diabetic or Prediabetic)  11/11/2023    Depression Monitoring  01/30/2024    Annual Wellness Visit (AWV)  01/31/2024    Colorectal Cancer Screen  03/15/2026    DTaP/Tdap/Td vaccine (2 - Td or Tdap) 11/06/2030    AAA screen  Completed    Hepatitis A vaccine  Aged Out    Hepatitis B vaccine  Aged Out    Hib vaccine  Aged Out    Meningococcal (ACWY) vaccine  Aged Out    GFR test (Diabetes, CKD 3-4, OR last GFR 15-59)  Discontinued    Pneumococcal 0-64 years Vaccine  Discontinued
appreciated  -No active concerning lesions of herpes at exam  -Very low suspicion of herpes genitalis  -Discussed with patient with visual images of herpes genitalis infection, instructed patient to come back to the clinic if he had any of those symptoms to get a swab and diagnosis    2. Other eczema  - triamcinolone (KENALOG) 0.1 % ointment; Apply topically 2 times daily for 7 days As needed for itching  Dispense: 80 each; Refill: 1      Requested Prescriptions     Signed Prescriptions Disp Refills    triamcinolone (KENALOG) 0.1 % ointment 80 each 1     Sig: Apply topically 2 times daily for 7 days As needed for itching       There are no discontinued medications. Alis Farr received counseling on the following healthy behaviors: nutrition, exercise and medication adherence. Discussed use, benefit, and side effects of prescribed medications. Barriers to medication compliance addressed. All patient questions answered. Pt voiced understanding. Return in about 1 month (around 12/17/2023) for for rash with PCP. Naomi Garvinr, MD  Family medicine resident, PGY2  11/17/2023 at 4:50 PM     Disclaimer: Some orall of this note was transcribed using voice-recognition software. This may cause typographical errors occasionally. Although all effort is made to fix these errors, please do not hesitate to contact our office if there Richy Code concern with the understanding of this note.
with PCP.    (GC Modifier)-Dr. Glenys Enrique MD

## 2023-11-18 PROBLEM — Z13.220 SCREENING FOR HYPERLIPIDEMIA: Status: RESOLVED | Noted: 2017-10-05 | Resolved: 2023-11-18

## 2024-01-10 DIAGNOSIS — N40.1 BENIGN PROSTATIC HYPERPLASIA WITH NOCTURIA: ICD-10-CM

## 2024-01-10 DIAGNOSIS — R35.1 BENIGN PROSTATIC HYPERPLASIA WITH NOCTURIA: ICD-10-CM

## 2024-01-11 RX ORDER — TAMSULOSIN HYDROCHLORIDE 0.4 MG/1
0.4 CAPSULE ORAL DAILY
Qty: 30 CAPSULE | Refills: 0 | Status: SHIPPED | OUTPATIENT
Start: 2024-01-11

## 2024-01-23 ENCOUNTER — TELEPHONE (OUTPATIENT)
Dept: FAMILY MEDICINE CLINIC | Age: 69
End: 2024-01-23

## 2024-01-23 DIAGNOSIS — J41.0 SIMPLE CHRONIC BRONCHITIS (HCC): Primary | Chronic | ICD-10-CM

## 2024-01-23 NOTE — TELEPHONE ENCOUNTER
[employer] sent over information that Symbicort is not on insurance formulary, will not be covered. Patient was given a 30 day supplies but will not longer be able to get it covered under insurance. Please send in new RX to pharmacy if applicable. Form was scanned into media under medications.       Alternatives: breo ellipta INH, budesonide/formoterol aer

## 2024-01-26 RX ORDER — FLUTICASONE FUROATE AND VILANTEROL 100; 25 UG/1; UG/1
1 POWDER RESPIRATORY (INHALATION) DAILY
Qty: 60 EACH | Refills: 3 | Status: SHIPPED | OUTPATIENT
Start: 2024-01-26 | End: 2024-02-09 | Stop reason: SDUPTHER

## 2024-01-26 NOTE — TELEPHONE ENCOUNTER
Writer left voicemail informing that breo inhaler was sent to RX as the Symbicort will no longer be covered after the 30 day supplier per insurance.

## 2024-02-08 ENCOUNTER — HOSPITAL ENCOUNTER (OUTPATIENT)
Dept: DIABETES SERVICES | Age: 69
Setting detail: THERAPIES SERIES
Discharge: HOME | End: 2024-02-08
Payer: MEDICARE

## 2024-02-08 PROCEDURE — G0108 DIAB MANAGE TRN  PER INDIV: HCPCS

## 2024-02-08 SDOH — ECONOMIC STABILITY: FOOD INSECURITY: ADDITIONAL INFORMATION: NO

## 2024-02-08 ASSESSMENT — PROBLEM AREAS IN DIABETES QUESTIONNAIRE (PAID)
FEELING DEPRESSED WHEN YOU THINK ABOUT LIVING WITH DIABETES: 0
WORRYING ABOUT THE FUTURE AND THE POSSIBILITY OF SERIOUS COMPLICATIONS: 0
FEELING SCARED WHEN YOU THINK ABOUT LIVING WITH DIABETES: 0
PAID-5 TOTAL SCORE: 0
COPING WITH COMPLICATIONS OF DIABETES: 0
FEELING THAT DIABETES IS TAKING UP TOO MUCH OF YOUR MENTAL AND PHYSICAL ENERGY EVERY DAY: 0

## 2024-02-08 NOTE — PROGRESS NOTES
[facility] - [address], [place]     Free -  REGISTRATION IS REQUIRED - CALL [phone] call for dates    []  Diabetes Group at  [facility]/ [facility] of [facility] - Free 6 week diabetes education support   classes - use web site interest form found at  https://www.[employer].org/main/healthy-living - to enroll       []ADA “ Where do I Begin, Living with Type 2 diabetes ADA home support program  Web site: diabetes.org/living    Call: 1800 DIABETES  e-mail: AskADA@diabetes.org     []  Internet web sites - ADAWeb site: diabetes.org and diabetesfoodhub.org      Post Education Referrals:      [] [place] Tobacco Quit information sheet and 1800 QUIT NOW , 7204 053- 2216      [] Dental care - Dental care of [facility]     [] [employer] [facility] link  phone number - for information and referral to [facility]  Clinically  Integrated Network - EYE, FOOT, CARDIAC, WOUND, WEIGHT MANAGEMENT        []Other  CAM ESPOSITO RN

## 2024-02-09 ENCOUNTER — OFFICE VISIT (OUTPATIENT)
Dept: FAMILY MEDICINE CLINIC | Age: 69
End: 2024-02-09
Payer: MEDICARE

## 2024-02-09 VITALS
HEIGHT: 69 IN | DIASTOLIC BLOOD PRESSURE: 80 MMHG | HEART RATE: 91 BPM | SYSTOLIC BLOOD PRESSURE: 134 MMHG | BODY MASS INDEX: 34 KG/M2 | WEIGHT: 229.6 LBS

## 2024-02-09 DIAGNOSIS — Z12.11 COLON CANCER SCREENING: ICD-10-CM

## 2024-02-09 DIAGNOSIS — Z87.891 PERSONAL HISTORY OF TOBACCO USE, PRESENTING HAZARDS TO HEALTH: ICD-10-CM

## 2024-02-09 DIAGNOSIS — H43.393 FLOATERS, BILATERAL: ICD-10-CM

## 2024-02-09 DIAGNOSIS — R06.83 SNORING: ICD-10-CM

## 2024-02-09 DIAGNOSIS — Z13.6 SCREENING FOR CARDIOVASCULAR CONDITION: ICD-10-CM

## 2024-02-09 DIAGNOSIS — H53.9 VISION CHANGES: ICD-10-CM

## 2024-02-09 DIAGNOSIS — N40.1 BENIGN PROSTATIC HYPERPLASIA WITH LOWER URINARY TRACT SYMPTOMS, SYMPTOM DETAILS UNSPECIFIED: ICD-10-CM

## 2024-02-09 DIAGNOSIS — Z72.89 OTHER PROBLEMS RELATED TO LIFESTYLE: ICD-10-CM

## 2024-02-09 DIAGNOSIS — Z11.59 NEED FOR HEPATITIS C SCREENING TEST: ICD-10-CM

## 2024-02-09 DIAGNOSIS — G47.33 OSA (OBSTRUCTIVE SLEEP APNEA): ICD-10-CM

## 2024-02-09 DIAGNOSIS — Z23 NEED FOR PROPHYLACTIC VACCINATION AND INOCULATION AGAINST VARICELLA: ICD-10-CM

## 2024-02-09 DIAGNOSIS — J41.0 SIMPLE CHRONIC BRONCHITIS (HCC): ICD-10-CM

## 2024-02-09 DIAGNOSIS — Z00.00 MEDICARE ANNUAL WELLNESS VISIT, SUBSEQUENT: Primary | ICD-10-CM

## 2024-02-09 PROCEDURE — 99406 BEHAV CHNG SMOKING 3-10 MIN: CPT

## 2024-02-09 RX ORDER — ALBUTEROL SULFATE 90 UG/1
2 AEROSOL, METERED RESPIRATORY (INHALATION) 4 TIMES DAILY PRN
Qty: 1 EACH | Refills: 1 | Status: SHIPPED | OUTPATIENT
Start: 2024-02-09

## 2024-02-09 RX ORDER — FLUTICASONE FUROATE AND VILANTEROL 100; 25 UG/1; UG/1
1 POWDER RESPIRATORY (INHALATION) DAILY
Qty: 60 EACH | Refills: 3 | Status: SHIPPED | OUTPATIENT
Start: 2024-02-09

## 2024-02-09 RX ORDER — BUDESONIDE AND FORMOTEROL FUMARATE DIHYDRATE 160; 4.5 UG/1; UG/1
2 AEROSOL RESPIRATORY (INHALATION) 2 TIMES DAILY
Qty: 10.2 G | Refills: 3 | Status: SHIPPED | OUTPATIENT
Start: 2024-02-09

## 2024-02-09 ASSESSMENT — PATIENT HEALTH QUESTIONNAIRE - PHQ9
SUM OF ALL RESPONSES TO PHQ QUESTIONS 1-9: 0
2. FEELING DOWN, DEPRESSED OR HOPELESS: 0
7. TROUBLE CONCENTRATING ON THINGS, SUCH AS READING THE NEWSPAPER OR WATCHING TELEVISION: 0
SUM OF ALL RESPONSES TO PHQ QUESTIONS 1-9: 0
9. THOUGHTS THAT YOU WOULD BE BETTER OFF DEAD, OR OF HURTING YOURSELF: 0
SUM OF ALL RESPONSES TO PHQ9 QUESTIONS 1 & 2: 0
1. LITTLE INTEREST OR PLEASURE IN DOING THINGS: 0
DEPRESSION UNABLE TO ASSESS: PT REFUSES
5. POOR APPETITE OR OVEREATING: 0
4. FEELING TIRED OR HAVING LITTLE ENERGY: 0
8. MOVING OR SPEAKING SO SLOWLY THAT OTHER PEOPLE COULD HAVE NOTICED. OR THE OPPOSITE, BEING SO FIGETY OR RESTLESS THAT YOU HAVE BEEN MOVING AROUND A LOT MORE THAN USUAL: 0
SUM OF ALL RESPONSES TO PHQ QUESTIONS 1-9: 0
SUM OF ALL RESPONSES TO PHQ QUESTIONS 1-9: 0
6. FEELING BAD ABOUT YOURSELF - OR THAT YOU ARE A FAILURE OR HAVE LET YOURSELF OR YOUR FAMILY DOWN: 0
10. IF YOU CHECKED OFF ANY PROBLEMS, HOW DIFFICULT HAVE THESE PROBLEMS MADE IT FOR YOU TO DO YOUR WORK, TAKE CARE OF THINGS AT HOME, OR GET ALONG WITH OTHER PEOPLE: 0

## 2024-02-09 ASSESSMENT — LIFESTYLE VARIABLES
HOW MANY STANDARD DRINKS CONTAINING ALCOHOL DO YOU HAVE ON A TYPICAL DAY: PATIENT DOES NOT DRINK
HOW OFTEN DO YOU HAVE A DRINK CONTAINING ALCOHOL: NEVER

## 2024-02-09 NOTE — PATIENT INSTRUCTIONS
if you are having problems. It's also a good idea to know your test results and keep a list of the medicines you take.  How can you care for yourself at home?  Install smoke alarms and carbon monoxide alarms in your house.  Put smoke alarms:  On each level of your home, in the hallway outside sleeping areas, and inside each bedroom.  On the ceiling or high up on a wall. This is where smoke goes first. Avoid places near stoves, doors, windows, or air ducts.  Put a carbon monoxide alarm in the hallway outside of the bedrooms in each sleeping area of the house. The alarm should be placed high on the wall. Make sure that the alarm can't be covered up by furniture or drapes.  Make sure your safety alarms are working at all times. You can test them every month by pressing the test button.  If an alarm makes a chirping sound, replace the battery right away.  Replace non-lithium batteries twice a year. Put this on your calendar ahead of time. Some people change alarm batteries when they reset their clocks in the spring and fall.  Replace smoke alarms every 10 years.  Plan and practice fire escape routes. Make sure you have at least two for each area of your home. This includes upper stories and the basement.  When should you call for help?   Call 911 anytime you think you may need emergency care. For example, call if:    A smoke or carbon monoxide alarm sounds. Tell everyone to get out of the building. Stand outside until firefighters arrive.   Watch closely for changes in your health, and be sure to contact your doctor if you have questions about how to use a home safety alarm.  Current as of: August 6, 2023               Content Version: 13.9  © 2006-2023 [employer].   Care instructions adapted under license by [employer]. If you have questions about a medical condition or this instruction, always ask your healthcare professional. [employer] disclaims any warranty or liability for your use of

## 2024-02-09 NOTE — PROGRESS NOTES
Medicare Annual Wellness Visit    Gurpreet Pa is here for Medicare AWV (AWV)    Assessment & Plan   Simple chronic bronchitis (HCC)  The following orders have not been finalized:  -     fluticasone furoate-vilanterol (BREO ELLIPTA) 100-25 MCG/ACT inhaler  -     budesonide-formoterol (SYMBICORT) 160-4.5 MCG/ACT AERO  -     albuterol sulfate HFA (VENTOLIN HFA) 108 (90 Base) MCG/ACT inhaler    Recommendations for Preventive Services Due: see orders and patient instructions/AVS.  Recommended screening schedule for the next 5-10 years is provided to the patient in written form: see Patient Instructions/AVS.     No follow-ups on file.     Subjective   The following acute and/or chronic problems were also addressed today:  Snoring, NANCY  Patient reports he was diagnosed with obstructive sleep apnea long time ago, does not have a CPAP machine has not used it, will order the sleep study again so that he can get his CPAP      PHQ 9 score 0  Alcohol quit 3 months ago  Smoking cessation, CT lung screen  Smoking for 36 years, 10 cig,  2  pack per week  No Eye exam in a year, seeing spot sometimes, referral provided  No Hearing exam, no hearing problems  Does not use seatbelt in car, counseled in detail    Will refer for Colonoscopy, no hx of cancer in family    Lipids  Shingles vaccine script provided      Patient's complete Health Risk Assessment and screening values have been reviewed and are found in Flowsheets. The following problems were reviewed today and where indicated follow up appointments were made and/or referrals ordered.    Positive Risk Factor Screenings with Interventions:       Cognitive:   Clock Drawing Test (CDT): Normal  Words recalled: 0 Words Recalled  Total Score: (!) 2  Total Score Interpretation: Abnormal Mini-Cog    Interventions:  Patient comments: Not agreeable to further investigation  Patient declines any further evaluation or treatment    Depression:  Depression Unable to Assess: Pt

## 2024-02-09 NOTE — PROGRESS NOTES
Visit Information    Have you changed or started any medications since your last visit including any over-the-counter medicines, vitamins, or herbal medicines? no   Have you stopped taking any of your medications? Is so, why? -  no  Are you having any side effects from any of your medications? - no    Have you seen any other physician or provider since your last visit?  no   Have you had any other diagnostic tests since your last visit?  no   Have you been seen in the emergency room and/or had an admission in a hospital since we last saw you?  no   Have you had your routine dental cleaning in the past 6 months?  no     Do you have an active MyChart account? If no, what is the barrier?  Yes    Patient Care Team:  Barrie Stern MD as PCP - General (Family Medicine)  Mitch Hernandez as Surgeon (Orthopedic Surgery)  Group, [facility] (Behavioral Health)    Medical History Review  Past Medical, Family, and Social History reviewed and does not contribute to the patient presenting condition    Health Maintenance   Topic Date Due    Hepatitis C screen  Never done    Shingles vaccine (1 of 2) Never done    Respiratory Syncytial Virus (RSV) Pregnant or age 60 yrs+ (1 - 1-dose 60+ series) Never done    Lipids  09/18/2020    COVID-19 Vaccine (4 - 2023-24 season) 09/01/2023    Annual Wellness Visit (Medicare Advantage)  01/01/2024    A1C test (Diabetic or Prediabetic)  03/21/2024    Depression Monitoring  12/21/2024    Colorectal Cancer Screen  03/15/2026    DTaP/Tdap/Td vaccine (2 - Td or Tdap) 11/06/2030    Flu vaccine  Completed    Pneumococcal 65+ years Vaccine  Completed    AAA screen  Completed    Hepatitis A vaccine  Aged Out    Hepatitis B vaccine  Aged Out    Hib vaccine  Aged Out    Polio vaccine  Aged Out    Meningococcal (ACWY) vaccine  Aged Out    GFR test (Diabetes, CKD 3-4, OR last GFR 15-59)  Discontinued    Pneumococcal 0-64 years Vaccine  Discontinued

## 2024-02-09 NOTE — PROGRESS NOTES
Attending Physician Statement  I  have discussed the care of Gurpreet Pa including pertinent history and exam findings with the resident. I agree with the assessment, plan and orders as documented by the resident.      /80 (Site: Left Upper Arm, Position: Sitting, Cuff Size: Large Adult)   Pulse 91   Ht 1.753 m (5' 9.02\")   Wt 104.1 kg (229 lb 9.6 oz)   BMI 33.89 kg/m²    BP Readings from Last 3 Encounters:   02/09/24 134/80   12/21/23 122/72   11/17/23 132/80     Wt Readings from Last 3 Encounters:   02/09/24 104.1 kg (229 lb 9.6 oz)   12/21/23 109.1 kg (240 lb 9.6 oz)   11/17/23 106.6 kg (235 lb)          Diagnosis Orders   1. Medicare annual wellness visit, subsequent        2. Simple chronic bronchitis (HCC)  albuterol sulfate HFA (VENTOLIN HFA) 108 (90 Base) MCG/ACT inhaler    budesonide-formoterol (SYMBICORT) 160-4.5 MCG/ACT AERO    fluticasone furoate-vilanterol (BREO ELLIPTA) 100-25 MCG/ACT inhaler      3. Vision changes        4. Personal history of tobacco use, presenting hazards to health  CO Smoking and Tobacco Use Cessation (Intermediate): 3-10 MINUTES [08863]    CT lung screen [Initial/Annual]      5. Need for prophylactic vaccination and inoculation against varicella  zoster recombinant adjuvanted vaccine (SHINGRIX) 50 MCG/0.5ML SUSR injection      6. Need for hepatitis C screening test  Hepatitis C Antibody      7. Other problems related to lifestyle  Hepatitis C Antibody      8. Screening for cardiovascular condition  Lipid Panel      9. Snoring  Sleep Study with PAP Titration      10. Colon cancer screening  [facility]      11. NANCY (obstructive sleep apnea)  Sleep Study with PAP Titration      12. Floaters, bilateral  External Referral To Ophthalmology      13. Benign prostatic hyperplasia with lower urinary tract symptoms, symptom details unspecified  PSA Screening              Mariama Schmitt MD 2/9/2024 4:48 PM

## 2024-02-14 NOTE — TELEPHONE ENCOUNTER
Writer attempted to reach patient to inform that the medication was switched to Breo inhaler as the Symbicort will no longer be covered by insurance per insurance. Voice recorded came on with phone number [phone] stated the person your trying to reach can't accept calls at this time. Writer called other number listed [phone] and a lady answered and will have patient contact office back.

## 2024-02-16 ENCOUNTER — HOSPITAL ENCOUNTER (OUTPATIENT)
Dept: DIABETES SERVICES | Age: 69
Setting detail: THERAPIES SERIES
Discharge: HOME | End: 2024-02-16
Payer: MEDICARE

## 2024-02-16 PROCEDURE — G0108 DIAB MANAGE TRN  PER INDIV: HCPCS

## 2024-02-16 NOTE — PROGRESS NOTES
feet, How to pick the right shoes  Individualized Diabetes report card     []  Diet Follow Up- CHO counting and  planning for sick days, holiday, vacations   How to Thrive: A guide for Your journey with Diabetes - ADA booklet  - pages 42- 43  Diabetes Food hub www.diabetesfoodhub.org   Handouts: Sick day rules, Dining out guide, Diabetes and alcohol, Traveling with diabetes, Diabetes disaster preparedness plan, Holidays and special occasions                                                            []  Self care and goal review -  3 month follow -    Handouts: AADE7 Self care behaviors work sheets and personal goal setting worksheet review, DSME support options on line     []Self-Management  Gestational - RN class -Resource materials sent out : care booklet - \" Gestational Diabetes Mellitus ( GDM) toolkit form [place] gestational diabetes postpartum care learning collaborative 2019. \"Simple Guidelines for meal planning with gestational diabetes.  SMBG sheets to fax back to [facility] weekly. BD  healthy injection site selection and rotation with 6 mm insulin syringe and 4 mm pen needle. Gestational diabetes handout from [address] jan 2016. Did you have gestational diabetes when you were pregnant? Handout from [facility]  April 2014    []Self-Management Gestational - RD class - My Food Plan for Gestational diabetes    [x]Glucose Meter -  One Touch given free of charge 2-16-24 BB    []Insulin Kit     []Other        Encounter Type Date Start Time End Time Comments No Show Dates   Assessment 2-8-24 BB   3:05pm 4:05pm   Face to face    Class 1 - Understanding diabetes 2-16-24BB 11:30am 12:30pm  Face to face    Class 2- Nutrition and diabetes          Class 3 - Preventing Complications        Class 4 -  In depth Nutrition and sick day care        Class 5 - 3 month follow up / goal reassessment        Gestational - RN         Gestational - RD        Individual MNT         Shared Med Appt         Yearly Follow-up        Meter

## 2024-02-23 ENCOUNTER — TELEPHONE (OUTPATIENT)
Dept: ONCOLOGY | Age: 69
End: 2024-02-23

## 2024-02-25 DIAGNOSIS — R35.1 BENIGN PROSTATIC HYPERPLASIA WITH NOCTURIA: ICD-10-CM

## 2024-02-25 DIAGNOSIS — N40.1 BENIGN PROSTATIC HYPERPLASIA WITH NOCTURIA: ICD-10-CM

## 2024-02-26 RX ORDER — TAMSULOSIN HYDROCHLORIDE 0.4 MG/1
0.4 CAPSULE ORAL DAILY
Qty: 30 CAPSULE | Refills: 0 | Status: SHIPPED | OUTPATIENT
Start: 2024-02-26

## 2024-02-26 NOTE — TELEPHONE ENCOUNTER
Last visit: 2/9/24  Last Med refill: 1/11/24  Does patient have enough medication for 72 hours: no    Next Visit Date:  Future Appointments   Date Time Provider Department Center   3/4/2024  4:30 PM STA CT SCAN RM 2 STAZ CT SCAN STA Radiolog   3/6/2024 11:30 AM Kathi Carroll RD, LD STVZ DIAB ED [facility]   3/12/2024  7:30 PM STAZ SLEEP RM 3 [facility]   3/14/2024  2:20 PM [facility]       Health Maintenance   Topic Date Due    Hepatitis C screen  Never done    Shingles vaccine (1 of 2) Never done    Low dose CT lung screening &/or counseling  Never done    Respiratory Syncytial Virus (RSV) Pregnant or age 60 yrs+ (1 - 1-dose 60+ series) Never done    Lipids  09/18/2020    COVID-19 Vaccine (4 - 2023-24 season) 09/01/2023    A1C test (Diabetic or Prediabetic)  03/21/2024    Depression Monitoring  02/09/2025    Colorectal Cancer Screen  03/15/2026    DTaP/Tdap/Td vaccine (2 - Td or Tdap) 11/06/2030    Annual Wellness Visit (Medicare Advantage)  Completed    Flu vaccine  Completed    Pneumococcal 65+ years Vaccine  Completed    AAA screen  Completed    Hepatitis A vaccine  Aged Out    Hepatitis B vaccine  Aged Out    Hib vaccine  Aged Out    Polio vaccine  Aged Out    Meningococcal (ACWY) vaccine  Aged Out    GFR test (Diabetes, CKD 3-4, OR last GFR 15-59)  Discontinued    Pneumococcal 0-64 years Vaccine  Discontinued       Hemoglobin A1C (%)   Date Value   12/21/2023 6.6   11/11/2022 6.4   04/22/2021 5.9             ( goal A1C is < 7)   No components found for: \"LABMICR\"  LDL Cholesterol (mg/dL)   Date Value   09/18/2019 120   05/03/2017 79       (goal LDL is <100)   AST (U/L)   Date Value   03/22/2023 16     ALT (U/L)   Date Value   03/22/2023 18     BUN (mg/dL)   Date Value   03/22/2023 21     BP Readings from Last 3 Encounters:   02/09/24 134/80   12/21/23 122/72   11/17/23 132/80          (goal 120/80)    All Future Testing planned in [facility]  Lab Frequency Next Occurrence

## 2024-02-28 DIAGNOSIS — I10 ESSENTIAL HYPERTENSION: ICD-10-CM

## 2024-02-28 RX ORDER — AMLODIPINE BESYLATE 10 MG/1
TABLET ORAL
Qty: 30 TABLET | Refills: 3 | Status: SHIPPED | OUTPATIENT
Start: 2024-02-28

## 2024-02-28 NOTE — TELEPHONE ENCOUNTER
Gastroesophageal reflux disease without esophagitis     Viral URI     Personal history of tobacco use, presenting hazards to health     Need for prophylactic vaccination and inoculation against varicella     Need for hepatitis C screening test     Other problems related to lifestyle     Snoring     Floaters, bilateral     Benign prostatic hyperplasia with lower urinary tract symptoms

## 2024-03-04 ENCOUNTER — HOSPITAL ENCOUNTER (OUTPATIENT)
Dept: CT IMAGING | Age: 69
Discharge: HOME | End: 2024-03-06
Payer: MEDICARE

## 2024-03-04 DIAGNOSIS — Z87.891 PERSONAL HISTORY OF TOBACCO USE, PRESENTING HAZARDS TO HEALTH: ICD-10-CM

## 2024-03-04 PROCEDURE — 71271 CT THORAX LUNG CANCER SCR C-: CPT

## 2024-03-04 NOTE — TELEPHONE ENCOUNTER
Writer spoke to a lady and patient is not at home, writer informed to just have patient contact office back when able too. Lady stated will have patient call office back.

## 2024-03-05 ENCOUNTER — TELEPHONE (OUTPATIENT)
Dept: FAMILY MEDICINE CLINIC | Age: 69
End: 2024-03-05

## 2024-03-05 NOTE — TELEPHONE ENCOUNTER
Patient called in this morning about his CT scan that was done yesterday, patient would like his results. Please Advise

## 2024-03-05 NOTE — TELEPHONE ENCOUNTER
Patient contacted office in results. Writer informed patient that could take 24-48 hours for provider to respond. Writer reassured that medical assistant sent to provider this am and that I will resend as well.

## 2024-03-06 ENCOUNTER — HOSPITAL ENCOUNTER (OUTPATIENT)
Dept: DIABETES SERVICES | Age: 69
Setting detail: THERAPIES SERIES
Discharge: HOME | End: 2024-03-06
Payer: MEDICARE

## 2024-03-06 PROCEDURE — G0108 DIAB MANAGE TRN  PER INDIV: HCPCS

## 2024-03-06 NOTE — PROGRESS NOTES
Diabetes Self- Management Education Program Assessment -   Also see Diabetic Screening  Patient, Gurpreet Pa,  here for diabetes self-management education  visit/ assessment.   Today's visit was in an individual setting.    MEDICAL HISTORY:  Past Medical History:   Diagnosis Date    Aortic valve sclerosis     Back pain     Pain Management for post laminectomy syndrome, see's monthly    Cerebral artery occlusion with cerebral infarction ([facility])     TIA 2017 x2    COPD (chronic obstructive pulmonary disease) ([facility])     NEUBULIZER AS NEEDED. PCP    COVID-19 vaccine series completed     Depression     DJD (degenerative joint disease)     Drug abuse ([facility])     drug abuse includes smoking cocaine    High cholesterol     History of loop recorder     no longer functional    History of shingles     2013    Hypertension     Left trigger finger     Prediabetes     Sleep apnea     does not have machine    Suicidal ideations     S/I's without attempt    TIA (transient ischemic attack) 10/2017    x 2    Toe contracture, left     5th    Under care of team     Cardiology, last seen 5/15/2023    Wears dentures     1 GOLD TOOTH ON FLIPPER UPPER    Wears glasses     Wellness examination     PCP,  last seen 5/19/2023     Family History   Problem Relation Age of Onset    Diabetes Mother     Diabetes Brother      Patient has no known allergies.   Immunization History   Administered Date(s) Administered    COVID-19, PFIZER PURPLE top, DILUTE for use, (age 12 y+), 30mcg/0.3mL 02/23/2021, 03/16/2021, 11/19/2021    Influenza, FLUARIX, FLULAVAL, FLUZONE (age 6 mo+) AND AFLURIA, (age 3 y+), PF, 0.5mL 11/06/2020, 01/30/2023, 12/21/2023    Pneumococcal, PCV20, PREVNAR 20, (age 6w+), IM, 0.5mL 12/21/2023    Pneumococcal, PPSV23, PNEUMOVAX 23, (age 2y+), SC/IM, 0.5mL 11/06/2020    TDaP, ADACEL (age 10y-64y), BOOSTRIX (age 10y+), IM, 0.5mL 11/06/2020     Current Medications  Current Outpatient Medications   Medication Sig Dispense Refill

## 2024-03-10 PROBLEM — Z11.59 NEED FOR HEPATITIS C SCREENING TEST: Status: RESOLVED | Noted: 2024-02-09 | Resolved: 2024-03-10

## 2024-03-10 PROBLEM — Z12.11 COLON CANCER SCREENING: Status: RESOLVED | Noted: 2017-10-05 | Resolved: 2024-03-10

## 2024-03-11 NOTE — TELEPHONE ENCOUNTER
Patient called about his CT- Lungs, patient was given the results and patient was happy. Call ended

## 2024-03-12 ENCOUNTER — HOSPITAL ENCOUNTER (OUTPATIENT)
Dept: SLEEP CENTER | Age: 69
Discharge: HOME | End: 2024-03-14
Payer: MEDICARE

## 2024-03-12 VITALS — HEIGHT: 69 IN | BODY MASS INDEX: 32.58 KG/M2 | WEIGHT: 220 LBS

## 2024-03-12 DIAGNOSIS — G47.33 OSA (OBSTRUCTIVE SLEEP APNEA): ICD-10-CM

## 2024-03-12 DIAGNOSIS — R06.83 SNORING: ICD-10-CM

## 2024-03-12 PROCEDURE — 95811 POLYSOM 6/>YRS CPAP 4/> PARM: CPT

## 2024-03-14 ENCOUNTER — OFFICE VISIT (OUTPATIENT)
Dept: FAMILY MEDICINE CLINIC | Age: 69
End: 2024-03-14

## 2024-03-14 VITALS
BODY MASS INDEX: 33.03 KG/M2 | HEIGHT: 69 IN | DIASTOLIC BLOOD PRESSURE: 77 MMHG | WEIGHT: 223 LBS | SYSTOLIC BLOOD PRESSURE: 115 MMHG | HEART RATE: 94 BPM | OXYGEN SATURATION: 97 %

## 2024-03-14 DIAGNOSIS — Z71.6 ENCOUNTER FOR SMOKING CESSATION COUNSELING: ICD-10-CM

## 2024-03-14 DIAGNOSIS — I10 ESSENTIAL HYPERTENSION: Primary | ICD-10-CM

## 2024-03-14 DIAGNOSIS — E11.9 TYPE 2 DIABETES MELLITUS WITHOUT COMPLICATION, WITHOUT LONG-TERM CURRENT USE OF INSULIN (HCC): ICD-10-CM

## 2024-03-14 DIAGNOSIS — R10.9 LEFT SIDED ABDOMINAL PAIN: ICD-10-CM

## 2024-03-14 DIAGNOSIS — J41.0 SIMPLE CHRONIC BRONCHITIS (HCC): ICD-10-CM

## 2024-03-14 RX ORDER — ALBUTEROL SULFATE 90 UG/1
2 AEROSOL, METERED RESPIRATORY (INHALATION) 4 TIMES DAILY PRN
Qty: 1 EACH | Refills: 1 | Status: SHIPPED | OUTPATIENT
Start: 2024-03-14

## 2024-03-14 RX ORDER — AMLODIPINE BESYLATE 10 MG/1
TABLET ORAL
Qty: 30 TABLET | Refills: 3 | Status: SHIPPED | OUTPATIENT
Start: 2024-03-14

## 2024-03-14 RX ORDER — NICOTINE 21 MG/24HR
1 PATCH, TRANSDERMAL 24 HOURS TRANSDERMAL DAILY
Qty: 42 PATCH | Refills: 0 | Status: SHIPPED | OUTPATIENT
Start: 2024-03-14 | End: 2024-04-25

## 2024-03-14 RX ORDER — FLUTICASONE FUROATE AND VILANTEROL 100; 25 UG/1; UG/1
1 POWDER RESPIRATORY (INHALATION) DAILY
Qty: 60 EACH | Refills: 3 | Status: CANCELLED | OUTPATIENT
Start: 2024-03-14

## 2024-03-14 RX ORDER — BUDESONIDE AND FORMOTEROL FUMARATE DIHYDRATE 160; 4.5 UG/1; UG/1
2 AEROSOL RESPIRATORY (INHALATION) 2 TIMES DAILY
Qty: 10.2 G | Refills: 3 | Status: SHIPPED | OUTPATIENT
Start: 2024-03-14

## 2024-03-14 ASSESSMENT — ENCOUNTER SYMPTOMS
RESPIRATORY NEGATIVE: 1
ABDOMINAL PAIN: 1

## 2024-03-14 NOTE — PATIENT INSTRUCTIONS
Thank you for letting us take care of you today. We hope all your questions were addressed. If a question was overlooked or something else comes to mind after you return home, please contact a member of your Care Team listed below.      Your Care Team at [facility] is Team #1  Kendra Packer, Faculty Advisor  John Rivera, Resident Physician  Jeffrey Brown, Resident Physician  Becky Torrez, Resident Physician  Danyell Aguirre, [facility]  Judit Funk, [facility]  Reza Mensah, [facility]  Harmony Barton, [facility]  Olivia Koenig, [facility]  Sendy Lopez, [facility]  Mary Jane Davidson, [facility]  Jossie Yu, [facility]  Kristi (LJ) Shante,   Madiha Hayes [facility] (Clinical Pharmacist)     Office phone number: [phone]    If you need to get in right away due to illness, please be advised we have \"Same Day\" appointments available Monday-Friday. Please call us at [phone] option #3 to schedule your \"Same Day\" appointment.    Living

## 2024-03-14 NOTE — PROGRESS NOTES
HYPERTENSION visit     BP Readings from Last 3 Encounters:   02/09/24 134/80   12/21/23 122/72   11/17/23 132/80       LDL Cholesterol (mg/dL)   Date Value   09/18/2019 120     HDL (mg/dL)   Date Value   09/18/2019 34 (L)     BUN (mg/dL)   Date Value   03/22/2023 21     Creatinine (mg/dL)   Date Value   03/22/2023 1.29 (H)     POC Creatinine (mg/dL)   Date Value   05/23/2023 1.10     Glucose (mg/dL)   Date Value   03/22/2023 110 (H)   05/02/2021 112 (H)              Have you changed or started any medications since your last visit including any over-the-counter medicines, vitamins, or herbal medicines? no   Have you stopped taking any of your medications? Is so, why? -  no  Are you having any side effects from any of your medications? - no  How often do you miss doses of your medication? rare      Have you seen any other physician or provider since your last visit?  no   Have you had any other diagnostic tests since your last visit?  yes - Sleep, CT   Have you been seen in the emergency room and/or had an admission in a hospital since we last saw you?  yes - [facility]   Have you had your routine dental cleaning in the past 6 months?  no     Do you have an active MyChart account? If no, what is the barrier?  Yes    Patient Care Team:  Barrie Stenr MD as PCP - General (Family Medicine)  Mitch Hernandez as Surgeon (Orthopedic Surgery)  [facility] (Behavioral Health)    Medical History Review  Past Medical, Family, and Social History reviewed and does contribute to the patient presenting condition    Health Maintenance   Topic Date Due    Hepatitis C screen  Never done    Shingles vaccine (1 of 2) Never done    Respiratory Syncytial Virus (RSV) Pregnant or age 60 yrs+ (1 - 1-dose 60+ series) Never done    Lipids  09/18/2020    COVID-19 Vaccine (4 - 2023-24 season) 09/01/2023    A1C test (Diabetic or Prediabetic)  03/21/2024    Depression Monitoring  02/09/2025    Low dose CT lung screening &/or 
agree with  orders as documented by the resident.  Recommendations: Agree with resident assessment and plan.  Resident team to discuss further with patient his left side abdominal pain which may be due to constipation or bowel spasms especially with his history of Percocet use without any constitutional symptoms and mild left-sided abdominal pain per resident report.  Patient also may benefit from MiraLAX or similar agents especially if it is confirmed that he is chronically using his opioids.  Patient may also benefit from behavioral health support regarding patient smoking cessation or referral to 1800-QUIT NOW hotline.  Resident team to give patient ER precautions as well.  Return in about 2 weeks (around 3/28/2024) for results.   (GE Modifier ) Dr. GAMA CHAUDHARI MD  
addressed.      All patient questions answered.  Pt voiced understanding.     Return in about 2 weeks (around 3/28/2024) for results.        Disclaimer: Some orall of this note was transcribed using voice-recognition software.This may cause typographical errors occasionally. Although all effort is made to fix these errors, please do not hesitate to contact our office if there isany concern with the understanding of this note.

## 2024-03-19 LAB — STATUS: NORMAL

## 2024-03-21 DIAGNOSIS — G47.33 OSA (OBSTRUCTIVE SLEEP APNEA): Primary | ICD-10-CM

## 2024-03-21 NOTE — PROGRESS NOTES
Dme order for CPAP, A final pressure of 12 cm H2O is recommended. Heated humidification should be included to prevent upper airway dryness.  A DME order was placed today    FACE TO FACE for CPAP DME order  Patient was evaluated today for the diagnosis of  NANCY .  I entered a DME order for CPAP because the diagnosis and testing require the patient to have CPAP.  Condition will improve or be benefited by CPAP use.  The need for this equipment was discussed with the patient and he understands and is in agreement.     Electronically signed by Barrie Stern MD on 3/21/2024 at 5:10 PM

## 2024-03-25 DIAGNOSIS — R35.1 BENIGN PROSTATIC HYPERPLASIA WITH NOCTURIA: ICD-10-CM

## 2024-03-25 DIAGNOSIS — N40.1 BENIGN PROSTATIC HYPERPLASIA WITH NOCTURIA: ICD-10-CM

## 2024-03-25 NOTE — TELEPHONE ENCOUNTER
E-scribe request for flomax. Please review and e-scribe if applicable.     Last Visit Date:  3/14/2024  Next Visit Date:  3/28/2024    Hemoglobin A1C (%)   Date Value   12/21/2023 6.6   11/11/2022 6.4   04/22/2021 5.9             ( goal A1C is < 7)   No components found for: \"LABMICR\"  LDL Cholesterol (mg/dL)   Date Value   09/18/2019 120       (goal LDL is <100)   AST (U/L)   Date Value   03/22/2023 16     ALT (U/L)   Date Value   03/22/2023 18     BUN (mg/dL)   Date Value   03/22/2023 21     BP Readings from Last 3 Encounters:   03/14/24 115/77   02/09/24 134/80   12/21/23 122/72          (goal 120/80)        Patient Active Problem List:     Currently attempting to quit smoking     Back pain     Hypercholesteremia     Trigger finger of left hand     Trigger finger, right     Primary osteoarthritis of left knee     Chronic pain of both shoulders     Essential hypertension     Chronic obstructive pulmonary disease (HCC)     Right foot pain     Back pain of lumbar region with sciatica     Spondylolisthesis, lumbar region     Chronic low back pain without sciatica     Right hip pain     Acute ischemic right middle cerebral artery (MCA) stroke (HCC)     Vision changes     Stroke-like symptoms     Vitamin D deficiency     Elevated LDL cholesterol level     Thalamic infarct, acute (HCC)     Simple chronic bronchitis (HCC)     Neural foraminal stenosis of lumbar spine     Postlaminectomy syndrome     Idiopathic acute pancreatitis     Unstable gait     Major depressive disorder, recurrent episode, moderate (HCC)     Primary osteoarthritis of both knees     TIA (transient ischemic attack)     Paresthesias     Acute ischemic stroke (HCC)     Cocaine abuse (HCC)     Shortness of breath     Swelling of left hand     Chronic pain of left knee     Left hand pain     Prediabetes     Pain, dental     Paronychia of right index finger     NANCY (obstructive sleep apnea)     S/P total knee arthroplasty, left     Neuropathy

## 2024-03-26 ENCOUNTER — TELEPHONE (OUTPATIENT)
Dept: PHARMACY | Age: 69
End: 2024-03-26

## 2024-03-26 RX ORDER — TAMSULOSIN HYDROCHLORIDE 0.4 MG/1
0.4 CAPSULE ORAL DAILY
Qty: 30 CAPSULE | Refills: 0 | Status: SHIPPED | OUTPATIENT
Start: 2024-03-26

## 2024-03-26 NOTE — TELEPHONE ENCOUNTER
Clinic received new referral for Smoking Cessation, called to schedule, no answer,  N/A.    Noa Small, [facility], CACP  Clinical Pharmacist Medication Management  3/26/2024  3:53 PM

## 2024-03-27 ENCOUNTER — HOSPITAL ENCOUNTER (OUTPATIENT)
Dept: GENERAL RADIOLOGY | Age: 69
Discharge: HOME | End: 2024-03-29
Payer: MEDICARE

## 2024-03-27 ENCOUNTER — HOSPITAL ENCOUNTER (OUTPATIENT)
Age: 69
Discharge: HOME | End: 2024-03-27
Payer: MEDICARE

## 2024-03-27 ENCOUNTER — HOSPITAL ENCOUNTER (OUTPATIENT)
Age: 69
Discharge: HOME | End: 2024-03-29
Payer: MEDICARE

## 2024-03-27 ENCOUNTER — HOSPITAL ENCOUNTER (OUTPATIENT)
Dept: VASCULAR LAB | Age: 69
Discharge: HOME | End: 2024-03-29
Payer: MEDICARE

## 2024-03-27 DIAGNOSIS — N40.1 BENIGN PROSTATIC HYPERPLASIA WITH LOWER URINARY TRACT SYMPTOMS, SYMPTOM DETAILS UNSPECIFIED: ICD-10-CM

## 2024-03-27 DIAGNOSIS — Z72.89 OTHER PROBLEMS RELATED TO LIFESTYLE: ICD-10-CM

## 2024-03-27 DIAGNOSIS — Z13.6 SCREENING FOR CARDIOVASCULAR CONDITION: ICD-10-CM

## 2024-03-27 DIAGNOSIS — R10.9 LEFT SIDED ABDOMINAL PAIN: ICD-10-CM

## 2024-03-27 DIAGNOSIS — Z13.6 SCREENING FOR AAA (ABDOMINAL AORTIC ANEURYSM): ICD-10-CM

## 2024-03-27 DIAGNOSIS — Z11.59 NEED FOR HEPATITIS C SCREENING TEST: ICD-10-CM

## 2024-03-27 LAB
CHOLEST SERPL-MCNC: 188 MG/DL (ref 0–199)
CHOLESTEROL/HDL RATIO: 4
HCV AB SERPL QL IA: NONREACTIVE
HDLC SERPL-MCNC: 44 MG/DL
LDLC SERPL CALC-MCNC: 110 MG/DL (ref 0–100)
PSA SERPL-MCNC: 9.8 NG/ML (ref 0–4)
TRIGL SERPL-MCNC: 169 MG/DL
VLDLC SERPL CALC-MCNC: 34 MG/DL

## 2024-03-27 PROCEDURE — 76706 US ABDL AORTA SCREEN AAA: CPT

## 2024-03-27 PROCEDURE — G0103 PSA SCREENING: HCPCS

## 2024-03-27 PROCEDURE — 86803 HEPATITIS C AB TEST: CPT

## 2024-03-27 PROCEDURE — 36415 COLL VENOUS BLD VENIPUNCTURE: CPT

## 2024-03-27 PROCEDURE — 74018 RADEX ABDOMEN 1 VIEW: CPT

## 2024-03-27 PROCEDURE — 80061 LIPID PANEL: CPT

## 2024-03-28 ENCOUNTER — TELEPHONE (OUTPATIENT)
Dept: FAMILY MEDICINE CLINIC | Age: 69
End: 2024-03-28

## 2024-03-28 ENCOUNTER — OFFICE VISIT (OUTPATIENT)
Dept: FAMILY MEDICINE CLINIC | Age: 69
End: 2024-03-28
Payer: MEDICARE

## 2024-03-28 VITALS
HEART RATE: 95 BPM | DIASTOLIC BLOOD PRESSURE: 60 MMHG | SYSTOLIC BLOOD PRESSURE: 106 MMHG | BODY MASS INDEX: 32.64 KG/M2 | OXYGEN SATURATION: 97 % | HEIGHT: 69 IN | WEIGHT: 220.4 LBS

## 2024-03-28 DIAGNOSIS — E78.5 DYSLIPIDEMIA: Primary | ICD-10-CM

## 2024-03-28 DIAGNOSIS — R97.20 ELEVATED PSA: ICD-10-CM

## 2024-03-28 DIAGNOSIS — Z96.652 S/P TOTAL KNEE ARTHROPLASTY, LEFT: ICD-10-CM

## 2024-03-28 LAB
VAS AORTA DIST AP: 1.66 CM
VAS AORTA DIST PSV: 129 CM/S
VAS AORTA DIST TR: 1.52 CM
VAS AORTA INFRARENAL PSV: 86.7 CM/S
VAS AORTA MID AP: 1.66 CM
VAS AORTA MID PSV: 86.7 CM/S
VAS AORTA MID TRANS: 1.52 CM
VAS AORTA PROX AP: 1.6 CM
VAS AORTA PROX PSV: 108 CM/S
VAS AORTA PROX TR: 1.79 CM
VAS LEFT COM ILIAC AP: 1 CM
VAS LEFT COM ILIAC PROX PSV: 151 CM/S
VAS LEFT COM ILIAC TRANS: 0.98 CM
VAS RIGHT COM ILIAC AP: 1.04 CM
VAS RIGHT COM ILIAC PROX PSV: 152 CM/S
VAS RIGHT COM ILIAC TRANS: 1 CM

## 2024-03-28 PROCEDURE — 76706 US ABDL AORTA SCREEN AAA: CPT | Performed by: SURGERY

## 2024-03-28 PROCEDURE — G8482 FLU IMMUNIZE ORDER/ADMIN: HCPCS

## 2024-03-28 PROCEDURE — 3078F DIAST BP <80 MM HG: CPT

## 2024-03-28 PROCEDURE — 3074F SYST BP LT 130 MM HG: CPT

## 2024-03-28 PROCEDURE — G8427 DOCREV CUR MEDS BY ELIG CLIN: HCPCS

## 2024-03-28 PROCEDURE — 3017F COLORECTAL CA SCREEN DOC REV: CPT

## 2024-03-28 PROCEDURE — 1123F ACP DISCUSS/DSCN MKR DOCD: CPT

## 2024-03-28 PROCEDURE — G8417 CALC BMI ABV UP PARAM F/U: HCPCS

## 2024-03-28 PROCEDURE — 99213 OFFICE O/P EST LOW 20 MIN: CPT

## 2024-03-28 PROCEDURE — 4004F PT TOBACCO SCREEN RCVD TLK: CPT

## 2024-03-28 RX ORDER — ATORVASTATIN CALCIUM 80 MG/1
80 TABLET, FILM COATED ORAL NIGHTLY
Qty: 30 TABLET | Refills: 5 | Status: SHIPPED | OUTPATIENT
Start: 2024-03-28

## 2024-03-28 ASSESSMENT — ENCOUNTER SYMPTOMS
ABDOMINAL PAIN: 0
CONSTIPATION: 0
RESPIRATORY NEGATIVE: 1

## 2024-03-28 NOTE — TELEPHONE ENCOUNTER
----- Message from Gary Bender DO sent at 3/28/2024  8:16 AM EDT -----  Vascular study - Results reviewed are normal.    Please advise patient.    Please arrange him to see a PCP for continuity care. (Last PCP graduated).

## 2024-03-28 NOTE — PROGRESS NOTES
[facility] Residency Program - Outpatient Note      Subjective:    Gurpreet Pa is a 68 y.o. male with  has a past medical history of Aortic valve sclerosis, Back pain, Cerebral artery occlusion with cerebral infarction ([facility]), COPD (chronic obstructive pulmonary disease) ([facility]), COVID-19 vaccine series completed, Depression, DJD (degenerative joint disease), Drug abuse ([facility]), High cholesterol, History of loop recorder, History of shingles, Hypertension, Left trigger finger, Prediabetes, Sleep apnea, Suicidal ideations, TIA (transient ischemic attack), Toe contracture, left, Under care of team, Wears dentures, Wears glasses, and Wellness examination.    Presented to the office today for:  Chief Complaint   Patient presents with    Results     Follow up        HPI    68-year-old male seen in the clinic today to go over test results.  Patient states that he has been doing okay since he was last seen.  States that his blood pressure has been okay at home, blood pressure well-controlled in the office today.  Patient also reports compliance of medications.  Went over test results with patient's, discussed normal AAA screening and abdominal x-ray done due to the pain at last appointment.  Abdominal x-ray showed moderate stool burden with abdominal gases, patient states that he took the prescribed laxative and had good bowel movements, has not needed to take daily laxatives since then.  States that he is having regular bowel movements now.  States that he no longer has pain.  Also discussed patient's lipid panel with elevated triglycerides, discussed diet modification, and need for getting exercise as well as diet compliance.  Patient states that he might not have been taking statins regularly, feels like he might have run out.  Discussed negative hep C result.  Discussed elevated PSA levels at 9.8.  Patient denies any prostatitis symptoms, states that he takes Flomax and does not

## 2024-03-28 NOTE — PROGRESS NOTES
Attending Physician Statement  I have discussed the care of Gurpreet Pa, 68 y.o. male,including pertinent history and exam findings,  with the resident Chaya Chino MD.  History:  Chief Complaint   Patient presents with    Results     Follow up      I have reviewed the key elements of the encounter with the resident. Examination was done by resident as documented in residents note.  BP Readings from Last 3 Encounters:   03/28/24 106/60   03/14/24 115/77   02/09/24 134/80     /60 (Site: Left Upper Arm, Position: Sitting, Cuff Size: Large Adult)   Pulse 95   Ht 1.753 m (5' 9\")   Wt 100 kg (220 lb 6.4 oz)   SpO2 97%   BMI 32.55 kg/m²   Lab Results   Component Value Date    WBC 6.8 03/22/2023    HGB 14.7 03/22/2023    HCT 44.8 03/22/2023     03/22/2023    CHOL 188 03/27/2024    TRIG 169 (H) 03/27/2024    HDL 44 03/27/2024    ALT 18 03/22/2023    AST 16 03/22/2023     03/22/2023    K 4.1 03/22/2023     03/22/2023    CREATININE 1.10 05/23/2023    BUN 21 03/22/2023    CO2 24 03/22/2023    TSH 1.77 12/06/2012    PSA 9.80 (H) 03/27/2024    INR 1.0 04/22/2021    LABA1C 6.6 12/21/2023     Lab Results   Component Value Date    CALCIUM 9.4 03/22/2023     Lab Results   Component Value Date    LDLCHOLESTEROL 110 (H) 03/27/2024     I agree with the assessment, plan and diagnosis of    Diagnosis Orders   1. Essential hypertension        2. Dyslipidemia          I agree with  orders as documented by the resident.  Recommendations: Agree with resident assessment and plan.  Patient also likely to benefit from continued use of MiraLAX while taking opioids treatment.  Patient also referred to urology regarding his elevated PSA as patient has high risk.  If patient blood pressure continues to be elevated he may benefit from decrease of blood pressure regiment as blood pressure goal likely to benefit for patient has SBP of 120-140.  No follow-ups on file.   (GE Modifier ) Dr. GAMA CHAUDHARI MD

## 2024-03-28 NOTE — TELEPHONE ENCOUNTER
Attempted to contact patient to inform him of his normal AAA Screen. Mailbox is full, unable to leave message.

## 2024-03-28 NOTE — PATIENT INSTRUCTIONS
Henry peck for letting us take care of you today. We hope all your questions were addressed. If a question was overlooked or something else comes to mind after you return home, please contact a member of your Care Team listed below.        Your Care Team at [facility] is Team #4  Louise Amanda DO(Faculty)  Barrie Stern (Resident)  Yaa Berman (Resident)  Celestine Spivey(Resident)  Danyell Aguirre, JOE Mensah, JOE Funk, JOE Barton, [facility]  Jossie Yu, [facility]  Sendy Lopez, [facility]  Olivia Koenig, [facility]  Mary Jane Davidson, JOE Berry () AARON Frey (Clinical Practice Manager)  Madiha Hayes [facility] (Clinical Pharmacist)       Office phone number: [phone]    If you need to get in right away due to illness, please be advised we have \"Same Day\" appointments available Monday-Friday. Please call us at [phone] option #3 to schedule your \"Same Day\" appointment.

## 2024-03-28 NOTE — PROGRESS NOTES
DIABETES and HYPERTENSION visit    BP Readings from Last 3 Encounters:   03/14/24 115/77   02/09/24 134/80   12/21/23 122/72        Hemoglobin A1C (%)   Date Value   12/21/2023 6.6   11/11/2022 6.4   04/22/2021 5.9     LDL Cholesterol (mg/dL)   Date Value   03/27/2024 110 (H)     HDL (mg/dL)   Date Value   03/27/2024 44     BUN (mg/dL)   Date Value   03/22/2023 21     Creatinine (mg/dL)   Date Value   03/22/2023 1.29 (H)     POC Creatinine (mg/dL)   Date Value   05/23/2023 1.10     Glucose (mg/dL)   Date Value   03/22/2023 110 (H)   05/02/2021 112 (H)            Have you changed or started any medications since your last visit including any over-the-counter medicines, vitamins, or herbal medicines? no   Have you stopped taking any of your medications? Is so, why? -  no  Are you having any side effects from any of your medications? - no    Have you seen any other physician or provider since your last visit?  Yes - Diabetic education  Have you had any other diagnostic tests since your last visit?  yes - xray - ABD , vascular labs ,   Have you been seen in the emergency room and/or had an admission in a hospital since we last saw you?  yes - [facility]   Have you had your routine dental cleaning in the past 6 months?  no     Have you had your annual diabetic retinal (eye) exam? No   (ensure copy of exam is in the chart)    Do you have an active MyChart account? If no, what is the barrier?  Yes    Patient Care Team:  Barrie Stern MD as PCP - General (Family Medicine)  Mitch Hernandez as Surgeon (Orthopedic Surgery)  Group, [facility] (Behavioral Health)    Medical History Review  Past Medical, Family, and Social History reviewed and does not contribute to the patient presenting condition    Health Maintenance   Topic Date Due    Diabetic foot exam  Never done    Diabetic Alb to Cr ratio (uACR) test  Never done    Diabetic retinal exam  Never done    Shingles vaccine (1 of 2) Never done    Respiratory

## 2024-04-03 DIAGNOSIS — E78.5 DYSLIPIDEMIA: ICD-10-CM

## 2024-04-03 RX ORDER — ATORVASTATIN CALCIUM 80 MG/1
80 TABLET, FILM COATED ORAL NIGHTLY
Qty: 60 TABLET | Refills: 5 | Status: SHIPPED | OUTPATIENT
Start: 2024-04-03

## 2024-04-03 NOTE — TELEPHONE ENCOUNTER
Screen Once 05/19/2023   Hepatitis C Antibody Once 05/19/2023   Lipid Panel Once 10/19/2023   Hemoglobin A1C Once 10/19/2023   Basic Metabolic Panel Once 10/19/2023   XR SHOULDER RIGHT (MIN 2 VIEWS) Once 10/19/2023   XR SHOULDER LEFT (MIN 2 VIEWS) Once 10/19/2023   Phase I - warming device PRN    Phase I - cardiac monitor PRN                Patient Active Problem List:     Currently attempting to quit smoking     Back pain     Hypercholesteremia     Trigger finger of left hand     Trigger finger, right     Primary osteoarthritis of left knee     Chronic pain of both shoulders     Essential hypertension     Chronic obstructive pulmonary disease (HCC)     Right foot pain     Back pain of lumbar region with sciatica     Spondylolisthesis, lumbar region     Chronic low back pain without sciatica     Right hip pain     Acute ischemic right middle cerebral artery (MCA) stroke (HCC)     Vision changes     Stroke-like symptoms     Vitamin D deficiency     Elevated LDL cholesterol level     Thalamic infarct, acute (HCC)     Simple chronic bronchitis (HCC)     Neural foraminal stenosis of lumbar spine     Postlaminectomy syndrome     Idiopathic acute pancreatitis     Unstable gait     Major depressive disorder, recurrent episode, moderate (HCC)     Primary osteoarthritis of both knees     TIA (transient ischemic attack)     Paresthesias     Acute ischemic stroke (HCC)     Cocaine abuse (HCC)     Shortness of breath     Swelling of left hand     Chronic pain of left knee     Left hand pain     Prediabetes     Pain, dental     Paronychia of right index finger     NANCY (obstructive sleep apnea)     S/P total knee arthroplasty, left     Neuropathy     Gastroesophageal reflux disease without esophagitis     Viral URI     Personal history of tobacco use, presenting hazards to health     Need for prophylactic vaccination and inoculation against varicella     Other problems related to lifestyle     Snoring     Floaters, bilateral

## 2024-04-22 DIAGNOSIS — Z71.6 ENCOUNTER FOR SMOKING CESSATION COUNSELING: ICD-10-CM

## 2024-04-22 DIAGNOSIS — N40.1 BENIGN PROSTATIC HYPERPLASIA WITH NOCTURIA: ICD-10-CM

## 2024-04-22 DIAGNOSIS — R35.1 BENIGN PROSTATIC HYPERPLASIA WITH NOCTURIA: ICD-10-CM

## 2024-04-22 RX ORDER — TAMSULOSIN HYDROCHLORIDE 0.4 MG/1
0.4 CAPSULE ORAL DAILY
Qty: 30 CAPSULE | Refills: 0 | Status: SHIPPED | OUTPATIENT
Start: 2024-04-22

## 2024-04-22 RX ORDER — BUPROPION HYDROCHLORIDE 150 MG/1
150 TABLET ORAL EVERY MORNING
Qty: 30 TABLET | Refills: 3 | Status: SHIPPED | OUTPATIENT
Start: 2024-04-22

## 2024-04-22 NOTE — TELEPHONE ENCOUNTER
Next Occurrence   HIV Screen Once 05/19/2023   Hepatitis C Antibody Once 05/19/2023   Lipid Panel Once 10/19/2023   Hemoglobin A1C Once 10/19/2023   Basic Metabolic Panel Once 10/19/2023   XR SHOULDER RIGHT (MIN 2 VIEWS) Once 10/19/2023   XR SHOULDER LEFT (MIN 2 VIEWS) Once 10/19/2023   Phase I - warming device PRN    Phase I - cardiac monitor PRN                Patient Active Problem List:     Currently attempting to quit smoking     Back pain     Hypercholesteremia     Trigger finger of left hand     Trigger finger, right     Primary osteoarthritis of left knee     Chronic pain of both shoulders     Essential hypertension     Chronic obstructive pulmonary disease (HCC)     Right foot pain     Back pain of lumbar region with sciatica     Spondylolisthesis, lumbar region     Chronic low back pain without sciatica     Right hip pain     Acute ischemic right middle cerebral artery (MCA) stroke (HCC)     Vision changes     Stroke-like symptoms     Vitamin D deficiency     Elevated LDL cholesterol level     Thalamic infarct, acute (HCC)     Simple chronic bronchitis (HCC)     Neural foraminal stenosis of lumbar spine     Postlaminectomy syndrome     Idiopathic acute pancreatitis     Unstable gait     Major depressive disorder, recurrent episode, moderate (HCC)     Primary osteoarthritis of both knees     TIA (transient ischemic attack)     Paresthesias     Acute ischemic stroke (HCC)     Cocaine abuse (HCC)     Shortness of breath     Swelling of left hand     Chronic pain of left knee     Left hand pain     Prediabetes     Pain, dental     Paronychia of right index finger     NANCY (obstructive sleep apnea)     S/P total knee arthroplasty, left     Neuropathy     Gastroesophageal reflux disease without esophagitis     Viral URI     Personal history of tobacco use, presenting hazards to health     Need for prophylactic vaccination and inoculation against varicella     Other problems related to lifestyle     Snoring

## 2024-04-22 NOTE — TELEPHONE ENCOUNTER
Last visit: 03/28/2024  Last Med refill: 12/21/2023  Does patient have enough medication for 72 hours: No:     Next Visit Date:  Future Appointments   Date Time Provider Department Center   6/6/2024  2:20 PM Barrie Stern MD [id number]       Health Maintenance   Topic Date Due    Diabetic foot exam  Never done    Diabetic Alb to Cr ratio (uACR) test  Never done    Diabetic retinal exam  Never done    Shingles vaccine (1 of 2) Never done    Respiratory Syncytial Virus (RSV) Pregnant or age 60 yrs+ (1 - 1-dose 60+ series) Never done    COVID-19 Vaccine (4 - 2023-24 season) 09/01/2023    GFR test (Diabetes, CKD 3-4, OR last GFR 15-59)  03/22/2024    A1C test (Diabetic or Prediabetic)  12/21/2024    Depression Monitoring  02/09/2025    Low dose CT lung screening &/or counseling  03/04/2025    Lipids  03/27/2025    Prostate Specific Antigen (PSA) Screening or Monitoring  03/27/2025    Colorectal Cancer Screen  03/15/2026    DTaP/Tdap/Td vaccine (2 - Td or Tdap) 11/06/2030    Annual Wellness Visit (Medicare Advantage)  Completed    Flu vaccine  Completed    Pneumococcal 65+ years Vaccine  Completed    AAA screen  Completed    Hepatitis C screen  Completed    Hepatitis A vaccine  Aged Out    Hepatitis B vaccine  Aged Out    Hib vaccine  Aged Out    Polio vaccine  Aged Out    Meningococcal (ACWY) vaccine  Aged Out    Pneumococcal 0-64 years Vaccine  Discontinued       Hemoglobin A1C (%)   Date Value   12/21/2023 6.6   11/11/2022 6.4   04/22/2021 5.9             ( goal A1C is < 7)   No components found for: \"LABMICR\"  LDL Cholesterol (mg/dL)   Date Value   03/27/2024 110 (H)   09/18/2019 120       (goal LDL is <100)   AST (U/L)   Date Value   03/22/2023 16     ALT (U/L)   Date Value   03/22/2023 18     BUN (mg/dL)   Date Value   03/22/2023 21     BP Readings from Last 3 Encounters:   03/28/24 106/60   03/14/24 115/77   02/09/24 134/80          (goal 120/80)    All Future Testing planned in [facility]  Lab Frequency

## 2024-05-01 ENCOUNTER — TELEPHONE (OUTPATIENT)
Dept: PHARMACY | Age: 69
End: 2024-05-01

## 2024-05-06 ENCOUNTER — TELEPHONE (OUTPATIENT)
Dept: PHARMACY | Age: 69
End: 2024-05-06

## 2024-05-06 NOTE — TELEPHONE ENCOUNTER
Called to schedule initial visit for SC, left VM.   Will cancel referral if no response by the end of the day on 5/7/24.

## 2024-05-25 DIAGNOSIS — R35.1 BENIGN PROSTATIC HYPERPLASIA WITH NOCTURIA: ICD-10-CM

## 2024-05-25 DIAGNOSIS — N40.1 BENIGN PROSTATIC HYPERPLASIA WITH NOCTURIA: ICD-10-CM

## 2024-05-28 RX ORDER — TAMSULOSIN HYDROCHLORIDE 0.4 MG/1
0.4 CAPSULE ORAL DAILY
Qty: 30 CAPSULE | Refills: 3 | Status: SHIPPED | OUTPATIENT
Start: 2024-05-28

## 2024-08-02 ENCOUNTER — TELEPHONE (OUTPATIENT)
Dept: PHARMACY | Facility: CLINIC | Age: 69
End: 2024-08-02

## 2024-08-02 NOTE — TELEPHONE ENCOUNTER
[facility] CLINICAL PHARMACY: ADHERENCE REVIEW  Identified care gap per [employer]: fills at Non-preferred pharmacy: [facility]: Diabetes and Statin adherence      ASSESSMENT  DIABETES ADHERENCE    Insurance Records claims through 24 (Prior Year PDC = not reported; YTD PDC = 70%; Potential Fail Date: 24):   Metformin 500mg ER Next refill due: 24    Prescribed si tablet/capsule daily    Per Reconcile Dispense History: last filled on 3/16/24 for 90 day supply.     Per [facility]: will get  90 day supply ready to  since past due.    Rx#[id number]    Lab Results   Component Value Date    LABA1C 6.6 2023    LABA1C 6.4 2022    LABA1C 5.9 2021     NOTE: A1c not yet completed this calendar year    STATIN ADHERENCE    Insurance Records claims through 24 (Prior Year PDC = not reported; YTD PDC = 38%; Potential Fail Date: 24):   Atorvastatin 80mg Next refill due: 24    Prescribed si tablet/capsule daily    Per Reconcile Dispense History: last filled on 24 for 30 day supply. RF    Written for 90DS    Per [facility]:  Left VM to refill for 90 days this time  [id number]    Lab Results   Component Value Date    CHOL 188 2024    TRIG 169 (H) 2024    HDL 44 2024     Lab Results   Component Value Date     (H) 2024      ALT   Date Value Ref Range Status   2023 18 5 - 41 U/L Final     AST   Date Value Ref Range Status   2023 16 <40 U/L Final     The ASCVD Risk score (Zuleima DK, et al., 2019) failed to calculate for the following reasons:    The patient has a prior MI or stroke diagnosis     PLAN  Per insurer report, LIS-2 - may be able to receive up to a 100-day supply for the same cost as a 30-day supply.      The following are interventions that have been identified:   Patient OVERDUE refilling both and active on home medication list.     Reached patient to review. Patient will  refills    Education provided.

## 2024-09-13 ENCOUNTER — HOSPITAL ENCOUNTER (OUTPATIENT)
Age: 69
Setting detail: OUTPATIENT SURGERY
Discharge: HOME | End: 2024-09-13
Attending: UROLOGY | Admitting: UROLOGY
Payer: MEDICARE

## 2024-09-13 ENCOUNTER — ANESTHESIA (OUTPATIENT)
Dept: OPERATING ROOM | Age: 69
End: 2024-09-13
Payer: MEDICARE

## 2024-09-13 ENCOUNTER — HOSPITAL ENCOUNTER (OUTPATIENT)
Dept: ULTRASOUND IMAGING | Age: 69
Setting detail: OUTPATIENT SURGERY
Discharge: HOME | End: 2024-09-15
Attending: UROLOGY
Payer: MEDICARE

## 2024-09-13 ENCOUNTER — ANESTHESIA EVENT (OUTPATIENT)
Dept: OPERATING ROOM | Age: 69
End: 2024-09-13
Payer: MEDICARE

## 2024-09-13 VITALS
TEMPERATURE: 97 F | DIASTOLIC BLOOD PRESSURE: 77 MMHG | SYSTOLIC BLOOD PRESSURE: 117 MMHG | HEART RATE: 73 BPM | RESPIRATION RATE: 16 BRPM | OXYGEN SATURATION: 95 %

## 2024-09-13 DIAGNOSIS — R97.20 ELEVATED PSA: ICD-10-CM

## 2024-09-13 LAB
BUN BLD-MCNC: 22 MG/DL (ref 8–26)
EGFR, POC: 82 ML/MIN/1.73M2
GLUCOSE BLD-MCNC: 113 MG/DL (ref 74–100)
POC CREATININE: 1 MG/DL (ref 0.51–1.19)

## 2024-09-13 PROCEDURE — 82565 ASSAY OF CREATININE: CPT

## 2024-09-13 PROCEDURE — 2580000003 HC RX 258: Performed by: UROLOGY

## 2024-09-13 PROCEDURE — 84520 ASSAY OF UREA NITROGEN: CPT

## 2024-09-13 PROCEDURE — 7100000011 HC PHASE II RECOVERY - ADDTL 15 MIN: Performed by: UROLOGY

## 2024-09-13 PROCEDURE — 6360000002 HC RX W HCPCS

## 2024-09-13 PROCEDURE — 3700000000 HC ANESTHESIA ATTENDED CARE: Performed by: UROLOGY

## 2024-09-13 PROCEDURE — 82947 ASSAY GLUCOSE BLOOD QUANT: CPT

## 2024-09-13 PROCEDURE — 3700000001 HC ADD 15 MINUTES (ANESTHESIA): Performed by: UROLOGY

## 2024-09-13 PROCEDURE — 76942 ECHO GUIDE FOR BIOPSY: CPT

## 2024-09-13 PROCEDURE — 7100000010 HC PHASE II RECOVERY - FIRST 15 MIN: Performed by: UROLOGY

## 2024-09-13 PROCEDURE — 2500000003 HC RX 250 WO HCPCS: Performed by: UROLOGY

## 2024-09-13 PROCEDURE — 2709999900 HC NON-CHARGEABLE SUPPLY: Performed by: UROLOGY

## 2024-09-13 PROCEDURE — 2500000003 HC RX 250 WO HCPCS

## 2024-09-13 PROCEDURE — 88334 PATH CONSLTJ SURG CYTO XM EA: CPT

## 2024-09-13 PROCEDURE — 3600000012 HC SURGERY LEVEL 2 ADDTL 15MIN: Performed by: UROLOGY

## 2024-09-13 PROCEDURE — 3600000002 HC SURGERY LEVEL 2 BASE: Performed by: UROLOGY

## 2024-09-13 PROCEDURE — 88305 TISSUE EXAM BY PATHOLOGIST: CPT

## 2024-09-13 RX ORDER — NALOXONE HYDROCHLORIDE 0.4 MG/ML
INJECTION, SOLUTION INTRAMUSCULAR; INTRAVENOUS; SUBCUTANEOUS PRN
Status: CANCELLED | OUTPATIENT
Start: 2024-09-13

## 2024-09-13 RX ORDER — METOCLOPRAMIDE HYDROCHLORIDE 5 MG/ML
10 INJECTION INTRAMUSCULAR; INTRAVENOUS
Status: CANCELLED | OUTPATIENT
Start: 2024-09-13 | End: 2024-09-14

## 2024-09-13 RX ORDER — SODIUM CHLORIDE, SODIUM LACTATE, POTASSIUM CHLORIDE, CALCIUM CHLORIDE 600; 310; 30; 20 MG/100ML; MG/100ML; MG/100ML; MG/100ML
INJECTION, SOLUTION INTRAVENOUS CONTINUOUS
Status: DISCONTINUED | OUTPATIENT
Start: 2024-09-13 | End: 2024-09-13 | Stop reason: HOSPADM

## 2024-09-13 RX ORDER — HYDRALAZINE HYDROCHLORIDE 20 MG/ML
10 INJECTION INTRAMUSCULAR; INTRAVENOUS
Status: CANCELLED | OUTPATIENT
Start: 2024-09-13

## 2024-09-13 RX ORDER — LABETALOL HYDROCHLORIDE 5 MG/ML
10 INJECTION, SOLUTION INTRAVENOUS
Status: CANCELLED | OUTPATIENT
Start: 2024-09-13

## 2024-09-13 RX ORDER — FENTANYL CITRATE 50 UG/ML
25 INJECTION, SOLUTION INTRAMUSCULAR; INTRAVENOUS EVERY 5 MIN PRN
Status: CANCELLED | OUTPATIENT
Start: 2024-09-13

## 2024-09-13 RX ORDER — LIDOCAINE HYDROCHLORIDE 10 MG/ML
INJECTION, SOLUTION INFILTRATION; PERINEURAL PRN
Status: DISCONTINUED | OUTPATIENT
Start: 2024-09-13 | End: 2024-09-13 | Stop reason: ALTCHOICE

## 2024-09-13 RX ORDER — IPRATROPIUM BROMIDE AND ALBUTEROL SULFATE 2.5; .5 MG/3ML; MG/3ML
1 SOLUTION RESPIRATORY (INHALATION)
Status: CANCELLED | OUTPATIENT
Start: 2024-09-13 | End: 2024-09-14

## 2024-09-13 RX ORDER — PROCHLORPERAZINE EDISYLATE 5 MG/ML
5 INJECTION INTRAMUSCULAR; INTRAVENOUS
Status: CANCELLED | OUTPATIENT
Start: 2024-09-13 | End: 2024-09-14

## 2024-09-13 RX ORDER — SODIUM CHLORIDE 0.9 % (FLUSH) 0.9 %
5-40 SYRINGE (ML) INJECTION PRN
Status: CANCELLED | OUTPATIENT
Start: 2024-09-13

## 2024-09-13 RX ORDER — PROPOFOL 10 MG/ML
INJECTION, EMULSION INTRAVENOUS
Status: DISCONTINUED | OUTPATIENT
Start: 2024-09-13 | End: 2024-09-13 | Stop reason: SDUPTHER

## 2024-09-13 RX ORDER — SODIUM CHLORIDE 0.9 % (FLUSH) 0.9 %
5-40 SYRINGE (ML) INJECTION EVERY 12 HOURS SCHEDULED
Status: CANCELLED | OUTPATIENT
Start: 2024-09-13

## 2024-09-13 RX ORDER — LIDOCAINE HYDROCHLORIDE 10 MG/ML
INJECTION, SOLUTION EPIDURAL; INFILTRATION; INTRACAUDAL; PERINEURAL
Status: DISCONTINUED | OUTPATIENT
Start: 2024-09-13 | End: 2024-09-13 | Stop reason: SDUPTHER

## 2024-09-13 RX ORDER — FENTANYL CITRATE 50 UG/ML
50 INJECTION, SOLUTION INTRAMUSCULAR; INTRAVENOUS EVERY 5 MIN PRN
Status: CANCELLED | OUTPATIENT
Start: 2024-09-13

## 2024-09-13 RX ORDER — SODIUM CHLORIDE 9 MG/ML
INJECTION, SOLUTION INTRAVENOUS PRN
Status: CANCELLED | OUTPATIENT
Start: 2024-09-13

## 2024-09-13 RX ADMIN — PROPOFOL 60 MG: 10 INJECTION, EMULSION INTRAVENOUS at 11:33

## 2024-09-13 RX ADMIN — LIDOCAINE HYDROCHLORIDE 50 MG: 10 INJECTION, SOLUTION EPIDURAL; INFILTRATION; INTRACAUDAL; PERINEURAL at 11:32

## 2024-09-13 RX ADMIN — PROPOFOL 125 MCG/KG/MIN: 10 INJECTION, EMULSION INTRAVENOUS at 11:32

## 2024-09-13 RX ADMIN — SODIUM CHLORIDE, POTASSIUM CHLORIDE, SODIUM LACTATE AND CALCIUM CHLORIDE: 600; 310; 30; 20 INJECTION, SOLUTION INTRAVENOUS at 11:26

## 2024-09-13 ASSESSMENT — PAIN - FUNCTIONAL ASSESSMENT
PAIN_FUNCTIONAL_ASSESSMENT: 0-10
PAIN_FUNCTIONAL_ASSESSMENT: PREVENTS OR INTERFERES WITH ALL ACTIVE AND SOME PASSIVE ACTIVITIES
PAIN_FUNCTIONAL_ASSESSMENT: NONE - DENIES PAIN

## 2024-09-13 ASSESSMENT — ENCOUNTER SYMPTOMS: SHORTNESS OF BREATH: 1

## 2024-09-13 ASSESSMENT — PAIN DESCRIPTION - DESCRIPTORS: DESCRIPTORS: ACHING;THROBBING

## 2024-09-13 ASSESSMENT — COPD QUESTIONNAIRES: CAT_SEVERITY: NO INTERVAL CHANGE

## 2024-09-19 LAB — SURGICAL PATHOLOGY REPORT: NORMAL

## 2024-10-31 NOTE — BRIEF OP NOTE
Brief Postoperative Note      Patient: Yovanny Henry  YOB: 1955  MRN: [id number]    Date of Procedure: 5/23/2023    Pre op diagnosis: Left index trigger finger    Post-Op Diagnosis: Left index trigger finger       Procedure(s):  Left index A1 pulley release    Surgeon(s):  Donaldo Tyler DO    Assistant:  First Assistant: Fanta Gutierrez  Resident: Ananth Schilling DO    Anesthesia: Monitor Anesthesia Care    Estimated Blood Loss (mL): 1 cc    Complications: None    Specimens:   * No specimens in log *    Implants:  * No implants in log *      Drains: * No LDAs found *    Surgical Indications:  Yovanny Henry is a 74 y.o. old male  who presented with persistent left index trigger finger. Following a discussion with the patient regarding both non-operative and operative treatment options, she consented to proceed with an open left index trigger finger (A1 pulley) release. he came to this decision after demonstrating an understanding of our discussion regarding details of the procedure, risks and benefits, expected outcome, and postoperative course. Operative technique: Following appropriate identification of the patient and his operative extremity, consent was reviewed with the patient and his operative extremity was signed. The anesthesia service administered a conscious sedation without complication. All bony prominences were appropriately padded and the patient was secured to the operative table in a supine position. A well-padded pneumatic tourniquet was applied to the patient's surgical arm. The patient's operative extremity was prepped and draped in a standard sterile fashion and a time out was performed during which the correct patient, operative extremity, and procedure were verified. We then injected 5 cc of a mixutre of 1% lidocaine and 0.25% marcaine into the planned incision site and tendon sheath of the left index finger.     The patient'sleft hand and wrist was exsanguinated using [Care Plan reviewed and provided to patient/caregiver] : Care plan reviewed and provided to patient/caregiver [Understands and communicates without difficulty] : Patient/Caregiver understands and communicates without difficulty

## 2024-11-11 ENCOUNTER — OFFICE VISIT (OUTPATIENT)
Dept: FAMILY MEDICINE CLINIC | Age: 69
End: 2024-11-11
Payer: MEDICARE

## 2024-11-11 VITALS
HEIGHT: 69 IN | DIASTOLIC BLOOD PRESSURE: 67 MMHG | WEIGHT: 221 LBS | SYSTOLIC BLOOD PRESSURE: 130 MMHG | HEART RATE: 86 BPM | BODY MASS INDEX: 32.73 KG/M2

## 2024-11-11 DIAGNOSIS — Z23 INFLUENZA VACCINE NEEDED: ICD-10-CM

## 2024-11-11 DIAGNOSIS — E11.9 TYPE 2 DIABETES MELLITUS WITHOUT COMPLICATION, WITHOUT LONG-TERM CURRENT USE OF INSULIN (HCC): ICD-10-CM

## 2024-11-11 DIAGNOSIS — J06.9 VIRAL URI: ICD-10-CM

## 2024-11-11 DIAGNOSIS — J41.0 SIMPLE CHRONIC BRONCHITIS (HCC): ICD-10-CM

## 2024-11-11 DIAGNOSIS — I10 ESSENTIAL HYPERTENSION: ICD-10-CM

## 2024-11-11 DIAGNOSIS — E78.5 DYSLIPIDEMIA: ICD-10-CM

## 2024-11-11 DIAGNOSIS — M54.50 CHRONIC MIDLINE LOW BACK PAIN WITHOUT SCIATICA: Primary | ICD-10-CM

## 2024-11-11 DIAGNOSIS — G89.29 CHRONIC MIDLINE LOW BACK PAIN WITHOUT SCIATICA: Primary | ICD-10-CM

## 2024-11-11 LAB — HBA1C MFR BLD: 6.3 %

## 2024-11-11 PROCEDURE — 83036 HEMOGLOBIN GLYCOSYLATED A1C: CPT

## 2024-11-11 PROCEDURE — 90656 IIV3 VACC NO PRSV 0.5 ML IM: CPT

## 2024-11-11 RX ORDER — ALBUTEROL SULFATE 90 UG/1
2 INHALANT RESPIRATORY (INHALATION) 4 TIMES DAILY PRN
Qty: 1 EACH | Refills: 1 | Status: SHIPPED | OUTPATIENT
Start: 2024-11-11

## 2024-11-11 RX ORDER — ATORVASTATIN CALCIUM 80 MG/1
80 TABLET, FILM COATED ORAL NIGHTLY
Qty: 90 TABLET | Refills: 3 | Status: SHIPPED | OUTPATIENT
Start: 2024-11-11

## 2024-11-11 RX ORDER — METFORMIN HYDROCHLORIDE 500 MG/1
500 TABLET, EXTENDED RELEASE ORAL
Qty: 90 TABLET | Refills: 3 | Status: SHIPPED | OUTPATIENT
Start: 2024-11-11

## 2024-11-11 RX ORDER — LIDOCAINE 4 G/G
1 PATCH TOPICAL DAILY
Qty: 10 EACH | Refills: 5 | Status: SHIPPED | OUTPATIENT
Start: 2024-11-11

## 2024-11-11 RX ORDER — AMLODIPINE BESYLATE 10 MG/1
TABLET ORAL
Qty: 90 TABLET | Refills: 3 | Status: SHIPPED | OUTPATIENT
Start: 2024-11-11

## 2024-11-11 RX ORDER — BUDESONIDE AND FORMOTEROL FUMARATE DIHYDRATE 160; 4.5 UG/1; UG/1
2 AEROSOL RESPIRATORY (INHALATION) 2 TIMES DAILY
Qty: 10.2 G | Refills: 3 | Status: SHIPPED | OUTPATIENT
Start: 2024-11-11

## 2024-11-11 SDOH — ECONOMIC STABILITY: FOOD INSECURITY: WITHIN THE PAST 12 MONTHS, THE FOOD YOU BOUGHT JUST DIDN'T LAST AND YOU DIDN'T HAVE MONEY TO GET MORE.: NEVER TRUE

## 2024-11-11 SDOH — ECONOMIC STABILITY: FOOD INSECURITY: WITHIN THE PAST 12 MONTHS, YOU WORRIED THAT YOUR FOOD WOULD RUN OUT BEFORE YOU GOT MONEY TO BUY MORE.: NEVER TRUE

## 2024-11-11 SDOH — ECONOMIC STABILITY: INCOME INSECURITY: HOW HARD IS IT FOR YOU TO PAY FOR THE VERY BASICS LIKE FOOD, HOUSING, MEDICAL CARE, AND HEATING?: NOT HARD AT ALL

## 2024-11-11 ASSESSMENT — ENCOUNTER SYMPTOMS: BACK PAIN: 1

## 2024-11-11 NOTE — PROGRESS NOTES
Attending Physician Statement  I have discussed the case, including pertinent history and exam findings with the resident. I have seen and examined the patient and the key elements of the encounter have been performed by me.  I agree with the assessment, plan and orders as documented by the resident.        /67 (Site: Left Upper Arm, Position: Sitting, Cuff Size: Medium Adult)   Pulse 86   Ht 1.753 m (5' 9.02\")   Wt 100.2 kg (221 lb)   BMI 32.62 kg/m²    BP Readings from Last 3 Encounters:   11/11/24 130/67   09/13/24 117/77   03/28/24 106/60     Wt Readings from Last 3 Encounters:   11/11/24 100.2 kg (221 lb)   03/28/24 100 kg (220 lb 6.4 oz)   03/12/24 99.8 kg (220 lb)        Diagnosis Orders   1. Chronic midline low back pain without sciatica  MRI LUMBAR SPINE WO CONTRAST      2. Simple chronic bronchitis (HCC)  albuterol sulfate HFA (VENTOLIN HFA) 108 (90 Base) MCG/ACT inhaler    budesonide-formoterol (SYMBICORT) 160-4.5 MCG/ACT AERO      3. Essential hypertension  amLODIPine (NORVASC) 10 MG tablet      4. Dyslipidemia  atorvastatin (LIPITOR) 80 MG tablet      5. Influenza vaccine needed  Influenza, AFLURIA Trivalent, (age 3 y+), IM, Preservative Free, 0.5mL      6. Type 2 diabetes mellitus without complication, without long-term current use of insulin (HCC)  metFORMIN (GLUCOPHAGE-XR) 500 MG extended release tablet    POCT glycosylated hemoglobin (Hb A1C)      7. Viral URI  lidocaine 4 % external patch          Gary Bender DO 11/11/2024 4:39 PM

## 2024-11-11 NOTE — PROGRESS NOTES
[facility] Residency Program - Outpatient Note      Subjective:    Gurpreet Pa is a 69 y.o. male with  has a past medical history of Aortic valve sclerosis, Back pain, Cerebral artery occlusion with cerebral infarction ([facility]), COPD (chronic obstructive pulmonary disease) ([facility]), COVID-19 vaccine series completed, Depression, DJD (degenerative joint disease), Drug abuse ([facility]), High cholesterol, History of loop recorder, History of shingles, Hypertension, Left trigger finger, Prediabetes, Sleep apnea, Suicidal ideations, TIA (transient ischemic attack), Toe contracture, left, Under care of team, Wears dentures, Wears glasses, and Wellness examination.    Presented to the office today for:  Chief Complaint   Patient presents with    Medication Refill    Lower Back Pain     Patient that he would like a MRI  done to check it out due to so much pain       HPI    Patient here with complaints of back pain. He has chronic lower back pain with history of laminectomy in 2020. The pain worsened for him 3 weeks ago. Occurs while sitting, lying flat, getting up. The pain down not radiate down to his legs but he has noticed left leg numbness. He rates the pain 10/10. He does not give any history of recent traumatic injury or lifting anything heavy. He has a back brace at home which he has stopped using because of discomfort. He follow with pain management and is supposed to get hip injection and left shoulder injection on the 21st. He has previously tried aqua therapy for pain.     Review of Systems   Constitutional:  Negative for activity change, fatigue and fever.   Cardiovascular:  Negative for chest pain and leg swelling.   Musculoskeletal:  Positive for arthralgias (sholder and knees), back pain (lower spine) and gait problem (uses a walker). Negative for neck stiffness.   Neurological:  Positive for numbness (left leg). Negative for dizziness, tremors, facial asymmetry, weakness and

## 2024-11-11 NOTE — PROGRESS NOTES
Visit Information    Have you changed or started any medications since your last visit including any over-the-counter medicines, vitamins, or herbal medicines? no   Are you having any side effects from any of your medications? -  no  Have you stopped taking any of your medications? Is so, why? -  no    Have you seen any other physician or provider since your last visit? No  Have you had any other diagnostic tests since your last visit? No  Have you been seen in the emergency room and/or had an admission to a hospital since we last saw you? No  Have you had your routine dental cleaning in the past 6 months? no    Have you activated your [employer] account? If not, what are your barriers? Yes     Patient Care Team:  Barrie Stern MD as PCP - General (Family Medicine)  Mitch Hernandez MD as Surgeon (Orthopedic Surgery)  Group, [facility] (Behavioral Health)    Medical History Review  Past Medical, Family, and Social History reviewed and does not contribute to the patient presenting condition    Health Maintenance   Topic Date Due    Diabetic foot exam  Never done    Diabetic Alb to Cr ratio (uACR) test  Never done    Diabetic retinal exam  Never done    Shingles vaccine (1 of 2) Never done    Respiratory Syncytial Virus (RSV) Pregnant or age 60 yrs+ (1 - 1-dose 60+ series) Never done    GFR test (Diabetes, CKD 3-4, OR last GFR 15-59)  03/22/2024    Flu vaccine (1) 08/01/2024    COVID-19 Vaccine (4 - 2023-24 season) 09/01/2024    A1C test (Diabetic or Prediabetic)  12/21/2024    Depression Monitoring  02/09/2025    Lung Cancer Screening &/or Counseling  03/04/2025    Lipids  03/27/2025    Prostate Specific Antigen (PSA) Screening or Monitoring  03/27/2025    Colorectal Cancer Screen  03/15/2026    DTaP/Tdap/Td vaccine (2 - Td or Tdap) 11/06/2030    Annual Wellness Visit (Medicare Advantage)  Completed    Pneumococcal 65+ years Vaccine  Completed    AAA screen  Completed    Hepatitis C screen  Completed

## 2024-11-11 NOTE — PATIENT INSTRUCTIONS
Thank you for letting us take care of you today. We hope all your questions were addressed. If a question was overlooked or something else comes to mind after you return home, please contact a member of your Care Team listed below.        Your Care Team at [facility] is Team #4  Louise Amanda M.D. (Faculty)  Barrie Stern M.D. (Resident)  Ada Blanton M.D.  (Resident)  Maeve Dixon M.D. (Resident)  Ruby Stewart M.D. (Resident)  Reza Mensah, JOE Funk, JOE Barton, [facility]  Jossie Yu, [facility]  Sendy Lopez, [facility]  Olivia Koenig, JOE Davidson, JOE Berry (LJ) AARON Frey (Clinical Practice Manager)  Madiha Hayes [facility] (Clinical Pharmacist)       Office phone number: [phone]    If you need to get in right away due to illness, please be advised we have \"Same Day\" appointments available Monday-Friday. Please call us at [phone] option #3 to schedule your \"Same Day\" appointment.

## 2024-11-18 ENCOUNTER — HOSPITAL ENCOUNTER (OUTPATIENT)
Dept: MRI IMAGING | Age: 69
Discharge: HOME | End: 2024-11-20
Payer: MEDICARE

## 2024-11-18 DIAGNOSIS — M54.50 CHRONIC MIDLINE LOW BACK PAIN WITHOUT SCIATICA: ICD-10-CM

## 2024-11-18 DIAGNOSIS — G89.29 CHRONIC MIDLINE LOW BACK PAIN WITHOUT SCIATICA: ICD-10-CM

## 2024-11-18 PROCEDURE — 72148 MRI LUMBAR SPINE W/O DYE: CPT

## 2024-11-22 ENCOUNTER — TELEPHONE (OUTPATIENT)
Dept: FAMILY MEDICINE CLINIC | Age: 69
End: 2024-11-22

## 2024-11-22 DIAGNOSIS — M54.40 BACK PAIN OF LUMBAR REGION WITH SCIATICA: Primary | ICD-10-CM

## 2024-11-22 NOTE — TELEPHONE ENCOUNTER
Tried calling the patient and left him a voice message to inform him of his MRI lumbar spine results showing moderate to severe stenosis and neural foraminal narrowing of L3 - L4. Informed him of neurosurgery and physical therapy referral. Patient follows with pain management. Given Ed precaution including Loss of bowel, bladder function and saddle numbness.

## 2024-11-27 ENCOUNTER — OFFICE VISIT (OUTPATIENT)
Dept: NEUROSURGERY | Age: 69
End: 2024-11-27
Payer: MEDICARE

## 2024-11-27 VITALS
DIASTOLIC BLOOD PRESSURE: 76 MMHG | HEIGHT: 69 IN | BODY MASS INDEX: 32.44 KG/M2 | HEART RATE: 85 BPM | WEIGHT: 219 LBS | SYSTOLIC BLOOD PRESSURE: 122 MMHG

## 2024-11-27 DIAGNOSIS — M47.26 OTHER SPONDYLOSIS WITH RADICULOPATHY, LUMBAR REGION: ICD-10-CM

## 2024-11-27 DIAGNOSIS — Z98.1 HISTORY OF LUMBAR FUSION: ICD-10-CM

## 2024-11-27 DIAGNOSIS — M53.9 MULTILEVEL DEGENERATIVE DISC DISEASE: Primary | ICD-10-CM

## 2024-11-27 PROCEDURE — 4004F PT TOBACCO SCREEN RCVD TLK: CPT

## 2024-11-27 PROCEDURE — G8482 FLU IMMUNIZE ORDER/ADMIN: HCPCS

## 2024-11-27 PROCEDURE — 3017F COLORECTAL CA SCREEN DOC REV: CPT

## 2024-11-27 PROCEDURE — 3074F SYST BP LT 130 MM HG: CPT

## 2024-11-27 PROCEDURE — 1160F RVW MEDS BY RX/DR IN RCRD: CPT

## 2024-11-27 PROCEDURE — G8417 CALC BMI ABV UP PARAM F/U: HCPCS

## 2024-11-27 PROCEDURE — G8427 DOCREV CUR MEDS BY ELIG CLIN: HCPCS

## 2024-11-27 PROCEDURE — 1159F MED LIST DOCD IN RCRD: CPT

## 2024-11-27 PROCEDURE — 99204 OFFICE O/P NEW MOD 45 MIN: CPT

## 2024-11-27 PROCEDURE — 3078F DIAST BP <80 MM HG: CPT

## 2024-11-27 PROCEDURE — 1123F ACP DISCUSS/DSCN MKR DOCD: CPT

## 2024-11-27 NOTE — PROGRESS NOTES
loss at L5-S1 and to lesser extent at L3-L4.     SPINAL CORD: The conus terminates normally.     SOFT TISSUES: No paraspinal mass identified.     L1-L2: There is no significant disc herniation, spinal canal stenosis or  neural foraminal narrowing.  Mild bilateral facet arthropathy.     L2-L3: No significant disc bulge.  Bilateral facet arthropathy.  No  significant spinal canal stenosis.  Mild left neural foraminal narrowing.     L3-L4: Posterior disc bulge, bilateral facet arthropathy and ligamentum  flavum thickening as well as focally prominent dorsal epidural fat results in  moderate to severe spinal canal stenosis.  Moderate to severe bilateral  neural foraminal narrowing.     L4-L5: Posterior osteophyte, eccentric to the right.  No significant spinal  canal stenosis.  Bilateral facet arthropathy.  Mild bilateral neural  foraminal narrowing.     L5-S1: Posterior disc osteophyte complex.  No significant spinal canal  stenosis.  Bilateral facet arthropathy.  Severe right and mild left neural  foraminal narrowing.     IMPRESSION:  1. Postsurgical changes related to prior posterior fusion from L4 through S1  with associated laminectomies.  2. Multilevel degenerative changes of the lumbar spine with moderate to  severe spinal canal stenosis at L3-L4.  3. Multilevel neural foraminal narrowing, severe on the right at L5-S1 and  moderate to severe bilaterally at L3-L4.    PCP Notes Reviewed    Assessment and Plan:     1. Multilevel degenerative disc disease    2. Other spondylosis with radiculopathy, lumbar region    3. History of lumbar fusion         Plan: Patient presents with axial lower back pain that has worsened in the past 3 weeks, no known injury. Lumbar MRI shows post-surgical changes at L4-S1 with associated laminectomies. Imaging reveals multilevel lumbar degenerative disc disease, most significant at L3-L4 and L5-S1. Moderate to severe spinal canal stenosis is noted at L3-L4, along with severe neural

## 2024-12-03 ASSESSMENT — ENCOUNTER SYMPTOMS: BACK PAIN: 1

## 2025-01-06 ENCOUNTER — OFFICE VISIT (OUTPATIENT)
Age: 70
End: 2025-01-06
Payer: MEDICARE

## 2025-01-06 VITALS
HEIGHT: 69 IN | BODY MASS INDEX: 32.44 KG/M2 | RESPIRATION RATE: 15 BRPM | WEIGHT: 219 LBS | SYSTOLIC BLOOD PRESSURE: 138 MMHG | DIASTOLIC BLOOD PRESSURE: 84 MMHG | HEART RATE: 90 BPM

## 2025-01-06 DIAGNOSIS — M53.3 SACROILIAC JOINT PAIN: Primary | ICD-10-CM

## 2025-01-06 PROCEDURE — 99214 OFFICE O/P EST MOD 30 MIN: CPT | Performed by: NEUROLOGICAL SURGERY

## 2025-01-06 PROCEDURE — 3079F DIAST BP 80-89 MM HG: CPT | Performed by: NEUROLOGICAL SURGERY

## 2025-01-06 PROCEDURE — 1159F MED LIST DOCD IN RCRD: CPT | Performed by: NEUROLOGICAL SURGERY

## 2025-01-06 PROCEDURE — 1123F ACP DISCUSS/DSCN MKR DOCD: CPT | Performed by: NEUROLOGICAL SURGERY

## 2025-01-06 PROCEDURE — 3075F SYST BP GE 130 - 139MM HG: CPT | Performed by: NEUROLOGICAL SURGERY

## 2025-01-06 NOTE — PROGRESS NOTES
[facility], [facility] NEUROSURGERY  [address], [address]  [place] [id number]  Dept: [phone]  Dept Fax: [phone]     Patient:  Gurpreet Pa  YOB: 1955  Date: 1/6/25      Chief Complaint   Patient presents with    Follow-up     rt sided back pain - last seen 1/23/2023  MRI Lumbar 11/18/24 @ [facility]              HPI:     I had the pleasure of seeing this patient back in the office today in follow-up.  As you know he is a 69-year-old male who has a history of L4-S1 fusion surgery in the past who was last seen in the office in January 2023.  The patient presents now with a 6-month history of worsening lower back discomfort predominately on the right.  The pain is fairly localized to the right sacroiliac joint region.  Infrequently he has experienced intermittent discomfort in the lower extremities.  His low back pain is his primary complaint today.  He does not describe any difficulty with his bowel or bladder function.    Examination today demonstrates marked reproducible focal discomfort over the right sacroiliac joint.  The left SI joint is nontender.  Hips are nontender.  Joce's testing is negative.  Strength and sensation are normal throughout both lower extremities.  Straight leg raising is -9 degrees.  Gait is steady.    A recent MRI of the lumbar spine performed November 20, 2024 is reviewed.  This study does demonstrate postoperative changes with instrumentation at the L4-S1 levels.  At the L3-4 level the patient does have moderate central stenosis.  No severe spinal stenosis was noted.  I was able to compare this study to a previous study from 2021.  The patient did have moderate stenosis at the L3-4 level on that previous MRI.  I did review these images in the office today with the patient.  At the present time his primary complaint is that of right sided lower back discomfort.  Physical exam

## 2025-02-18 ENCOUNTER — OFFICE VISIT (OUTPATIENT)
Dept: FAMILY MEDICINE CLINIC | Age: 70
End: 2025-02-18
Payer: MEDICARE

## 2025-02-18 VITALS
HEART RATE: 89 BPM | HEIGHT: 69 IN | TEMPERATURE: 97.9 F | OXYGEN SATURATION: 98 % | SYSTOLIC BLOOD PRESSURE: 130 MMHG | BODY MASS INDEX: 33.89 KG/M2 | DIASTOLIC BLOOD PRESSURE: 67 MMHG | WEIGHT: 228.8 LBS

## 2025-02-18 DIAGNOSIS — J41.0 SIMPLE CHRONIC BRONCHITIS (HCC): ICD-10-CM

## 2025-02-18 DIAGNOSIS — Z00.00 MEDICARE ANNUAL WELLNESS VISIT, SUBSEQUENT: Primary | ICD-10-CM

## 2025-02-18 DIAGNOSIS — A60.01 HERPES SIMPLEX INFECTION OF PENIS: ICD-10-CM

## 2025-02-18 DIAGNOSIS — I10 ESSENTIAL HYPERTENSION: ICD-10-CM

## 2025-02-18 DIAGNOSIS — E11.9 TYPE 2 DIABETES MELLITUS WITHOUT COMPLICATION, WITHOUT LONG-TERM CURRENT USE OF INSULIN (HCC): ICD-10-CM

## 2025-02-18 DIAGNOSIS — E78.5 DYSLIPIDEMIA: ICD-10-CM

## 2025-02-18 LAB — HBA1C MFR BLD: 6.4 %

## 2025-02-18 PROCEDURE — 83036 HEMOGLOBIN GLYCOSYLATED A1C: CPT

## 2025-02-18 RX ORDER — VALACYCLOVIR HYDROCHLORIDE 1 G/1
1000 TABLET, FILM COATED ORAL 2 TIMES DAILY
Qty: 14 TABLET | Refills: 0 | Status: SHIPPED | OUTPATIENT
Start: 2025-02-18 | End: 2025-02-25

## 2025-02-18 RX ORDER — AMLODIPINE BESYLATE 10 MG/1
TABLET ORAL
Qty: 90 TABLET | Refills: 3 | Status: SHIPPED | OUTPATIENT
Start: 2025-02-18

## 2025-02-18 RX ORDER — ALBUTEROL SULFATE 90 UG/1
2 INHALANT RESPIRATORY (INHALATION) 4 TIMES DAILY PRN
Qty: 1 EACH | Refills: 1 | Status: SHIPPED | OUTPATIENT
Start: 2025-02-18

## 2025-02-18 RX ORDER — ATORVASTATIN CALCIUM 80 MG/1
80 TABLET, FILM COATED ORAL NIGHTLY
Qty: 90 TABLET | Refills: 3 | Status: SHIPPED | OUTPATIENT
Start: 2025-02-18

## 2025-02-18 RX ORDER — DOXYCYCLINE 100 MG/1
100 CAPSULE ORAL 2 TIMES DAILY
COMMUNITY
Start: 2025-02-14 | End: 2025-02-24

## 2025-02-18 RX ORDER — FLUTICASONE FUROATE AND VILANTEROL 100; 25 UG/1; UG/1
1 POWDER RESPIRATORY (INHALATION) DAILY
Qty: 60 EACH | Refills: 3 | Status: CANCELLED | OUTPATIENT
Start: 2025-02-18

## 2025-02-18 RX ORDER — BUDESONIDE AND FORMOTEROL FUMARATE DIHYDRATE 160; 4.5 UG/1; UG/1
2 AEROSOL RESPIRATORY (INHALATION) 2 TIMES DAILY
Qty: 10.2 G | Refills: 3 | Status: SHIPPED | OUTPATIENT
Start: 2025-02-18

## 2025-02-18 RX ORDER — METFORMIN HYDROCHLORIDE 500 MG/1
500 TABLET, EXTENDED RELEASE ORAL
Qty: 90 TABLET | Refills: 3 | Status: SHIPPED | OUTPATIENT
Start: 2025-02-18

## 2025-02-18 SDOH — ECONOMIC STABILITY: FOOD INSECURITY: WITHIN THE PAST 12 MONTHS, THE FOOD YOU BOUGHT JUST DIDN'T LAST AND YOU DIDN'T HAVE MONEY TO GET MORE.: NEVER TRUE

## 2025-02-18 SDOH — ECONOMIC STABILITY: FOOD INSECURITY: WITHIN THE PAST 12 MONTHS, YOU WORRIED THAT YOUR FOOD WOULD RUN OUT BEFORE YOU GOT MONEY TO BUY MORE.: NEVER TRUE

## 2025-02-18 ASSESSMENT — PATIENT HEALTH QUESTIONNAIRE - PHQ9
SUM OF ALL RESPONSES TO PHQ QUESTIONS 1-9: 8
8. MOVING OR SPEAKING SO SLOWLY THAT OTHER PEOPLE COULD HAVE NOTICED. OR THE OPPOSITE, BEING SO FIGETY OR RESTLESS THAT YOU HAVE BEEN MOVING AROUND A LOT MORE THAN USUAL: NOT AT ALL
1. LITTLE INTEREST OR PLEASURE IN DOING THINGS: NOT AT ALL
9. THOUGHTS THAT YOU WOULD BE BETTER OFF DEAD, OR OF HURTING YOURSELF: NOT AT ALL
SUM OF ALL RESPONSES TO PHQ QUESTIONS 1-9: 8
7. TROUBLE CONCENTRATING ON THINGS, SUCH AS READING THE NEWSPAPER OR WATCHING TELEVISION: NOT AT ALL
SUM OF ALL RESPONSES TO PHQ QUESTIONS 1-9: 8
4. FEELING TIRED OR HAVING LITTLE ENERGY: MORE THAN HALF THE DAYS
SUM OF ALL RESPONSES TO PHQ9 QUESTIONS 1 & 2: 3
2. FEELING DOWN, DEPRESSED OR HOPELESS: NEARLY EVERY DAY
6. FEELING BAD ABOUT YOURSELF - OR THAT YOU ARE A FAILURE OR HAVE LET YOURSELF OR YOUR FAMILY DOWN: NOT AT ALL
SUM OF ALL RESPONSES TO PHQ QUESTIONS 1-9: 8
10. IF YOU CHECKED OFF ANY PROBLEMS, HOW DIFFICULT HAVE THESE PROBLEMS MADE IT FOR YOU TO DO YOUR WORK, TAKE CARE OF THINGS AT HOME, OR GET ALONG WITH OTHER PEOPLE: NOT DIFFICULT AT ALL
5. POOR APPETITE OR OVEREATING: NOT AT ALL
3. TROUBLE FALLING OR STAYING ASLEEP: NEARLY EVERY DAY

## 2025-02-18 ASSESSMENT — LIFESTYLE VARIABLES
HOW OFTEN DO YOU HAVE A DRINK CONTAINING ALCOHOL: NEVER
HOW MANY STANDARD DRINKS CONTAINING ALCOHOL DO YOU HAVE ON A TYPICAL DAY: PATIENT DOES NOT DRINK

## 2025-02-18 ASSESSMENT — VISUAL ACUITY
OS_CC: 20/20
OD_CC: 20/25

## 2025-02-18 NOTE — PATIENT INSTRUCTIONS
your risk of falling.  Even if you have a hard time meeting the recommendations, it's better to be more active than less active. All activity done in each category counts toward your weekly total. You'd be surprised how daily things like carrying groceries, keeping up with grandchildren, and taking the stairs can add up.  What keeps you from being active?  If you've had a hard time being more active, you're not alone. Maybe you remember being able to do more. Or maybe you've never thought of yourself as being active. It's frustrating when you can't do the things you want. Being more active can help. What's holding you back?  Getting started.  Have a goal, but break it into easy tasks. Small steps build into big accomplishments.  Staying motivated.  If you feel like skipping your activity, remember your goal. Maybe you want to move better and stay independent. Every activity gets you one step closer.  Not feeling your best.  Start with 5 minutes of an activity you enjoy. Prove to yourself you can do it. As you get comfortable, increase your time.  You may not be where you want to be. But you're in the process of getting there. Everyone starts somewhere.  How can you find safe ways to stay active?  Talk with your doctor about any physical challenges you're facing. Make a plan with your doctor if you have a health problem or aren't sure how to get started with activity.  If you're already active, ask your doctor if there is anything you should change to stay safe as your body and health change.  If you tend to feel dizzy after you take medicine, avoid activity at that time. Try being active before you take your medicine. This will reduce your risk of falls.  If you plan to be active at home, make sure to clear your space before you get started. Remove things like TV cords, coffee tables, and throw rugs. It's safest to have plenty of space to move freely.  The key to getting more active is to take it slow and steady.

## 2025-02-18 NOTE — PROGRESS NOTES
Attending Physician Statement  I  have discussed the care of Gurpreet Pa including pertinent history and exam findings with the resident. I agree with the assessment, plan and orders as documented by the resident.      /67 (Site: Left Lower Arm, Position: Sitting, Cuff Size: Medium Adult)   Pulse 89   Temp 97.9 °F (36.6 °C) (Oral)   Ht 1.753 m (5' 9.02\")   Wt 103.8 kg (228 lb 12.8 oz)   SpO2 98%   BMI 33.77 kg/m²    BP Readings from Last 3 Encounters:   02/18/25 130/67   01/06/25 138/84   11/27/24 122/76     Wt Readings from Last 3 Encounters:   02/18/25 103.8 kg (228 lb 12.8 oz)   01/06/25 99.3 kg (219 lb)   11/27/24 99.3 kg (219 lb)          Diagnosis Orders   1. Simple chronic bronchitis (HCC)        2. Essential hypertension        3. Type 2 diabetes mellitus without complication, without long-term current use of insulin (HCC)  POCT glycosylated hemoglobin (Hb A1C)      4. 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

## 2025-02-18 NOTE — PROGRESS NOTES
Medicare Annual Wellness Visit    Gurpreet Pa is here for Medicare AWV, Hypertension, Lesion(s) (Pt seen at [facility] for penile lesion. ), and Health Maintenance (Dm eye exam [facility] on [address] )    Assessment & Plan   Medicare annual wellness visit, subsequent  Herpes simplex infection of penis  -     valACYclovir (VALTREX) 1 g tablet; Take 1 tablet by mouth 2 times daily for 7 days, Disp-14 tablet, R-0Normal  Essential hypertension  -     amLODIPine (NORVASC) 10 MG tablet; take 1 tablet by mouth once daily, Disp-90 tablet, R-3Normal  -     Basic Metabolic Panel; Future  Simple chronic bronchitis (HCC)  -     budesonide-formoterol (SYMBICORT) 160-4.5 MCG/ACT AERO; Inhale 2 puffs into the lungs 2 times daily, Disp-10.2 g, R-3Normal  -     albuterol sulfate HFA (VENTOLIN HFA) 108 (90 Base) MCG/ACT inhaler; Inhale 2 puffs into the lungs 4 times daily as needed for Wheezing, Disp-1 each, R-1Normal  Type 2 diabetes mellitus without complication, without long-term current use of insulin (HCC)  -     Albumin/Creatinine Ratio, Urine (uACR); Future  -     POCT glycosylated hemoglobin (Hb A1C)  -     metFORMIN (GLUCOPHAGE-XR) 500 MG extended release tablet; Take 1 tablet by mouth daily (with breakfast), Disp-90 tablet, R-3Normal  Dyslipidemia  -     atorvastatin (LIPITOR) 80 MG tablet; Take 1 tablet by mouth nightly, Disp-90 tablet, R-3Normal       Return in 6 weeks (on 4/1/2025).     Subjective   The following acute and/or chronic problems were also addressed today:  Herpes simplex type 2,   went to ED 2/14/25 for penile lesions, swab culture came back POSITIVE: HSV-2 DNA detected by nucleic acid amp.  He was discharged with doxycycline,  asked patient to stop with and start taking Valtrex for initial episode, patient reports this is the first time, he has not had any previous similar complaints, discussed in detail about the disease, course and prognosis, treatment and suppression, discussed in detail about safe sex and

## 2025-02-19 ENCOUNTER — TELEPHONE (OUTPATIENT)
Dept: FAMILY MEDICINE CLINIC | Age: 70
End: 2025-02-19

## 2025-02-19 NOTE — TELEPHONE ENCOUNTER
During visit on 2-18-25 the system had went down, writer was unable to print off the AVS and labs. Writer informed patient would mail out on 2-. Writer called patient to inform of what was ordered. No answer, writer left voicemail. Writer mailed patient AVS and lab orders to patient. Sent [employer] message as well for patient to schedule his 6 week follow up.      If patient is to call office back, please schedule 6 week follow up around 4/1/25. Thank you.

## 2025-03-03 NOTE — TELEPHONE ENCOUNTER
Writer spoke to patient. Patient didn't get the paperwork. Writer informed provider placed two labs for patient to get done. Also, to carlos the follow up for 6 weeks. Patient is scheduled on 4-1-25. Writer will mail AVS and lab orders. Writer explain to patient if he goes through [facility] labs order are in system, meaning can get done now if like. Patient voiced understanding and thanked writer.

## 2025-04-07 ENCOUNTER — OFFICE VISIT (OUTPATIENT)
Age: 70
End: 2025-04-07
Payer: MEDICARE

## 2025-04-07 VITALS
HEIGHT: 69 IN | DIASTOLIC BLOOD PRESSURE: 88 MMHG | HEART RATE: 76 BPM | BODY MASS INDEX: 33.77 KG/M2 | SYSTOLIC BLOOD PRESSURE: 152 MMHG | WEIGHT: 228 LBS

## 2025-04-07 DIAGNOSIS — M54.50 LUMBAR PAIN: Primary | ICD-10-CM

## 2025-04-07 PROCEDURE — 3077F SYST BP >= 140 MM HG: CPT | Performed by: NEUROLOGICAL SURGERY

## 2025-04-07 PROCEDURE — 99213 OFFICE O/P EST LOW 20 MIN: CPT | Performed by: NEUROLOGICAL SURGERY

## 2025-04-07 PROCEDURE — 3079F DIAST BP 80-89 MM HG: CPT | Performed by: NEUROLOGICAL SURGERY

## 2025-04-07 PROCEDURE — 1159F MED LIST DOCD IN RCRD: CPT | Performed by: NEUROLOGICAL SURGERY

## 2025-04-07 PROCEDURE — 1123F ACP DISCUSS/DSCN MKR DOCD: CPT | Performed by: NEUROLOGICAL SURGERY

## 2025-04-07 NOTE — PROGRESS NOTES
[facility], [facility] NEUROSCIENCE [place], [facility] NEUROSURGERY  [address], [address]  [place] [address]  Dept: [phone]  Dept Fax: [phone]     Patient:  Gurpreet Pa  YOB: 1955  Date: 4/7/25      Chief Complaint   Patient presents with    Follow-up     Lumbar- 3 month follow up with Pain Management            HPI:     I had the pleasure of seeing this patient back in the office today in follow-up.  As you know he is a 69-year-old male who is undergone previous lumbar fusion surgery at the L4-S1 levels in January 2023.  Patient was last seen in my office in January 2025 with complaints of lower back discomfort specifically over the right SI joint.  The patient is currently working with pain management.  He feels slightly improved.  He has no new complaints at today's visit.  Again his recent follow-up lumbar MRI demonstrated moderate central stenosis at the L3-4 level but no severe spinal stenosis.  Currently the patient has no complaints of pain coursing into the lower extremities and his clinical presentation does not correlate well with spinal stenosis.  Thus I have not recommended any further surgical invention at this point in time.  I took this opportunity to answer intervening questions that the patient may have had.  He indicates that he will continue with pain management currently.        Physical Exam:      BP (!) 152/88 (BP Site: Left Upper Arm, Patient Position: Sitting, BP Cuff Size: Large Adult)   Pulse 76   Ht 1.753 m (5' 9.02\")   Wt 103.4 kg (228 lb)   BMI 33.65 kg/m²         Assessment and Plan:     1. Lumbar pain              Electronically signed by Poncho Haque MD on 4/7/2025 at 11:10 AM    Please note that this chart was generated using voice recognition Dragon dictation software.  Although every effort was made to ensure the accuracy of this automated transcription, some errors in transcription may

## 2025-04-21 ENCOUNTER — CARE COORDINATION (OUTPATIENT)
Dept: CARE COORDINATION | Age: 70
End: 2025-04-21

## 2025-04-21 NOTE — CARE COORDINATION
Patient has been identified for ambulatory care management.  [facility] introduction letter mailed to Patient.  Writer plans to call Patient in 1 - 2 weeks.

## 2025-04-22 PROBLEM — J06.9 VIRAL URI: Status: RESOLVED | Noted: 2023-09-18 | Resolved: 2025-04-22

## 2025-04-22 PROBLEM — R06.02 SHORTNESS OF BREATH: Status: RESOLVED | Noted: 2020-05-27 | Resolved: 2025-04-22

## 2025-04-22 PROBLEM — F14.10 COCAINE ABUSE (HCC): Status: RESOLVED | Noted: 2019-12-23 | Resolved: 2025-04-22

## 2025-04-22 PROBLEM — E78.00 ELEVATED LDL CHOLESTEROL LEVEL: Status: RESOLVED | Noted: 2017-04-23 | Resolved: 2025-04-22

## 2025-04-22 PROBLEM — H53.9 VISION CHANGES: Status: RESOLVED | Noted: 2017-04-22 | Resolved: 2025-04-22

## 2025-04-22 PROBLEM — K85.00 IDIOPATHIC ACUTE PANCREATITIS: Status: RESOLVED | Noted: 2018-03-16 | Resolved: 2025-04-22

## 2025-04-22 PROBLEM — K08.89 PAIN, DENTAL: Status: RESOLVED | Noted: 2021-01-29 | Resolved: 2025-04-22

## 2025-04-22 PROBLEM — I63.81 THALAMIC INFARCT, ACUTE (HCC): Status: RESOLVED | Noted: 2017-05-03 | Resolved: 2025-04-22

## 2025-04-22 PROBLEM — M48.061 NEURAL FORAMINAL STENOSIS OF LUMBAR SPINE: Status: RESOLVED | Noted: 2017-08-09 | Resolved: 2025-04-22

## 2025-04-22 PROBLEM — Z72.89 OTHER PROBLEMS RELATED TO LIFESTYLE: Status: RESOLVED | Noted: 2024-02-09 | Resolved: 2025-04-22

## 2025-04-22 PROBLEM — L03.011 PARONYCHIA OF RIGHT INDEX FINGER: Status: RESOLVED | Noted: 2021-03-29 | Resolved: 2025-04-22

## 2025-04-22 PROBLEM — M79.642 LEFT HAND PAIN: Status: RESOLVED | Noted: 2020-09-22 | Resolved: 2025-04-22

## 2025-04-22 PROBLEM — Z96.652 S/P TOTAL KNEE ARTHROPLASTY, LEFT: Status: RESOLVED | Noted: 2021-04-28 | Resolved: 2025-04-22

## 2025-04-22 PROBLEM — F33.1 MAJOR DEPRESSIVE DISORDER, RECURRENT EPISODE, MODERATE (HCC): Status: RESOLVED | Noted: 2018-06-27 | Resolved: 2025-04-22

## 2025-04-22 PROBLEM — R06.83 SNORING: Status: RESOLVED | Noted: 2024-02-09 | Resolved: 2025-04-22

## 2025-04-22 PROBLEM — Z71.6 ENCOUNTER FOR SMOKING CESSATION COUNSELING: Status: RESOLVED | Noted: 2024-03-14 | Resolved: 2025-04-22

## 2025-04-22 PROBLEM — R73.03 PREDIABETES: Status: RESOLVED | Noted: 2020-09-22 | Resolved: 2025-04-22

## 2025-04-22 PROBLEM — M79.89 SWELLING OF LEFT HAND: Status: RESOLVED | Noted: 2020-07-15 | Resolved: 2025-04-22

## 2025-04-22 PROBLEM — G45.9 TIA (TRANSIENT ISCHEMIC ATTACK): Status: RESOLVED | Noted: 2019-09-16 | Resolved: 2025-04-22

## 2025-04-22 PROBLEM — Z87.891 PERSONAL HISTORY OF TOBACCO USE, PRESENTING HAZARDS TO HEALTH: Status: RESOLVED | Noted: 2024-02-09 | Resolved: 2025-04-22

## 2025-04-22 PROBLEM — Z23 NEED FOR PROPHYLACTIC VACCINATION AND INOCULATION AGAINST VARICELLA: Status: RESOLVED | Noted: 2024-02-09 | Resolved: 2025-04-22

## 2025-04-22 PROBLEM — R26.81 UNSTABLE GAIT: Status: RESOLVED | Noted: 2018-06-27 | Resolved: 2025-04-22

## 2025-04-22 PROBLEM — I63.9 ACUTE ISCHEMIC STROKE (HCC): Status: RESOLVED | Noted: 2019-09-19 | Resolved: 2025-04-22

## 2025-04-22 PROBLEM — I63.511 ACUTE ISCHEMIC RIGHT MIDDLE CEREBRAL ARTERY (MCA) STROKE (HCC): Status: RESOLVED | Noted: 2017-04-22 | Resolved: 2025-04-22

## 2025-04-28 ENCOUNTER — CARE COORDINATION (OUTPATIENT)
Dept: CARE COORDINATION | Age: 70
End: 2025-04-28

## 2025-04-28 NOTE — CARE COORDINATION
Ambulatory Care Coordination Note     4/28/2025 12:13 PM     Patient outreach attempt by this ACM today to offer care management services. ACM was unable to reach the patient by telephone today;   left voice message requesting a return phone call to this ACM.     ACM: Bridger Wyman RN     Care Summary Note:     Writer phoned Patient to introduce self and explain ambulatory care management.  Message left on Patient's identifiable voice mail requesting return call.  Writer's contact information provided    PCP/Specialist follow up:       Follow Up:   Plan for next ACM outreach in approximately 1 week to complete:  - outreach attempt to offer care management services.

## 2025-05-08 ENCOUNTER — CARE COORDINATION (OUTPATIENT)
Dept: CARE COORDINATION | Age: 70
End: 2025-05-08

## 2025-05-08 NOTE — CARE COORDINATION
Ambulatory Care Coordination Note     5/8/2025 10:58 AM     Patient outreach attempt by this ACM today to offer care management services. ACM was unable to reach the patient by telephone today;   voicemail full and unable to leave a message.      ACM: Bridger Wyman RN     Care Summary Note:   Writer phoned Patient to introduce self and explain ambulatory care management.  Patient did not answer.  His voice mail is full.  Writer unable to contact Patient.      PCP/Specialist follow up:       Follow Up:   Plan for next ACM outreach in approximately 1 week to complete:  - outreach attempt to offer care management services.             Handoff

## 2025-05-19 ENCOUNTER — CARE COORDINATION (OUTPATIENT)
Dept: CARE COORDINATION | Age: 70
End: 2025-05-19

## 2025-05-19 NOTE — CARE COORDINATION
Ambulatory Care Coordination Note     5/19/2025 10:45 AM     Patient outreach attempt by this ACM today to offer care management services. ACM was unable to reach the patient by telephone today;   left voice message requesting a return phone call to this ACM.     ACM: Bridger Wyman RN     Care Summary Note:     Writer phoned Patient to introduce self and explain ACM.  Message left on Patient's identifiable voice mail requesting a return call.  Writer's contact information provided.    PCP/Specialist follow up:       Follow Up:   Plan for next ACM outreach in approximately 1 week to complete:  - outreach attempt to offer care management services.

## 2025-05-28 ENCOUNTER — CARE COORDINATION (OUTPATIENT)
Dept: CARE COORDINATION | Age: 70
End: 2025-05-28

## 2025-05-28 NOTE — CARE COORDINATION
Ambulatory Care Coordination Note     5/28/2025 3:57 PM     Patient outreach attempt by this ACM today to offer care management services. ACM was unable to reach the patient by telephone today;   left voice message requesting a return phone call to this ACM.     ACM: Bridger Wyman RN     Care Summary Note:     Patient has been identified for ambulatory case management.  Writer phoned Patient to introduce self and explain ambulatory case management.  Message left on Patient's voice mail requesting a return call.    Today is Writer's fourth weekly call to offer care management.   Today is final attempt.  ACM letter mailed to Patient.          PCP/Specialist follow up:

## 2025-07-03 ENCOUNTER — TELEPHONE (OUTPATIENT)
Dept: PHARMACY | Facility: CLINIC | Age: 70
End: 2025-07-03

## 2025-07-03 NOTE — TELEPHONE ENCOUNTER
[facility] CLINICAL PHARMACY: ADHERENCE REVIEW  Identified care gap per [employer]: fills at Non-preferred pharmacy: [facility] (is LIS and/or DSNP, so \"preferred\" pharmacy network may not apply): Diabetes and Statin adherence    ASSESSMENT  DIABETES ADHERENCE    Insurance Records claims through 25 (Prior Year PDC = not reported; YTD PDC = FIRST FILL; Potential Fail Date: 25):   METFORMIN    TAB 500MG ER last filled on 25 for 90 day supply. Next refill due: 25    Prescribed si tablet/capsule daily    Per Reconcile Dispense History: last filled on 25 for 90 day supply.     Per [facility]: unable to reach staff and automated order status did not recognize patient's phone # or rx #    Lab Results   Component Value Date    LABA1C 6.4 2025    LABA1C 6.3 2024    LABA1C 6.6 2023     STATIN ADHERENCE    Insurance Records claims through 25 (Prior Year PDC = not reported; YTD PDC = FIRST FILL; Potential Fail Date: 25):   ATORVASTATIN TAB 80MG last filled on 25 for 90 day supply. Next refill due: 25    Prescribed si tablet/capsule daily    Per Reconcile Dispense History: last filled on 25 for 90 day supply.     Per [facility]: unable to reach staff and automated order status did not recognize patient's phone # or rx #    Lab Results   Component Value Date    CHOL 188 2024    TRIG 169 (H) 2024    HDL 44 2024     Lab Results   Component Value Date     (H) 2024      ALT   Date Value Ref Range Status   2023 18 5 - 41 U/L Final     AST   Date Value Ref Range Status   2023 16 <40 U/L Final     The ASCVD Risk score (Zuleima PENNINGTON, et al., 2019) failed to calculate for the following reasons:    Risk score cannot be calculated because patient has a medical history suggesting prior/existing ASCVD     PLAN  Per insurer report, LIS-2 - may be able to receive up to a 100-day supply for the same cost as a 30-day

## (undated) DEVICE — 4-PORT MANIFOLD: Brand: NEPTUNE 2

## (undated) DEVICE — SOLUTION IV 250ML 0.9% SOD CHL PH 5 INJ USP VIAFLX PLAS

## (undated) DEVICE — YANKAUER,FLEXIBLE HANDLE,REGLR CAPACITY: Brand: MEDLINE INDUSTRIES, INC.

## (undated) DEVICE — SUTURE MCRYL SZ 3-0 L27IN ABSRB UD L24MM PS-1 3/8 CIR PRIM Y936H

## (undated) DEVICE — BLADE SAW W7XL18.5MM THK0.51MM SAG OSC TPS 229633114] STRYKER INSTRUMENT DIV]

## (undated) DEVICE — MAX-CORE® DISPOSABLE CORE BIOPSY INSTRUMENT, 18G X 20CM: Brand: MAX-CORE

## (undated) DEVICE — TOTAL TRAY, DB, 100% SILI FOLEY, 16FR 10: Brand: MEDLINE

## (undated) DEVICE — TUBING, SUCTION, 1/4" X 12', STRAIGHT: Brand: MEDLINE

## (undated) DEVICE — COVER,TABLE,HEAVY DUTY,50"X90",STRL: Brand: MEDLINE

## (undated) DEVICE — BANDAGE GZ W2XL75IN ST RAYON POLY CNFRM STRTCH LTWT

## (undated) DEVICE — BLADE SAGITAL 18X90X1.27MM

## (undated) DEVICE — GLOVE ORANGE PI 7 1/2   MSG9075

## (undated) DEVICE — NEEDLE SPNL 22GA L7IN SPINOCAN

## (undated) DEVICE — TOWEL,OR,DSP,ST,NATURAL,DLX,4/PK,20PK/CS: Brand: MEDLINE

## (undated) DEVICE — STRAP ARMBRD W1.5XL32IN FOAM STR YET SFT W/ HK AND LOOP

## (undated) DEVICE — SVMMC POD PK

## (undated) DEVICE — SUTURE VCRL SZ 0 L27IN ABSRB UD L36MM CP-1 1/2 CIR REV CUT J267H

## (undated) DEVICE — PAD COOL W10.9XL11.3IN UNIV REG HOSE WRP ON THER CLD

## (undated) DEVICE — ELECTRODE ES L3IN S STL BLDE INSUL DISP VALLEYLAB EDGE

## (undated) DEVICE — TOURNIQUET CUF BLD PRESSURE 4X18 IN 2 PRT SINGLE BLDR RED

## (undated) DEVICE — PIN DRL DIA1/8IN QUIK REL FOR VANGUARD UNIV INSTR TOT KNEE 32486265] ZIMMER BIOMET ORTHOPEDICS]

## (undated) DEVICE — Z INACTIVE USE 2660664 SOLUTION IRRIG 3000ML 0.9% SOD CHL USP UROMATIC PLAS CONT

## (undated) DEVICE — GLOVE SURG SZ 85 L12IN FNGR ORTHO 126MIL CRM LTX FREE

## (undated) DEVICE — NEEDLE SPNL 18GA L3.5IN W/ QNCKE SHARPER BVL DURA CLICK

## (undated) DEVICE — CEMENT MIXING SYSTEM WITH FEMORAL BREAKWAY NOZZLE: Brand: REVOLUTION

## (undated) DEVICE — SHEET,DRAPE,70X100,STERILE: Brand: MEDLINE

## (undated) DEVICE — GLOVE ORANGE PI 8 1/2   MSG9085

## (undated) DEVICE — SUTURE ETHLN SZ 2-0 L18IN NONABSORBABLE BLK L26MM PS 3/8 585H

## (undated) DEVICE — GLOVE SURG SZ 65 THK91MIL LTX FREE SYN POLYISOPRENE

## (undated) DEVICE — Device

## (undated) DEVICE — BANDAGE,GAUZE,BULKEE II,4.5"X4.1YD,STRL: Brand: MEDLINE

## (undated) DEVICE — GLOVE SURG SZ 6 THK91MIL LTX FREE SYN POLYISOPRENE ANTI

## (undated) DEVICE — SPINAL BIOPSY NEEDLE 22GA X 20CM: Brand: SPINAL BIOPSY NEEDLE

## (undated) DEVICE — SUTURE VCRL SZ 1 L27IN ABSRB UD L36MM CP-1 1/2 CIR REV CUT J268H

## (undated) DEVICE — GOWN,AURORA,NONRNF,XL,30/CS: Brand: MEDLINE

## (undated) DEVICE — CHLORAPREP 26ML ORANGE

## (undated) DEVICE — SUTURE VCRL SZ 2-0 L27IN ABSRB UD L36MM CP-1 1/2 CIR REV J266H

## (undated) DEVICE — Z DISCONTINUED PER MEDLINE USE 2741943 DRESSING AQUACEL 10 IN ALG W9XL25CM SIL CVR WTRPRF VIR BACT BARR ANTIMIC

## (undated) DEVICE — BLANKET WRM W29.9XL79.1IN UP BODY FORC AIR MISTRAL-AIR

## (undated) DEVICE — PROTECTOR PRSS FOR PRSS

## (undated) DEVICE — ZIPPERED TOGA, 2X LARGE: Brand: FLYTE, SURGICOOL

## (undated) DEVICE — PREP-RESISTANT MARKER W/ RULER: Brand: MEDLINE INDUSTRIES, INC.

## (undated) DEVICE — SMALL TEAR CROSS CUT RASP (11.0 X 5.0MM)

## (undated) DEVICE — SUTURE VIC + UD PS-2 3-0 18IN VCP497G

## (undated) DEVICE — ADHESIVE SKIN CLSR 0.7ML TOP DERMBND ADV

## (undated) DEVICE — GLOVE ORANGE PI 7   MSG9070